# Patient Record
Sex: FEMALE | Race: WHITE | NOT HISPANIC OR LATINO | Employment: UNEMPLOYED | URBAN - NONMETROPOLITAN AREA
[De-identification: names, ages, dates, MRNs, and addresses within clinical notes are randomized per-mention and may not be internally consistent; named-entity substitution may affect disease eponyms.]

---

## 2019-02-14 ENCOUNTER — HOSPITAL ENCOUNTER (EMERGENCY)
Facility: HOSPITAL | Age: 54
Discharge: HOME | End: 2019-02-14
Attending: EMERGENCY MEDICINE | Admitting: EMERGENCY MEDICINE

## 2019-02-14 ENCOUNTER — HOSPITAL ENCOUNTER (EMERGENCY)
Facility: HOSPITAL | Age: 54
Discharge: HOME | End: 2019-02-15
Attending: EMERGENCY MEDICINE | Admitting: EMERGENCY MEDICINE

## 2019-02-14 VITALS
RESPIRATION RATE: 16 BRPM | HEART RATE: 90 BPM | OXYGEN SATURATION: 99 % | WEIGHT: 125 LBS | BODY MASS INDEX: 20.83 KG/M2 | DIASTOLIC BLOOD PRESSURE: 73 MMHG | TEMPERATURE: 97.8 F | SYSTOLIC BLOOD PRESSURE: 115 MMHG | HEIGHT: 65 IN

## 2019-02-14 DIAGNOSIS — E87.1 HYPONATREMIA: ICD-10-CM

## 2019-02-14 DIAGNOSIS — Z59.00 HOMELESSNESS: Primary | ICD-10-CM

## 2019-02-14 DIAGNOSIS — F10.10 ETOH ABUSE: Primary | ICD-10-CM

## 2019-02-14 DIAGNOSIS — Z59.00 HOMELESSNESS: ICD-10-CM

## 2019-02-14 DIAGNOSIS — R07.9 CHEST PAIN, UNSPECIFIED TYPE: ICD-10-CM

## 2019-02-14 LAB
ALBUMIN SERPL-MCNC: 4.5 G/DL (ref 3.5–5)
ALBUMIN/GLOB SERPL: 1.3 G/DL (ref 1–2)
ALP SERPL-CCNC: 87 U/L (ref 38–126)
ALT SERPL W P-5'-P-CCNC: 34 U/L (ref 13–69)
ANION GAP SERPL CALCULATED.3IONS-SCNC: 16 MMOL/L (ref 10–20)
AST SERPL-CCNC: 59 U/L (ref 15–46)
BASOPHILS # BLD AUTO: 0.03 10*3/MM3 (ref 0–0.2)
BASOPHILS # BLD AUTO: 0.06 10*3/MM3 (ref 0–0.2)
BASOPHILS NFR BLD AUTO: 0.4 % (ref 0–2.5)
BASOPHILS NFR BLD AUTO: 1 % (ref 0–2.5)
BILIRUB SERPL-MCNC: 0.3 MG/DL (ref 0.2–1.3)
BUN BLD-MCNC: 3 MG/DL (ref 7–20)
BUN/CREAT SERPL: 7.5 (ref 7.1–23.5)
CALCIUM SPEC-SCNC: 8.9 MG/DL (ref 8.4–10.2)
CHLORIDE SERPL-SCNC: 94 MMOL/L (ref 98–107)
CO2 SERPL-SCNC: 20 MMOL/L (ref 26–30)
CREAT BLD-MCNC: 0.4 MG/DL (ref 0.6–1.3)
DEPRECATED RDW RBC AUTO: 46.6 FL (ref 37–54)
DEPRECATED RDW RBC AUTO: 47.2 FL (ref 37–54)
EOSINOPHIL # BLD AUTO: 0.09 10*3/MM3 (ref 0–0.7)
EOSINOPHIL # BLD AUTO: 0.19 10*3/MM3 (ref 0–0.7)
EOSINOPHIL NFR BLD AUTO: 1.3 % (ref 0–7)
EOSINOPHIL NFR BLD AUTO: 3.2 % (ref 0–7)
ERYTHROCYTE [DISTWIDTH] IN BLOOD BY AUTOMATED COUNT: 12.5 % (ref 11.5–14.5)
ERYTHROCYTE [DISTWIDTH] IN BLOOD BY AUTOMATED COUNT: 12.5 % (ref 11.5–14.5)
ETHANOL BLD-MCNC: 236 MG/DL
ETHANOL UR QL: 0.24 %
GFR SERPL CREATININE-BSD FRML MDRD: >150 ML/MIN/1.73
GLOBULIN UR ELPH-MCNC: 3.4 GM/DL
GLUCOSE BLD-MCNC: 76 MG/DL (ref 74–98)
HCT VFR BLD AUTO: 37.1 % (ref 37–47)
HCT VFR BLD AUTO: 37.8 % (ref 37–47)
HGB BLD-MCNC: 13 G/DL (ref 12–16)
HGB BLD-MCNC: 13.1 G/DL (ref 12–16)
HOLD SPECIMEN: NORMAL
HOLD SPECIMEN: NORMAL
IMM GRANULOCYTES # BLD AUTO: 0.02 10*3/MM3 (ref 0–0.06)
IMM GRANULOCYTES # BLD AUTO: 0.03 10*3/MM3 (ref 0–0.06)
IMM GRANULOCYTES NFR BLD AUTO: 0.3 % (ref 0–0.6)
IMM GRANULOCYTES NFR BLD AUTO: 0.5 % (ref 0–0.6)
LYMPHOCYTES # BLD AUTO: 0.57 10*3/MM3 (ref 0.6–3.4)
LYMPHOCYTES # BLD AUTO: 1.33 10*3/MM3 (ref 0.6–3.4)
LYMPHOCYTES NFR BLD AUTO: 22.7 % (ref 10–50)
LYMPHOCYTES NFR BLD AUTO: 8.1 % (ref 10–50)
MCH RBC QN AUTO: 34.9 PG (ref 27–31)
MCH RBC QN AUTO: 35.6 PG (ref 27–31)
MCHC RBC AUTO-ENTMCNC: 34.4 G/DL (ref 30–37)
MCHC RBC AUTO-ENTMCNC: 35.3 G/DL (ref 30–37)
MCV RBC AUTO: 100.8 FL (ref 81–99)
MCV RBC AUTO: 101.6 FL (ref 81–99)
MONOCYTES # BLD AUTO: 0.56 10*3/MM3 (ref 0–0.9)
MONOCYTES # BLD AUTO: 0.59 10*3/MM3 (ref 0–0.9)
MONOCYTES NFR BLD AUTO: 10.1 % (ref 0–12)
MONOCYTES NFR BLD AUTO: 8 % (ref 0–12)
NEUTROPHILS # BLD AUTO: 3.65 10*3/MM3 (ref 2–6.9)
NEUTROPHILS # BLD AUTO: 5.73 10*3/MM3 (ref 2–6.9)
NEUTROPHILS NFR BLD AUTO: 62.5 % (ref 37–80)
NEUTROPHILS NFR BLD AUTO: 81.9 % (ref 37–80)
NRBC BLD AUTO-RTO: 0 /100 WBC (ref 0–0)
NRBC BLD AUTO-RTO: 0 /100 WBC (ref 0–0)
PLATELET # BLD AUTO: 107 10*3/MM3 (ref 130–400)
PLATELET # BLD AUTO: 123 10*3/MM3 (ref 130–400)
PMV BLD AUTO: 9.8 FL (ref 6–12)
PMV BLD AUTO: 9.9 FL (ref 6–12)
POTASSIUM BLD-SCNC: 4 MMOL/L (ref 3.5–5.1)
PROT SERPL-MCNC: 7.9 G/DL (ref 6.3–8.2)
RBC # BLD AUTO: 3.68 10*6/MM3 (ref 4.2–5.4)
RBC # BLD AUTO: 3.72 10*6/MM3 (ref 4.2–5.4)
SODIUM BLD-SCNC: 126 MMOL/L (ref 137–145)
TROPONIN I SERPL-MCNC: <0.012 NG/ML (ref 0–0.03)
WBC NRBC COR # BLD: 5.85 10*3/MM3 (ref 4.8–10.8)
WBC NRBC COR # BLD: 7 10*3/MM3 (ref 4.8–10.8)
WHOLE BLOOD HOLD SPECIMEN: NORMAL
WHOLE BLOOD HOLD SPECIMEN: NORMAL

## 2019-02-14 PROCEDURE — 99283 EMERGENCY DEPT VISIT LOW MDM: CPT

## 2019-02-14 PROCEDURE — 85025 COMPLETE CBC W/AUTO DIFF WBC: CPT | Performed by: EMERGENCY MEDICINE

## 2019-02-14 PROCEDURE — 85025 COMPLETE CBC W/AUTO DIFF WBC: CPT | Performed by: PHYSICIAN ASSISTANT

## 2019-02-14 PROCEDURE — 96375 TX/PRO/DX INJ NEW DRUG ADDON: CPT

## 2019-02-14 PROCEDURE — 25010000002 LORAZEPAM PER 2 MG: Performed by: EMERGENCY MEDICINE

## 2019-02-14 PROCEDURE — 25010000002 DIPHENHYDRAMINE PER 50 MG: Performed by: EMERGENCY MEDICINE

## 2019-02-14 PROCEDURE — 96361 HYDRATE IV INFUSION ADD-ON: CPT

## 2019-02-14 PROCEDURE — 96360 HYDRATION IV INFUSION INIT: CPT

## 2019-02-14 PROCEDURE — 84484 ASSAY OF TROPONIN QUANT: CPT | Performed by: PHYSICIAN ASSISTANT

## 2019-02-14 PROCEDURE — 80307 DRUG TEST PRSMV CHEM ANLYZR: CPT | Performed by: PHYSICIAN ASSISTANT

## 2019-02-14 PROCEDURE — 93005 ELECTROCARDIOGRAM TRACING: CPT | Performed by: EMERGENCY MEDICINE

## 2019-02-14 PROCEDURE — 80053 COMPREHEN METABOLIC PANEL: CPT | Performed by: PHYSICIAN ASSISTANT

## 2019-02-14 PROCEDURE — 93005 ELECTROCARDIOGRAM TRACING: CPT

## 2019-02-14 PROCEDURE — 96374 THER/PROPH/DIAG INJ IV PUSH: CPT

## 2019-02-14 RX ORDER — CALCIUM CARBONATE 200(500)MG
2 TABLET,CHEWABLE ORAL AS NEEDED
COMMUNITY

## 2019-02-14 RX ORDER — DIPHENHYDRAMINE HYDROCHLORIDE 50 MG/ML
25 INJECTION INTRAMUSCULAR; INTRAVENOUS ONCE
Status: COMPLETED | OUTPATIENT
Start: 2019-02-14 | End: 2019-02-14

## 2019-02-14 RX ORDER — ASPIRIN 325 MG
650 TABLET ORAL DAILY
Status: ON HOLD | COMMUNITY
End: 2019-04-18 | Stop reason: SDUPTHER

## 2019-02-14 RX ORDER — LORAZEPAM 2 MG/ML
1 INJECTION INTRAMUSCULAR ONCE
Status: COMPLETED | OUTPATIENT
Start: 2019-02-14 | End: 2019-02-14

## 2019-02-14 RX ORDER — SODIUM CHLORIDE 0.9 % (FLUSH) 0.9 %
10 SYRINGE (ML) INJECTION AS NEEDED
Status: DISCONTINUED | OUTPATIENT
Start: 2019-02-14 | End: 2019-02-14 | Stop reason: HOSPADM

## 2019-02-14 RX ADMIN — SODIUM CHLORIDE 1000 ML: 9 INJECTION, SOLUTION INTRAVENOUS at 22:44

## 2019-02-14 RX ADMIN — LORAZEPAM 1 MG: 2 INJECTION, SOLUTION INTRAMUSCULAR; INTRAVENOUS at 22:32

## 2019-02-14 RX ADMIN — SODIUM CHLORIDE 1000 ML: 9 INJECTION, SOLUTION INTRAVENOUS at 13:49

## 2019-02-14 RX ADMIN — DIPHENHYDRAMINE HYDROCHLORIDE 25 MG: 50 INJECTION, SOLUTION INTRAMUSCULAR; INTRAVENOUS at 22:32

## 2019-02-14 SDOH — ECONOMIC STABILITY - HOUSING INSECURITY: HOMELESSNESS UNSPECIFIED: Z59.00

## 2019-02-14 NOTE — ED NOTES
Patient was given paper scrubs, nonskid socks, sack lunch, and cola.     Bella Mccoy RN  02/14/19 8856

## 2019-02-14 NOTE — ED NOTES
Attempted to contact Collin Bundy [phone], and Faustino Mclain [phone], and [phone].  No answer.       Bella Mccoy RN  02/14/19 3339

## 2019-02-14 NOTE — ED NOTES
Erin (RECIO) attempting to find a place for patient to stay.      Bella Mccoy, RN  02/14/19 3839

## 2019-02-14 NOTE — PROGRESS NOTES
Continued Stay Note  [facility]     Patient Name: Altagracia King  MRN: [id number]  Today's Date: 2/14/2019    Admit Date: 2/14/2019    Discharge Plan     Row Name 02/14/19 7137       Plan    Plan  Spoke to pt. who states she has no where to lives. Provided homeles shelter list, bus tokens and notified of ability to ride [facility] [address] [facility] or use bus token to [address] [facility] [address] in [place] to [employer]. After 5 will need to get taxi voucher to [employer].    Plan Comments  Notified nurse.    Row Name 02/14/19 4027       Plan    Plan  Consult for homelessness        Discharge Codes    No documentation.             Zaida Campbell

## 2019-02-14 NOTE — ED PROVIDER NOTES
Subjective   53-year-old female presents with multiple complaints, she states that she's got glaucoma and bilateral eyes.  The left is blind and clouded, she has glaucoma the right she states that vision is getting worse.  She also states that she has weakness, and chest pain, she admits that she for several years, he put around she's been standing a hotel.  She recently Hotel and has nowhere to go  after being put on a hotel she called 911 to come to the emergency department.  She's had multiple complaints, she's not been eating and drinking well, and she cannot care for herself.        History provided by:  Patient   used: No        Review of Systems   Constitutional: Positive for appetite change and fatigue.   Cardiovascular: Positive for chest pain.   Musculoskeletal: Positive for myalgias.   Neurological: Positive for weakness.   All other systems reviewed and are negative.      History reviewed. No pertinent past medical history.    No Known Allergies    Past Surgical History:   Procedure Laterality Date   • CARDIAC CATHETERIZATION         History reviewed. No pertinent family history.    Social History     Socioeconomic History   • Marital status:      Spouse name: Not on file   • Number of children: Not on file   • Years of education: Not on file   • Highest education level: Not on file   Tobacco Use   • Smoking status: Current Every Day Smoker     Packs/day: 1.00   • Smokeless tobacco: Never Used   Substance and Sexual Activity   • Alcohol use: Yes     Alcohol/week: 2.4 oz     Types: 4 Cans of beer per week           Objective   Physical Exam   Constitutional: She is oriented to person, place, and time.   Disheveled appearance,   Eyes:       Left eye completely clouded no vision in any visual field.  Right eye is partially cloudy   Neck: Normal range of motion. Neck supple.   Cardiovascular: Normal rate and regular rhythm.   Pulmonary/Chest: Effort normal and breath sounds  normal.   Abdominal: Soft. Bowel sounds are normal.   Musculoskeletal:   cachectic appearing   Neurological: She is alert and oriented to person, place, and time. She has normal reflexes.   Skin:   Multiple areas of picked skin/scabs   Psychiatric: She has a normal mood and affect.   Nursing note and vitals reviewed.      Procedures           ED Course  ED Course as of Feb 18 1006   Thu Feb 14, 2019   1454 Patient has an available bed at a shelter, will discharge after liter of normal saline.  She does have low sodium and signs of dehydration, but she is a chronic alcoholic I suspect that her levels are low at baseline.  [CS]   1504 EKG reveals sinus rhythm with a rate of 92 bpm.  There are occasional premature ventricular complexes.  No acute ischemic signs or signs of arrhythmia.  Atypical appearing EKG.  [TB]   1707 With patient unable to go to [facility] due to needing assistance, she is legally blind, she has been for years but she doesn't have help  [CS]   1716 Discussed with yaneth , facility in [place] can accomodate  [CS]      ED Course User Index  [CS] Gómez Klein Jr., PA-C  [TB] Mckayla Jackson MD                  MDM  Number of Diagnoses or Management Options  ETOH abuse: new and requires workup  Homelessness: new and requires workup  Hyponatremia: new and requires workup     Amount and/or Complexity of Data Reviewed  Clinical lab tests: reviewed  Discuss the patient with other providers: yes    Risk of Complications, Morbidity, and/or Mortality  Presenting problems: low  Diagnostic procedures: moderate  Management options: moderate    Patient Progress  Patient progress: stable        Final diagnoses:   ETOH abuse   Hyponatremia   Homelessness            Gómez Klein Jr., PA-C  02/14/19 1553       Gómez Klein Jr., PA-C  02/18/19 1007

## 2019-02-15 VITALS
RESPIRATION RATE: 18 BRPM | WEIGHT: 125 LBS | OXYGEN SATURATION: 99 % | SYSTOLIC BLOOD PRESSURE: 116 MMHG | HEART RATE: 116 BPM | TEMPERATURE: 98.1 F | HEIGHT: 65 IN | DIASTOLIC BLOOD PRESSURE: 89 MMHG | BODY MASS INDEX: 20.83 KG/M2

## 2019-02-15 LAB
ALBUMIN SERPL-MCNC: 3.4 G/DL (ref 3.5–5)
ALBUMIN/GLOB SERPL: 1.3 G/DL (ref 1–2)
ALP SERPL-CCNC: 65 U/L (ref 38–126)
ALT SERPL W P-5'-P-CCNC: 28 U/L (ref 13–69)
ANION GAP SERPL CALCULATED.3IONS-SCNC: 10.3 MMOL/L (ref 10–20)
AST SERPL-CCNC: 31 U/L (ref 15–46)
BILIRUB SERPL-MCNC: 0.3 MG/DL (ref 0.2–1.3)
BUN BLD-MCNC: 8 MG/DL (ref 7–20)
BUN/CREAT SERPL: 16 (ref 7.1–23.5)
CALCIUM SPEC-SCNC: 8.2 MG/DL (ref 8.4–10.2)
CHLORIDE SERPL-SCNC: 104 MMOL/L (ref 98–107)
CO2 SERPL-SCNC: 22 MMOL/L (ref 26–30)
CREAT BLD-MCNC: 0.5 MG/DL (ref 0.6–1.3)
GFR SERPL CREATININE-BSD FRML MDRD: 128 ML/MIN/1.73
GLOBULIN UR ELPH-MCNC: 2.7 GM/DL
GLUCOSE BLD-MCNC: 72 MG/DL (ref 74–98)
GLUCOSE BLDC GLUCOMTR-MCNC: 75 MG/DL (ref 70–130)
POTASSIUM BLD-SCNC: 4.3 MMOL/L (ref 3.5–5.1)
PROT SERPL-MCNC: 6.1 G/DL (ref 6.3–8.2)
SODIUM BLD-SCNC: 132 MMOL/L (ref 137–145)

## 2019-02-15 PROCEDURE — 80053 COMPREHEN METABOLIC PANEL: CPT | Performed by: EMERGENCY MEDICINE

## 2019-02-15 PROCEDURE — 82962 GLUCOSE BLOOD TEST: CPT

## 2019-02-15 NOTE — PROGRESS NOTES
Continued Stay Note  [facility]     Patient Name: Altagracia King  MRN: [id number]  Today's Date: 2/15/2019    Admit Date: 2/14/2019    Discharge Plan     Row Name 02/15/19 1215       Plan    Plan  Have called three shelters only bed at this point [facility] [place] [facility] but it is a top bunk. Will continue to look for bed. Has no Medicare. [facility] is looking to determine if she has skilled nursing benefits    Row Name 02/15/19 1148       Plan    Plan  Patient's walker okay to bill to [facility].    Row Name 02/15/19 1047       Plan    Plan  APS report number [id number].        Discharge Codes    No documentation.             Zaida Campbell

## 2019-02-15 NOTE — ED PROVIDER NOTES
"Subjective   53-year-old female presents to the ED with chief complaint of chest pain.  The patient was seen earlier today and evaluated and discharged after a negative workup for chest pain.  She states it is not went away.  On further questioning and review it is found out that the patient was homeless without a place to stay.  It was arranged for her to go to a shelter via cab but the patient got correction to [place] and then had the  take her back to a trailer in [place]. The patient then states that she may have did some \"drugs\" with her friend who then told her to leave and called the police on her. Pt was brought back to the ED.             Review of Systems   Cardiovascular: Positive for chest pain.   All other systems reviewed and are negative.      History reviewed. No pertinent past medical history.    No Known Allergies    Past Surgical History:   Procedure Laterality Date   • CARDIAC CATHETERIZATION         History reviewed. No pertinent family history.    Social History     Socioeconomic History   • Marital status:      Spouse name: Not on file   • Number of children: Not on file   • Years of education: Not on file   • Highest education level: Not on file   Tobacco Use   • Smoking status: Current Every Day Smoker     Packs/day: 1.00   • Smokeless tobacco: Never Used   Substance and Sexual Activity   • Alcohol use: Yes     Alcohol/week: 2.4 oz     Types: 4 Cans of beer per week           Objective   Physical Exam   Constitutional: She is oriented to person, place, and time. No distress.   Chronically ill-appearing   HENT:   Head: Normocephalic and atraumatic.   Nose: Nose normal.   Eyes:   blindness   Cardiovascular: Regular rhythm.   Tachycardia   Pulmonary/Chest: Effort normal and breath sounds normal. No respiratory distress.   Abdominal: Soft. She exhibits no distension. There is no tenderness. There is no guarding.   Musculoskeletal: She exhibits no edema or deformity. "   Neurological: She is alert and oriented to person, place, and time. No cranial nerve deficit.   Skin: She is not diaphoretic.   Nursing note and vitals reviewed.      Procedures           ED Course          Workup Unremarkable.  Patient will be discharged to a facility.        [facility]      Final diagnoses:   Homelessness   Chest pain, unspecified type            Yusef Murphy, DO  02/15/19 0315

## 2019-02-15 NOTE — PROGRESS NOTES
Continued Stay Note  [facility]     Patient Name: Altagracia King  MRN: [id number]  Today's Date: 2/15/2019    Admit Date: 2/14/2019    Discharge Plan     Row Name 02/15/19 1047       Plan    Plan  [facility] report number [id number].    Row Name 02/15/19 0841       Plan    Plan  Called last evening on call. Apparently pt. told  to take her tofriends home and not to shelter as planned. Police were called per staff and cab brought her back around 9 last evening. Plan was for her to go to shelter. At around 1 am one shelter refused and unsure if other would take at 1 am. Spoke to pt. and she would return to friends. Plan was for staff to offere cab voucher to her desired location and SW would follow up in am.     Plan Comments  Called [facility] this am and am waiting a call back for acceptance. Pt legally blind perstaff but has been living in hotel caring for herself and directed  to friends house without difficulty. She needs to be able to walk up stairs at local [facility] [employer] so this is an option also. Discussed  with staff.        Discharge Codes    No documentation.             Zaida Campbell

## 2019-02-15 NOTE — ED NOTES
I spoke with Zaida Campbell and stated that the patient can be sent to [facility] in [place] to stay.  She stated that they will be able to help the patient with her needs.  Erin, (house supervisor), gave patient cab voucher to [facility] in [place].  Patient A & O x 4, resp e/u, skin p/w/d.      Bella Mccoy, RN  02/14/19 1940

## 2019-02-15 NOTE — ED NOTES
Pt loudly calling out for something to drink. Provided her with an ice water and a warm blanket. Educated her on how to use the call light for further needs.      Rose Paz RN  02/14/19 2294

## 2019-02-15 NOTE — PROGRESS NOTES
Continued Stay Note  [facility]     Patient Name: Altagracia King  MRN: [id number]  Today's Date: 2/15/2019    Admit Date: 2/14/2019    Discharge Plan     Row Name 02/15/19 1237       Plan    Plan  Left message for [facility] staff to see if local shelter has bed.    Row Name 02/15/19 1215       Plan    Plan  Have called three shelters only bed at this point [facility] [place] [facility] but it is a top bunk. Will continue to look for bed. Has no Medicare. [facility] is looking to determine if she has skilled nursing benefits    Row Name 02/15/19 1148       Plan    Plan  Patient's walker okay to bill to [facility].    Row Name 02/15/19 1047       Plan    Plan  APS report number [id number].        Discharge Codes    No documentation.             Zaida Campbell

## 2019-02-15 NOTE — ED NOTES
Lab called to report that specimen appeared contaminated.  They are coming to redraw.     Rose Paz, LIANE  02/15/19 0023

## 2019-02-15 NOTE — PROGRESS NOTES
Continued Stay Note  [facility]     Patient Name: Altagracia King  MRN: [id number]  Today's Date: 2/15/2019    Admit Date: 2/14/2019    Discharge Plan     Row Name 02/15/19 1321       Plan    Plan  Call to [facility] in [place], they have beds. Located at [address] [place] [address]. Discussed with pt. and provided a taxi voucher for her.    Plan Comments  Provided food and socks and patient left with all her belongings. Verified she has no nursing home benefits. Personal walker also left in pt's possession. Pt. in agreement with this plan.    Row Name 02/15/19 1237       Plan    Plan  Left message for [facility] staff to see if local shelter has bed.    Row Name 02/15/19 1215       Plan    Plan  Have called three shelters only bed at this point [facility] [place] [facility] but it is a top bunk. Will continue to look for bed. Has no Medicare. [facility] is looking to determine if she has skilled nursing benefits    Row Name 02/15/19 1148       Plan    Plan  Patient's walker okay to bill to [facility].    Row Name 02/15/19 1047       Plan    Plan  APS report number [id number].        Discharge Codes    No documentation.             Zaida Campbell

## 2019-02-15 NOTE — PROGRESS NOTES
Continued Stay Note  [facility]     Patient Name: Altagracia King  MRN: [id number]  Today's Date: 2/15/2019    Admit Date: 2/14/2019    Discharge Plan     Row Name 02/15/19 0841       Plan    Plan  Called last evening on call. Apparently pt. told  to take her tofriends home and not to shelter as planned. Police were called per staff and cab brought her back around 9 last evening. Plan was for her to go to shelter. At around 1 am one shelter refused and unsure if other would take at 1 am. Spoke to pt. and she would return to friends. Plan was for staff to offere cab voucher to her desired location and SW would follow up in am.     Plan Comments  Called [facility] this am and am waiting a call back for acceptance. Pt legally blind perstaff but has been living in hotel caring for herself and directed  to friends house without difficulty. She needs to be able to walk up stairs at local [employer] so this is an option also. Discussed  with staff.        Discharge Codes    No documentation.             Zaida Campbell

## 2019-02-15 NOTE — PROGRESS NOTES
Continued Stay Note  [facility]     Patient Name: Altagracia King  MRN: [id number]  Today's Date: 2/15/2019    Admit Date: 2/14/2019    Discharge Plan     Row Name 02/15/19 1148       Plan    Plan  Patient's walker okay to bill to [facility].    Row Name 02/15/19 1047       Plan    Plan  APS report number [id number].        Discharge Codes    No documentation.             Zaida Campbell

## 2019-02-22 ENCOUNTER — HOSPITAL ENCOUNTER (INPATIENT)
Facility: HOSPITAL | Age: 54
LOS: 54 days | Discharge: NURSING, REHAB OR LONG-TERM CARE | End: 2019-04-18
Attending: FAMILY MEDICINE | Admitting: INTERNAL MEDICINE

## 2019-02-22 ENCOUNTER — APPOINTMENT (OUTPATIENT)
Dept: GENERAL RADIOLOGY | Facility: HOSPITAL | Age: 54
End: 2019-02-22

## 2019-02-22 DIAGNOSIS — R07.9 CHEST PAIN, UNSPECIFIED TYPE: Primary | ICD-10-CM

## 2019-02-22 DIAGNOSIS — N30.00 ACUTE CYSTITIS WITHOUT HEMATURIA: ICD-10-CM

## 2019-02-22 LAB
6-ACETYL MORPHINE: NEGATIVE
ALBUMIN SERPL-MCNC: 4.2 G/DL (ref 3.5–5)
ALBUMIN/GLOB SERPL: 1.5 G/DL (ref 1.5–2.5)
ALP SERPL-CCNC: 69 U/L (ref 35–104)
ALT SERPL W P-5'-P-CCNC: 15 U/L (ref 10–36)
AMPHET+METHAMPHET UR QL: NEGATIVE
ANION GAP SERPL CALCULATED.3IONS-SCNC: 5.5 MMOL/L (ref 3.6–11.2)
AST SERPL-CCNC: 23 U/L (ref 10–30)
BACTERIA UR QL AUTO: ABNORMAL /HPF
BARBITURATES UR QL SCN: NEGATIVE
BASOPHILS # BLD AUTO: 0.02 10*3/MM3 (ref 0–0.3)
BASOPHILS NFR BLD AUTO: 0.4 % (ref 0–2)
BENZODIAZ UR QL SCN: NEGATIVE
BILIRUB SERPL-MCNC: 0.2 MG/DL (ref 0.2–1.8)
BILIRUB UR QL STRIP: NEGATIVE
BNP SERPL-MCNC: 162 PG/ML (ref 0–100)
BUN BLD-MCNC: 16 MG/DL (ref 7–21)
BUN/CREAT SERPL: 23.2 (ref 7–25)
BUPRENORPHINE SERPL-MCNC: NEGATIVE NG/ML
CALCIUM SPEC-SCNC: 9.1 MG/DL (ref 7.7–10)
CANNABINOIDS SERPL QL: NEGATIVE
CHLORIDE SERPL-SCNC: 102 MMOL/L (ref 99–112)
CK SERPL-CCNC: 41 U/L (ref 24–173)
CLARITY UR: CLEAR
CO2 SERPL-SCNC: 24.5 MMOL/L (ref 24.3–31.9)
COCAINE UR QL: NEGATIVE
COLOR UR: YELLOW
CREAT BLD-MCNC: 0.69 MG/DL (ref 0.43–1.29)
DEPRECATED RDW RBC AUTO: 47.4 FL (ref 37–54)
EOSINOPHIL # BLD AUTO: 0.07 10*3/MM3 (ref 0–0.7)
EOSINOPHIL NFR BLD AUTO: 1.2 % (ref 0–5)
ERYTHROCYTE [DISTWIDTH] IN BLOOD BY AUTOMATED COUNT: 12.7 % (ref 11.5–14.5)
ETHANOL BLD-MCNC: <10 MG/DL
ETHANOL UR QL: <0.01 %
GFR SERPL CREATININE-BSD FRML MDRD: 88 ML/MIN/1.73
GLOBULIN UR ELPH-MCNC: 2.8 GM/DL
GLUCOSE BLD-MCNC: 104 MG/DL (ref 70–110)
GLUCOSE UR STRIP-MCNC: NEGATIVE MG/DL
HCT VFR BLD AUTO: 34.4 % (ref 37–47)
HGB BLD-MCNC: 11.2 G/DL (ref 12–16)
HGB UR QL STRIP.AUTO: NEGATIVE
HOLD SPECIMEN: NORMAL
HOLD SPECIMEN: NORMAL
HYALINE CASTS UR QL AUTO: ABNORMAL /LPF
IMM GRANULOCYTES # BLD AUTO: 0.01 10*3/MM3 (ref 0–0.03)
IMM GRANULOCYTES NFR BLD AUTO: 0.2 % (ref 0–0.5)
KETONES UR QL STRIP: NEGATIVE
LEUKOCYTE ESTERASE UR QL STRIP.AUTO: ABNORMAL
LYMPHOCYTES # BLD AUTO: 0.85 10*3/MM3 (ref 1–3)
LYMPHOCYTES NFR BLD AUTO: 15 % (ref 21–51)
MAGNESIUM SERPL-MCNC: 2 MG/DL (ref 1.7–2.6)
MCH RBC QN AUTO: 33.9 PG (ref 27–33)
MCHC RBC AUTO-ENTMCNC: 32.6 G/DL (ref 33–37)
MCV RBC AUTO: 104.2 FL (ref 80–94)
METHADONE UR QL SCN: NEGATIVE
MONOCYTES # BLD AUTO: 1.09 10*3/MM3 (ref 0.1–0.9)
MONOCYTES NFR BLD AUTO: 19.3 % (ref 0–10)
MYOGLOBIN SERPL-MCNC: 21 NG/ML (ref 0–109)
NEUTROPHILS # BLD AUTO: 3.62 10*3/MM3 (ref 1.4–6.5)
NEUTROPHILS NFR BLD AUTO: 63.9 % (ref 30–70)
NITRITE UR QL STRIP: POSITIVE
OPIATES UR QL: NEGATIVE
OSMOLALITY SERPL CALC.SUM OF ELEC: 266 MOSM/KG (ref 273–305)
OXYCODONE UR QL SCN: NEGATIVE
PCP UR QL SCN: NEGATIVE
PH UR STRIP.AUTO: 7.5 [PH] (ref 5–8)
PLATELET # BLD AUTO: 143 10*3/MM3 (ref 130–400)
PMV BLD AUTO: 10.2 FL (ref 6–10)
POTASSIUM BLD-SCNC: 3.9 MMOL/L (ref 3.5–5.3)
PROT SERPL-MCNC: 7 G/DL (ref 6–8)
PROT UR QL STRIP: NEGATIVE
RBC # BLD AUTO: 3.3 10*6/MM3 (ref 4.2–5.4)
RBC # UR: ABNORMAL /HPF
REF LAB TEST METHOD: ABNORMAL
SODIUM BLD-SCNC: 132 MMOL/L (ref 135–153)
SP GR UR STRIP: 1.01 (ref 1–1.03)
SQUAMOUS #/AREA URNS HPF: ABNORMAL /HPF
TROPONIN I SERPL-MCNC: <0.006 NG/ML
TROPONIN I SERPL-MCNC: <0.006 NG/ML
TSH SERPL DL<=0.05 MIU/L-ACNC: 7 MIU/ML (ref 0.55–4.78)
UROBILINOGEN UR QL STRIP: ABNORMAL
WBC NRBC COR # BLD: 5.66 10*3/MM3 (ref 4.5–12.5)
WBC UR QL AUTO: ABNORMAL /HPF
WHOLE BLOOD HOLD SPECIMEN: NORMAL
WHOLE BLOOD HOLD SPECIMEN: NORMAL

## 2019-02-22 PROCEDURE — 36415 COLL VENOUS BLD VENIPUNCTURE: CPT

## 2019-02-22 PROCEDURE — 80307 DRUG TEST PRSMV CHEM ANLYZR: CPT | Performed by: FAMILY MEDICINE

## 2019-02-22 PROCEDURE — 81001 URINALYSIS AUTO W/SCOPE: CPT | Performed by: FAMILY MEDICINE

## 2019-02-22 PROCEDURE — 85025 COMPLETE CBC W/AUTO DIFF WBC: CPT | Performed by: FAMILY MEDICINE

## 2019-02-22 PROCEDURE — 71045 X-RAY EXAM CHEST 1 VIEW: CPT

## 2019-02-22 PROCEDURE — 25010000002 CEFTRIAXONE: Performed by: FAMILY MEDICINE

## 2019-02-22 PROCEDURE — G0378 HOSPITAL OBSERVATION PER HR: HCPCS

## 2019-02-22 PROCEDURE — 93010 ELECTROCARDIOGRAM REPORT: CPT | Performed by: INTERNAL MEDICINE

## 2019-02-22 PROCEDURE — 83874 ASSAY OF MYOGLOBIN: CPT | Performed by: FAMILY MEDICINE

## 2019-02-22 PROCEDURE — 71045 X-RAY EXAM CHEST 1 VIEW: CPT | Performed by: RADIOLOGY

## 2019-02-22 PROCEDURE — 93005 ELECTROCARDIOGRAM TRACING: CPT | Performed by: PHYSICIAN ASSISTANT

## 2019-02-22 PROCEDURE — 99285 EMERGENCY DEPT VISIT HI MDM: CPT

## 2019-02-22 PROCEDURE — 83735 ASSAY OF MAGNESIUM: CPT | Performed by: FAMILY MEDICINE

## 2019-02-22 PROCEDURE — 84443 ASSAY THYROID STIM HORMONE: CPT | Performed by: FAMILY MEDICINE

## 2019-02-22 PROCEDURE — 93005 ELECTROCARDIOGRAM TRACING: CPT | Performed by: EMERGENCY MEDICINE

## 2019-02-22 PROCEDURE — 83880 ASSAY OF NATRIURETIC PEPTIDE: CPT | Performed by: FAMILY MEDICINE

## 2019-02-22 PROCEDURE — 80053 COMPREHEN METABOLIC PANEL: CPT | Performed by: FAMILY MEDICINE

## 2019-02-22 PROCEDURE — 82550 ASSAY OF CK (CPK): CPT | Performed by: FAMILY MEDICINE

## 2019-02-22 PROCEDURE — 93005 ELECTROCARDIOGRAM TRACING: CPT | Performed by: FAMILY MEDICINE

## 2019-02-22 PROCEDURE — 84484 ASSAY OF TROPONIN QUANT: CPT | Performed by: FAMILY MEDICINE

## 2019-02-22 RX ORDER — SODIUM CHLORIDE 0.9 % (FLUSH) 0.9 %
3 SYRINGE (ML) INJECTION EVERY 12 HOURS SCHEDULED
Status: DISCONTINUED | OUTPATIENT
Start: 2019-02-23 | End: 2019-04-18 | Stop reason: HOSPADM

## 2019-02-22 RX ORDER — FAMOTIDINE 10 MG/ML
INJECTION, SOLUTION INTRAVENOUS
Status: COMPLETED
Start: 2019-02-22 | End: 2019-02-22

## 2019-02-22 RX ORDER — FAMOTIDINE 10 MG/ML
20 INJECTION, SOLUTION INTRAVENOUS ONCE
Status: COMPLETED | OUTPATIENT
Start: 2019-02-22 | End: 2019-02-22

## 2019-02-22 RX ORDER — ACETAMINOPHEN 325 MG/1
650 TABLET ORAL EVERY 4 HOURS PRN
Status: DISCONTINUED | OUTPATIENT
Start: 2019-02-22 | End: 2019-04-18 | Stop reason: HOSPADM

## 2019-02-22 RX ORDER — ONDANSETRON 2 MG/ML
4 INJECTION INTRAMUSCULAR; INTRAVENOUS EVERY 6 HOURS PRN
Status: DISCONTINUED | OUTPATIENT
Start: 2019-02-22 | End: 2019-04-02

## 2019-02-22 RX ORDER — SODIUM CHLORIDE 0.9 % (FLUSH) 0.9 %
3-10 SYRINGE (ML) INJECTION AS NEEDED
Status: DISCONTINUED | OUTPATIENT
Start: 2019-02-22 | End: 2019-04-18 | Stop reason: HOSPADM

## 2019-02-22 RX ORDER — CALCIUM CARBONATE 200(500)MG
2 TABLET,CHEWABLE ORAL 2 TIMES DAILY PRN
Status: DISCONTINUED | OUTPATIENT
Start: 2019-02-22 | End: 2019-04-18 | Stop reason: HOSPADM

## 2019-02-22 RX ORDER — NITROGLYCERIN 0.4 MG/1
0.4 TABLET SUBLINGUAL
Status: DISCONTINUED | OUTPATIENT
Start: 2019-02-22 | End: 2019-04-18 | Stop reason: HOSPADM

## 2019-02-22 RX ADMIN — FAMOTIDINE 20 MG: 10 INJECTION, SOLUTION INTRAVENOUS at 23:17

## 2019-02-22 RX ADMIN — CEFTRIAXONE 1 G: 1 INJECTION, POWDER, FOR SOLUTION INTRAMUSCULAR; INTRAVENOUS at 23:04

## 2019-02-23 ENCOUNTER — APPOINTMENT (OUTPATIENT)
Dept: CT IMAGING | Facility: HOSPITAL | Age: 54
End: 2019-02-23

## 2019-02-23 ENCOUNTER — APPOINTMENT (OUTPATIENT)
Dept: CARDIOLOGY | Facility: HOSPITAL | Age: 54
End: 2019-02-23

## 2019-02-23 LAB
ALBUMIN SERPL-MCNC: 3.9 G/DL (ref 3.5–5)
ALBUMIN/GLOB SERPL: 1.5 G/DL (ref 1.5–2.5)
ALP SERPL-CCNC: 61 U/L (ref 35–104)
ALT SERPL W P-5'-P-CCNC: 14 U/L (ref 10–36)
ANION GAP SERPL CALCULATED.3IONS-SCNC: 6.6 MMOL/L (ref 3.6–11.2)
AST SERPL-CCNC: 21 U/L (ref 10–30)
BASOPHILS # BLD AUTO: 0.02 10*3/MM3 (ref 0–0.3)
BASOPHILS NFR BLD AUTO: 0.4 % (ref 0–2)
BH CV ECHO MEAS - ACS: 1.3 CM
BH CV ECHO MEAS - AI DEC SLOPE: 430.7 CM/SEC^2
BH CV ECHO MEAS - AI MAX PG: 72.2 MMHG
BH CV ECHO MEAS - AI MAX VEL: 424.8 CM/SEC
BH CV ECHO MEAS - AI P1/2T: 288.9 MSEC
BH CV ECHO MEAS - AO MAX PG: 4.7 MMHG
BH CV ECHO MEAS - AO MEAN PG: 3.1 MMHG
BH CV ECHO MEAS - AO ROOT AREA (BSA CORRECTED): 1.8
BH CV ECHO MEAS - AO ROOT AREA: 6.3 CM^2
BH CV ECHO MEAS - AO ROOT DIAM: 2.8 CM
BH CV ECHO MEAS - AO V2 MAX: 108.6 CM/SEC
BH CV ECHO MEAS - AO V2 MEAN: 82.8 CM/SEC
BH CV ECHO MEAS - AO V2 VTI: 21.9 CM
BH CV ECHO MEAS - BSA(HAYCOCK): 1.5 M^2
BH CV ECHO MEAS - BSA: 1.6 M^2
BH CV ECHO MEAS - BZI_BMI: 19 KILOGRAMS/M^2
BH CV ECHO MEAS - BZI_METRIC_HEIGHT: 165.1 CM
BH CV ECHO MEAS - BZI_METRIC_WEIGHT: 51.7 KG
BH CV ECHO MEAS - EDV(CUBED): 79.5 ML
BH CV ECHO MEAS - EDV(MOD-SP4): 73 ML
BH CV ECHO MEAS - EDV(TEICH): 83 ML
BH CV ECHO MEAS - EF(CUBED): 78.5 %
BH CV ECHO MEAS - EF(MOD-BP): 68 %
BH CV ECHO MEAS - EF(MOD-SP4): 68.5 %
BH CV ECHO MEAS - EF(TEICH): 71 %
BH CV ECHO MEAS - ESV(CUBED): 17.1 ML
BH CV ECHO MEAS - ESV(MOD-SP4): 23 ML
BH CV ECHO MEAS - ESV(TEICH): 24.1 ML
BH CV ECHO MEAS - FS: 40.1 %
BH CV ECHO MEAS - IVS/LVPW: 1.6
BH CV ECHO MEAS - IVSD: 1.4 CM
BH CV ECHO MEAS - LA DIMENSION: 3.7 CM
BH CV ECHO MEAS - LA/AO: 1.3
BH CV ECHO MEAS - LV DIASTOLIC VOL/BSA (35-75): 46.9 ML/M^2
BH CV ECHO MEAS - LV MASS(C)D: 173.2 GRAMS
BH CV ECHO MEAS - LV MASS(C)DI: 111.2 GRAMS/M^2
BH CV ECHO MEAS - LV SYSTOLIC VOL/BSA (12-30): 14.8 ML/M^2
BH CV ECHO MEAS - LVIDD: 4.3 CM
BH CV ECHO MEAS - LVIDS: 2.6 CM
BH CV ECHO MEAS - LVLD AP4: 7.3 CM
BH CV ECHO MEAS - LVLS AP4: 5.8 CM
BH CV ECHO MEAS - LVOT AREA (M): 3.5 CM^2
BH CV ECHO MEAS - LVOT AREA: 3.5 CM^2
BH CV ECHO MEAS - LVOT DIAM: 2.1 CM
BH CV ECHO MEAS - LVPWD: 0.87 CM
BH CV ECHO MEAS - MV A MAX VEL: 91.8 CM/SEC
BH CV ECHO MEAS - MV E MAX VEL: 85.4 CM/SEC
BH CV ECHO MEAS - MV E/A: 0.93
BH CV ECHO MEAS - PA ACC SLOPE: 484.3 CM/SEC^2
BH CV ECHO MEAS - PA ACC TIME: 0.14 SEC
BH CV ECHO MEAS - PA PR(ACCEL): 17.2 MMHG
BH CV ECHO MEAS - RAP SYSTOLE: 10 MMHG
BH CV ECHO MEAS - RVSP: 31 MMHG
BH CV ECHO MEAS - SI(AO): 88.7 ML/M^2
BH CV ECHO MEAS - SI(CUBED): 40 ML/M^2
BH CV ECHO MEAS - SI(MOD-SP4): 32.1 ML/M^2
BH CV ECHO MEAS - SI(TEICH): 37.9 ML/M^2
BH CV ECHO MEAS - SV(AO): 138.2 ML
BH CV ECHO MEAS - SV(CUBED): 62.3 ML
BH CV ECHO MEAS - SV(MOD-SP4): 50 ML
BH CV ECHO MEAS - SV(TEICH): 59 ML
BH CV ECHO MEAS - TR MAX VEL: 228.9 CM/SEC
BILIRUB SERPL-MCNC: 0.2 MG/DL (ref 0.2–1.8)
BNP SERPL-MCNC: 155 PG/ML (ref 0–100)
BUN BLD-MCNC: 14 MG/DL (ref 7–21)
BUN/CREAT SERPL: 18.9 (ref 7–25)
CALCIUM SPEC-SCNC: 9.3 MG/DL (ref 7.7–10)
CHLORIDE SERPL-SCNC: 105 MMOL/L (ref 99–112)
CO2 SERPL-SCNC: 25.4 MMOL/L (ref 24.3–31.9)
CREAT BLD-MCNC: 0.74 MG/DL (ref 0.43–1.29)
CRP SERPL-MCNC: <0.5 MG/DL (ref 0–0.99)
D DIMER PPP FEU-MCNC: 1.16 MCGFEU/ML (ref 0–0.5)
DEPRECATED RDW RBC AUTO: 49.1 FL (ref 37–54)
EOSINOPHIL # BLD AUTO: 0.11 10*3/MM3 (ref 0–0.7)
EOSINOPHIL NFR BLD AUTO: 2.3 % (ref 0–5)
ERYTHROCYTE [DISTWIDTH] IN BLOOD BY AUTOMATED COUNT: 12.7 % (ref 11.5–14.5)
GFR SERPL CREATININE-BSD FRML MDRD: 81 ML/MIN/1.73
GLOBULIN UR ELPH-MCNC: 2.6 GM/DL
GLUCOSE BLD-MCNC: 89 MG/DL (ref 70–110)
HCT VFR BLD AUTO: 34.6 % (ref 37–47)
HGB BLD-MCNC: 11.1 G/DL (ref 12–16)
HYPOCHROMIA BLD QL: NORMAL
IMM GRANULOCYTES # BLD AUTO: 0.02 10*3/MM3 (ref 0–0.03)
IMM GRANULOCYTES NFR BLD AUTO: 0.4 % (ref 0–0.5)
LYMPHOCYTES # BLD AUTO: 0.98 10*3/MM3 (ref 1–3)
LYMPHOCYTES NFR BLD AUTO: 20.2 % (ref 21–51)
MACROCYTES BLD QL SMEAR: NORMAL
MAGNESIUM SERPL-MCNC: 2 MG/DL (ref 1.7–2.6)
MAXIMAL PREDICTED HEART RATE: 165 BPM
MCH RBC QN AUTO: 34.2 PG (ref 27–33)
MCHC RBC AUTO-ENTMCNC: 32.1 G/DL (ref 33–37)
MCV RBC AUTO: 106.5 FL (ref 80–94)
MONOCYTES # BLD AUTO: 0.99 10*3/MM3 (ref 0.1–0.9)
MONOCYTES NFR BLD AUTO: 20.5 % (ref 0–10)
NEUTROPHILS # BLD AUTO: 2.72 10*3/MM3 (ref 1.4–6.5)
NEUTROPHILS NFR BLD AUTO: 56.2 % (ref 30–70)
OSMOLALITY SERPL CALC.SUM OF ELEC: 273.8 MOSM/KG (ref 273–305)
PLAT MORPH BLD: NORMAL
PLATELET # BLD AUTO: 147 10*3/MM3 (ref 130–400)
PMV BLD AUTO: 10.4 FL (ref 6–10)
POTASSIUM BLD-SCNC: 3.6 MMOL/L (ref 3.5–5.3)
PROT SERPL-MCNC: 6.5 G/DL (ref 6–8)
RBC # BLD AUTO: 3.25 10*6/MM3 (ref 4.2–5.4)
SODIUM BLD-SCNC: 137 MMOL/L (ref 135–153)
STRESS TARGET HR: 140 BPM
TROPONIN I SERPL-MCNC: <0.006 NG/ML
WBC NRBC COR # BLD: 4.84 10*3/MM3 (ref 4.5–12.5)

## 2019-02-23 PROCEDURE — 84484 ASSAY OF TROPONIN QUANT: CPT | Performed by: INTERNAL MEDICINE

## 2019-02-23 PROCEDURE — 25010000002 CEFTRIAXONE: Performed by: INTERNAL MEDICINE

## 2019-02-23 PROCEDURE — 93306 TTE W/DOPPLER COMPLETE: CPT | Performed by: INTERNAL MEDICINE

## 2019-02-23 PROCEDURE — 85025 COMPLETE CBC W/AUTO DIFF WBC: CPT | Performed by: PHYSICIAN ASSISTANT

## 2019-02-23 PROCEDURE — 93306 TTE W/DOPPLER COMPLETE: CPT

## 2019-02-23 PROCEDURE — 83880 ASSAY OF NATRIURETIC PEPTIDE: CPT | Performed by: PHYSICIAN ASSISTANT

## 2019-02-23 PROCEDURE — 99253 IP/OBS CNSLTJ NEW/EST LOW 45: CPT | Performed by: INTERNAL MEDICINE

## 2019-02-23 PROCEDURE — 93005 ELECTROCARDIOGRAM TRACING: CPT | Performed by: INTERNAL MEDICINE

## 2019-02-23 PROCEDURE — 80053 COMPREHEN METABOLIC PANEL: CPT | Performed by: PHYSICIAN ASSISTANT

## 2019-02-23 PROCEDURE — 99232 SBSQ HOSP IP/OBS MODERATE 35: CPT | Performed by: PHYSICIAN ASSISTANT

## 2019-02-23 PROCEDURE — 94799 UNLISTED PULMONARY SVC/PX: CPT

## 2019-02-23 PROCEDURE — 93010 ELECTROCARDIOGRAM REPORT: CPT | Performed by: INTERNAL MEDICINE

## 2019-02-23 PROCEDURE — 25010000002 ENOXAPARIN PER 10 MG: Performed by: PHYSICIAN ASSISTANT

## 2019-02-23 PROCEDURE — 85007 BL SMEAR W/DIFF WBC COUNT: CPT | Performed by: PHYSICIAN ASSISTANT

## 2019-02-23 PROCEDURE — 71275 CT ANGIOGRAPHY CHEST: CPT | Performed by: RADIOLOGY

## 2019-02-23 PROCEDURE — 83735 ASSAY OF MAGNESIUM: CPT | Performed by: PHYSICIAN ASSISTANT

## 2019-02-23 PROCEDURE — 86140 C-REACTIVE PROTEIN: CPT | Performed by: PHYSICIAN ASSISTANT

## 2019-02-23 PROCEDURE — 84484 ASSAY OF TROPONIN QUANT: CPT | Performed by: PHYSICIAN ASSISTANT

## 2019-02-23 PROCEDURE — 0 IOPAMIDOL PER 1 ML: Performed by: INTERNAL MEDICINE

## 2019-02-23 PROCEDURE — 85379 FIBRIN DEGRADATION QUANT: CPT | Performed by: INTERNAL MEDICINE

## 2019-02-23 PROCEDURE — 71275 CT ANGIOGRAPHY CHEST: CPT

## 2019-02-23 RX ORDER — SODIUM CHLORIDE 0.9 % (FLUSH) 0.9 %
5 SYRINGE (ML) INJECTION AS NEEDED
Status: DISCONTINUED | OUTPATIENT
Start: 2019-02-23 | End: 2019-04-18 | Stop reason: HOSPADM

## 2019-02-23 RX ORDER — FOLIC ACID 1 MG/1
1 TABLET ORAL DAILY
Status: DISCONTINUED | OUTPATIENT
Start: 2019-02-23 | End: 2019-04-18 | Stop reason: HOSPADM

## 2019-02-23 RX ORDER — LORAZEPAM 2 MG/1
2 TABLET ORAL EVERY 4 HOURS PRN
Status: ACTIVE | OUTPATIENT
Start: 2019-02-23 | End: 2019-02-24

## 2019-02-23 RX ORDER — LORAZEPAM 0.5 MG/1
0.5 TABLET ORAL EVERY 6 HOURS PRN
Status: ACTIVE | OUTPATIENT
Start: 2019-02-26 | End: 2019-02-27

## 2019-02-23 RX ORDER — LORAZEPAM 2 MG/1
2 TABLET ORAL
Status: DISPENSED | OUTPATIENT
Start: 2019-02-23 | End: 2019-02-24

## 2019-02-23 RX ORDER — ASPIRIN 325 MG
325 TABLET ORAL DAILY
Status: DISCONTINUED | OUTPATIENT
Start: 2019-02-23 | End: 2019-03-19

## 2019-02-23 RX ORDER — LORAZEPAM 1 MG/1
1 TABLET ORAL
Status: DISPENSED | OUTPATIENT
Start: 2019-02-25 | End: 2019-02-26

## 2019-02-23 RX ORDER — CALCIUM CARBONATE 200(500)MG
2 TABLET,CHEWABLE ORAL AS NEEDED
Status: CANCELLED | OUTPATIENT
Start: 2019-02-23

## 2019-02-23 RX ORDER — LORAZEPAM 0.5 MG/1
0.5 TABLET ORAL
Status: COMPLETED | OUTPATIENT
Start: 2019-02-26 | End: 2019-02-26

## 2019-02-23 RX ORDER — LORAZEPAM 1 MG/1
1 TABLET ORAL EVERY 4 HOURS PRN
Status: ACTIVE | OUTPATIENT
Start: 2019-02-25 | End: 2019-02-26

## 2019-02-23 RX ORDER — ASPIRIN 325 MG
650 TABLET ORAL DAILY
Status: CANCELLED | OUTPATIENT
Start: 2019-02-23

## 2019-02-23 RX ORDER — LORAZEPAM 0.5 MG/1
0.5 TABLET ORAL EVERY 4 HOURS PRN
Status: DISCONTINUED | OUTPATIENT
Start: 2019-02-27 | End: 2019-02-23 | Stop reason: SDUPTHER

## 2019-02-23 RX ORDER — THIAMINE MONONITRATE (VIT B1) 100 MG
100 TABLET ORAL DAILY
Status: DISCONTINUED | OUTPATIENT
Start: 2019-02-23 | End: 2019-04-18 | Stop reason: HOSPADM

## 2019-02-23 RX ORDER — SODIUM CHLORIDE 0.9 % (FLUSH) 0.9 %
3 SYRINGE (ML) INJECTION EVERY 12 HOURS SCHEDULED
Status: DISCONTINUED | OUTPATIENT
Start: 2019-02-23 | End: 2019-04-18 | Stop reason: HOSPADM

## 2019-02-23 RX ADMIN — FOLIC ACID 1 MG: 1 TABLET ORAL at 17:25

## 2019-02-23 RX ADMIN — IOPAMIDOL 80 ML: 755 INJECTION, SOLUTION INTRAVENOUS at 15:15

## 2019-02-23 RX ADMIN — LORAZEPAM 2 MG: 2 TABLET ORAL at 07:51

## 2019-02-23 RX ADMIN — CEFTRIAXONE 1 G: 1 INJECTION, POWDER, FOR SOLUTION INTRAMUSCULAR; INTRAVENOUS at 17:31

## 2019-02-23 RX ADMIN — Medication 100 MG: at 17:25

## 2019-02-23 RX ADMIN — SODIUM CHLORIDE, PRESERVATIVE FREE 3 ML: 5 INJECTION INTRAVENOUS at 11:19

## 2019-02-23 RX ADMIN — ENOXAPARIN SODIUM 40 MG: 40 INJECTION SUBCUTANEOUS at 10:11

## 2019-02-23 RX ADMIN — ASPIRIN 325 MG: 325 TABLET ORAL at 11:18

## 2019-02-23 RX ADMIN — SODIUM CHLORIDE, PRESERVATIVE FREE 3 ML: 5 INJECTION INTRAVENOUS at 11:18

## 2019-02-23 RX ADMIN — LORAZEPAM 2 MG: 2 TABLET ORAL at 21:41

## 2019-02-24 LAB
ALBUMIN SERPL-MCNC: 3.5 G/DL (ref 3.5–5)
ALBUMIN/GLOB SERPL: 1.5 G/DL (ref 1.5–2.5)
ALP SERPL-CCNC: 59 U/L (ref 35–104)
ALT SERPL W P-5'-P-CCNC: 13 U/L (ref 10–36)
ANION GAP SERPL CALCULATED.3IONS-SCNC: 5.3 MMOL/L (ref 3.6–11.2)
AST SERPL-CCNC: 18 U/L (ref 10–30)
BASOPHILS # BLD MANUAL: 0.05 10*3/MM3 (ref 0–0.3)
BASOPHILS NFR BLD AUTO: 1 % (ref 0–1.5)
BILIRUB SERPL-MCNC: 0.1 MG/DL (ref 0.2–1.8)
BUN BLD-MCNC: 15 MG/DL (ref 7–21)
BUN/CREAT SERPL: 18.1 (ref 7–25)
CALCIUM SPEC-SCNC: 9.1 MG/DL (ref 7.7–10)
CHLORIDE SERPL-SCNC: 108 MMOL/L (ref 99–112)
CO2 SERPL-SCNC: 21.7 MMOL/L (ref 24.3–31.9)
CREAT BLD-MCNC: 0.83 MG/DL (ref 0.43–1.29)
DEPRECATED RDW RBC AUTO: 49.8 FL (ref 37–54)
EOSINOPHIL # BLD MANUAL: 0.14 10*3/MM3 (ref 0–0.7)
EOSINOPHIL NFR BLD MANUAL: 3 % (ref 0–5)
ERYTHROCYTE [DISTWIDTH] IN BLOOD BY AUTOMATED COUNT: 13.1 % (ref 11.5–14.5)
FOLATE SERPL-MCNC: 12.87 NG/ML (ref 5.4–20)
GFR SERPL CREATININE-BSD FRML MDRD: 71 ML/MIN/1.73
GLOBULIN UR ELPH-MCNC: 2.4 GM/DL
GLUCOSE BLD-MCNC: 131 MG/DL (ref 70–110)
HCT VFR BLD AUTO: 32.1 % (ref 37–47)
HGB BLD-MCNC: 10.1 G/DL (ref 12–16)
HYPOCHROMIA BLD QL: ABNORMAL
LYMPHOCYTES # BLD MANUAL: 0.86 10*3/MM3 (ref 1–3)
LYMPHOCYTES NFR BLD MANUAL: 15 % (ref 0–10)
LYMPHOCYTES NFR BLD MANUAL: 19 % (ref 21–51)
MACROCYTES BLD QL SMEAR: ABNORMAL
MCH RBC QN AUTO: 33.7 PG (ref 27–33)
MCHC RBC AUTO-ENTMCNC: 31.5 G/DL (ref 33–37)
MCV RBC AUTO: 107 FL (ref 80–94)
MONOCYTES # BLD AUTO: 0.68 10*3/MM3 (ref 0.1–0.9)
NEUTROPHILS # BLD AUTO: 2.82 10*3/MM3 (ref 1.4–6.5)
NEUTROPHILS NFR BLD MANUAL: 62 % (ref 30–70)
OSMOLALITY SERPL CALC.SUM OF ELEC: 272.7 MOSM/KG (ref 273–305)
PLATELET # BLD AUTO: 131 10*3/MM3 (ref 130–400)
PMV BLD AUTO: 11.1 FL (ref 6–10)
POTASSIUM BLD-SCNC: 3.9 MMOL/L (ref 3.5–5.3)
PROT SERPL-MCNC: 5.9 G/DL (ref 6–8)
RBC # BLD AUTO: 3 10*6/MM3 (ref 4.2–5.4)
SCAN SLIDE: NORMAL
SMALL PLATELETS BLD QL SMEAR: ABNORMAL
SODIUM BLD-SCNC: 135 MMOL/L (ref 135–153)
T3FREE SERPL-MCNC: 2.6 PG/ML (ref 2.3–4.2)
T4 FREE SERPL-MCNC: 0.69 NG/DL (ref 0.89–1.76)
VIT B12 BLD-MCNC: 122 PG/ML (ref 211–911)
WBC NRBC COR # BLD: 4.55 10*3/MM3 (ref 4.5–12.5)

## 2019-02-24 PROCEDURE — 82607 VITAMIN B-12: CPT | Performed by: PHYSICIAN ASSISTANT

## 2019-02-24 PROCEDURE — 82746 ASSAY OF FOLIC ACID SERUM: CPT | Performed by: PHYSICIAN ASSISTANT

## 2019-02-24 PROCEDURE — 25010000002 ENOXAPARIN PER 10 MG: Performed by: PHYSICIAN ASSISTANT

## 2019-02-24 PROCEDURE — 94799 UNLISTED PULMONARY SVC/PX: CPT

## 2019-02-24 PROCEDURE — 84481 FREE ASSAY (FT-3): CPT | Performed by: PHYSICIAN ASSISTANT

## 2019-02-24 PROCEDURE — 25010000002 CYANOCOBALAMIN PER 1000 MCG: Performed by: INTERNAL MEDICINE

## 2019-02-24 PROCEDURE — 99233 SBSQ HOSP IP/OBS HIGH 50: CPT | Performed by: INTERNAL MEDICINE

## 2019-02-24 PROCEDURE — 25010000002 CEFTRIAXONE: Performed by: INTERNAL MEDICINE

## 2019-02-24 PROCEDURE — 85007 BL SMEAR W/DIFF WBC COUNT: CPT | Performed by: PHYSICIAN ASSISTANT

## 2019-02-24 PROCEDURE — 85025 COMPLETE CBC W/AUTO DIFF WBC: CPT | Performed by: PHYSICIAN ASSISTANT

## 2019-02-24 PROCEDURE — 84439 ASSAY OF FREE THYROXINE: CPT | Performed by: PHYSICIAN ASSISTANT

## 2019-02-24 PROCEDURE — 80053 COMPREHEN METABOLIC PANEL: CPT | Performed by: PHYSICIAN ASSISTANT

## 2019-02-24 RX ORDER — LEVOTHYROXINE SODIUM 0.03 MG/1
25 TABLET ORAL
Status: DISCONTINUED | OUTPATIENT
Start: 2019-02-24 | End: 2019-03-11

## 2019-02-24 RX ORDER — CYANOCOBALAMIN 1000 UG/ML
1000 INJECTION, SOLUTION INTRAMUSCULAR; SUBCUTANEOUS DAILY
Status: DISCONTINUED | OUTPATIENT
Start: 2019-02-24 | End: 2019-02-25

## 2019-02-24 RX ORDER — PANTOPRAZOLE SODIUM 40 MG/1
40 TABLET, DELAYED RELEASE ORAL
Status: DISCONTINUED | OUTPATIENT
Start: 2019-02-24 | End: 2019-03-08 | Stop reason: ALTCHOICE

## 2019-02-24 RX ADMIN — ASPIRIN 325 MG: 325 TABLET ORAL at 08:24

## 2019-02-24 RX ADMIN — LORAZEPAM 1.5 MG: 2 TABLET ORAL at 15:15

## 2019-02-24 RX ADMIN — ENOXAPARIN SODIUM 40 MG: 40 INJECTION SUBCUTANEOUS at 08:24

## 2019-02-24 RX ADMIN — SODIUM CHLORIDE, PRESERVATIVE FREE 3 ML: 5 INJECTION INTRAVENOUS at 20:30

## 2019-02-24 RX ADMIN — Medication 100 MG: at 08:24

## 2019-02-24 RX ADMIN — LEVOTHYROXINE SODIUM 25 MCG: 25 TABLET ORAL at 11:12

## 2019-02-24 RX ADMIN — CYANOCOBALAMIN 1000 MCG: 1000 INJECTION, SOLUTION INTRAMUSCULAR at 11:12

## 2019-02-24 RX ADMIN — LORAZEPAM 1.5 MG: 2 TABLET ORAL at 08:25

## 2019-02-24 RX ADMIN — CEFTRIAXONE 1 G: 1 INJECTION, POWDER, FOR SOLUTION INTRAMUSCULAR; INTRAVENOUS at 17:03

## 2019-02-24 RX ADMIN — PANTOPRAZOLE SODIUM 40 MG: 40 TABLET, DELAYED RELEASE ORAL at 11:12

## 2019-02-24 RX ADMIN — FOLIC ACID 1 MG: 1 TABLET ORAL at 08:24

## 2019-02-24 RX ADMIN — LORAZEPAM 1.5 MG: 2 TABLET ORAL at 20:29

## 2019-02-25 LAB
ALBUMIN SERPL-MCNC: 3.5 G/DL (ref 3.5–5)
ALBUMIN/GLOB SERPL: 1.4 G/DL (ref 1.5–2.5)
ALP SERPL-CCNC: 52 U/L (ref 35–104)
ALT SERPL W P-5'-P-CCNC: 13 U/L (ref 10–36)
ANION GAP SERPL CALCULATED.3IONS-SCNC: 5.3 MMOL/L (ref 3.6–11.2)
AST SERPL-CCNC: 21 U/L (ref 10–30)
BASOPHILS # BLD AUTO: 0.02 10*3/MM3 (ref 0–0.3)
BASOPHILS NFR BLD AUTO: 0.4 % (ref 0–2)
BILIRUB SERPL-MCNC: 0.2 MG/DL (ref 0.2–1.8)
BUN BLD-MCNC: 12 MG/DL (ref 7–21)
BUN/CREAT SERPL: 21.4 (ref 7–25)
CALCIUM SPEC-SCNC: 9.2 MG/DL (ref 7.7–10)
CHLORIDE SERPL-SCNC: 107 MMOL/L (ref 99–112)
CO2 SERPL-SCNC: 22.7 MMOL/L (ref 24.3–31.9)
CREAT BLD-MCNC: 0.56 MG/DL (ref 0.43–1.29)
DEPRECATED RDW RBC AUTO: 51.1 FL (ref 37–54)
EOSINOPHIL # BLD AUTO: 0.13 10*3/MM3 (ref 0–0.7)
EOSINOPHIL NFR BLD AUTO: 2.3 % (ref 0–5)
ERYTHROCYTE [DISTWIDTH] IN BLOOD BY AUTOMATED COUNT: 13.4 % (ref 11.5–14.5)
GFR SERPL CREATININE-BSD FRML MDRD: 112 ML/MIN/1.73
GLOBULIN UR ELPH-MCNC: 2.5 GM/DL
GLUCOSE BLD-MCNC: 85 MG/DL (ref 70–110)
HCT VFR BLD AUTO: 31.4 % (ref 37–47)
HGB BLD-MCNC: 10.1 G/DL (ref 12–16)
IMM GRANULOCYTES # BLD AUTO: 0.02 10*3/MM3 (ref 0–0.03)
IMM GRANULOCYTES NFR BLD AUTO: 0.4 % (ref 0–0.5)
LYMPHOCYTES # BLD AUTO: 0.95 10*3/MM3 (ref 1–3)
LYMPHOCYTES NFR BLD AUTO: 17 % (ref 21–51)
MCH RBC QN AUTO: 33.9 PG (ref 27–33)
MCHC RBC AUTO-ENTMCNC: 32.2 G/DL (ref 33–37)
MCV RBC AUTO: 105.4 FL (ref 80–94)
MONOCYTES # BLD AUTO: 0.96 10*3/MM3 (ref 0.1–0.9)
MONOCYTES NFR BLD AUTO: 17.1 % (ref 0–10)
NEUTROPHILS # BLD AUTO: 3.52 10*3/MM3 (ref 1.4–6.5)
NEUTROPHILS NFR BLD AUTO: 62.8 % (ref 30–70)
OSMOLALITY SERPL CALC.SUM OF ELEC: 269.1 MOSM/KG (ref 273–305)
PLAT MORPH BLD: NORMAL
PLATELET # BLD AUTO: 161 10*3/MM3 (ref 130–400)
PMV BLD AUTO: 10.7 FL (ref 6–10)
POTASSIUM BLD-SCNC: 4.1 MMOL/L (ref 3.5–5.3)
PROT SERPL-MCNC: 6 G/DL (ref 6–8)
RBC # BLD AUTO: 2.98 10*6/MM3 (ref 4.2–5.4)
RBC MORPH BLD: NORMAL
SODIUM BLD-SCNC: 135 MMOL/L (ref 135–153)
WBC NRBC COR # BLD: 5.6 10*3/MM3 (ref 4.5–12.5)

## 2019-02-25 PROCEDURE — 85025 COMPLETE CBC W/AUTO DIFF WBC: CPT | Performed by: PHYSICIAN ASSISTANT

## 2019-02-25 PROCEDURE — 25010000002 CYANOCOBALAMIN PER 1000 MCG: Performed by: INTERNAL MEDICINE

## 2019-02-25 PROCEDURE — 25010000002 ENOXAPARIN PER 10 MG: Performed by: PHYSICIAN ASSISTANT

## 2019-02-25 PROCEDURE — 97116 GAIT TRAINING THERAPY: CPT

## 2019-02-25 PROCEDURE — 99253 IP/OBS CNSLTJ NEW/EST LOW 45: CPT | Performed by: PSYCHIATRY & NEUROLOGY

## 2019-02-25 PROCEDURE — 25010000002 CEFTRIAXONE: Performed by: INTERNAL MEDICINE

## 2019-02-25 PROCEDURE — 80053 COMPREHEN METABOLIC PANEL: CPT | Performed by: PHYSICIAN ASSISTANT

## 2019-02-25 PROCEDURE — 85007 BL SMEAR W/DIFF WBC COUNT: CPT | Performed by: PHYSICIAN ASSISTANT

## 2019-02-25 PROCEDURE — 99232 SBSQ HOSP IP/OBS MODERATE 35: CPT | Performed by: HOSPITALIST

## 2019-02-25 PROCEDURE — 94799 UNLISTED PULMONARY SVC/PX: CPT

## 2019-02-25 PROCEDURE — 97167 OT EVAL HIGH COMPLEX 60 MIN: CPT

## 2019-02-25 PROCEDURE — 97163 PT EVAL HIGH COMPLEX 45 MIN: CPT

## 2019-02-25 RX ORDER — CYANOCOBALAMIN 1000 UG/ML
100 INJECTION, SOLUTION INTRAMUSCULAR; SUBCUTANEOUS DAILY
Status: DISCONTINUED | OUTPATIENT
Start: 2019-02-25 | End: 2019-02-25

## 2019-02-25 RX ORDER — CYANOCOBALAMIN 1000 UG/ML
1000 INJECTION, SOLUTION INTRAMUSCULAR; SUBCUTANEOUS DAILY
Status: COMPLETED | OUTPATIENT
Start: 2019-02-26 | End: 2019-03-02

## 2019-02-25 RX ORDER — CYANOCOBALAMIN 1000 UG/ML
1000 INJECTION, SOLUTION INTRAMUSCULAR; SUBCUTANEOUS
Status: DISCONTINUED | OUTPATIENT
Start: 2019-04-01 | End: 2019-04-18 | Stop reason: HOSPADM

## 2019-02-25 RX ORDER — L.ACID,PARA/B.BIFIDUM/S.THERM 8B CELL
1 CAPSULE ORAL DAILY
Status: DISCONTINUED | OUTPATIENT
Start: 2019-02-25 | End: 2019-03-04

## 2019-02-25 RX ADMIN — LORAZEPAM 1 MG: 1 TABLET ORAL at 08:24

## 2019-02-25 RX ADMIN — SODIUM CHLORIDE, PRESERVATIVE FREE 3 ML: 5 INJECTION INTRAVENOUS at 21:36

## 2019-02-25 RX ADMIN — ASPIRIN 325 MG: 325 TABLET ORAL at 08:15

## 2019-02-25 RX ADMIN — SODIUM CHLORIDE, PRESERVATIVE FREE 3 ML: 5 INJECTION INTRAVENOUS at 08:23

## 2019-02-25 RX ADMIN — PANTOPRAZOLE SODIUM 40 MG: 40 TABLET, DELAYED RELEASE ORAL at 06:18

## 2019-02-25 RX ADMIN — CYANOCOBALAMIN 1000 MCG: 1000 INJECTION, SOLUTION INTRAMUSCULAR at 08:15

## 2019-02-25 RX ADMIN — LORAZEPAM 1.5 MG: 2 TABLET ORAL at 03:55

## 2019-02-25 RX ADMIN — Medication 100 MG: at 08:15

## 2019-02-25 RX ADMIN — CEFTRIAXONE 1 G: 1 INJECTION, POWDER, FOR SOLUTION INTRAMUSCULAR; INTRAVENOUS at 17:43

## 2019-02-25 RX ADMIN — LORAZEPAM 1 MG: 1 TABLET ORAL at 16:04

## 2019-02-25 RX ADMIN — FOLIC ACID 1 MG: 1 TABLET ORAL at 08:15

## 2019-02-25 RX ADMIN — LEVOTHYROXINE SODIUM 25 MCG: 25 TABLET ORAL at 06:18

## 2019-02-25 RX ADMIN — Medication 1 CAPSULE: at 13:00

## 2019-02-25 RX ADMIN — ENOXAPARIN SODIUM 40 MG: 40 INJECTION SUBCUTANEOUS at 08:16

## 2019-02-25 RX ADMIN — LORAZEPAM 0.5 MG: 0.5 TABLET ORAL at 03:49

## 2019-02-26 LAB
ALBUMIN SERPL-MCNC: 3.6 G/DL (ref 3.5–5)
ALBUMIN/GLOB SERPL: 1.4 G/DL (ref 1.5–2.5)
ALP SERPL-CCNC: 53 U/L (ref 35–104)
ALT SERPL W P-5'-P-CCNC: 16 U/L (ref 10–36)
ANION GAP SERPL CALCULATED.3IONS-SCNC: 6.5 MMOL/L (ref 3.6–11.2)
AST SERPL-CCNC: 24 U/L (ref 10–30)
BASOPHILS # BLD AUTO: 0.01 10*3/MM3 (ref 0–0.3)
BASOPHILS NFR BLD AUTO: 0.2 % (ref 0–2)
BILIRUB SERPL-MCNC: 0.2 MG/DL (ref 0.2–1.8)
BUN BLD-MCNC: 11 MG/DL (ref 7–21)
BUN/CREAT SERPL: 18.6 (ref 7–25)
CALCIUM SPEC-SCNC: 9.3 MG/DL (ref 7.7–10)
CHLORIDE SERPL-SCNC: 106 MMOL/L (ref 99–112)
CO2 SERPL-SCNC: 22.5 MMOL/L (ref 24.3–31.9)
CREAT BLD-MCNC: 0.59 MG/DL (ref 0.43–1.29)
DEPRECATED RDW RBC AUTO: 49.3 FL (ref 37–54)
EOSINOPHIL # BLD AUTO: 0.12 10*3/MM3 (ref 0–0.7)
EOSINOPHIL NFR BLD AUTO: 2.5 % (ref 0–5)
ERYTHROCYTE [DISTWIDTH] IN BLOOD BY AUTOMATED COUNT: 12.8 % (ref 11.5–14.5)
GFR SERPL CREATININE-BSD FRML MDRD: 106 ML/MIN/1.73
GLOBULIN UR ELPH-MCNC: 2.6 GM/DL
GLUCOSE BLD-MCNC: 94 MG/DL (ref 70–110)
HCT VFR BLD AUTO: 31.1 % (ref 37–47)
HGB BLD-MCNC: 9.9 G/DL (ref 12–16)
HYPOCHROMIA BLD QL: NORMAL
IMM GRANULOCYTES # BLD AUTO: 0.02 10*3/MM3 (ref 0–0.03)
IMM GRANULOCYTES NFR BLD AUTO: 0.4 % (ref 0–0.5)
LYMPHOCYTES # BLD AUTO: 0.75 10*3/MM3 (ref 1–3)
LYMPHOCYTES NFR BLD AUTO: 15.9 % (ref 21–51)
MACROCYTES BLD QL SMEAR: NORMAL
MCH RBC QN AUTO: 33.9 PG (ref 27–33)
MCHC RBC AUTO-ENTMCNC: 31.8 G/DL (ref 33–37)
MCV RBC AUTO: 106.5 FL (ref 80–94)
MONOCYTES # BLD AUTO: 0.81 10*3/MM3 (ref 0.1–0.9)
MONOCYTES NFR BLD AUTO: 17.2 % (ref 0–10)
NEUTROPHILS # BLD AUTO: 3.01 10*3/MM3 (ref 1.4–6.5)
NEUTROPHILS NFR BLD AUTO: 63.8 % (ref 30–70)
OSMOLALITY SERPL CALC.SUM OF ELEC: 269.3 MOSM/KG (ref 273–305)
PLAT MORPH BLD: NORMAL
PLATELET # BLD AUTO: 156 10*3/MM3 (ref 130–400)
PMV BLD AUTO: 10.7 FL (ref 6–10)
POTASSIUM BLD-SCNC: 4.2 MMOL/L (ref 3.5–5.3)
PROT SERPL-MCNC: 6.2 G/DL (ref 6–8)
RBC # BLD AUTO: 2.92 10*6/MM3 (ref 4.2–5.4)
SODIUM BLD-SCNC: 135 MMOL/L (ref 135–153)
WBC NRBC COR # BLD: 4.72 10*3/MM3 (ref 4.5–12.5)

## 2019-02-26 PROCEDURE — 80053 COMPREHEN METABOLIC PANEL: CPT | Performed by: PHYSICIAN ASSISTANT

## 2019-02-26 PROCEDURE — 85025 COMPLETE CBC W/AUTO DIFF WBC: CPT | Performed by: PHYSICIAN ASSISTANT

## 2019-02-26 PROCEDURE — 25010000002 ENOXAPARIN PER 10 MG: Performed by: PHYSICIAN ASSISTANT

## 2019-02-26 PROCEDURE — 97110 THERAPEUTIC EXERCISES: CPT

## 2019-02-26 PROCEDURE — 25010000002 CYANOCOBALAMIN PER 1000 MCG: Performed by: HOSPITALIST

## 2019-02-26 PROCEDURE — 85007 BL SMEAR W/DIFF WBC COUNT: CPT | Performed by: PHYSICIAN ASSISTANT

## 2019-02-26 PROCEDURE — 97530 THERAPEUTIC ACTIVITIES: CPT

## 2019-02-26 PROCEDURE — 25010000002 CEFTRIAXONE: Performed by: INTERNAL MEDICINE

## 2019-02-26 PROCEDURE — 97116 GAIT TRAINING THERAPY: CPT

## 2019-02-26 PROCEDURE — 99232 SBSQ HOSP IP/OBS MODERATE 35: CPT | Performed by: HOSPITALIST

## 2019-02-26 RX ADMIN — SODIUM CHLORIDE, PRESERVATIVE FREE 3 ML: 5 INJECTION INTRAVENOUS at 08:14

## 2019-02-26 RX ADMIN — PANTOPRAZOLE SODIUM 40 MG: 40 TABLET, DELAYED RELEASE ORAL at 05:01

## 2019-02-26 RX ADMIN — ACETAMINOPHEN 650 MG: 325 TABLET, FILM COATED ORAL at 07:07

## 2019-02-26 RX ADMIN — Medication 1 CAPSULE: at 08:13

## 2019-02-26 RX ADMIN — ENOXAPARIN SODIUM 40 MG: 40 INJECTION SUBCUTANEOUS at 08:13

## 2019-02-26 RX ADMIN — SODIUM CHLORIDE, PRESERVATIVE FREE 3 ML: 5 INJECTION INTRAVENOUS at 21:14

## 2019-02-26 RX ADMIN — ASPIRIN 325 MG: 325 TABLET ORAL at 08:13

## 2019-02-26 RX ADMIN — LORAZEPAM 0.5 MG: 0.5 TABLET ORAL at 08:13

## 2019-02-26 RX ADMIN — LORAZEPAM 0.5 MG: 0.5 TABLET ORAL at 21:17

## 2019-02-26 RX ADMIN — LORAZEPAM 0.5 MG: 0.5 TABLET ORAL at 15:28

## 2019-02-26 RX ADMIN — LEVOTHYROXINE SODIUM 25 MCG: 25 TABLET ORAL at 05:01

## 2019-02-26 RX ADMIN — Medication 100 MG: at 08:13

## 2019-02-26 RX ADMIN — FOLIC ACID 1 MG: 1 TABLET ORAL at 08:13

## 2019-02-26 RX ADMIN — SODIUM CHLORIDE, PRESERVATIVE FREE 3 ML: 5 INJECTION INTRAVENOUS at 21:15

## 2019-02-26 RX ADMIN — CYANOCOBALAMIN 1000 MCG: 1000 INJECTION, SOLUTION INTRAMUSCULAR at 08:13

## 2019-02-26 RX ADMIN — CEFTRIAXONE 1 G: 1 INJECTION, POWDER, FOR SOLUTION INTRAMUSCULAR; INTRAVENOUS at 16:52

## 2019-02-27 LAB
ALBUMIN SERPL-MCNC: 3.4 G/DL (ref 3.5–5)
ALBUMIN/GLOB SERPL: 1.3 G/DL (ref 1.5–2.5)
ALP SERPL-CCNC: 54 U/L (ref 35–104)
ALT SERPL W P-5'-P-CCNC: 21 U/L (ref 10–36)
ANION GAP SERPL CALCULATED.3IONS-SCNC: 5.9 MMOL/L (ref 3.6–11.2)
AST SERPL-CCNC: 28 U/L (ref 10–30)
BASOPHILS # BLD AUTO: 0.04 10*3/MM3 (ref 0–0.3)
BASOPHILS NFR BLD AUTO: 0.8 % (ref 0–2)
BILIRUB SERPL-MCNC: 0.2 MG/DL (ref 0.2–1.8)
BUN BLD-MCNC: 11 MG/DL (ref 7–21)
BUN/CREAT SERPL: 20.8 (ref 7–25)
CALCIUM SPEC-SCNC: 9 MG/DL (ref 7.7–10)
CHLORIDE SERPL-SCNC: 107 MMOL/L (ref 99–112)
CO2 SERPL-SCNC: 21.1 MMOL/L (ref 24.3–31.9)
CREAT BLD-MCNC: 0.53 MG/DL (ref 0.43–1.29)
DEPRECATED RDW RBC AUTO: 50.3 FL (ref 37–54)
EOSINOPHIL # BLD AUTO: 0.15 10*3/MM3 (ref 0–0.7)
EOSINOPHIL NFR BLD AUTO: 3 % (ref 0–5)
ERYTHROCYTE [DISTWIDTH] IN BLOOD BY AUTOMATED COUNT: 13.2 % (ref 11.5–14.5)
GFR SERPL CREATININE-BSD FRML MDRD: 120 ML/MIN/1.73
GLOBULIN UR ELPH-MCNC: 2.7 GM/DL
GLUCOSE BLD-MCNC: 78 MG/DL (ref 70–110)
HCT VFR BLD AUTO: 32.9 % (ref 37–47)
HGB BLD-MCNC: 10.5 G/DL (ref 12–16)
IMM GRANULOCYTES # BLD AUTO: 0.03 10*3/MM3 (ref 0–0.03)
IMM GRANULOCYTES NFR BLD AUTO: 0.6 % (ref 0–0.5)
LYMPHOCYTES # BLD AUTO: 0.89 10*3/MM3 (ref 1–3)
LYMPHOCYTES NFR BLD AUTO: 18 % (ref 21–51)
MCH RBC QN AUTO: 33.4 PG (ref 27–33)
MCHC RBC AUTO-ENTMCNC: 31.9 G/DL (ref 33–37)
MCV RBC AUTO: 104.8 FL (ref 80–94)
MONOCYTES # BLD AUTO: 0.95 10*3/MM3 (ref 0.1–0.9)
MONOCYTES NFR BLD AUTO: 19.2 % (ref 0–10)
NEUTROPHILS # BLD AUTO: 2.89 10*3/MM3 (ref 1.4–6.5)
NEUTROPHILS NFR BLD AUTO: 58.4 % (ref 30–70)
OSMOLALITY SERPL CALC.SUM OF ELEC: 266.5 MOSM/KG (ref 273–305)
PLATELET # BLD AUTO: 174 10*3/MM3 (ref 130–400)
PMV BLD AUTO: 10.6 FL (ref 6–10)
POTASSIUM BLD-SCNC: 4.1 MMOL/L (ref 3.5–5.3)
PROT SERPL-MCNC: 6.1 G/DL (ref 6–8)
RBC # BLD AUTO: 3.14 10*6/MM3 (ref 4.2–5.4)
SODIUM BLD-SCNC: 134 MMOL/L (ref 135–153)
WBC NRBC COR # BLD: 4.95 10*3/MM3 (ref 4.5–12.5)

## 2019-02-27 PROCEDURE — 99232 SBSQ HOSP IP/OBS MODERATE 35: CPT | Performed by: HOSPITALIST

## 2019-02-27 PROCEDURE — 25010000002 ENOXAPARIN PER 10 MG: Performed by: PHYSICIAN ASSISTANT

## 2019-02-27 PROCEDURE — 25010000002 CYANOCOBALAMIN PER 1000 MCG: Performed by: HOSPITALIST

## 2019-02-27 PROCEDURE — 80053 COMPREHEN METABOLIC PANEL: CPT | Performed by: PHYSICIAN ASSISTANT

## 2019-02-27 PROCEDURE — 85025 COMPLETE CBC W/AUTO DIFF WBC: CPT | Performed by: PHYSICIAN ASSISTANT

## 2019-02-27 PROCEDURE — 25010000002 CEFTRIAXONE: Performed by: HOSPITALIST

## 2019-02-27 RX ADMIN — ASPIRIN 325 MG: 325 TABLET ORAL at 10:05

## 2019-02-27 RX ADMIN — SODIUM CHLORIDE, PRESERVATIVE FREE 3 ML: 5 INJECTION INTRAVENOUS at 10:05

## 2019-02-27 RX ADMIN — CEFTRIAXONE 1 G: 1 INJECTION, POWDER, FOR SOLUTION INTRAMUSCULAR; INTRAVENOUS at 16:10

## 2019-02-27 RX ADMIN — ENOXAPARIN SODIUM 40 MG: 40 INJECTION SUBCUTANEOUS at 10:03

## 2019-02-27 RX ADMIN — PANTOPRAZOLE SODIUM 40 MG: 40 TABLET, DELAYED RELEASE ORAL at 05:18

## 2019-02-27 RX ADMIN — Medication 100 MG: at 10:05

## 2019-02-27 RX ADMIN — LEVOTHYROXINE SODIUM 25 MCG: 25 TABLET ORAL at 05:18

## 2019-02-27 RX ADMIN — FOLIC ACID 1 MG: 1 TABLET ORAL at 10:06

## 2019-02-27 RX ADMIN — SODIUM CHLORIDE, PRESERVATIVE FREE 3 ML: 5 INJECTION INTRAVENOUS at 21:00

## 2019-02-27 RX ADMIN — CYANOCOBALAMIN 1000 MCG: 1000 INJECTION, SOLUTION INTRAMUSCULAR at 10:06

## 2019-02-27 RX ADMIN — Medication 1 CAPSULE: at 10:06

## 2019-02-28 LAB
ALBUMIN SERPL-MCNC: 3.2 G/DL (ref 3.5–5)
ALBUMIN/GLOB SERPL: 1.1 G/DL (ref 1.5–2.5)
ALP SERPL-CCNC: 55 U/L (ref 35–104)
ALT SERPL W P-5'-P-CCNC: 27 U/L (ref 10–36)
ANION GAP SERPL CALCULATED.3IONS-SCNC: 7.7 MMOL/L (ref 3.6–11.2)
AST SERPL-CCNC: 23 U/L (ref 10–30)
BASOPHILS # BLD AUTO: 0.02 10*3/MM3 (ref 0–0.3)
BASOPHILS NFR BLD AUTO: 0.4 % (ref 0–2)
BILIRUB SERPL-MCNC: 0.2 MG/DL (ref 0.2–1.8)
BUN BLD-MCNC: 8 MG/DL (ref 7–21)
BUN/CREAT SERPL: 15.1 (ref 7–25)
CALCIUM SPEC-SCNC: 9.2 MG/DL (ref 7.7–10)
CHLORIDE SERPL-SCNC: 109 MMOL/L (ref 99–112)
CO2 SERPL-SCNC: 20.3 MMOL/L (ref 24.3–31.9)
CREAT BLD-MCNC: 0.53 MG/DL (ref 0.43–1.29)
CRP SERPL-MCNC: <0.5 MG/DL (ref 0–0.99)
DEPRECATED RDW RBC AUTO: 50.2 FL (ref 37–54)
EOSINOPHIL # BLD AUTO: 0.18 10*3/MM3 (ref 0–0.7)
EOSINOPHIL NFR BLD AUTO: 3.5 % (ref 0–5)
ERYTHROCYTE [DISTWIDTH] IN BLOOD BY AUTOMATED COUNT: 13.1 % (ref 11.5–14.5)
GFR SERPL CREATININE-BSD FRML MDRD: 120 ML/MIN/1.73
GLOBULIN UR ELPH-MCNC: 2.9 GM/DL
GLUCOSE BLD-MCNC: 86 MG/DL (ref 70–110)
HCT VFR BLD AUTO: 32.3 % (ref 37–47)
HGB BLD-MCNC: 10.3 G/DL (ref 12–16)
HYPOCHROMIA BLD QL: NORMAL
IMM GRANULOCYTES # BLD AUTO: 0.05 10*3/MM3 (ref 0–0.03)
IMM GRANULOCYTES NFR BLD AUTO: 1 % (ref 0–0.5)
LYMPHOCYTES # BLD AUTO: 1.04 10*3/MM3 (ref 1–3)
LYMPHOCYTES NFR BLD AUTO: 20.4 % (ref 21–51)
MACROCYTES BLD QL SMEAR: NORMAL
MCH RBC QN AUTO: 33.6 PG (ref 27–33)
MCHC RBC AUTO-ENTMCNC: 31.9 G/DL (ref 33–37)
MCV RBC AUTO: 105.2 FL (ref 80–94)
MONOCYTES # BLD AUTO: 0.79 10*3/MM3 (ref 0.1–0.9)
MONOCYTES NFR BLD AUTO: 15.5 % (ref 0–10)
NEUTROPHILS # BLD AUTO: 3.03 10*3/MM3 (ref 1.4–6.5)
NEUTROPHILS NFR BLD AUTO: 59.2 % (ref 30–70)
OSMOLALITY SERPL CALC.SUM OF ELEC: 271.5 MOSM/KG (ref 273–305)
PLAT MORPH BLD: NORMAL
PLATELET # BLD AUTO: 171 10*3/MM3 (ref 130–400)
PMV BLD AUTO: 10.4 FL (ref 6–10)
POTASSIUM BLD-SCNC: 4.3 MMOL/L (ref 3.5–5.3)
PROT SERPL-MCNC: 6.1 G/DL (ref 6–8)
RBC # BLD AUTO: 3.07 10*6/MM3 (ref 4.2–5.4)
SODIUM BLD-SCNC: 137 MMOL/L (ref 135–153)
WBC NRBC COR # BLD: 5.11 10*3/MM3 (ref 4.5–12.5)

## 2019-02-28 PROCEDURE — 85025 COMPLETE CBC W/AUTO DIFF WBC: CPT | Performed by: PHYSICIAN ASSISTANT

## 2019-02-28 PROCEDURE — 86140 C-REACTIVE PROTEIN: CPT | Performed by: HOSPITALIST

## 2019-02-28 PROCEDURE — 85007 BL SMEAR W/DIFF WBC COUNT: CPT | Performed by: PHYSICIAN ASSISTANT

## 2019-02-28 PROCEDURE — 99232 SBSQ HOSP IP/OBS MODERATE 35: CPT | Performed by: HOSPITALIST

## 2019-02-28 PROCEDURE — 80053 COMPREHEN METABOLIC PANEL: CPT | Performed by: PHYSICIAN ASSISTANT

## 2019-02-28 PROCEDURE — 97110 THERAPEUTIC EXERCISES: CPT

## 2019-02-28 PROCEDURE — 25010000002 ENOXAPARIN PER 10 MG: Performed by: PHYSICIAN ASSISTANT

## 2019-02-28 PROCEDURE — 25010000002 CYANOCOBALAMIN PER 1000 MCG: Performed by: HOSPITALIST

## 2019-02-28 RX ADMIN — CYANOCOBALAMIN 1000 MCG: 1000 INJECTION, SOLUTION INTRAMUSCULAR at 09:28

## 2019-02-28 RX ADMIN — ENOXAPARIN SODIUM 40 MG: 40 INJECTION SUBCUTANEOUS at 09:27

## 2019-02-28 RX ADMIN — PANTOPRAZOLE SODIUM 40 MG: 40 TABLET, DELAYED RELEASE ORAL at 06:32

## 2019-02-28 RX ADMIN — Medication 1 CAPSULE: at 09:27

## 2019-02-28 RX ADMIN — SODIUM CHLORIDE, PRESERVATIVE FREE 3 ML: 5 INJECTION INTRAVENOUS at 09:28

## 2019-02-28 RX ADMIN — SODIUM CHLORIDE, PRESERVATIVE FREE 3 ML: 5 INJECTION INTRAVENOUS at 21:01

## 2019-02-28 RX ADMIN — FOLIC ACID 1 MG: 1 TABLET ORAL at 09:27

## 2019-02-28 RX ADMIN — Medication 100 MG: at 09:27

## 2019-02-28 RX ADMIN — ASPIRIN 325 MG: 325 TABLET ORAL at 09:27

## 2019-02-28 RX ADMIN — LEVOTHYROXINE SODIUM 25 MCG: 25 TABLET ORAL at 06:32

## 2019-03-01 LAB
ALBUMIN SERPL-MCNC: 3.6 G/DL (ref 3.5–5)
ALBUMIN/GLOB SERPL: 1.3 G/DL (ref 1.5–2.5)
ALP SERPL-CCNC: 69 U/L (ref 35–104)
ALT SERPL W P-5'-P-CCNC: 30 U/L (ref 10–36)
ANION GAP SERPL CALCULATED.3IONS-SCNC: 7.2 MMOL/L (ref 3.6–11.2)
AST SERPL-CCNC: 34 U/L (ref 10–30)
BASOPHILS # BLD AUTO: 0.03 10*3/MM3 (ref 0–0.3)
BASOPHILS NFR BLD AUTO: 0.6 % (ref 0–2)
BILIRUB SERPL-MCNC: 0.2 MG/DL (ref 0.2–1.8)
BUN BLD-MCNC: 12 MG/DL (ref 7–21)
BUN/CREAT SERPL: 22.6 (ref 7–25)
CALCIUM SPEC-SCNC: 9.4 MG/DL (ref 7.7–10)
CHLORIDE SERPL-SCNC: 105 MMOL/L (ref 99–112)
CO2 SERPL-SCNC: 21.8 MMOL/L (ref 24.3–31.9)
CREAT BLD-MCNC: 0.53 MG/DL (ref 0.43–1.29)
DEPRECATED RDW RBC AUTO: 46.5 FL (ref 37–54)
EOSINOPHIL # BLD AUTO: 0.16 10*3/MM3 (ref 0–0.7)
EOSINOPHIL NFR BLD AUTO: 3.2 % (ref 0–5)
ERYTHROCYTE [DISTWIDTH] IN BLOOD BY AUTOMATED COUNT: 12.5 % (ref 11.5–14.5)
GFR SERPL CREATININE-BSD FRML MDRD: 120 ML/MIN/1.73
GLOBULIN UR ELPH-MCNC: 2.8 GM/DL
GLUCOSE BLD-MCNC: 88 MG/DL (ref 70–110)
HCT VFR BLD AUTO: 31.3 % (ref 37–47)
HGB BLD-MCNC: 10.1 G/DL (ref 12–16)
IMM GRANULOCYTES # BLD AUTO: 0.03 10*3/MM3 (ref 0–0.03)
IMM GRANULOCYTES NFR BLD AUTO: 0.6 % (ref 0–0.5)
LYMPHOCYTES # BLD AUTO: 1 10*3/MM3 (ref 1–3)
LYMPHOCYTES NFR BLD AUTO: 20 % (ref 21–51)
MCH RBC QN AUTO: 33.7 PG (ref 27–33)
MCHC RBC AUTO-ENTMCNC: 32.3 G/DL (ref 33–37)
MCV RBC AUTO: 104.3 FL (ref 80–94)
MONOCYTES # BLD AUTO: 0.84 10*3/MM3 (ref 0.1–0.9)
MONOCYTES NFR BLD AUTO: 16.8 % (ref 0–10)
NEUTROPHILS # BLD AUTO: 2.93 10*3/MM3 (ref 1.4–6.5)
NEUTROPHILS NFR BLD AUTO: 58.8 % (ref 30–70)
OSMOLALITY SERPL CALC.SUM OF ELEC: 267.4 MOSM/KG (ref 273–305)
PLATELET # BLD AUTO: 175 10*3/MM3 (ref 130–400)
PMV BLD AUTO: 10.3 FL (ref 6–10)
POTASSIUM BLD-SCNC: 4.4 MMOL/L (ref 3.5–5.3)
PROT SERPL-MCNC: 6.4 G/DL (ref 6–8)
RBC # BLD AUTO: 3 10*6/MM3 (ref 4.2–5.4)
SODIUM BLD-SCNC: 134 MMOL/L (ref 135–153)
WBC NRBC COR # BLD: 4.99 10*3/MM3 (ref 4.5–12.5)

## 2019-03-01 PROCEDURE — 94799 UNLISTED PULMONARY SVC/PX: CPT

## 2019-03-01 PROCEDURE — 99231 SBSQ HOSP IP/OBS SF/LOW 25: CPT | Performed by: HOSPITALIST

## 2019-03-01 PROCEDURE — 25010000002 ENOXAPARIN PER 10 MG: Performed by: PHYSICIAN ASSISTANT

## 2019-03-01 PROCEDURE — 80053 COMPREHEN METABOLIC PANEL: CPT | Performed by: PHYSICIAN ASSISTANT

## 2019-03-01 PROCEDURE — 85025 COMPLETE CBC W/AUTO DIFF WBC: CPT | Performed by: PHYSICIAN ASSISTANT

## 2019-03-01 PROCEDURE — 25010000002 CYANOCOBALAMIN PER 1000 MCG: Performed by: HOSPITALIST

## 2019-03-01 PROCEDURE — 97116 GAIT TRAINING THERAPY: CPT

## 2019-03-01 PROCEDURE — 97530 THERAPEUTIC ACTIVITIES: CPT

## 2019-03-01 RX ADMIN — ENOXAPARIN SODIUM 40 MG: 40 INJECTION SUBCUTANEOUS at 08:49

## 2019-03-01 RX ADMIN — Medication 100 MG: at 08:49

## 2019-03-01 RX ADMIN — CYANOCOBALAMIN 1000 MCG: 1000 INJECTION, SOLUTION INTRAMUSCULAR at 08:49

## 2019-03-01 RX ADMIN — ASPIRIN 325 MG: 325 TABLET ORAL at 08:49

## 2019-03-01 RX ADMIN — SODIUM CHLORIDE, PRESERVATIVE FREE 3 ML: 5 INJECTION INTRAVENOUS at 08:54

## 2019-03-01 RX ADMIN — SODIUM CHLORIDE, PRESERVATIVE FREE 3 ML: 5 INJECTION INTRAVENOUS at 20:39

## 2019-03-01 RX ADMIN — Medication 1 CAPSULE: at 08:49

## 2019-03-01 RX ADMIN — PANTOPRAZOLE SODIUM 40 MG: 40 TABLET, DELAYED RELEASE ORAL at 05:43

## 2019-03-01 RX ADMIN — FOLIC ACID 1 MG: 1 TABLET ORAL at 08:49

## 2019-03-01 RX ADMIN — LEVOTHYROXINE SODIUM 25 MCG: 25 TABLET ORAL at 05:43

## 2019-03-02 LAB
ALBUMIN SERPL-MCNC: 3.8 G/DL (ref 3.5–5)
ALBUMIN/GLOB SERPL: 1.5 G/DL (ref 1.5–2.5)
ALP SERPL-CCNC: 75 U/L (ref 35–104)
ALT SERPL W P-5'-P-CCNC: 32 U/L (ref 10–36)
ANION GAP SERPL CALCULATED.3IONS-SCNC: 9 MMOL/L (ref 3.6–11.2)
AST SERPL-CCNC: 45 U/L (ref 10–30)
BASOPHILS # BLD AUTO: 0.04 10*3/MM3 (ref 0–0.3)
BASOPHILS NFR BLD AUTO: 0.8 % (ref 0–2)
BILIRUB SERPL-MCNC: 0.2 MG/DL (ref 0.2–1.8)
BUN BLD-MCNC: 13 MG/DL (ref 7–21)
BUN/CREAT SERPL: 22.8 (ref 7–25)
CALCIUM SPEC-SCNC: 9.3 MG/DL (ref 7.7–10)
CHLORIDE SERPL-SCNC: 106 MMOL/L (ref 99–112)
CO2 SERPL-SCNC: 21 MMOL/L (ref 24.3–31.9)
CREAT BLD-MCNC: 0.57 MG/DL (ref 0.43–1.29)
DEPRECATED RDW RBC AUTO: 46.5 FL (ref 37–54)
EOSINOPHIL # BLD AUTO: 0.24 10*3/MM3 (ref 0–0.7)
EOSINOPHIL NFR BLD AUTO: 4.6 % (ref 0–5)
ERYTHROCYTE [DISTWIDTH] IN BLOOD BY AUTOMATED COUNT: 12.4 % (ref 11.5–14.5)
GFR SERPL CREATININE-BSD FRML MDRD: 110 ML/MIN/1.73
GLOBULIN UR ELPH-MCNC: 2.5 GM/DL
GLUCOSE BLD-MCNC: 84 MG/DL (ref 70–110)
HCT VFR BLD AUTO: 30.7 % (ref 37–47)
HGB BLD-MCNC: 9.8 G/DL (ref 12–16)
IMM GRANULOCYTES # BLD AUTO: 0.03 10*3/MM3 (ref 0–0.03)
IMM GRANULOCYTES NFR BLD AUTO: 0.6 % (ref 0–0.5)
LYMPHOCYTES # BLD AUTO: 1.35 10*3/MM3 (ref 1–3)
LYMPHOCYTES NFR BLD AUTO: 26 % (ref 21–51)
MCH RBC QN AUTO: 33.3 PG (ref 27–33)
MCHC RBC AUTO-ENTMCNC: 31.9 G/DL (ref 33–37)
MCV RBC AUTO: 104.4 FL (ref 80–94)
MONOCYTES # BLD AUTO: 0.8 10*3/MM3 (ref 0.1–0.9)
MONOCYTES NFR BLD AUTO: 15.4 % (ref 0–10)
NEUTROPHILS # BLD AUTO: 2.74 10*3/MM3 (ref 1.4–6.5)
NEUTROPHILS NFR BLD AUTO: 52.6 % (ref 30–70)
OSMOLALITY SERPL CALC.SUM OF ELEC: 271.3 MOSM/KG (ref 273–305)
PLATELET # BLD AUTO: 176 10*3/MM3 (ref 130–400)
PMV BLD AUTO: 10.1 FL (ref 6–10)
POTASSIUM BLD-SCNC: 4.3 MMOL/L (ref 3.5–5.3)
PROT SERPL-MCNC: 6.3 G/DL (ref 6–8)
RBC # BLD AUTO: 2.94 10*6/MM3 (ref 4.2–5.4)
SODIUM BLD-SCNC: 136 MMOL/L (ref 135–153)
WBC NRBC COR # BLD: 5.2 10*3/MM3 (ref 4.5–12.5)

## 2019-03-02 PROCEDURE — 85025 COMPLETE CBC W/AUTO DIFF WBC: CPT | Performed by: PHYSICIAN ASSISTANT

## 2019-03-02 PROCEDURE — 25010000002 CYANOCOBALAMIN PER 1000 MCG: Performed by: HOSPITALIST

## 2019-03-02 PROCEDURE — 25010000002 ENOXAPARIN PER 10 MG: Performed by: PHYSICIAN ASSISTANT

## 2019-03-02 PROCEDURE — 80053 COMPREHEN METABOLIC PANEL: CPT | Performed by: PHYSICIAN ASSISTANT

## 2019-03-02 PROCEDURE — 99231 SBSQ HOSP IP/OBS SF/LOW 25: CPT | Performed by: HOSPITALIST

## 2019-03-02 PROCEDURE — 94799 UNLISTED PULMONARY SVC/PX: CPT

## 2019-03-02 RX ADMIN — FOLIC ACID 1 MG: 1 TABLET ORAL at 09:05

## 2019-03-02 RX ADMIN — ASPIRIN 325 MG: 325 TABLET ORAL at 09:05

## 2019-03-02 RX ADMIN — SODIUM CHLORIDE, PRESERVATIVE FREE 3 ML: 5 INJECTION INTRAVENOUS at 20:25

## 2019-03-02 RX ADMIN — Medication 1 CAPSULE: at 09:05

## 2019-03-02 RX ADMIN — CYANOCOBALAMIN 1000 MCG: 1000 INJECTION, SOLUTION INTRAMUSCULAR at 09:06

## 2019-03-02 RX ADMIN — ENOXAPARIN SODIUM 40 MG: 40 INJECTION SUBCUTANEOUS at 09:05

## 2019-03-02 RX ADMIN — PANTOPRAZOLE SODIUM 40 MG: 40 TABLET, DELAYED RELEASE ORAL at 05:27

## 2019-03-02 RX ADMIN — SODIUM CHLORIDE, PRESERVATIVE FREE 3 ML: 5 INJECTION INTRAVENOUS at 09:08

## 2019-03-02 RX ADMIN — LEVOTHYROXINE SODIUM 25 MCG: 25 TABLET ORAL at 05:27

## 2019-03-02 RX ADMIN — SODIUM CHLORIDE, PRESERVATIVE FREE 3 ML: 5 INJECTION INTRAVENOUS at 09:05

## 2019-03-02 RX ADMIN — Medication 100 MG: at 09:05

## 2019-03-03 LAB
ALBUMIN SERPL-MCNC: 4.1 G/DL (ref 3.5–5)
ALBUMIN/GLOB SERPL: 1.5 G/DL (ref 1.5–2.5)
ALP SERPL-CCNC: 82 U/L (ref 35–104)
ALT SERPL W P-5'-P-CCNC: 31 U/L (ref 10–36)
ANION GAP SERPL CALCULATED.3IONS-SCNC: 9.6 MMOL/L (ref 3.6–11.2)
AST SERPL-CCNC: 31 U/L (ref 10–30)
BASOPHILS # BLD AUTO: 0.05 10*3/MM3 (ref 0–0.3)
BASOPHILS NFR BLD AUTO: 0.9 % (ref 0–2)
BILIRUB SERPL-MCNC: 0.3 MG/DL (ref 0.2–1.8)
BUN BLD-MCNC: 15 MG/DL (ref 7–21)
BUN/CREAT SERPL: 23.4 (ref 7–25)
CALCIUM SPEC-SCNC: 9.9 MG/DL (ref 7.7–10)
CHLORIDE SERPL-SCNC: 103 MMOL/L (ref 99–112)
CO2 SERPL-SCNC: 21.4 MMOL/L (ref 24.3–31.9)
CREAT BLD-MCNC: 0.64 MG/DL (ref 0.43–1.29)
DEPRECATED RDW RBC AUTO: 46.8 FL (ref 37–54)
EOSINOPHIL # BLD AUTO: 0.24 10*3/MM3 (ref 0–0.7)
EOSINOPHIL NFR BLD AUTO: 4.2 % (ref 0–5)
ERYTHROCYTE [DISTWIDTH] IN BLOOD BY AUTOMATED COUNT: 12.5 % (ref 11.5–14.5)
GFR SERPL CREATININE-BSD FRML MDRD: 96 ML/MIN/1.73
GLOBULIN UR ELPH-MCNC: 2.8 GM/DL
GLUCOSE BLD-MCNC: 86 MG/DL (ref 70–110)
HCT VFR BLD AUTO: 32.6 % (ref 37–47)
HGB BLD-MCNC: 10.6 G/DL (ref 12–16)
IMM GRANULOCYTES # BLD AUTO: 0.06 10*3/MM3 (ref 0–0.03)
IMM GRANULOCYTES NFR BLD AUTO: 1 % (ref 0–0.5)
LYMPHOCYTES # BLD AUTO: 1.47 10*3/MM3 (ref 1–3)
LYMPHOCYTES NFR BLD AUTO: 25.5 % (ref 21–51)
MCH RBC QN AUTO: 33.8 PG (ref 27–33)
MCHC RBC AUTO-ENTMCNC: 32.5 G/DL (ref 33–37)
MCV RBC AUTO: 103.8 FL (ref 80–94)
MONOCYTES # BLD AUTO: 0.78 10*3/MM3 (ref 0.1–0.9)
MONOCYTES NFR BLD AUTO: 13.5 % (ref 0–10)
NEUTROPHILS # BLD AUTO: 3.16 10*3/MM3 (ref 1.4–6.5)
NEUTROPHILS NFR BLD AUTO: 54.9 % (ref 30–70)
OSMOLALITY SERPL CALC.SUM OF ELEC: 268.4 MOSM/KG (ref 273–305)
PLATELET # BLD AUTO: 196 10*3/MM3 (ref 130–400)
PMV BLD AUTO: 10 FL (ref 6–10)
POTASSIUM BLD-SCNC: 4.3 MMOL/L (ref 3.5–5.3)
PROT SERPL-MCNC: 6.9 G/DL (ref 6–8)
RBC # BLD AUTO: 3.14 10*6/MM3 (ref 4.2–5.4)
SODIUM BLD-SCNC: 134 MMOL/L (ref 135–153)
WBC NRBC COR # BLD: 5.76 10*3/MM3 (ref 4.5–12.5)

## 2019-03-03 PROCEDURE — 99231 SBSQ HOSP IP/OBS SF/LOW 25: CPT | Performed by: HOSPITALIST

## 2019-03-03 PROCEDURE — 25010000002 ENOXAPARIN PER 10 MG: Performed by: PHYSICIAN ASSISTANT

## 2019-03-03 PROCEDURE — 85025 COMPLETE CBC W/AUTO DIFF WBC: CPT | Performed by: PHYSICIAN ASSISTANT

## 2019-03-03 PROCEDURE — 80053 COMPREHEN METABOLIC PANEL: CPT | Performed by: PHYSICIAN ASSISTANT

## 2019-03-03 RX ADMIN — Medication 100 MG: at 09:13

## 2019-03-03 RX ADMIN — LEVOTHYROXINE SODIUM 25 MCG: 25 TABLET ORAL at 05:16

## 2019-03-03 RX ADMIN — ASPIRIN 325 MG: 325 TABLET ORAL at 09:13

## 2019-03-03 RX ADMIN — ENOXAPARIN SODIUM 40 MG: 40 INJECTION SUBCUTANEOUS at 09:13

## 2019-03-03 RX ADMIN — Medication 1 CAPSULE: at 09:13

## 2019-03-03 RX ADMIN — PANTOPRAZOLE SODIUM 40 MG: 40 TABLET, DELAYED RELEASE ORAL at 05:16

## 2019-03-03 RX ADMIN — SODIUM CHLORIDE, PRESERVATIVE FREE 3 ML: 5 INJECTION INTRAVENOUS at 09:13

## 2019-03-03 RX ADMIN — FOLIC ACID 1 MG: 1 TABLET ORAL at 09:13

## 2019-03-04 LAB
ALBUMIN SERPL-MCNC: 4.1 G/DL (ref 3.5–5)
ALBUMIN/GLOB SERPL: 1.4 G/DL (ref 1.5–2.5)
ALP SERPL-CCNC: 85 U/L (ref 35–104)
ALT SERPL W P-5'-P-CCNC: 38 U/L (ref 10–36)
ANION GAP SERPL CALCULATED.3IONS-SCNC: 8 MMOL/L (ref 3.6–11.2)
AST SERPL-CCNC: 37 U/L (ref 10–30)
BASOPHILS # BLD AUTO: 0.04 10*3/MM3 (ref 0–0.3)
BASOPHILS NFR BLD AUTO: 0.6 % (ref 0–2)
BILIRUB SERPL-MCNC: 0.2 MG/DL (ref 0.2–1.8)
BUN BLD-MCNC: 18 MG/DL (ref 7–21)
BUN/CREAT SERPL: 32.7 (ref 7–25)
CALCIUM SPEC-SCNC: 10 MG/DL (ref 7.7–10)
CHLORIDE SERPL-SCNC: 104 MMOL/L (ref 99–112)
CO2 SERPL-SCNC: 22 MMOL/L (ref 24.3–31.9)
CREAT BLD-MCNC: 0.55 MG/DL (ref 0.43–1.29)
DEPRECATED RDW RBC AUTO: 48.9 FL (ref 37–54)
EOSINOPHIL # BLD AUTO: 0.28 10*3/MM3 (ref 0–0.7)
EOSINOPHIL NFR BLD AUTO: 3.9 % (ref 0–5)
ERYTHROCYTE [DISTWIDTH] IN BLOOD BY AUTOMATED COUNT: 13 % (ref 11.5–14.5)
GFR SERPL CREATININE-BSD FRML MDRD: 115 ML/MIN/1.73
GLOBULIN UR ELPH-MCNC: 2.9 GM/DL
GLUCOSE BLD-MCNC: 84 MG/DL (ref 70–110)
HCT VFR BLD AUTO: 31.3 % (ref 37–47)
HGB BLD-MCNC: 10.2 G/DL (ref 12–16)
IMM GRANULOCYTES # BLD AUTO: 0.1 10*3/MM3 (ref 0–0.03)
IMM GRANULOCYTES NFR BLD AUTO: 1.4 % (ref 0–0.5)
LYMPHOCYTES # BLD AUTO: 1.71 10*3/MM3 (ref 1–3)
LYMPHOCYTES NFR BLD AUTO: 23.7 % (ref 21–51)
MCH RBC QN AUTO: 33.7 PG (ref 27–33)
MCHC RBC AUTO-ENTMCNC: 32.6 G/DL (ref 33–37)
MCV RBC AUTO: 103.3 FL (ref 80–94)
MONOCYTES # BLD AUTO: 1.02 10*3/MM3 (ref 0.1–0.9)
MONOCYTES NFR BLD AUTO: 14.1 % (ref 0–10)
NEUTROPHILS # BLD AUTO: 4.08 10*3/MM3 (ref 1.4–6.5)
NEUTROPHILS NFR BLD AUTO: 56.3 % (ref 30–70)
OSMOLALITY SERPL CALC.SUM OF ELEC: 269.3 MOSM/KG (ref 273–305)
PLATELET # BLD AUTO: 216 10*3/MM3 (ref 130–400)
PMV BLD AUTO: 10.7 FL (ref 6–10)
POTASSIUM BLD-SCNC: 4.6 MMOL/L (ref 3.5–5.3)
PROT SERPL-MCNC: 7 G/DL (ref 6–8)
RBC # BLD AUTO: 3.03 10*6/MM3 (ref 4.2–5.4)
SODIUM BLD-SCNC: 134 MMOL/L (ref 135–153)
WBC NRBC COR # BLD: 7.23 10*3/MM3 (ref 4.5–12.5)

## 2019-03-04 PROCEDURE — 80053 COMPREHEN METABOLIC PANEL: CPT | Performed by: PHYSICIAN ASSISTANT

## 2019-03-04 PROCEDURE — 93005 ELECTROCARDIOGRAM TRACING: CPT | Performed by: HOSPITALIST

## 2019-03-04 PROCEDURE — 99233 SBSQ HOSP IP/OBS HIGH 50: CPT | Performed by: INTERNAL MEDICINE

## 2019-03-04 PROCEDURE — 93010 ELECTROCARDIOGRAM REPORT: CPT | Performed by: INTERNAL MEDICINE

## 2019-03-04 PROCEDURE — 99254 IP/OBS CNSLTJ NEW/EST MOD 60: CPT | Performed by: INTERNAL MEDICINE

## 2019-03-04 PROCEDURE — 85025 COMPLETE CBC W/AUTO DIFF WBC: CPT | Performed by: PHYSICIAN ASSISTANT

## 2019-03-04 PROCEDURE — 25010000002 ENOXAPARIN PER 10 MG: Performed by: PHYSICIAN ASSISTANT

## 2019-03-04 RX ORDER — FLECAINIDE ACETATE 50 MG/1
50 TABLET ORAL EVERY 12 HOURS SCHEDULED
Status: DISCONTINUED | OUTPATIENT
Start: 2019-03-04 | End: 2019-03-07

## 2019-03-04 RX ORDER — METOPROLOL TARTRATE 5 MG/5ML
INJECTION INTRAVENOUS
Status: COMPLETED
Start: 2019-03-04 | End: 2019-03-04

## 2019-03-04 RX ORDER — METOPROLOL TARTRATE 5 MG/5ML
5 INJECTION INTRAVENOUS ONCE
Status: COMPLETED | OUTPATIENT
Start: 2019-03-04 | End: 2019-03-04

## 2019-03-04 RX ADMIN — PANTOPRAZOLE SODIUM 40 MG: 40 TABLET, DELAYED RELEASE ORAL at 05:43

## 2019-03-04 RX ADMIN — METOPROLOL TARTRATE 5 MG: 5 INJECTION INTRAVENOUS at 01:32

## 2019-03-04 RX ADMIN — SODIUM CHLORIDE, PRESERVATIVE FREE 3 ML: 5 INJECTION INTRAVENOUS at 20:55

## 2019-03-04 RX ADMIN — ENOXAPARIN SODIUM 40 MG: 40 INJECTION SUBCUTANEOUS at 08:53

## 2019-03-04 RX ADMIN — ASPIRIN 325 MG: 325 TABLET ORAL at 08:54

## 2019-03-04 RX ADMIN — DILTIAZEM HYDROCHLORIDE 5 MG/HR: 5 INJECTION INTRAVENOUS at 02:17

## 2019-03-04 RX ADMIN — FOLIC ACID 1 MG: 1 TABLET ORAL at 08:54

## 2019-03-04 RX ADMIN — Medication 1 CAPSULE: at 08:54

## 2019-03-04 RX ADMIN — LEVOTHYROXINE SODIUM 25 MCG: 25 TABLET ORAL at 05:43

## 2019-03-04 RX ADMIN — Medication 100 MG: at 08:54

## 2019-03-05 LAB
ALBUMIN SERPL-MCNC: 3.6 G/DL (ref 3.5–5)
ALBUMIN/GLOB SERPL: 1.2 G/DL (ref 1.5–2.5)
ALP SERPL-CCNC: 75 U/L (ref 35–104)
ALT SERPL W P-5'-P-CCNC: 29 U/L (ref 10–36)
ANION GAP SERPL CALCULATED.3IONS-SCNC: 11.5 MMOL/L (ref 3.6–11.2)
AST SERPL-CCNC: 27 U/L (ref 10–30)
BASOPHILS # BLD AUTO: 0.05 10*3/MM3 (ref 0–0.3)
BASOPHILS NFR BLD AUTO: 0.6 % (ref 0–2)
BILIRUB SERPL-MCNC: 0.2 MG/DL (ref 0.2–1.8)
BUN BLD-MCNC: 14 MG/DL (ref 7–21)
BUN/CREAT SERPL: 26.4 (ref 7–25)
CALCIUM SPEC-SCNC: 9.5 MG/DL (ref 7.7–10)
CHLORIDE SERPL-SCNC: 98 MMOL/L (ref 99–112)
CO2 SERPL-SCNC: 19.5 MMOL/L (ref 24.3–31.9)
CREAT BLD-MCNC: 0.53 MG/DL (ref 0.43–1.29)
DEPRECATED RDW RBC AUTO: 47.3 FL (ref 37–54)
EOSINOPHIL # BLD AUTO: 0.32 10*3/MM3 (ref 0–0.7)
EOSINOPHIL NFR BLD AUTO: 4 % (ref 0–5)
ERYTHROCYTE [DISTWIDTH] IN BLOOD BY AUTOMATED COUNT: 12.8 % (ref 11.5–14.5)
GFR SERPL CREATININE-BSD FRML MDRD: 120 ML/MIN/1.73
GLOBULIN UR ELPH-MCNC: 2.9 GM/DL
GLUCOSE BLD-MCNC: 91 MG/DL (ref 70–110)
HCT VFR BLD AUTO: 30.3 % (ref 37–47)
HGB BLD-MCNC: 9.7 G/DL (ref 12–16)
IMM GRANULOCYTES # BLD AUTO: 0.12 10*3/MM3 (ref 0–0.03)
IMM GRANULOCYTES NFR BLD AUTO: 1.5 % (ref 0–0.5)
LYMPHOCYTES # BLD AUTO: 1.33 10*3/MM3 (ref 1–3)
LYMPHOCYTES NFR BLD AUTO: 16.6 % (ref 21–51)
MCH RBC QN AUTO: 33.1 PG (ref 27–33)
MCHC RBC AUTO-ENTMCNC: 32 G/DL (ref 33–37)
MCV RBC AUTO: 103.4 FL (ref 80–94)
MONOCYTES # BLD AUTO: 0.88 10*3/MM3 (ref 0.1–0.9)
MONOCYTES NFR BLD AUTO: 11 % (ref 0–10)
NEUTROPHILS # BLD AUTO: 5.32 10*3/MM3 (ref 1.4–6.5)
NEUTROPHILS NFR BLD AUTO: 66.3 % (ref 30–70)
OSMOLALITY SERPL CALC.SUM OF ELEC: 259 MOSM/KG (ref 273–305)
PHOSPHATE SERPL-MCNC: 5.5 MG/DL (ref 2.7–4.5)
PLATELET # BLD AUTO: 217 10*3/MM3 (ref 130–400)
PMV BLD AUTO: 10.6 FL (ref 6–10)
POTASSIUM BLD-SCNC: 4.5 MMOL/L (ref 3.5–5.3)
PROT SERPL-MCNC: 6.5 G/DL (ref 6–8)
RBC # BLD AUTO: 2.93 10*6/MM3 (ref 4.2–5.4)
SODIUM BLD-SCNC: 129 MMOL/L (ref 135–153)
SODIUM BLD-SCNC: 131 MMOL/L (ref 135–153)
SODIUM UR-SCNC: 38 MMOL/L
WBC NRBC COR # BLD: 8.02 10*3/MM3 (ref 4.5–12.5)

## 2019-03-05 PROCEDURE — 93010 ELECTROCARDIOGRAM REPORT: CPT | Performed by: INTERNAL MEDICINE

## 2019-03-05 PROCEDURE — 84100 ASSAY OF PHOSPHORUS: CPT | Performed by: INTERNAL MEDICINE

## 2019-03-05 PROCEDURE — 97530 THERAPEUTIC ACTIVITIES: CPT

## 2019-03-05 PROCEDURE — 80053 COMPREHEN METABOLIC PANEL: CPT | Performed by: PHYSICIAN ASSISTANT

## 2019-03-05 PROCEDURE — 99232 SBSQ HOSP IP/OBS MODERATE 35: CPT | Performed by: INTERNAL MEDICINE

## 2019-03-05 PROCEDURE — 97116 GAIT TRAINING THERAPY: CPT

## 2019-03-05 PROCEDURE — 25010000002 ENOXAPARIN PER 10 MG: Performed by: PHYSICIAN ASSISTANT

## 2019-03-05 PROCEDURE — 93005 ELECTROCARDIOGRAM TRACING: CPT | Performed by: INTERNAL MEDICINE

## 2019-03-05 PROCEDURE — 85025 COMPLETE CBC W/AUTO DIFF WBC: CPT | Performed by: PHYSICIAN ASSISTANT

## 2019-03-05 PROCEDURE — 84295 ASSAY OF SERUM SODIUM: CPT | Performed by: INTERNAL MEDICINE

## 2019-03-05 PROCEDURE — 84300 ASSAY OF URINE SODIUM: CPT | Performed by: INTERNAL MEDICINE

## 2019-03-05 RX ADMIN — FOLIC ACID 1 MG: 1 TABLET ORAL at 11:34

## 2019-03-05 RX ADMIN — FLECAINIDE ACETATE 50 MG: 50 TABLET ORAL at 11:34

## 2019-03-05 RX ADMIN — ENOXAPARIN SODIUM 40 MG: 40 INJECTION SUBCUTANEOUS at 11:34

## 2019-03-05 RX ADMIN — LEVOTHYROXINE SODIUM 25 MCG: 25 TABLET ORAL at 05:04

## 2019-03-05 RX ADMIN — Medication 100 MG: at 11:34

## 2019-03-05 RX ADMIN — PANTOPRAZOLE SODIUM 40 MG: 40 TABLET, DELAYED RELEASE ORAL at 05:04

## 2019-03-05 RX ADMIN — ASPIRIN 325 MG: 325 TABLET ORAL at 11:34

## 2019-03-05 RX ADMIN — FLECAINIDE ACETATE 50 MG: 50 TABLET ORAL at 21:35

## 2019-03-05 RX ADMIN — FLECAINIDE ACETATE 50 MG: 50 TABLET ORAL at 00:45

## 2019-03-06 LAB
ALBUMIN SERPL-MCNC: 3.8 G/DL (ref 3.5–5)
ALBUMIN/GLOB SERPL: 1.3 G/DL (ref 1.5–2.5)
ALP SERPL-CCNC: 80 U/L (ref 35–104)
ALT SERPL W P-5'-P-CCNC: 19 U/L (ref 10–36)
ANION GAP SERPL CALCULATED.3IONS-SCNC: 9.2 MMOL/L (ref 3.6–11.2)
AST SERPL-CCNC: 17 U/L (ref 10–30)
BASOPHILS # BLD AUTO: 0.03 10*3/MM3 (ref 0–0.3)
BASOPHILS NFR BLD AUTO: 0.4 % (ref 0–2)
BILIRUB SERPL-MCNC: 0.3 MG/DL (ref 0.2–1.8)
BUN BLD-MCNC: 16 MG/DL (ref 7–21)
BUN/CREAT SERPL: 31.4 (ref 7–25)
CALCIUM SPEC-SCNC: 10 MG/DL (ref 7.7–10)
CHLORIDE SERPL-SCNC: 99 MMOL/L (ref 99–112)
CO2 SERPL-SCNC: 21.8 MMOL/L (ref 24.3–31.9)
CREAT BLD-MCNC: 0.51 MG/DL (ref 0.43–1.29)
DEPRECATED RDW RBC AUTO: 46.8 FL (ref 37–54)
EOSINOPHIL # BLD AUTO: 0.25 10*3/MM3 (ref 0–0.7)
EOSINOPHIL NFR BLD AUTO: 3.4 % (ref 0–5)
ERYTHROCYTE [DISTWIDTH] IN BLOOD BY AUTOMATED COUNT: 12.7 % (ref 11.5–14.5)
GFR SERPL CREATININE-BSD FRML MDRD: 125 ML/MIN/1.73
GLOBULIN UR ELPH-MCNC: 2.9 GM/DL
GLUCOSE BLD-MCNC: 94 MG/DL (ref 70–110)
HCT VFR BLD AUTO: 31.1 % (ref 37–47)
HGB BLD-MCNC: 10 G/DL (ref 12–16)
IMM GRANULOCYTES # BLD AUTO: 0.06 10*3/MM3 (ref 0–0.03)
IMM GRANULOCYTES NFR BLD AUTO: 0.8 % (ref 0–0.5)
LYMPHOCYTES # BLD AUTO: 1.14 10*3/MM3 (ref 1–3)
LYMPHOCYTES NFR BLD AUTO: 15.5 % (ref 21–51)
MAGNESIUM SERPL-MCNC: 1.7 MG/DL (ref 1.7–2.6)
MCH RBC QN AUTO: 32.9 PG (ref 27–33)
MCHC RBC AUTO-ENTMCNC: 32.2 G/DL (ref 33–37)
MCV RBC AUTO: 102.3 FL (ref 80–94)
MONOCYTES # BLD AUTO: 1.03 10*3/MM3 (ref 0.1–0.9)
MONOCYTES NFR BLD AUTO: 14 % (ref 0–10)
NEUTROPHILS # BLD AUTO: 4.84 10*3/MM3 (ref 1.4–6.5)
NEUTROPHILS NFR BLD AUTO: 65.9 % (ref 30–70)
OSMOLALITY SERPL CALC.SUM OF ELEC: 261.7 MOSM/KG (ref 273–305)
PLATELET # BLD AUTO: 229 10*3/MM3 (ref 130–400)
PMV BLD AUTO: 10.5 FL (ref 6–10)
POTASSIUM BLD-SCNC: 4.6 MMOL/L (ref 3.5–5.3)
PROT SERPL-MCNC: 6.7 G/DL (ref 6–8)
RBC # BLD AUTO: 3.04 10*6/MM3 (ref 4.2–5.4)
SODIUM BLD-SCNC: 130 MMOL/L (ref 135–153)
WBC NRBC COR # BLD: 7.35 10*3/MM3 (ref 4.5–12.5)

## 2019-03-06 PROCEDURE — 99232 SBSQ HOSP IP/OBS MODERATE 35: CPT | Performed by: INTERNAL MEDICINE

## 2019-03-06 PROCEDURE — 85025 COMPLETE CBC W/AUTO DIFF WBC: CPT | Performed by: PHYSICIAN ASSISTANT

## 2019-03-06 PROCEDURE — 93010 ELECTROCARDIOGRAM REPORT: CPT | Performed by: INTERNAL MEDICINE

## 2019-03-06 PROCEDURE — 83735 ASSAY OF MAGNESIUM: CPT | Performed by: NURSE PRACTITIONER

## 2019-03-06 PROCEDURE — 80053 COMPREHEN METABOLIC PANEL: CPT | Performed by: PHYSICIAN ASSISTANT

## 2019-03-06 PROCEDURE — 25010000002 MAGNESIUM SULFATE IN D5W 1G/100ML (PREMIX) 1-5 GM/100ML-% SOLUTION: Performed by: INTERNAL MEDICINE

## 2019-03-06 PROCEDURE — 25010000002 ENOXAPARIN PER 10 MG: Performed by: PHYSICIAN ASSISTANT

## 2019-03-06 PROCEDURE — 93005 ELECTROCARDIOGRAM TRACING: CPT | Performed by: NURSE PRACTITIONER

## 2019-03-06 RX ORDER — METOPROLOL TARTRATE 50 MG/1
50 TABLET, FILM COATED ORAL EVERY 12 HOURS SCHEDULED
Status: DISCONTINUED | OUTPATIENT
Start: 2019-03-06 | End: 2019-03-07

## 2019-03-06 RX ORDER — MAGNESIUM SULFATE 1 G/100ML
1 INJECTION INTRAVENOUS ONCE
Status: COMPLETED | OUTPATIENT
Start: 2019-03-06 | End: 2019-03-06

## 2019-03-06 RX ADMIN — LEVOTHYROXINE SODIUM 25 MCG: 25 TABLET ORAL at 11:29

## 2019-03-06 RX ADMIN — MAGNESIUM SULFATE HEPTAHYDRATE 1 G: 1 INJECTION, SOLUTION INTRAVENOUS at 13:25

## 2019-03-06 RX ADMIN — ASPIRIN 325 MG: 325 TABLET ORAL at 09:56

## 2019-03-06 RX ADMIN — ENOXAPARIN SODIUM 40 MG: 40 INJECTION SUBCUTANEOUS at 09:56

## 2019-03-06 RX ADMIN — METOPROLOL TARTRATE 50 MG: 50 TABLET, FILM COATED ORAL at 11:29

## 2019-03-06 RX ADMIN — PANTOPRAZOLE SODIUM 40 MG: 40 TABLET, DELAYED RELEASE ORAL at 09:56

## 2019-03-06 RX ADMIN — FLECAINIDE ACETATE 50 MG: 50 TABLET ORAL at 09:55

## 2019-03-06 RX ADMIN — Medication 100 MG: at 09:55

## 2019-03-06 RX ADMIN — FOLIC ACID 1 MG: 1 TABLET ORAL at 09:56

## 2019-03-07 ENCOUNTER — APPOINTMENT (OUTPATIENT)
Dept: NUCLEAR MEDICINE | Facility: HOSPITAL | Age: 54
End: 2019-03-07

## 2019-03-07 ENCOUNTER — APPOINTMENT (OUTPATIENT)
Dept: GENERAL RADIOLOGY | Facility: HOSPITAL | Age: 54
End: 2019-03-07

## 2019-03-07 ENCOUNTER — APPOINTMENT (OUTPATIENT)
Dept: CARDIOLOGY | Facility: HOSPITAL | Age: 54
End: 2019-03-07

## 2019-03-07 ENCOUNTER — TELEPHONE (OUTPATIENT)
Dept: GASTROENTEROLOGY | Facility: CLINIC | Age: 54
End: 2019-03-07

## 2019-03-07 LAB
ALBUMIN SERPL-MCNC: 3.7 G/DL (ref 3.5–5)
ALBUMIN/GLOB SERPL: 1.3 G/DL (ref 1.5–2.5)
ALP SERPL-CCNC: 79 U/L (ref 35–104)
ALT SERPL W P-5'-P-CCNC: 25 U/L (ref 10–36)
ANION GAP SERPL CALCULATED.3IONS-SCNC: 7.4 MMOL/L (ref 3.6–11.2)
AST SERPL-CCNC: 21 U/L (ref 10–30)
BASOPHILS # BLD AUTO: 0.04 10*3/MM3 (ref 0–0.3)
BASOPHILS NFR BLD AUTO: 0.6 % (ref 0–2)
BH CV NUCLEAR PRIOR STUDY: 3
BH CV STRESS BP STAGE 1: NORMAL
BH CV STRESS BP STAGE 2: NORMAL
BH CV STRESS COMMENTS STAGE 1: NORMAL
BH CV STRESS COMMENTS STAGE 2: NORMAL
BH CV STRESS DOSE REGADENOSON STAGE 1: 0.4
BH CV STRESS DURATION MIN STAGE 1: 0
BH CV STRESS DURATION MIN STAGE 2: 4
BH CV STRESS DURATION SEC STAGE 1: 10
BH CV STRESS DURATION SEC STAGE 2: 0
BH CV STRESS HR STAGE 1: 118
BH CV STRESS HR STAGE 2: 162
BH CV STRESS PROTOCOL 1: NORMAL
BH CV STRESS RECOVERY BP: NORMAL MMHG
BH CV STRESS RECOVERY HR: 162 BPM
BH CV STRESS STAGE 1: 1
BH CV STRESS STAGE 2: 2
BILIRUB SERPL-MCNC: 0.2 MG/DL (ref 0.2–1.8)
BUN BLD-MCNC: 20 MG/DL (ref 7–21)
BUN/CREAT SERPL: 27.8 (ref 7–25)
CALCIUM SPEC-SCNC: 10 MG/DL (ref 7.7–10)
CHLORIDE SERPL-SCNC: 102 MMOL/L (ref 99–112)
CO2 SERPL-SCNC: 23.6 MMOL/L (ref 24.3–31.9)
CREAT BLD-MCNC: 0.72 MG/DL (ref 0.43–1.29)
D-LACTATE SERPL-SCNC: 3.1 MMOL/L (ref 0.5–2)
DEPRECATED RDW RBC AUTO: 50.1 FL (ref 37–54)
EOSINOPHIL # BLD AUTO: 0.24 10*3/MM3 (ref 0–0.7)
EOSINOPHIL NFR BLD AUTO: 3.7 % (ref 0–5)
ERYTHROCYTE [DISTWIDTH] IN BLOOD BY AUTOMATED COUNT: 13.4 % (ref 11.5–14.5)
GFR SERPL CREATININE-BSD FRML MDRD: 84 ML/MIN/1.73
GLOBULIN UR ELPH-MCNC: 2.8 GM/DL
GLUCOSE BLD-MCNC: 90 MG/DL (ref 70–110)
HCT VFR BLD AUTO: 30.7 % (ref 37–47)
HGB BLD-MCNC: 10 G/DL (ref 12–16)
IMM GRANULOCYTES # BLD AUTO: 0.09 10*3/MM3 (ref 0–0.03)
IMM GRANULOCYTES NFR BLD AUTO: 1.4 % (ref 0–0.5)
LV EF NUC BP: 64 %
LYMPHOCYTES # BLD AUTO: 1.58 10*3/MM3 (ref 1–3)
LYMPHOCYTES NFR BLD AUTO: 24.5 % (ref 21–51)
MAGNESIUM SERPL-MCNC: 1.8 MG/DL (ref 1.7–2.6)
MAXIMAL PREDICTED HEART RATE: 165 BPM
MCH RBC QN AUTO: 33.4 PG (ref 27–33)
MCHC RBC AUTO-ENTMCNC: 32.6 G/DL (ref 33–37)
MCV RBC AUTO: 102.7 FL (ref 80–94)
MONOCYTES # BLD AUTO: 0.9 10*3/MM3 (ref 0.1–0.9)
MONOCYTES NFR BLD AUTO: 13.9 % (ref 0–10)
NEUTROPHILS # BLD AUTO: 3.61 10*3/MM3 (ref 1.4–6.5)
NEUTROPHILS NFR BLD AUTO: 55.9 % (ref 30–70)
OSMOLALITY SERPL CALC.SUM OF ELEC: 268.5 MOSM/KG (ref 273–305)
PERCENT MAX PREDICTED HR: 71.52 %
PLATELET # BLD AUTO: 238 10*3/MM3 (ref 130–400)
PMV BLD AUTO: 10.5 FL (ref 6–10)
POTASSIUM BLD-SCNC: 4.6 MMOL/L (ref 3.5–5.3)
PROT SERPL-MCNC: 6.5 G/DL (ref 6–8)
RBC # BLD AUTO: 2.99 10*6/MM3 (ref 4.2–5.4)
SODIUM BLD-SCNC: 133 MMOL/L (ref 135–153)
STRESS BASELINE BP: NORMAL MMHG
STRESS BASELINE HR: 94 BPM
STRESS PERCENT HR: 84 %
STRESS POST PEAK BP: NORMAL MMHG
STRESS POST PEAK HR: 118 BPM
STRESS TARGET HR: 140 BPM
TROPONIN I SERPL-MCNC: <0.006 NG/ML
WBC NRBC COR # BLD: 6.46 10*3/MM3 (ref 4.5–12.5)

## 2019-03-07 PROCEDURE — 94799 UNLISTED PULMONARY SVC/PX: CPT

## 2019-03-07 PROCEDURE — 94002 VENT MGMT INPAT INIT DAY: CPT

## 2019-03-07 PROCEDURE — 93018 CV STRESS TEST I&R ONLY: CPT | Performed by: INTERNAL MEDICINE

## 2019-03-07 PROCEDURE — 82375 ASSAY CARBOXYHB QUANT: CPT | Performed by: HOSPITALIST

## 2019-03-07 PROCEDURE — 85025 COMPLETE CBC W/AUTO DIFF WBC: CPT | Performed by: HOSPITALIST

## 2019-03-07 PROCEDURE — 0BH17EZ INSERTION OF ENDOTRACHEAL AIRWAY INTO TRACHEA, VIA NATURAL OR ARTIFICIAL OPENING: ICD-10-PCS | Performed by: INTERNAL MEDICINE

## 2019-03-07 PROCEDURE — 0 TECHNETIUM SESTAMIBI: Performed by: INTERNAL MEDICINE

## 2019-03-07 PROCEDURE — 71045 X-RAY EXAM CHEST 1 VIEW: CPT

## 2019-03-07 PROCEDURE — 83605 ASSAY OF LACTIC ACID: CPT | Performed by: HOSPITALIST

## 2019-03-07 PROCEDURE — 93005 ELECTROCARDIOGRAM TRACING: CPT | Performed by: INTERNAL MEDICINE

## 2019-03-07 PROCEDURE — 84484 ASSAY OF TROPONIN QUANT: CPT | Performed by: HOSPITALIST

## 2019-03-07 PROCEDURE — 87040 BLOOD CULTURE FOR BACTERIA: CPT | Performed by: HOSPITALIST

## 2019-03-07 PROCEDURE — 25010000002 REGADENOSON 0.4 MG/5ML SOLUTION: Performed by: INTERNAL MEDICINE

## 2019-03-07 PROCEDURE — 85007 BL SMEAR W/DIFF WBC COUNT: CPT | Performed by: HOSPITALIST

## 2019-03-07 PROCEDURE — 93010 ELECTROCARDIOGRAM REPORT: CPT | Performed by: INTERNAL MEDICINE

## 2019-03-07 PROCEDURE — 85025 COMPLETE CBC W/AUTO DIFF WBC: CPT | Performed by: PHYSICIAN ASSISTANT

## 2019-03-07 PROCEDURE — 78452 HT MUSCLE IMAGE SPECT MULT: CPT

## 2019-03-07 PROCEDURE — 5A1955Z RESPIRATORY VENTILATION, GREATER THAN 96 CONSECUTIVE HOURS: ICD-10-PCS | Performed by: INTERNAL MEDICINE

## 2019-03-07 PROCEDURE — 93005 ELECTROCARDIOGRAM TRACING: CPT | Performed by: HOSPITALIST

## 2019-03-07 PROCEDURE — 99232 SBSQ HOSP IP/OBS MODERATE 35: CPT | Performed by: INTERNAL MEDICINE

## 2019-03-07 PROCEDURE — 71045 X-RAY EXAM CHEST 1 VIEW: CPT | Performed by: RADIOLOGY

## 2019-03-07 PROCEDURE — 82805 BLOOD GASES W/O2 SATURATION: CPT | Performed by: HOSPITALIST

## 2019-03-07 PROCEDURE — 83735 ASSAY OF MAGNESIUM: CPT | Performed by: INTERNAL MEDICINE

## 2019-03-07 PROCEDURE — 36600 WITHDRAWAL OF ARTERIAL BLOOD: CPT | Performed by: HOSPITALIST

## 2019-03-07 PROCEDURE — 80053 COMPREHEN METABOLIC PANEL: CPT | Performed by: PHYSICIAN ASSISTANT

## 2019-03-07 PROCEDURE — 83050 HGB METHEMOGLOBIN QUAN: CPT | Performed by: HOSPITALIST

## 2019-03-07 PROCEDURE — 93017 CV STRESS TEST TRACING ONLY: CPT

## 2019-03-07 PROCEDURE — 99231 SBSQ HOSP IP/OBS SF/LOW 25: CPT | Performed by: INTERNAL MEDICINE

## 2019-03-07 PROCEDURE — 86140 C-REACTIVE PROTEIN: CPT | Performed by: HOSPITALIST

## 2019-03-07 PROCEDURE — A9500 TC99M SESTAMIBI: HCPCS | Performed by: INTERNAL MEDICINE

## 2019-03-07 PROCEDURE — 25010000002 ENOXAPARIN PER 10 MG: Performed by: PHYSICIAN ASSISTANT

## 2019-03-07 PROCEDURE — 80053 COMPREHEN METABOLIC PANEL: CPT | Performed by: HOSPITALIST

## 2019-03-07 PROCEDURE — 92950 HEART/LUNG RESUSCITATION CPR: CPT

## 2019-03-07 PROCEDURE — 25010000002 LORAZEPAM PER 2 MG

## 2019-03-07 PROCEDURE — 78452 HT MUSCLE IMAGE SPECT MULT: CPT | Performed by: INTERNAL MEDICINE

## 2019-03-07 RX ORDER — LORAZEPAM 2 MG/ML
1 INJECTION INTRAMUSCULAR ONCE
Status: COMPLETED | OUTPATIENT
Start: 2019-03-07 | End: 2019-03-07

## 2019-03-07 RX ORDER — SODIUM CHLORIDE 9 MG/ML
INJECTION, SOLUTION INTRAVENOUS
Status: DISPENSED
Start: 2019-03-07 | End: 2019-03-08

## 2019-03-07 RX ORDER — LORAZEPAM 2 MG/ML
INJECTION INTRAMUSCULAR
Status: COMPLETED
Start: 2019-03-07 | End: 2019-03-07

## 2019-03-07 RX ORDER — DILTIAZEM HYDROCHLORIDE 240 MG/1
240 CAPSULE, COATED, EXTENDED RELEASE ORAL
Status: DISCONTINUED | OUTPATIENT
Start: 2019-03-07 | End: 2019-03-08

## 2019-03-07 RX ADMIN — LEVOTHYROXINE SODIUM 25 MCG: 25 TABLET ORAL at 06:00

## 2019-03-07 RX ADMIN — PROPOFOL 5 MCG/KG/MIN: 10 INJECTION, EMULSION INTRAVENOUS at 23:40

## 2019-03-07 RX ADMIN — SODIUM CHLORIDE, PRESERVATIVE FREE 3 ML: 5 INJECTION INTRAVENOUS at 19:51

## 2019-03-07 RX ADMIN — LORAZEPAM 1 MG: 2 INJECTION INTRAMUSCULAR at 22:15

## 2019-03-07 RX ADMIN — FOLIC ACID 1 MG: 1 TABLET ORAL at 15:11

## 2019-03-07 RX ADMIN — TECHNETIUM TC 99M SESTAMIBI 1 DOSE: 1 INJECTION INTRAVENOUS at 10:32

## 2019-03-07 RX ADMIN — LORAZEPAM 1 MG: 2 INJECTION INTRAMUSCULAR; INTRAVENOUS at 22:15

## 2019-03-07 RX ADMIN — NOREPINEPHRINE BITARTRATE 0.02 MCG/KG/MIN: 1 INJECTION INTRAVENOUS at 23:30

## 2019-03-07 RX ADMIN — REGADENOSON 0.4 MG: 0.08 INJECTION, SOLUTION INTRAVENOUS at 12:50

## 2019-03-07 RX ADMIN — DILTIAZEM HYDROCHLORIDE 240 MG: 240 CAPSULE, COATED, EXTENDED RELEASE ORAL at 21:18

## 2019-03-07 RX ADMIN — TECHNETIUM TC 99M SESTAMIBI 1 DOSE: 1 INJECTION INTRAVENOUS at 12:50

## 2019-03-07 RX ADMIN — METOPROLOL TARTRATE 50 MG: 50 TABLET, FILM COATED ORAL at 19:50

## 2019-03-07 RX ADMIN — SODIUM CHLORIDE, PRESERVATIVE FREE 3 ML: 5 INJECTION INTRAVENOUS at 09:21

## 2019-03-07 RX ADMIN — ASPIRIN 325 MG: 325 TABLET ORAL at 15:12

## 2019-03-07 RX ADMIN — DILTIAZEM HYDROCHLORIDE 10 MG/HR: 5 INJECTION INTRAVENOUS at 14:57

## 2019-03-07 RX ADMIN — Medication 100 MG: at 15:12

## 2019-03-07 RX ADMIN — ENOXAPARIN SODIUM 40 MG: 40 INJECTION SUBCUTANEOUS at 09:21

## 2019-03-07 NOTE — TELEPHONE ENCOUNTER
CALLED PATIENT REGARDING MISSED APPOINTMENT ON 3/5/19 AT 10:30AM. NO ANSWER AND NO VOICE MAIL SET UP.

## 2019-03-08 ENCOUNTER — APPOINTMENT (OUTPATIENT)
Dept: CARDIOLOGY | Facility: HOSPITAL | Age: 54
End: 2019-03-08

## 2019-03-08 ENCOUNTER — APPOINTMENT (OUTPATIENT)
Dept: ULTRASOUND IMAGING | Facility: HOSPITAL | Age: 54
End: 2019-03-08

## 2019-03-08 ENCOUNTER — APPOINTMENT (OUTPATIENT)
Dept: CT IMAGING | Facility: HOSPITAL | Age: 54
End: 2019-03-08

## 2019-03-08 ENCOUNTER — APPOINTMENT (OUTPATIENT)
Dept: GENERAL RADIOLOGY | Facility: HOSPITAL | Age: 54
End: 2019-03-08

## 2019-03-08 LAB
6-ACETYL MORPHINE: NEGATIVE
A-A DO2: 49.4 MMHG (ref 0–300)
A-A DO2: >300 MMHG (ref 0–300)
ALBUMIN SERPL-MCNC: 3.3 G/DL (ref 3.5–5)
ALBUMIN SERPL-MCNC: 3.8 G/DL (ref 3.5–5)
ALBUMIN/GLOB SERPL: 1.2 G/DL (ref 1.5–2.5)
ALBUMIN/GLOB SERPL: 1.2 G/DL (ref 1.5–2.5)
ALP SERPL-CCNC: 75 U/L (ref 35–104)
ALP SERPL-CCNC: 81 U/L (ref 35–104)
ALT SERPL W P-5'-P-CCNC: 24 U/L (ref 10–36)
ALT SERPL W P-5'-P-CCNC: 38 U/L (ref 10–36)
AMPHET+METHAMPHET UR QL: NEGATIVE
ANION GAP SERPL CALCULATED.3IONS-SCNC: 11.2 MMOL/L (ref 3.6–11.2)
ANION GAP SERPL CALCULATED.3IONS-SCNC: 17 MMOL/L (ref 3.6–11.2)
ARTERIAL PATENCY WRIST A: ABNORMAL
ARTERIAL PATENCY WRIST A: POSITIVE
AST SERPL-CCNC: 21 U/L (ref 10–30)
AST SERPL-CCNC: 52 U/L (ref 10–30)
ATMOSPHERIC PRESS: 735 MMHG
BACTERIA UR QL AUTO: ABNORMAL /HPF
BARBITURATES UR QL SCN: NEGATIVE
BASE EXCESS BLDA CALC-SCNC: -4 MMOL/L
BASE EXCESS BLDA CALC-SCNC: -4.8 MMOL/L
BASE EXCESS BLDA CALC-SCNC: -6 MMOL/L
BASE EXCESS BLDA CALC-SCNC: -6.6 MMOL/L
BASE EXCESS BLDA CALC-SCNC: -6.9 MMOL/L
BASOPHILS # BLD AUTO: 0.06 10*3/MM3 (ref 0–0.3)
BASOPHILS # BLD AUTO: 0.08 10*3/MM3 (ref 0–0.3)
BASOPHILS NFR BLD AUTO: 0.2 % (ref 0–2)
BASOPHILS NFR BLD AUTO: 0.3 % (ref 0–2)
BDY SITE: ABNORMAL
BENZODIAZ UR QL SCN: POSITIVE
BH CV ECHO MEAS - % IVS THICK: 60 %
BH CV ECHO MEAS - % LVPW THICK: 84.6 %
BH CV ECHO MEAS - BSA(HAYCOCK): 1.6 M^2
BH CV ECHO MEAS - BSA: 1.6 M^2
BH CV ECHO MEAS - BZI_BMI: 19.6 KILOGRAMS/M^2
BH CV ECHO MEAS - BZI_METRIC_HEIGHT: 165.1 CM
BH CV ECHO MEAS - BZI_METRIC_WEIGHT: 53.5 KG
BH CV ECHO MEAS - EDV(CUBED): 122.1 ML
BH CV ECHO MEAS - EDV(MOD-SP4): 62 ML
BH CV ECHO MEAS - EDV(TEICH): 116.1 ML
BH CV ECHO MEAS - EF(CUBED): 83.1 %
BH CV ECHO MEAS - EF(MOD-BP): 71 %
BH CV ECHO MEAS - EF(MOD-SP4): 71 %
BH CV ECHO MEAS - EF(TEICH): 75.8 %
BH CV ECHO MEAS - ESV(CUBED): 20.6 ML
BH CV ECHO MEAS - ESV(MOD-SP4): 18 ML
BH CV ECHO MEAS - ESV(TEICH): 28.1 ML
BH CV ECHO MEAS - FS: 44.7 %
BH CV ECHO MEAS - IVS/LVPW: 0.96
BH CV ECHO MEAS - IVSD: 1 CM
BH CV ECHO MEAS - IVSS: 1.6 CM
BH CV ECHO MEAS - LV DIASTOLIC VOL/BSA (35-75): 39.2 ML/M^2
BH CV ECHO MEAS - LV MASS(C)D: 186.6 GRAMS
BH CV ECHO MEAS - LV MASS(C)DI: 118 GRAMS/M^2
BH CV ECHO MEAS - LV MASS(C)S: 190.7 GRAMS
BH CV ECHO MEAS - LV MASS(C)SI: 120.6 GRAMS/M^2
BH CV ECHO MEAS - LV SYSTOLIC VOL/BSA (12-30): 11.4 ML/M^2
BH CV ECHO MEAS - LVIDD: 5 CM
BH CV ECHO MEAS - LVIDS: 2.7 CM
BH CV ECHO MEAS - LVLD AP4: 6.4 CM
BH CV ECHO MEAS - LVLS AP4: 5 CM
BH CV ECHO MEAS - LVPWD: 1 CM
BH CV ECHO MEAS - LVPWS: 1.9 CM
BH CV ECHO MEAS - SI(CUBED): 64.2 ML/M^2
BH CV ECHO MEAS - SI(MOD-SP4): 27.8 ML/M^2
BH CV ECHO MEAS - SI(TEICH): 55.7 ML/M^2
BH CV ECHO MEAS - SV(CUBED): 101.5 ML
BH CV ECHO MEAS - SV(MOD-SP4): 44 ML
BH CV ECHO MEAS - SV(TEICH): 88 ML
BILIRUB SERPL-MCNC: 0.3 MG/DL (ref 0.2–1.8)
BILIRUB SERPL-MCNC: 0.7 MG/DL (ref 0.2–1.8)
BILIRUB UR QL STRIP: NEGATIVE
BNP SERPL-MCNC: 283 PG/ML (ref 0–100)
BNP SERPL-MCNC: 679 PG/ML (ref 0–100)
BODY TEMPERATURE: 98.6 C
BUN BLD-MCNC: 18 MG/DL (ref 7–21)
BUN BLD-MCNC: 18 MG/DL (ref 7–21)
BUN/CREAT SERPL: 20.2 (ref 7–25)
BUN/CREAT SERPL: 26.5 (ref 7–25)
BUPRENORPHINE SERPL-MCNC: NEGATIVE NG/ML
CALCIUM SPEC-SCNC: 9.1 MG/DL (ref 7.7–10)
CALCIUM SPEC-SCNC: 9.8 MG/DL (ref 7.7–10)
CANNABINOIDS SERPL QL: NEGATIVE
CHLORIDE SERPL-SCNC: 100 MMOL/L (ref 99–112)
CHLORIDE SERPL-SCNC: 97 MMOL/L (ref 99–112)
CLARITY UR: CLEAR
CO2 SERPL-SCNC: 15 MMOL/L (ref 24.3–31.9)
CO2 SERPL-SCNC: 15.8 MMOL/L (ref 24.3–31.9)
COCAINE UR QL: NEGATIVE
COHGB MFR BLD: 0.5 % (ref 0–5)
COHGB MFR BLD: 0.8 % (ref 0–5)
COHGB MFR BLD: 1.2 % (ref 0–5)
COLOR UR: YELLOW
CREAT BLD-MCNC: 0.68 MG/DL (ref 0.43–1.29)
CREAT BLD-MCNC: 0.89 MG/DL (ref 0.43–1.29)
CRP SERPL-MCNC: <0.5 MG/DL (ref 0–0.99)
D DIMER PPP FEU-MCNC: 1.66 MCGFEU/ML (ref 0–0.5)
D-LACTATE SERPL-SCNC: 2.2 MMOL/L (ref 0.5–2)
D-LACTATE SERPL-SCNC: 2.5 MMOL/L (ref 0.5–2)
D-LACTATE SERPL-SCNC: 3.3 MMOL/L (ref 0.5–2)
DEPRECATED RDW RBC AUTO: 50.1 FL (ref 37–54)
DEPRECATED RDW RBC AUTO: 50.9 FL (ref 37–54)
EOSINOPHIL # BLD AUTO: 0.25 10*3/MM3 (ref 0–0.7)
EOSINOPHIL # BLD AUTO: 0.38 10*3/MM3 (ref 0–0.7)
EOSINOPHIL NFR BLD AUTO: 0.9 % (ref 0–5)
EOSINOPHIL NFR BLD AUTO: 1.6 % (ref 0–5)
ERYTHROCYTE [DISTWIDTH] IN BLOOD BY AUTOMATED COUNT: 13.3 % (ref 11.5–14.5)
ERYTHROCYTE [DISTWIDTH] IN BLOOD BY AUTOMATED COUNT: 13.3 % (ref 11.5–14.5)
GAS FLOW AIRWAY: >50 LPM
GFR SERPL CREATININE-BSD FRML MDRD: 66 ML/MIN/1.73
GFR SERPL CREATININE-BSD FRML MDRD: 90 ML/MIN/1.73
GLOBULIN UR ELPH-MCNC: 2.8 GM/DL
GLOBULIN UR ELPH-MCNC: 3.3 GM/DL
GLUCOSE BLD-MCNC: 146 MG/DL (ref 70–110)
GLUCOSE BLD-MCNC: 198 MG/DL (ref 70–110)
GLUCOSE BLDC GLUCOMTR-MCNC: 168 MG/DL (ref 70–130)
GLUCOSE BLDC GLUCOMTR-MCNC: 195 MG/DL (ref 70–130)
GLUCOSE BLDC GLUCOMTR-MCNC: 224 MG/DL (ref 70–130)
GLUCOSE UR STRIP-MCNC: NEGATIVE MG/DL
HCO3 BLDA-SCNC: 17.5 MMOL/L (ref 22–26)
HCO3 BLDA-SCNC: 18.3 MMOL/L (ref 22–26)
HCO3 BLDA-SCNC: 19.3 MMOL/L (ref 22–26)
HCO3 BLDA-SCNC: 21 MMOL/L (ref 22–26)
HCO3 BLDA-SCNC: 24.2 MMOL/L (ref 22–26)
HCT VFR BLD AUTO: 36.5 % (ref 37–47)
HCT VFR BLD AUTO: 37.2 % (ref 37–47)
HCT VFR BLD CALC: 33 % (ref 37–47)
HCT VFR BLD CALC: 34 % (ref 37–47)
HCT VFR BLD CALC: 36 % (ref 37–47)
HGB BLD-MCNC: 11.8 G/DL (ref 12–16)
HGB BLD-MCNC: 12 G/DL (ref 12–16)
HGB BLDA-MCNC: 11.2 G/DL (ref 12–16)
HGB BLDA-MCNC: 11.4 G/DL (ref 12–16)
HGB BLDA-MCNC: 11.5 G/DL (ref 12–16)
HGB BLDA-MCNC: 11.5 G/DL (ref 12–16)
HGB BLDA-MCNC: 12.2 G/DL (ref 12–16)
HGB UR QL STRIP.AUTO: ABNORMAL
HOLD SPECIMEN: NORMAL
HOROWITZ INDEX BLD+IHG-RTO: 100 %
HYALINE CASTS UR QL AUTO: ABNORMAL /LPF
HYPOCHROMIA BLD QL: NORMAL
IMM GRANULOCYTES # BLD AUTO: 0.37 10*3/MM3 (ref 0–0.03)
IMM GRANULOCYTES # BLD AUTO: 0.44 10*3/MM3 (ref 0–0.03)
IMM GRANULOCYTES NFR BLD AUTO: 1.5 % (ref 0–0.5)
IMM GRANULOCYTES NFR BLD AUTO: 1.6 % (ref 0–0.5)
KETONES UR QL STRIP: NEGATIVE
LEUKOCYTE ESTERASE UR QL STRIP.AUTO: NEGATIVE
LYMPHOCYTES # BLD AUTO: 1.74 10*3/MM3 (ref 1–3)
LYMPHOCYTES # BLD AUTO: 2.98 10*3/MM3 (ref 1–3)
LYMPHOCYTES NFR BLD AUTO: 12.5 % (ref 21–51)
LYMPHOCYTES NFR BLD AUTO: 6.3 % (ref 21–51)
MACROCYTES BLD QL SMEAR: NORMAL
MAGNESIUM SERPL-MCNC: 1.4 MG/DL (ref 1.7–2.6)
MAXIMAL PREDICTED HEART RATE: 165 BPM
MCH RBC QN AUTO: 33.5 PG (ref 27–33)
MCH RBC QN AUTO: 34.1 PG (ref 27–33)
MCHC RBC AUTO-ENTMCNC: 32.3 G/DL (ref 33–37)
MCHC RBC AUTO-ENTMCNC: 32.3 G/DL (ref 33–37)
MCV RBC AUTO: 103.7 FL (ref 80–94)
MCV RBC AUTO: 105.7 FL (ref 80–94)
METHADONE UR QL SCN: NEGATIVE
METHGB BLD QL: 0.2 % (ref 0–3)
METHGB BLD QL: 0.2 % (ref 0–3)
METHGB BLD QL: 0.3 % (ref 0–3)
MODALITY: ABNORMAL
MONOCYTES # BLD AUTO: 1.43 10*3/MM3 (ref 0.1–0.9)
MONOCYTES # BLD AUTO: 1.71 10*3/MM3 (ref 0.1–0.9)
MONOCYTES NFR BLD AUTO: 5.2 % (ref 0–10)
MONOCYTES NFR BLD AUTO: 7.1 % (ref 0–10)
NEUTROPHILS # BLD AUTO: 18.4 10*3/MM3 (ref 1.4–6.5)
NEUTROPHILS # BLD AUTO: 23.83 10*3/MM3 (ref 1.4–6.5)
NEUTROPHILS NFR BLD AUTO: 77 % (ref 30–70)
NEUTROPHILS NFR BLD AUTO: 85.8 % (ref 30–70)
NITRITE UR QL STRIP: NEGATIVE
OPIATES UR QL: NEGATIVE
OSMOLALITY SERPL CALC.SUM OF ELEC: 262.6 MOSM/KG (ref 273–305)
OSMOLALITY SERPL CALC.SUM OF ELEC: 263.5 MOSM/KG (ref 273–305)
OXYCODONE UR QL SCN: NEGATIVE
OXYHGB MFR BLDV: 77.1 % (ref 85–100)
OXYHGB MFR BLDV: 79.7 % (ref 85–100)
OXYHGB MFR BLDV: 89.5 % (ref 85–100)
OXYHGB MFR BLDV: 92.5 % (ref 85–100)
OXYHGB MFR BLDV: 93.2 % (ref 85–100)
PCO2 BLDA: 31.5 MM HG (ref 35–45)
PCO2 BLDA: 32.5 MM HG (ref 35–45)
PCO2 BLDA: 34.3 MM HG (ref 35–45)
PCO2 BLDA: 37.7 MM HG (ref 35–45)
PCO2 BLDA: 73.1 MM HG (ref 35–45)
PCP UR QL SCN: NEGATIVE
PEEP RESPIRATORY: 5 CM[H2O]
PH BLDA: 7.14 PH UNITS (ref 7.35–7.45)
PH BLDA: 7.34 PH UNITS (ref 7.35–7.45)
PH BLDA: 7.36 PH UNITS (ref 7.35–7.45)
PH BLDA: 7.36 PH UNITS (ref 7.35–7.45)
PH BLDA: 7.39 PH UNITS (ref 7.35–7.45)
PH UR STRIP.AUTO: <=5 [PH] (ref 5–8)
PLAT MORPH BLD: NORMAL
PLATELET # BLD AUTO: 340 10*3/MM3 (ref 130–400)
PLATELET # BLD AUTO: 358 10*3/MM3 (ref 130–400)
PMV BLD AUTO: 9.7 FL (ref 6–10)
PMV BLD AUTO: 9.9 FL (ref 6–10)
PO2 BLDA: 49.8 MM HG (ref 80–100)
PO2 BLDA: 49.9 MM HG (ref 80–100)
PO2 BLDA: 75.9 MM HG (ref 80–100)
PO2 BLDA: 82 MM HG (ref 80–100)
PO2 BLDA: 83.7 MM HG (ref 80–100)
POTASSIUM BLD-SCNC: 4.5 MMOL/L (ref 3.5–5.3)
POTASSIUM BLD-SCNC: 5.3 MMOL/L (ref 3.5–5.3)
PROT SERPL-MCNC: 6.1 G/DL (ref 6–8)
PROT SERPL-MCNC: 7.1 G/DL (ref 6–8)
PROT UR QL STRIP: NEGATIVE
RBC # BLD AUTO: 3.52 10*6/MM3 (ref 4.2–5.4)
RBC # BLD AUTO: 3.52 10*6/MM3 (ref 4.2–5.4)
RBC # UR: ABNORMAL /HPF
REF LAB TEST METHOD: ABNORMAL
SAO2 % BLDCOA: 78.3 % (ref 90–100)
SAO2 % BLDCOA: 80.5 % (ref 90–100)
SAO2 % BLDCOA: 90.5 % (ref 90–100)
SAO2 % BLDCOA: 93.2 % (ref 90–100)
SAO2 % BLDCOA: 94.2 % (ref 90–100)
SET MECH RESP RATE: 20
SET MECH RESP RATE: 24
SET MECH RESP RATE: 24
SODIUM BLD-SCNC: 127 MMOL/L (ref 135–153)
SODIUM BLD-SCNC: 129 MMOL/L (ref 135–153)
SP GR UR STRIP: 1.02 (ref 1–1.03)
SQUAMOUS #/AREA URNS HPF: ABNORMAL /HPF
STRESS TARGET HR: 140 BPM
TOTAL RATE: 20 BREATHS/MINUTE
TROPONIN I SERPL-MCNC: 0.02 NG/ML
UROBILINOGEN UR QL STRIP: ABNORMAL
VENTILATOR MODE: ABNORMAL
VENTILATOR MODE: AC
VT ON VENT VENT: 450 ML
WBC NRBC COR # BLD: 23.92 10*3/MM3 (ref 4.5–12.5)
WBC NRBC COR # BLD: 27.75 10*3/MM3 (ref 4.5–12.5)
WBC UR QL AUTO: ABNORMAL /HPF

## 2019-03-08 PROCEDURE — 82805 BLOOD GASES W/O2 SATURATION: CPT | Performed by: HOSPITALIST

## 2019-03-08 PROCEDURE — 25010000002 METHYLPREDNISOLONE PER 125 MG

## 2019-03-08 PROCEDURE — 36556 INSERT NON-TUNNEL CV CATH: CPT | Performed by: SURGERY

## 2019-03-08 PROCEDURE — 82805 BLOOD GASES W/O2 SATURATION: CPT | Performed by: INTERNAL MEDICINE

## 2019-03-08 PROCEDURE — 80307 DRUG TEST PRSMV CHEM ANLYZR: CPT | Performed by: HOSPITALIST

## 2019-03-08 PROCEDURE — 94799 UNLISTED PULMONARY SVC/PX: CPT

## 2019-03-08 PROCEDURE — 02HV33Z INSERTION OF INFUSION DEVICE INTO SUPERIOR VENA CAVA, PERCUTANEOUS APPROACH: ICD-10-PCS | Performed by: SURGERY

## 2019-03-08 PROCEDURE — 83880 ASSAY OF NATRIURETIC PEPTIDE: CPT | Performed by: HOSPITALIST

## 2019-03-08 PROCEDURE — 83605 ASSAY OF LACTIC ACID: CPT | Performed by: HOSPITALIST

## 2019-03-08 PROCEDURE — 99292 CRITICAL CARE ADDL 30 MIN: CPT | Performed by: INTERNAL MEDICINE

## 2019-03-08 PROCEDURE — 71045 X-RAY EXAM CHEST 1 VIEW: CPT

## 2019-03-08 PROCEDURE — 94640 AIRWAY INHALATION TREATMENT: CPT

## 2019-03-08 PROCEDURE — 71045 X-RAY EXAM CHEST 1 VIEW: CPT | Performed by: RADIOLOGY

## 2019-03-08 PROCEDURE — 82962 GLUCOSE BLOOD TEST: CPT

## 2019-03-08 PROCEDURE — 36600 WITHDRAWAL OF ARTERIAL BLOOD: CPT | Performed by: HOSPITALIST

## 2019-03-08 PROCEDURE — 63710000001 INSULIN ASPART PER 5 UNITS: Performed by: NURSE PRACTITIONER

## 2019-03-08 PROCEDURE — 99291 CRITICAL CARE FIRST HOUR: CPT | Performed by: INTERNAL MEDICINE

## 2019-03-08 PROCEDURE — 83050 HGB METHEMOGLOBIN QUAN: CPT | Performed by: HOSPITALIST

## 2019-03-08 PROCEDURE — 82375 ASSAY CARBOXYHB QUANT: CPT | Performed by: HOSPITALIST

## 2019-03-08 PROCEDURE — 93308 TTE F-UP OR LMTD: CPT

## 2019-03-08 PROCEDURE — 83735 ASSAY OF MAGNESIUM: CPT | Performed by: INTERNAL MEDICINE

## 2019-03-08 PROCEDURE — 83605 ASSAY OF LACTIC ACID: CPT | Performed by: INTERNAL MEDICINE

## 2019-03-08 PROCEDURE — 84484 ASSAY OF TROPONIN QUANT: CPT | Performed by: HOSPITALIST

## 2019-03-08 PROCEDURE — 25010000002 ENOXAPARIN PER 10 MG

## 2019-03-08 PROCEDURE — 25010000002 VANCOMYCIN 5 G RECONSTITUTED SOLUTION 5,000 MG VIAL

## 2019-03-08 PROCEDURE — 36600 WITHDRAWAL OF ARTERIAL BLOOD: CPT | Performed by: INTERNAL MEDICINE

## 2019-03-08 PROCEDURE — 83605 ASSAY OF LACTIC ACID: CPT | Performed by: NURSE PRACTITIONER

## 2019-03-08 PROCEDURE — 25010000002 MAGNESIUM SULFATE 2 GM/50ML SOLUTION: Performed by: NURSE PRACTITIONER

## 2019-03-08 PROCEDURE — 76937 US GUIDE VASCULAR ACCESS: CPT | Performed by: SURGERY

## 2019-03-08 PROCEDURE — 25010000002 PIPERACILLIN-TAZOBACTAM

## 2019-03-08 PROCEDURE — 82375 ASSAY CARBOXYHB QUANT: CPT | Performed by: INTERNAL MEDICINE

## 2019-03-08 PROCEDURE — 93970 EXTREMITY STUDY: CPT | Performed by: RADIOLOGY

## 2019-03-08 PROCEDURE — 83050 HGB METHEMOGLOBIN QUAN: CPT | Performed by: INTERNAL MEDICINE

## 2019-03-08 PROCEDURE — 99291 CRITICAL CARE FIRST HOUR: CPT | Performed by: HOSPITALIST

## 2019-03-08 PROCEDURE — 71275 CT ANGIOGRAPHY CHEST: CPT | Performed by: RADIOLOGY

## 2019-03-08 PROCEDURE — 80053 COMPREHEN METABOLIC PANEL: CPT | Performed by: PHYSICIAN ASSISTANT

## 2019-03-08 PROCEDURE — 81001 URINALYSIS AUTO W/SCOPE: CPT | Performed by: HOSPITALIST

## 2019-03-08 PROCEDURE — 25010000002 METHYLPREDNISOLONE PER 40 MG: Performed by: INTERNAL MEDICINE

## 2019-03-08 PROCEDURE — 71275 CT ANGIOGRAPHY CHEST: CPT

## 2019-03-08 PROCEDURE — 25010000002 PIPERACILLIN-TAZOBACTAM: Performed by: NURSE PRACTITIONER

## 2019-03-08 PROCEDURE — 94003 VENT MGMT INPAT SUBQ DAY: CPT

## 2019-03-08 PROCEDURE — 0 IOPAMIDOL PER 1 ML: Performed by: INTERNAL MEDICINE

## 2019-03-08 PROCEDURE — 25010000002 PROPOFOL 1000 MG/ML EMULSION: Performed by: HOSPITALIST

## 2019-03-08 PROCEDURE — 25010000002 MIDAZOLAM PER 1 MG

## 2019-03-08 PROCEDURE — 93308 TTE F-UP OR LMTD: CPT | Performed by: INTERNAL MEDICINE

## 2019-03-08 PROCEDURE — 93970 EXTREMITY STUDY: CPT

## 2019-03-08 PROCEDURE — 85379 FIBRIN DEGRADATION QUANT: CPT | Performed by: INTERNAL MEDICINE

## 2019-03-08 PROCEDURE — 85025 COMPLETE CBC W/AUTO DIFF WBC: CPT | Performed by: PHYSICIAN ASSISTANT

## 2019-03-08 RX ORDER — POTASSIUM CHLORIDE 7.45 MG/ML
10 INJECTION INTRAVENOUS
Status: DISCONTINUED | OUTPATIENT
Start: 2019-03-08 | End: 2019-03-18 | Stop reason: SDUPTHER

## 2019-03-08 RX ORDER — DEXTROSE MONOHYDRATE 25 G/50ML
25 INJECTION, SOLUTION INTRAVENOUS
Status: DISCONTINUED | OUTPATIENT
Start: 2019-03-08 | End: 2019-03-25

## 2019-03-08 RX ORDER — NICOTINE POLACRILEX 4 MG
15 LOZENGE BUCCAL
Status: DISCONTINUED | OUTPATIENT
Start: 2019-03-08 | End: 2019-03-25

## 2019-03-08 RX ORDER — SODIUM CHLORIDE 9 MG/ML
125 INJECTION, SOLUTION INTRAVENOUS CONTINUOUS
Status: DISCONTINUED | OUTPATIENT
Start: 2019-03-08 | End: 2019-03-09

## 2019-03-08 RX ORDER — OSELTAMIVIR PHOSPHATE 75 MG/1
75 CAPSULE ORAL EVERY 12 HOURS SCHEDULED
Status: COMPLETED | OUTPATIENT
Start: 2019-03-08 | End: 2019-03-12

## 2019-03-08 RX ORDER — METHYLPREDNISOLONE SODIUM SUCCINATE 125 MG/2ML
INJECTION, POWDER, LYOPHILIZED, FOR SOLUTION INTRAMUSCULAR; INTRAVENOUS
Status: COMPLETED
Start: 2019-03-08 | End: 2019-03-08

## 2019-03-08 RX ORDER — BUDESONIDE 0.5 MG/2ML
0.5 INHALANT ORAL
Status: DISCONTINUED | OUTPATIENT
Start: 2019-03-08 | End: 2019-04-05

## 2019-03-08 RX ORDER — METHYLPREDNISOLONE SODIUM SUCCINATE 40 MG/ML
40 INJECTION, POWDER, LYOPHILIZED, FOR SOLUTION INTRAMUSCULAR; INTRAVENOUS EVERY 12 HOURS
Status: DISCONTINUED | OUTPATIENT
Start: 2019-03-08 | End: 2019-03-08

## 2019-03-08 RX ORDER — MAGNESIUM SULFATE HEPTAHYDRATE 40 MG/ML
2 INJECTION, SOLUTION INTRAVENOUS AS NEEDED
Status: DISCONTINUED | OUTPATIENT
Start: 2019-03-08 | End: 2019-04-02

## 2019-03-08 RX ORDER — MAGNESIUM SULFATE HEPTAHYDRATE 40 MG/ML
2 INJECTION, SOLUTION INTRAVENOUS
Status: COMPLETED | OUTPATIENT
Start: 2019-03-08 | End: 2019-03-08

## 2019-03-08 RX ORDER — CHLORHEXIDINE GLUCONATE 0.12 MG/ML
15 RINSE ORAL EVERY 12 HOURS SCHEDULED
Status: DISCONTINUED | OUTPATIENT
Start: 2019-03-08 | End: 2019-03-20

## 2019-03-08 RX ORDER — PANTOPRAZOLE SODIUM 40 MG/10ML
40 INJECTION, POWDER, LYOPHILIZED, FOR SOLUTION INTRAVENOUS
Status: DISCONTINUED | OUTPATIENT
Start: 2019-03-08 | End: 2019-03-14

## 2019-03-08 RX ORDER — METHYLPREDNISOLONE SODIUM SUCCINATE 40 MG/ML
40 INJECTION, POWDER, LYOPHILIZED, FOR SOLUTION INTRAMUSCULAR; INTRAVENOUS EVERY 24 HOURS
Status: DISCONTINUED | OUTPATIENT
Start: 2019-03-08 | End: 2019-03-11

## 2019-03-08 RX ORDER — MAGNESIUM SULFATE HEPTAHYDRATE 40 MG/ML
4 INJECTION, SOLUTION INTRAVENOUS AS NEEDED
Status: DISCONTINUED | OUTPATIENT
Start: 2019-03-08 | End: 2019-04-02

## 2019-03-08 RX ORDER — MIDAZOLAM HYDROCHLORIDE 1 MG/ML
INJECTION INTRAMUSCULAR; INTRAVENOUS
Status: COMPLETED
Start: 2019-03-08 | End: 2019-03-08

## 2019-03-08 RX ORDER — MIDAZOLAM HYDROCHLORIDE 1 MG/ML
1 INJECTION INTRAMUSCULAR; INTRAVENOUS ONCE
Status: COMPLETED | OUTPATIENT
Start: 2019-03-08 | End: 2019-03-08

## 2019-03-08 RX ADMIN — TAZOBACTAM SODIUM AND PIPERACILLIN SODIUM 4.5 G: .5; 4 INJECTION, POWDER, LYOPHILIZED, FOR SOLUTION INTRAVENOUS at 09:20

## 2019-03-08 RX ADMIN — Medication: at 23:27

## 2019-03-08 RX ADMIN — IPRATROPIUM BROMIDE 0.5 MG: 0.5 SOLUTION RESPIRATORY (INHALATION) at 01:31

## 2019-03-08 RX ADMIN — CHLORHEXIDINE GLUCONATE 15 ML: 1.2 RINSE ORAL at 20:00

## 2019-03-08 RX ADMIN — IPRATROPIUM BROMIDE 0.5 MG: 0.5 SOLUTION RESPIRATORY (INHALATION) at 05:57

## 2019-03-08 RX ADMIN — NOREPINEPHRINE BITARTRATE 0.22 MCG/KG/MIN: 1 INJECTION INTRAVENOUS at 08:32

## 2019-03-08 RX ADMIN — LEVOTHYROXINE SODIUM 25 MCG: 25 TABLET ORAL at 06:42

## 2019-03-08 RX ADMIN — INSULIN ASPART 4 UNITS: 100 INJECTION, SOLUTION INTRAVENOUS; SUBCUTANEOUS at 06:47

## 2019-03-08 RX ADMIN — METHYLPREDNISOLONE SODIUM SUCCINATE 40 MG: 125 INJECTION, POWDER, FOR SOLUTION INTRAMUSCULAR; INTRAVENOUS at 01:45

## 2019-03-08 RX ADMIN — CHLORHEXIDINE GLUCONATE 15 ML: 1.2 RINSE ORAL at 09:25

## 2019-03-08 RX ADMIN — SODIUM CHLORIDE, PRESERVATIVE FREE 3 ML: 5 INJECTION INTRAVENOUS at 20:01

## 2019-03-08 RX ADMIN — MAGNESIUM SULFATE IN WATER 2 G: 40 INJECTION, SOLUTION INTRAVENOUS at 06:42

## 2019-03-08 RX ADMIN — SODIUM CHLORIDE 125 ML/HR: 9 INJECTION, SOLUTION INTRAVENOUS at 09:21

## 2019-03-08 RX ADMIN — PROPOFOL 20 MCG/KG/MIN: 10 INJECTION, EMULSION INTRAVENOUS at 02:05

## 2019-03-08 RX ADMIN — DEXMEDETOMIDINE HYDROCHLORIDE 0.2 MCG/KG/HR: 100 INJECTION, SOLUTION INTRAVENOUS at 04:54

## 2019-03-08 RX ADMIN — INSULIN ASPART 2 UNITS: 100 INJECTION, SOLUTION INTRAVENOUS; SUBCUTANEOUS at 14:35

## 2019-03-08 RX ADMIN — IOPAMIDOL 66 ML: 755 INJECTION, SOLUTION INTRAVENOUS at 17:45

## 2019-03-08 RX ADMIN — OSELTAMIVIR PHOSPHATE 75 MG: 75 CAPSULE ORAL at 14:41

## 2019-03-08 RX ADMIN — SODIUM CHLORIDE, PRESERVATIVE FREE 3 ML: 5 INJECTION INTRAVENOUS at 20:02

## 2019-03-08 RX ADMIN — ASPIRIN 325 MG: 325 TABLET ORAL at 09:20

## 2019-03-08 RX ADMIN — OSELTAMIVIR PHOSPHATE 75 MG: 75 CAPSULE ORAL at 20:01

## 2019-03-08 RX ADMIN — Medication: at 08:32

## 2019-03-08 RX ADMIN — VANCOMYCIN HYDROCHLORIDE 1000 MG: 5 INJECTION, POWDER, LYOPHILIZED, FOR SOLUTION INTRAVENOUS at 17:13

## 2019-03-08 RX ADMIN — METHYLPREDNISOLONE SODIUM SUCCINATE 40 MG: 40 INJECTION, POWDER, FOR SOLUTION INTRAMUSCULAR; INTRAVENOUS at 14:41

## 2019-03-08 RX ADMIN — PROPOFOL 20 MCG/KG/MIN: 10 INJECTION, EMULSION INTRAVENOUS at 09:25

## 2019-03-08 RX ADMIN — Medication 100 MG: at 09:19

## 2019-03-08 RX ADMIN — MAGNESIUM SULFATE IN WATER 2 G: 40 INJECTION, SOLUTION INTRAVENOUS at 14:34

## 2019-03-08 RX ADMIN — PROPOFOL 40 MCG/KG/MIN: 10 INJECTION, EMULSION INTRAVENOUS at 17:14

## 2019-03-08 RX ADMIN — MIDAZOLAM HYDROCHLORIDE 1 MG: 1 INJECTION INTRAMUSCULAR; INTRAVENOUS at 03:04

## 2019-03-08 RX ADMIN — VASOPRESSIN 0.03 UNITS/MIN: 20 INJECTION INTRAVENOUS at 02:58

## 2019-03-08 RX ADMIN — Medication: at 02:00

## 2019-03-08 RX ADMIN — DOXYCYCLINE 100 MG: 100 INJECTION, POWDER, LYOPHILIZED, FOR SOLUTION INTRAVENOUS at 14:41

## 2019-03-08 RX ADMIN — PANTOPRAZOLE SODIUM 40 MG: 40 INJECTION, POWDER, FOR SOLUTION INTRAVENOUS at 09:22

## 2019-03-08 RX ADMIN — SODIUM CHLORIDE 1000 ML: 9 INJECTION, SOLUTION INTRAVENOUS at 15:59

## 2019-03-08 RX ADMIN — VANCOMYCIN HYDROCHLORIDE 1000 MG: 5 INJECTION, POWDER, LYOPHILIZED, FOR SOLUTION INTRAVENOUS at 02:25

## 2019-03-08 RX ADMIN — TAZOBACTAM SODIUM AND PIPERACILLIN SODIUM 4.5 G: .5; 4 INJECTION, POWDER, LYOPHILIZED, FOR SOLUTION INTRAVENOUS at 01:00

## 2019-03-08 RX ADMIN — FOLIC ACID 1 MG: 1 TABLET ORAL at 09:20

## 2019-03-08 RX ADMIN — BUDESONIDE 0.5 MG: 0.5 INHALANT RESPIRATORY (INHALATION) at 05:56

## 2019-03-08 RX ADMIN — MAGNESIUM SULFATE IN WATER 2 G: 40 INJECTION, SOLUTION INTRAVENOUS at 09:23

## 2019-03-08 RX ADMIN — INSULIN ASPART 2 UNITS: 100 INJECTION, SOLUTION INTRAVENOUS; SUBCUTANEOUS at 19:55

## 2019-03-08 RX ADMIN — Medication: at 15:58

## 2019-03-08 RX ADMIN — IPRATROPIUM BROMIDE 0.5 MG: 0.5 SOLUTION RESPIRATORY (INHALATION) at 19:19

## 2019-03-08 RX ADMIN — SODIUM CHLORIDE 75 ML/HR: 9 INJECTION, SOLUTION INTRAVENOUS at 02:11

## 2019-03-08 RX ADMIN — ENOXAPARIN SODIUM 50 MG: 80 INJECTION SUBCUTANEOUS at 14:40

## 2019-03-08 RX ADMIN — SODIUM CHLORIDE 500 ML: 9 INJECTION, SOLUTION INTRAVENOUS at 04:53

## 2019-03-08 RX ADMIN — ENOXAPARIN SODIUM 50 MG: 80 INJECTION SUBCUTANEOUS at 01:23

## 2019-03-08 RX ADMIN — SODIUM CHLORIDE 125 ML/HR: 9 INJECTION, SOLUTION INTRAVENOUS at 17:13

## 2019-03-08 RX ADMIN — IPRATROPIUM BROMIDE 0.5 MG: 0.5 SOLUTION RESPIRATORY (INHALATION) at 12:22

## 2019-03-08 RX ADMIN — MIDAZOLAM HYDROCHLORIDE 1 MG: 1 INJECTION, SOLUTION INTRAMUSCULAR; INTRAVENOUS at 03:04

## 2019-03-08 RX ADMIN — CHLORHEXIDINE GLUCONATE 15 ML: 1.2 RINSE ORAL at 02:24

## 2019-03-08 RX ADMIN — SODIUM CHLORIDE 1000 ML: 9 INJECTION, SOLUTION INTRAVENOUS at 01:26

## 2019-03-08 RX ADMIN — TAZOBACTAM SODIUM AND PIPERACILLIN SODIUM 4.5 G: .5; 4 INJECTION, POWDER, LYOPHILIZED, FOR SOLUTION INTRAVENOUS at 17:12

## 2019-03-08 RX ADMIN — SODIUM CHLORIDE, PRESERVATIVE FREE 3 ML: 5 INJECTION INTRAVENOUS at 09:25

## 2019-03-08 RX ADMIN — BUDESONIDE 0.5 MG: 0.5 INHALANT RESPIRATORY (INHALATION) at 19:19

## 2019-03-09 ENCOUNTER — APPOINTMENT (OUTPATIENT)
Dept: GENERAL RADIOLOGY | Facility: HOSPITAL | Age: 54
End: 2019-03-09

## 2019-03-09 LAB
A-A DO2: 238.2 MMHG (ref 0–300)
ALBUMIN SERPL-MCNC: 3.3 G/DL (ref 3.5–5)
ALBUMIN/GLOB SERPL: 1.3 G/DL (ref 1.5–2.5)
ALP SERPL-CCNC: 60 U/L (ref 35–104)
ALT SERPL W P-5'-P-CCNC: 24 U/L (ref 10–36)
ANION GAP SERPL CALCULATED.3IONS-SCNC: 8.6 MMOL/L (ref 3.6–11.2)
ARTERIAL PATENCY WRIST A: ABNORMAL
ARTERIAL PATENCY WRIST A: ABNORMAL
AST SERPL-CCNC: 18 U/L (ref 10–30)
ATMOSPHERIC PRESS: 728 MMHG
ATMOSPHERIC PRESS: 731 MMHG
BASE EXCESS BLDA CALC-SCNC: -8.8 MMOL/L
BASE EXCESS BLDA CALC-SCNC: -8.8 MMOL/L
BASOPHILS # BLD AUTO: 0.01 10*3/MM3 (ref 0–0.3)
BASOPHILS NFR BLD AUTO: 0.1 % (ref 0–2)
BDY SITE: ABNORMAL
BDY SITE: ABNORMAL
BILIRUB SERPL-MCNC: 0.2 MG/DL (ref 0.2–1.8)
BODY TEMPERATURE: 98.6 C
BUN BLD-MCNC: 11 MG/DL (ref 7–21)
BUN/CREAT SERPL: 19.3 (ref 7–25)
CALCIUM SPEC-SCNC: 8.6 MG/DL (ref 7.7–10)
CHLORIDE SERPL-SCNC: 113 MMOL/L (ref 99–112)
CO2 SERPL-SCNC: 18.4 MMOL/L (ref 24.3–31.9)
COHGB MFR BLD: 0.2 % (ref 0–5)
COHGB MFR BLD: 0.2 % (ref 0–5)
CREAT BLD-MCNC: 0.57 MG/DL (ref 0.43–1.29)
D-LACTATE SERPL-SCNC: 3.6 MMOL/L (ref 0.5–2)
DEPRECATED RDW RBC AUTO: 48.4 FL (ref 37–54)
EOSINOPHIL # BLD AUTO: 0 10*3/MM3 (ref 0–0.7)
EOSINOPHIL NFR BLD AUTO: 0 % (ref 0–5)
ERYTHROCYTE [DISTWIDTH] IN BLOOD BY AUTOMATED COUNT: 13.2 % (ref 11.5–14.5)
GFR SERPL CREATININE-BSD FRML MDRD: 110 ML/MIN/1.73
GLOBULIN UR ELPH-MCNC: 2.5 GM/DL
GLUCOSE BLD-MCNC: 158 MG/DL (ref 70–110)
GLUCOSE BLDC GLUCOMTR-MCNC: 150 MG/DL (ref 70–130)
GLUCOSE BLDC GLUCOMTR-MCNC: 163 MG/DL (ref 70–130)
GLUCOSE BLDC GLUCOMTR-MCNC: 176 MG/DL (ref 70–130)
HCO3 BLDA-SCNC: 15.5 MMOL/L (ref 22–26)
HCO3 BLDA-SCNC: 15.8 MMOL/L (ref 22–26)
HCT VFR BLD AUTO: 31.2 % (ref 37–47)
HCT VFR BLD CALC: 30 % (ref 37–47)
HCT VFR BLD CALC: 32 % (ref 37–47)
HGB BLD-MCNC: 9.9 G/DL (ref 12–16)
HGB BLDA-MCNC: 10.2 G/DL (ref 12–16)
HGB BLDA-MCNC: 10.8 G/DL (ref 12–16)
HOROWITZ INDEX BLD+IHG-RTO: 60 %
HOROWITZ INDEX BLD+IHG-RTO: 60 %
IMM GRANULOCYTES # BLD AUTO: 0.08 10*3/MM3 (ref 0–0.03)
IMM GRANULOCYTES NFR BLD AUTO: 0.5 % (ref 0–0.5)
L PNEUMO1 AG UR QL IA: NEGATIVE
LYMPHOCYTES # BLD AUTO: 0.76 10*3/MM3 (ref 1–3)
LYMPHOCYTES NFR BLD AUTO: 4.8 % (ref 21–51)
M PNEUMO IGM SER QL: NEGATIVE
MAGNESIUM SERPL-MCNC: 2.2 MG/DL (ref 1.7–2.6)
MCH RBC QN AUTO: 32.9 PG (ref 27–33)
MCHC RBC AUTO-ENTMCNC: 31.7 G/DL (ref 33–37)
MCV RBC AUTO: 103.7 FL (ref 80–94)
METHGB BLD QL: 0.2 % (ref 0–3)
METHGB BLD QL: 0.3 % (ref 0–3)
MODALITY: ABNORMAL
MODALITY: ABNORMAL
MONOCYTES # BLD AUTO: 1.24 10*3/MM3 (ref 0.1–0.9)
MONOCYTES NFR BLD AUTO: 7.8 % (ref 0–10)
NEUTROPHILS # BLD AUTO: 13.84 10*3/MM3 (ref 1.4–6.5)
NEUTROPHILS NFR BLD AUTO: 86.8 % (ref 30–70)
OSMOLALITY SERPL CALC.SUM OF ELEC: 282.1 MOSM/KG (ref 273–305)
OXYHGB MFR BLDV: 96.5 % (ref 85–100)
OXYHGB MFR BLDV: 98.2 % (ref 85–100)
PCO2 BLDA: 28.6 MM HG (ref 35–45)
PCO2 BLDA: 29.6 MM HG (ref 35–45)
PEEP RESPIRATORY: 10 CM[H2O]
PEEP RESPIRATORY: 10 CM[H2O]
PH BLDA: 7.34 PH UNITS (ref 7.35–7.45)
PH BLDA: 7.35 PH UNITS (ref 7.35–7.45)
PLATELET # BLD AUTO: 315 10*3/MM3 (ref 130–400)
PMV BLD AUTO: 10 FL (ref 6–10)
PO2 BLDA: 139.6 MM HG (ref 80–100)
PO2 BLDA: 98.5 MM HG (ref 80–100)
POTASSIUM BLD-SCNC: 4.7 MMOL/L (ref 3.5–5.3)
PROT SERPL-MCNC: 5.8 G/DL (ref 6–8)
RBC # BLD AUTO: 3.01 10*6/MM3 (ref 4.2–5.4)
SAO2 % BLDCOA: 97 % (ref 90–100)
SAO2 % BLDCOA: 98.6 % (ref 90–100)
SET MECH RESP RATE: 18
SET MECH RESP RATE: 18
SODIUM BLD-SCNC: 140 MMOL/L (ref 135–153)
SODIUM BLD-SCNC: 141 MMOL/L (ref 135–153)
VENTILATOR MODE: ABNORMAL
VENTILATOR MODE: ABNORMAL
VT ON VENT VENT: 480 ML
VT ON VENT VENT: 480 ML
WBC NRBC COR # BLD: 15.93 10*3/MM3 (ref 4.5–12.5)

## 2019-03-09 PROCEDURE — 25010000002 PROPOFOL 1000 MG/ML EMULSION: Performed by: HOSPITALIST

## 2019-03-09 PROCEDURE — 25010000002 METHYLPREDNISOLONE PER 40 MG: Performed by: INTERNAL MEDICINE

## 2019-03-09 PROCEDURE — 36600 WITHDRAWAL OF ARTERIAL BLOOD: CPT | Performed by: INTERNAL MEDICINE

## 2019-03-09 PROCEDURE — 83735 ASSAY OF MAGNESIUM: CPT | Performed by: INTERNAL MEDICINE

## 2019-03-09 PROCEDURE — 87899 AGENT NOS ASSAY W/OPTIC: CPT | Performed by: INTERNAL MEDICINE

## 2019-03-09 PROCEDURE — 86738 MYCOPLASMA ANTIBODY: CPT | Performed by: INTERNAL MEDICINE

## 2019-03-09 PROCEDURE — 94799 UNLISTED PULMONARY SVC/PX: CPT

## 2019-03-09 PROCEDURE — 25010000002 PIPERACILLIN-TAZOBACTAM: Performed by: NURSE PRACTITIONER

## 2019-03-09 PROCEDURE — 82805 BLOOD GASES W/O2 SATURATION: CPT | Performed by: INTERNAL MEDICINE

## 2019-03-09 PROCEDURE — 80053 COMPREHEN METABOLIC PANEL: CPT | Performed by: PHYSICIAN ASSISTANT

## 2019-03-09 PROCEDURE — 82962 GLUCOSE BLOOD TEST: CPT

## 2019-03-09 PROCEDURE — 63710000001 INSULIN ASPART PER 5 UNITS: Performed by: NURSE PRACTITIONER

## 2019-03-09 PROCEDURE — 82375 ASSAY CARBOXYHB QUANT: CPT | Performed by: INTERNAL MEDICINE

## 2019-03-09 PROCEDURE — 94003 VENT MGMT INPAT SUBQ DAY: CPT

## 2019-03-09 PROCEDURE — 25010000002 VANCOMYCIN 5 G RECONSTITUTED SOLUTION 5,000 MG VIAL

## 2019-03-09 PROCEDURE — 83050 HGB METHEMOGLOBIN QUAN: CPT | Performed by: INTERNAL MEDICINE

## 2019-03-09 PROCEDURE — 85025 COMPLETE CBC W/AUTO DIFF WBC: CPT | Performed by: PHYSICIAN ASSISTANT

## 2019-03-09 PROCEDURE — 71045 X-RAY EXAM CHEST 1 VIEW: CPT

## 2019-03-09 PROCEDURE — 84295 ASSAY OF SERUM SODIUM: CPT | Performed by: INTERNAL MEDICINE

## 2019-03-09 PROCEDURE — 83605 ASSAY OF LACTIC ACID: CPT | Performed by: INTERNAL MEDICINE

## 2019-03-09 PROCEDURE — 71045 X-RAY EXAM CHEST 1 VIEW: CPT | Performed by: RADIOLOGY

## 2019-03-09 PROCEDURE — 99291 CRITICAL CARE FIRST HOUR: CPT | Performed by: INTERNAL MEDICINE

## 2019-03-09 PROCEDURE — 25010000002 ENOXAPARIN PER 10 MG

## 2019-03-09 RX ORDER — DEXTROSE MONOHYDRATE AND SODIUM CHLORIDE 5; .225 G/100ML; G/100ML
75 INJECTION, SOLUTION INTRAVENOUS CONTINUOUS
Status: DISCONTINUED | OUTPATIENT
Start: 2019-03-09 | End: 2019-03-11

## 2019-03-09 RX ORDER — DEXTROSE AND SODIUM CHLORIDE 5; .2 G/100ML; G/100ML
INJECTION, SOLUTION INTRAVENOUS
Status: COMPLETED
Start: 2019-03-09 | End: 2019-03-09

## 2019-03-09 RX ADMIN — DEXTROSE AND SODIUM CHLORIDE 75 ML/HR: 5; 200 INJECTION, SOLUTION INTRAVENOUS at 09:40

## 2019-03-09 RX ADMIN — SODIUM CHLORIDE, PRESERVATIVE FREE 3 ML: 5 INJECTION INTRAVENOUS at 08:44

## 2019-03-09 RX ADMIN — DOXYCYCLINE 100 MG: 100 INJECTION, POWDER, LYOPHILIZED, FOR SOLUTION INTRAVENOUS at 12:08

## 2019-03-09 RX ADMIN — CHLORHEXIDINE GLUCONATE 15 ML: 1.2 RINSE ORAL at 08:43

## 2019-03-09 RX ADMIN — TAZOBACTAM SODIUM AND PIPERACILLIN SODIUM 4.5 G: .5; 4 INJECTION, POWDER, LYOPHILIZED, FOR SOLUTION INTRAVENOUS at 01:06

## 2019-03-09 RX ADMIN — INSULIN ASPART 2 UNITS: 100 INJECTION, SOLUTION INTRAVENOUS; SUBCUTANEOUS at 18:33

## 2019-03-09 RX ADMIN — SODIUM CHLORIDE 125 ML/HR: 9 INJECTION, SOLUTION INTRAVENOUS at 01:44

## 2019-03-09 RX ADMIN — IPRATROPIUM BROMIDE 0.5 MG: 0.5 SOLUTION RESPIRATORY (INHALATION) at 18:12

## 2019-03-09 RX ADMIN — PANTOPRAZOLE SODIUM 40 MG: 40 INJECTION, POWDER, FOR SOLUTION INTRAVENOUS at 08:43

## 2019-03-09 RX ADMIN — DEXTROSE AND SODIUM CHLORIDE 75 ML/HR: 5; 200 INJECTION, SOLUTION INTRAVENOUS at 21:44

## 2019-03-09 RX ADMIN — SODIUM CHLORIDE, PRESERVATIVE FREE 3 ML: 5 INJECTION INTRAVENOUS at 21:40

## 2019-03-09 RX ADMIN — PROPOFOL 30 MCG/KG/MIN: 10 INJECTION, EMULSION INTRAVENOUS at 10:42

## 2019-03-09 RX ADMIN — BUDESONIDE 0.5 MG: 0.5 INHALANT RESPIRATORY (INHALATION) at 06:42

## 2019-03-09 RX ADMIN — NOREPINEPHRINE BITARTRATE 0.1 MCG/KG/MIN: 1 INJECTION INTRAVENOUS at 03:27

## 2019-03-09 RX ADMIN — BUDESONIDE 0.5 MG: 0.5 INHALANT RESPIRATORY (INHALATION) at 18:12

## 2019-03-09 RX ADMIN — DEXMEDETOMIDINE HYDROCHLORIDE 0.2 MCG/KG/HR: 100 INJECTION, SOLUTION INTRAVENOUS at 14:32

## 2019-03-09 RX ADMIN — OSELTAMIVIR PHOSPHATE 75 MG: 75 CAPSULE ORAL at 08:43

## 2019-03-09 RX ADMIN — Medication: at 16:41

## 2019-03-09 RX ADMIN — TAZOBACTAM SODIUM AND PIPERACILLIN SODIUM 4.5 G: .5; 4 INJECTION, POWDER, LYOPHILIZED, FOR SOLUTION INTRAVENOUS at 16:09

## 2019-03-09 RX ADMIN — Medication: at 07:12

## 2019-03-09 RX ADMIN — ASPIRIN 325 MG: 325 TABLET ORAL at 08:43

## 2019-03-09 RX ADMIN — VANCOMYCIN HYDROCHLORIDE 1000 MG: 5 INJECTION, POWDER, LYOPHILIZED, FOR SOLUTION INTRAVENOUS at 04:34

## 2019-03-09 RX ADMIN — IPRATROPIUM BROMIDE 0.5 MG: 0.5 SOLUTION RESPIRATORY (INHALATION) at 06:42

## 2019-03-09 RX ADMIN — ENOXAPARIN SODIUM 50 MG: 80 INJECTION SUBCUTANEOUS at 12:09

## 2019-03-09 RX ADMIN — PROPOFOL 40 MCG/KG/MIN: 10 INJECTION, EMULSION INTRAVENOUS at 03:24

## 2019-03-09 RX ADMIN — SODIUM CHLORIDE, PRESERVATIVE FREE 3 ML: 5 INJECTION INTRAVENOUS at 21:41

## 2019-03-09 RX ADMIN — CHLORHEXIDINE GLUCONATE 15 ML: 1.2 RINSE ORAL at 21:40

## 2019-03-09 RX ADMIN — INSULIN ASPART 2 UNITS: 100 INJECTION, SOLUTION INTRAVENOUS; SUBCUTANEOUS at 01:05

## 2019-03-09 RX ADMIN — LEVOTHYROXINE SODIUM 25 MCG: 25 TABLET ORAL at 05:00

## 2019-03-09 RX ADMIN — FOLIC ACID 1 MG: 1 TABLET ORAL at 08:43

## 2019-03-09 RX ADMIN — Medication 100 MG: at 08:43

## 2019-03-09 RX ADMIN — TAZOBACTAM SODIUM AND PIPERACILLIN SODIUM 4.5 G: .5; 4 INJECTION, POWDER, LYOPHILIZED, FOR SOLUTION INTRAVENOUS at 08:43

## 2019-03-09 RX ADMIN — METHYLPREDNISOLONE SODIUM SUCCINATE 40 MG: 40 INJECTION, POWDER, FOR SOLUTION INTRAMUSCULAR; INTRAVENOUS at 12:08

## 2019-03-09 RX ADMIN — INSULIN ASPART 2 UNITS: 100 INJECTION, SOLUTION INTRAVENOUS; SUBCUTANEOUS at 06:29

## 2019-03-09 RX ADMIN — IPRATROPIUM BROMIDE 0.5 MG: 0.5 SOLUTION RESPIRATORY (INHALATION) at 12:40

## 2019-03-09 RX ADMIN — PROPOFOL 30 MCG/KG/MIN: 10 INJECTION, EMULSION INTRAVENOUS at 18:25

## 2019-03-09 RX ADMIN — VANCOMYCIN HYDROCHLORIDE 1000 MG: 5 INJECTION, POWDER, LYOPHILIZED, FOR SOLUTION INTRAVENOUS at 16:13

## 2019-03-09 RX ADMIN — OSELTAMIVIR PHOSPHATE 75 MG: 75 CAPSULE ORAL at 21:40

## 2019-03-09 RX ADMIN — DOXYCYCLINE 100 MG: 100 INJECTION, POWDER, LYOPHILIZED, FOR SOLUTION INTRAVENOUS at 00:56

## 2019-03-09 RX ADMIN — ENOXAPARIN SODIUM 50 MG: 80 INJECTION SUBCUTANEOUS at 01:06

## 2019-03-10 LAB
A-A DO2: 257.6 MMHG (ref 0–300)
ALBUMIN SERPL-MCNC: 3 G/DL (ref 3.5–5)
ALBUMIN/GLOB SERPL: 1.3 G/DL (ref 1.5–2.5)
ALP SERPL-CCNC: 47 U/L (ref 35–104)
ALT SERPL W P-5'-P-CCNC: 29 U/L (ref 10–36)
ANION GAP SERPL CALCULATED.3IONS-SCNC: 10 MMOL/L (ref 3.6–11.2)
ARTERIAL PATENCY WRIST A: POSITIVE
AST SERPL-CCNC: 21 U/L (ref 10–30)
ATMOSPHERIC PRESS: 728 MMHG
BASE EXCESS BLDA CALC-SCNC: -6.6 MMOL/L
BASOPHILS # BLD AUTO: 0.01 10*3/MM3 (ref 0–0.3)
BASOPHILS NFR BLD AUTO: 0.1 % (ref 0–2)
BDY SITE: ABNORMAL
BILIRUB SERPL-MCNC: 0.2 MG/DL (ref 0.2–1.8)
BODY TEMPERATURE: 98.6 C
BUN BLD-MCNC: 14 MG/DL (ref 7–21)
BUN/CREAT SERPL: 26.9 (ref 7–25)
CALCIUM SPEC-SCNC: 8.9 MG/DL (ref 7.7–10)
CHLORIDE SERPL-SCNC: 115 MMOL/L (ref 99–112)
CO2 SERPL-SCNC: 18 MMOL/L (ref 24.3–31.9)
COHGB MFR BLD: 0.6 % (ref 0–5)
CREAT BLD-MCNC: 0.52 MG/DL (ref 0.43–1.29)
CRP SERPL-MCNC: <0.5 MG/DL (ref 0–0.99)
D-LACTATE SERPL-SCNC: 1.8 MMOL/L (ref 0.5–2)
DEPRECATED RDW RBC AUTO: 51.4 FL (ref 37–54)
EOSINOPHIL # BLD AUTO: 0 10*3/MM3 (ref 0–0.7)
EOSINOPHIL NFR BLD AUTO: 0 % (ref 0–5)
ERYTHROCYTE [DISTWIDTH] IN BLOOD BY AUTOMATED COUNT: 13.6 % (ref 11.5–14.5)
GFR SERPL CREATININE-BSD FRML MDRD: 122 ML/MIN/1.73
GLOBULIN UR ELPH-MCNC: 2.4 GM/DL
GLUCOSE BLD-MCNC: 135 MG/DL (ref 70–110)
GLUCOSE BLDC GLUCOMTR-MCNC: 121 MG/DL (ref 70–130)
GLUCOSE BLDC GLUCOMTR-MCNC: 122 MG/DL (ref 70–130)
GLUCOSE BLDC GLUCOMTR-MCNC: 137 MG/DL (ref 70–130)
GLUCOSE BLDC GLUCOMTR-MCNC: 146 MG/DL (ref 70–130)
HCO3 BLDA-SCNC: 17.7 MMOL/L (ref 22–26)
HCT VFR BLD AUTO: 30.6 % (ref 37–47)
HCT VFR BLD CALC: 35 % (ref 37–47)
HGB BLD-MCNC: 9.4 G/DL (ref 12–16)
HGB BLDA-MCNC: 11.9 G/DL (ref 12–16)
HOROWITZ INDEX BLD+IHG-RTO: 60 %
HYPOCHROMIA BLD QL: NORMAL
IMM GRANULOCYTES # BLD AUTO: 0.06 10*3/MM3 (ref 0–0.03)
IMM GRANULOCYTES NFR BLD AUTO: 0.4 % (ref 0–0.5)
LYMPHOCYTES # BLD AUTO: 0.98 10*3/MM3 (ref 1–3)
LYMPHOCYTES NFR BLD AUTO: 7.2 % (ref 21–51)
MACROCYTES BLD QL SMEAR: NORMAL
MAGNESIUM SERPL-MCNC: 1.5 MG/DL (ref 1.7–2.6)
MCH RBC QN AUTO: 32.6 PG (ref 27–33)
MCHC RBC AUTO-ENTMCNC: 30.7 G/DL (ref 33–37)
MCV RBC AUTO: 106.3 FL (ref 80–94)
METHGB BLD QL: 0.2 % (ref 0–3)
MODALITY: ABNORMAL
MONOCYTES # BLD AUTO: 1.12 10*3/MM3 (ref 0.1–0.9)
MONOCYTES NFR BLD AUTO: 8.3 % (ref 0–10)
NEUTROPHILS # BLD AUTO: 11.4 10*3/MM3 (ref 1.4–6.5)
NEUTROPHILS NFR BLD AUTO: 84 % (ref 30–70)
OSMOLALITY SERPL CALC.SUM OF ELEC: 287.5 MOSM/KG (ref 273–305)
OXYHGB MFR BLDV: 97.4 % (ref 85–100)
PCO2 BLDA: 31.7 MM HG (ref 35–45)
PEEP RESPIRATORY: 10 CM[H2O]
PH BLDA: 7.37 PH UNITS (ref 7.35–7.45)
PLAT MORPH BLD: NORMAL
PLATELET # BLD AUTO: 233 10*3/MM3 (ref 130–400)
PMV BLD AUTO: 10.3 FL (ref 6–10)
PO2 BLDA: 116.1 MM HG (ref 80–100)
POTASSIUM BLD-SCNC: 4.7 MMOL/L (ref 3.5–5.3)
PROT SERPL-MCNC: 5.4 G/DL (ref 6–8)
RBC # BLD AUTO: 2.88 10*6/MM3 (ref 4.2–5.4)
SAO2 % BLDCOA: 98.2 % (ref 90–100)
SET MECH RESP RATE: 18
SODIUM BLD-SCNC: 141 MMOL/L (ref 135–153)
SODIUM BLD-SCNC: 143 MMOL/L (ref 135–153)
VANCOMYCIN TROUGH SERPL-MCNC: 16.4 MCG/ML (ref 5–15)
VENTILATOR MODE: ABNORMAL
VT ON VENT VENT: 480 ML
WBC NRBC COR # BLD: 13.57 10*3/MM3 (ref 4.5–12.5)

## 2019-03-10 PROCEDURE — 94799 UNLISTED PULMONARY SVC/PX: CPT

## 2019-03-10 PROCEDURE — 83050 HGB METHEMOGLOBIN QUAN: CPT | Performed by: INTERNAL MEDICINE

## 2019-03-10 PROCEDURE — 25010000002 PROPOFOL 1000 MG/ML EMULSION: Performed by: HOSPITALIST

## 2019-03-10 PROCEDURE — 25010000002 VANCOMYCIN 5 G RECONSTITUTED SOLUTION 5,000 MG VIAL

## 2019-03-10 PROCEDURE — 83605 ASSAY OF LACTIC ACID: CPT | Performed by: INTERNAL MEDICINE

## 2019-03-10 PROCEDURE — 82375 ASSAY CARBOXYHB QUANT: CPT | Performed by: INTERNAL MEDICINE

## 2019-03-10 PROCEDURE — 25010000002 MAGNESIUM SULFATE 2 GM/50ML SOLUTION: Performed by: NURSE PRACTITIONER

## 2019-03-10 PROCEDURE — 99233 SBSQ HOSP IP/OBS HIGH 50: CPT | Performed by: INTERNAL MEDICINE

## 2019-03-10 PROCEDURE — 84295 ASSAY OF SERUM SODIUM: CPT | Performed by: INTERNAL MEDICINE

## 2019-03-10 PROCEDURE — 82962 GLUCOSE BLOOD TEST: CPT

## 2019-03-10 PROCEDURE — 85025 COMPLETE CBC W/AUTO DIFF WBC: CPT | Performed by: PHYSICIAN ASSISTANT

## 2019-03-10 PROCEDURE — 86140 C-REACTIVE PROTEIN: CPT | Performed by: INTERNAL MEDICINE

## 2019-03-10 PROCEDURE — 36600 WITHDRAWAL OF ARTERIAL BLOOD: CPT | Performed by: INTERNAL MEDICINE

## 2019-03-10 PROCEDURE — 85007 BL SMEAR W/DIFF WBC COUNT: CPT | Performed by: PHYSICIAN ASSISTANT

## 2019-03-10 PROCEDURE — 25010000002 PIPERACILLIN-TAZOBACTAM: Performed by: NURSE PRACTITIONER

## 2019-03-10 PROCEDURE — 83735 ASSAY OF MAGNESIUM: CPT | Performed by: INTERNAL MEDICINE

## 2019-03-10 PROCEDURE — 25010000002 METHYLPREDNISOLONE PER 40 MG: Performed by: INTERNAL MEDICINE

## 2019-03-10 PROCEDURE — 80053 COMPREHEN METABOLIC PANEL: CPT | Performed by: PHYSICIAN ASSISTANT

## 2019-03-10 PROCEDURE — 80202 ASSAY OF VANCOMYCIN: CPT

## 2019-03-10 PROCEDURE — 82805 BLOOD GASES W/O2 SATURATION: CPT | Performed by: INTERNAL MEDICINE

## 2019-03-10 PROCEDURE — 25010000002 ENOXAPARIN PER 10 MG

## 2019-03-10 RX ORDER — MAGNESIUM SULFATE HEPTAHYDRATE 40 MG/ML
2 INJECTION, SOLUTION INTRAVENOUS
Status: COMPLETED | OUTPATIENT
Start: 2019-03-10 | End: 2019-03-11

## 2019-03-10 RX ORDER — L.ACID,PARA/B.BIFIDUM/S.THERM 8B CELL
1 CAPSULE ORAL DAILY
Status: DISCONTINUED | OUTPATIENT
Start: 2019-03-10 | End: 2019-03-25

## 2019-03-10 RX ORDER — FLECAINIDE ACETATE 50 MG/1
50 TABLET ORAL EVERY 12 HOURS
Status: DISCONTINUED | OUTPATIENT
Start: 2019-03-10 | End: 2019-03-13

## 2019-03-10 RX ADMIN — ENOXAPARIN SODIUM 50 MG: 80 INJECTION SUBCUTANEOUS at 01:01

## 2019-03-10 RX ADMIN — PANTOPRAZOLE SODIUM 40 MG: 40 INJECTION, POWDER, FOR SOLUTION INTRAVENOUS at 08:01

## 2019-03-10 RX ADMIN — Medication: at 03:38

## 2019-03-10 RX ADMIN — Medication 100 MG: at 08:01

## 2019-03-10 RX ADMIN — PROPOFOL 50 MCG/KG/MIN: 10 INJECTION, EMULSION INTRAVENOUS at 19:31

## 2019-03-10 RX ADMIN — IPRATROPIUM BROMIDE 0.5 MG: 0.5 SOLUTION RESPIRATORY (INHALATION) at 07:18

## 2019-03-10 RX ADMIN — OSELTAMIVIR PHOSPHATE 75 MG: 75 CAPSULE ORAL at 08:01

## 2019-03-10 RX ADMIN — BUDESONIDE 0.5 MG: 0.5 INHALANT RESPIRATORY (INHALATION) at 07:18

## 2019-03-10 RX ADMIN — MAGNESIUM SULFATE IN WATER 2 G: 40 INJECTION, SOLUTION INTRAVENOUS at 21:35

## 2019-03-10 RX ADMIN — DEXTROSE AND SODIUM CHLORIDE 75 ML/HR: 5; 200 INJECTION, SOLUTION INTRAVENOUS at 12:02

## 2019-03-10 RX ADMIN — TAZOBACTAM SODIUM AND PIPERACILLIN SODIUM 4.5 G: .5; 4 INJECTION, POWDER, LYOPHILIZED, FOR SOLUTION INTRAVENOUS at 08:12

## 2019-03-10 RX ADMIN — DEXMEDETOMIDINE HYDROCHLORIDE 0.6 MCG/KG/HR: 100 INJECTION, SOLUTION INTRAVENOUS at 17:09

## 2019-03-10 RX ADMIN — NOREPINEPHRINE BITARTRATE 0.1 MCG/KG/MIN: 1 INJECTION INTRAVENOUS at 16:25

## 2019-03-10 RX ADMIN — VANCOMYCIN HYDROCHLORIDE 1000 MG: 5 INJECTION, POWDER, LYOPHILIZED, FOR SOLUTION INTRAVENOUS at 05:27

## 2019-03-10 RX ADMIN — SODIUM CHLORIDE, PRESERVATIVE FREE 3 ML: 5 INJECTION INTRAVENOUS at 21:36

## 2019-03-10 RX ADMIN — METHYLPREDNISOLONE SODIUM SUCCINATE 40 MG: 40 INJECTION, POWDER, FOR SOLUTION INTRAMUSCULAR; INTRAVENOUS at 12:02

## 2019-03-10 RX ADMIN — MAGNESIUM SULFATE IN WATER 2 G: 40 INJECTION, SOLUTION INTRAVENOUS at 23:28

## 2019-03-10 RX ADMIN — FOLIC ACID 1 MG: 1 TABLET ORAL at 08:01

## 2019-03-10 RX ADMIN — IPRATROPIUM BROMIDE 0.5 MG: 0.5 SOLUTION RESPIRATORY (INHALATION) at 18:31

## 2019-03-10 RX ADMIN — CHLORHEXIDINE GLUCONATE 15 ML: 1.2 RINSE ORAL at 08:11

## 2019-03-10 RX ADMIN — DOXYCYCLINE 100 MG: 100 INJECTION, POWDER, LYOPHILIZED, FOR SOLUTION INTRAVENOUS at 12:02

## 2019-03-10 RX ADMIN — TAZOBACTAM SODIUM AND PIPERACILLIN SODIUM 4.5 G: .5; 4 INJECTION, POWDER, LYOPHILIZED, FOR SOLUTION INTRAVENOUS at 00:57

## 2019-03-10 RX ADMIN — CHLORHEXIDINE GLUCONATE 15 ML: 1.2 RINSE ORAL at 21:36

## 2019-03-10 RX ADMIN — PROPOFOL 30 MCG/KG/MIN: 10 INJECTION, EMULSION INTRAVENOUS at 05:02

## 2019-03-10 RX ADMIN — IPRATROPIUM BROMIDE 0.5 MG: 0.5 SOLUTION RESPIRATORY (INHALATION) at 12:54

## 2019-03-10 RX ADMIN — BUDESONIDE 0.5 MG: 0.5 INHALANT RESPIRATORY (INHALATION) at 18:31

## 2019-03-10 RX ADMIN — FLECAINIDE ACETATE 50 MG: 50 TABLET ORAL at 17:08

## 2019-03-10 RX ADMIN — ENOXAPARIN SODIUM 50 MG: 80 INJECTION SUBCUTANEOUS at 12:02

## 2019-03-10 RX ADMIN — TAZOBACTAM SODIUM AND PIPERACILLIN SODIUM 4.5 G: .5; 4 INJECTION, POWDER, LYOPHILIZED, FOR SOLUTION INTRAVENOUS at 17:09

## 2019-03-10 RX ADMIN — ASPIRIN 325 MG: 325 TABLET ORAL at 08:01

## 2019-03-10 RX ADMIN — OSELTAMIVIR PHOSPHATE 75 MG: 75 CAPSULE ORAL at 21:35

## 2019-03-10 RX ADMIN — DOXYCYCLINE 100 MG: 100 INJECTION, POWDER, LYOPHILIZED, FOR SOLUTION INTRAVENOUS at 00:57

## 2019-03-10 RX ADMIN — DOXYCYCLINE 100 MG: 100 INJECTION, POWDER, LYOPHILIZED, FOR SOLUTION INTRAVENOUS at 23:35

## 2019-03-10 RX ADMIN — Medication: at 13:23

## 2019-03-10 RX ADMIN — PROPOFOL 50 MCG/KG/MIN: 10 INJECTION, EMULSION INTRAVENOUS at 12:02

## 2019-03-10 RX ADMIN — VANCOMYCIN HYDROCHLORIDE 1000 MG: 5 INJECTION, POWDER, LYOPHILIZED, FOR SOLUTION INTRAVENOUS at 17:09

## 2019-03-10 RX ADMIN — Medication: at 23:31

## 2019-03-10 RX ADMIN — IPRATROPIUM BROMIDE 0.5 MG: 0.5 SOLUTION RESPIRATORY (INHALATION) at 00:30

## 2019-03-10 RX ADMIN — LEVOTHYROXINE SODIUM 25 MCG: 25 TABLET ORAL at 05:03

## 2019-03-11 ENCOUNTER — APPOINTMENT (OUTPATIENT)
Dept: GENERAL RADIOLOGY | Facility: HOSPITAL | Age: 54
End: 2019-03-11

## 2019-03-11 PROBLEM — F10.10 ETOH ABUSE: Status: ACTIVE | Noted: 2019-03-11

## 2019-03-11 PROBLEM — J96.01 ACUTE RESPIRATORY FAILURE WITH HYPOXIA (HCC): Status: ACTIVE | Noted: 2019-03-11

## 2019-03-11 PROBLEM — I48.0 PAF (PAROXYSMAL ATRIAL FIBRILLATION) (HCC): Status: ACTIVE | Noted: 2019-03-11

## 2019-03-11 LAB
A-A DO2: 185.5 MMHG (ref 0–300)
A-A DO2: 194.1 MMHG (ref 0–300)
ALBUMIN SERPL-MCNC: 3.1 G/DL (ref 3.5–5)
ALBUMIN/GLOB SERPL: 1.2 G/DL (ref 1.5–2.5)
ALP SERPL-CCNC: 51 U/L (ref 35–104)
ALT SERPL W P-5'-P-CCNC: 34 U/L (ref 10–36)
ANION GAP SERPL CALCULATED.3IONS-SCNC: 7.4 MMOL/L (ref 3.6–11.2)
ARTERIAL PATENCY WRIST A: POSITIVE
ARTERIAL PATENCY WRIST A: POSITIVE
AST SERPL-CCNC: 21 U/L (ref 10–30)
ATMOSPHERIC PRESS: 735 MMHG
ATMOSPHERIC PRESS: 735 MMHG
BASE EXCESS BLDA CALC-SCNC: -7.2 MMOL/L
BASE EXCESS BLDA CALC-SCNC: -7.7 MMOL/L
BASOPHILS # BLD AUTO: 0 10*3/MM3 (ref 0–0.3)
BASOPHILS NFR BLD AUTO: 0 % (ref 0–2)
BDY SITE: ABNORMAL
BDY SITE: ABNORMAL
BILIRUB SERPL-MCNC: 0.2 MG/DL (ref 0.2–1.8)
BODY TEMPERATURE: 98.6 C
BODY TEMPERATURE: 98.6 C
BUN BLD-MCNC: 14 MG/DL (ref 7–21)
BUN/CREAT SERPL: 25.9 (ref 7–25)
CALCIUM SPEC-SCNC: 8.8 MG/DL (ref 7.7–10)
CHLORIDE SERPL-SCNC: 116 MMOL/L (ref 99–112)
CO2 SERPL-SCNC: 18.6 MMOL/L (ref 24.3–31.9)
COHGB MFR BLD: 0.2 % (ref 0–5)
COHGB MFR BLD: 0.3 % (ref 0–5)
CREAT BLD-MCNC: 0.54 MG/DL (ref 0.43–1.29)
DEPRECATED RDW RBC AUTO: 51.2 FL (ref 37–54)
EOSINOPHIL # BLD AUTO: 0 10*3/MM3 (ref 0–0.7)
EOSINOPHIL NFR BLD AUTO: 0 % (ref 0–5)
ERYTHROCYTE [DISTWIDTH] IN BLOOD BY AUTOMATED COUNT: 13.5 % (ref 11.5–14.5)
GFR SERPL CREATININE-BSD FRML MDRD: 117 ML/MIN/1.73
GLOBULIN UR ELPH-MCNC: 2.5 GM/DL
GLUCOSE BLD-MCNC: 165 MG/DL (ref 70–110)
GLUCOSE BLDC GLUCOMTR-MCNC: 126 MG/DL (ref 70–130)
GLUCOSE BLDC GLUCOMTR-MCNC: 134 MG/DL (ref 70–130)
GLUCOSE BLDC GLUCOMTR-MCNC: 136 MG/DL (ref 70–130)
GLUCOSE BLDC GLUCOMTR-MCNC: 157 MG/DL (ref 70–130)
HCO3 BLDA-SCNC: 18.1 MMOL/L (ref 22–26)
HCO3 BLDA-SCNC: 18.7 MMOL/L (ref 22–26)
HCT VFR BLD AUTO: 32.1 % (ref 37–47)
HCT VFR BLD CALC: 31 % (ref 37–47)
HCT VFR BLD CALC: 31 % (ref 37–47)
HGB BLD-MCNC: 10.1 G/DL (ref 12–16)
HGB BLDA-MCNC: 10.5 G/DL (ref 12–16)
HGB BLDA-MCNC: 10.7 G/DL (ref 12–16)
HOROWITZ INDEX BLD+IHG-RTO: 50 %
HOROWITZ INDEX BLD+IHG-RTO: 50 %
HYPOCHROMIA BLD QL: NORMAL
IMM GRANULOCYTES # BLD AUTO: 0.06 10*3/MM3 (ref 0–0.03)
IMM GRANULOCYTES NFR BLD AUTO: 0.5 % (ref 0–0.5)
LYMPHOCYTES # BLD AUTO: 0.75 10*3/MM3 (ref 1–3)
LYMPHOCYTES NFR BLD AUTO: 6.6 % (ref 21–51)
MACROCYTES BLD QL SMEAR: NORMAL
MCH RBC QN AUTO: 33.3 PG (ref 27–33)
MCHC RBC AUTO-ENTMCNC: 31.5 G/DL (ref 33–37)
MCV RBC AUTO: 105.9 FL (ref 80–94)
METHGB BLD QL: 0.4 % (ref 0–3)
METHGB BLD QL: 0.4 % (ref 0–3)
MODALITY: ABNORMAL
MODALITY: ABNORMAL
MONOCYTES # BLD AUTO: 0.62 10*3/MM3 (ref 0.1–0.9)
MONOCYTES NFR BLD AUTO: 5.4 % (ref 0–10)
NEUTROPHILS # BLD AUTO: 10 10*3/MM3 (ref 1.4–6.5)
NEUTROPHILS NFR BLD AUTO: 87.5 % (ref 30–70)
OSMOLALITY SERPL CALC.SUM OF ELEC: 287.3 MOSM/KG (ref 273–305)
OXYHGB MFR BLDV: 96.3 % (ref 85–100)
OXYHGB MFR BLDV: 97.1 % (ref 85–100)
PCO2 BLDA: 38.1 MM HG (ref 35–45)
PCO2 BLDA: 39.3 MM HG (ref 35–45)
PEEP RESPIRATORY: 10 CM[H2O]
PEEP RESPIRATORY: 10 CM[H2O]
PH BLDA: 7.29 PH UNITS (ref 7.35–7.45)
PH BLDA: 7.3 PH UNITS (ref 7.35–7.45)
PLAT MORPH BLD: NORMAL
PLATELET # BLD AUTO: 245 10*3/MM3 (ref 130–400)
PMV BLD AUTO: 10.5 FL (ref 6–10)
PO2 BLDA: 105.7 MM HG (ref 80–100)
PO2 BLDA: 115.6 MM HG (ref 80–100)
POTASSIUM BLD-SCNC: 4.3 MMOL/L (ref 3.5–5.3)
PROT SERPL-MCNC: 5.6 G/DL (ref 6–8)
RBC # BLD AUTO: 3.03 10*6/MM3 (ref 4.2–5.4)
SAO2 % BLDCOA: 97 % (ref 90–100)
SAO2 % BLDCOA: 97.7 % (ref 90–100)
SET MECH RESP RATE: 16
SET MECH RESP RATE: 18
SODIUM BLD-SCNC: 142 MMOL/L (ref 135–153)
SODIUM BLD-SCNC: 142 MMOL/L (ref 136–145)
SODIUM BLD-SCNC: 143 MMOL/L (ref 135–153)
SODIUM BLD-SCNC: 143 MMOL/L (ref 136–145)
VENTILATOR MODE: ABNORMAL
VENTILATOR MODE: AC
VT ON VENT VENT: 430 ML
VT ON VENT VENT: 430 ML
WBC NRBC COR # BLD: 11.43 10*3/MM3 (ref 4.5–12.5)

## 2019-03-11 PROCEDURE — 84295 ASSAY OF SERUM SODIUM: CPT | Performed by: INTERNAL MEDICINE

## 2019-03-11 PROCEDURE — 94799 UNLISTED PULMONARY SVC/PX: CPT

## 2019-03-11 PROCEDURE — 71045 X-RAY EXAM CHEST 1 VIEW: CPT

## 2019-03-11 PROCEDURE — 82805 BLOOD GASES W/O2 SATURATION: CPT | Performed by: PHYSICIAN ASSISTANT

## 2019-03-11 PROCEDURE — 63710000001 INSULIN ASPART PER 5 UNITS: Performed by: NURSE PRACTITIONER

## 2019-03-11 PROCEDURE — 25010000002 VANCOMYCIN 5 G RECONSTITUTED SOLUTION 5,000 MG VIAL

## 2019-03-11 PROCEDURE — 25010000002 ENOXAPARIN PER 10 MG

## 2019-03-11 PROCEDURE — 99232 SBSQ HOSP IP/OBS MODERATE 35: CPT | Performed by: INTERNAL MEDICINE

## 2019-03-11 PROCEDURE — 94003 VENT MGMT INPAT SUBQ DAY: CPT

## 2019-03-11 PROCEDURE — 85007 BL SMEAR W/DIFF WBC COUNT: CPT | Performed by: PHYSICIAN ASSISTANT

## 2019-03-11 PROCEDURE — 80053 COMPREHEN METABOLIC PANEL: CPT | Performed by: PHYSICIAN ASSISTANT

## 2019-03-11 PROCEDURE — 99291 CRITICAL CARE FIRST HOUR: CPT | Performed by: INTERNAL MEDICINE

## 2019-03-11 PROCEDURE — 83050 HGB METHEMOGLOBIN QUAN: CPT | Performed by: PHYSICIAN ASSISTANT

## 2019-03-11 PROCEDURE — 85025 COMPLETE CBC W/AUTO DIFF WBC: CPT | Performed by: PHYSICIAN ASSISTANT

## 2019-03-11 PROCEDURE — 36600 WITHDRAWAL OF ARTERIAL BLOOD: CPT | Performed by: PHYSICIAN ASSISTANT

## 2019-03-11 PROCEDURE — 82962 GLUCOSE BLOOD TEST: CPT

## 2019-03-11 PROCEDURE — 25010000002 METHYLPREDNISOLONE PER 40 MG: Performed by: INTERNAL MEDICINE

## 2019-03-11 PROCEDURE — 25010000002 PIPERACILLIN-TAZOBACTAM: Performed by: NURSE PRACTITIONER

## 2019-03-11 PROCEDURE — 93005 ELECTROCARDIOGRAM TRACING: CPT | Performed by: INTERNAL MEDICINE

## 2019-03-11 PROCEDURE — 71045 X-RAY EXAM CHEST 1 VIEW: CPT | Performed by: RADIOLOGY

## 2019-03-11 PROCEDURE — 99233 SBSQ HOSP IP/OBS HIGH 50: CPT | Performed by: INTERNAL MEDICINE

## 2019-03-11 PROCEDURE — 25010000002 PROPOFOL 1000 MG/ML EMULSION: Performed by: HOSPITALIST

## 2019-03-11 PROCEDURE — 25010000002 MAGNESIUM SULFATE 2 GM/50ML SOLUTION: Performed by: NURSE PRACTITIONER

## 2019-03-11 PROCEDURE — 93010 ELECTROCARDIOGRAM REPORT: CPT | Performed by: INTERNAL MEDICINE

## 2019-03-11 PROCEDURE — 25010000002 HYDROCORTISONE SODIUM SUCCINATE 100 MG RECONSTITUTED SOLUTION: Performed by: PHYSICIAN ASSISTANT

## 2019-03-11 PROCEDURE — 82375 ASSAY CARBOXYHB QUANT: CPT | Performed by: PHYSICIAN ASSISTANT

## 2019-03-11 RX ORDER — LEVOTHYROXINE SODIUM 0.05 MG/1
50 TABLET ORAL
Status: DISCONTINUED | OUTPATIENT
Start: 2019-03-12 | End: 2019-04-02

## 2019-03-11 RX ADMIN — IPRATROPIUM BROMIDE 0.5 MG: 0.5 SOLUTION RESPIRATORY (INHALATION) at 06:32

## 2019-03-11 RX ADMIN — PROPOFOL 50 MCG/KG/MIN: 10 INJECTION, EMULSION INTRAVENOUS at 02:30

## 2019-03-11 RX ADMIN — SODIUM CHLORIDE, PRESERVATIVE FREE 3 ML: 5 INJECTION INTRAVENOUS at 08:38

## 2019-03-11 RX ADMIN — ENOXAPARIN SODIUM 50 MG: 80 INJECTION SUBCUTANEOUS at 14:15

## 2019-03-11 RX ADMIN — IPRATROPIUM BROMIDE 0.5 MG: 0.5 SOLUTION RESPIRATORY (INHALATION) at 18:39

## 2019-03-11 RX ADMIN — TAZOBACTAM SODIUM AND PIPERACILLIN SODIUM 4.5 G: .5; 4 INJECTION, POWDER, LYOPHILIZED, FOR SOLUTION INTRAVENOUS at 00:43

## 2019-03-11 RX ADMIN — ENOXAPARIN SODIUM 50 MG: 80 INJECTION SUBCUTANEOUS at 00:47

## 2019-03-11 RX ADMIN — HYDROCORTISONE SODIUM SUCCINATE 50 MG: 100 INJECTION, POWDER, FOR SOLUTION INTRAMUSCULAR; INTRAVENOUS at 20:26

## 2019-03-11 RX ADMIN — DOXYCYCLINE 100 MG: 100 INJECTION, POWDER, LYOPHILIZED, FOR SOLUTION INTRAVENOUS at 11:01

## 2019-03-11 RX ADMIN — PROPOFOL 50 MCG/KG/MIN: 10 INJECTION, EMULSION INTRAVENOUS at 14:14

## 2019-03-11 RX ADMIN — LEVOTHYROXINE SODIUM 25 MCG: 25 TABLET ORAL at 06:24

## 2019-03-11 RX ADMIN — BUDESONIDE 0.5 MG: 0.5 INHALANT RESPIRATORY (INHALATION) at 06:33

## 2019-03-11 RX ADMIN — TAZOBACTAM SODIUM AND PIPERACILLIN SODIUM 4.5 G: .5; 4 INJECTION, POWDER, LYOPHILIZED, FOR SOLUTION INTRAVENOUS at 17:58

## 2019-03-11 RX ADMIN — OSELTAMIVIR PHOSPHATE 75 MG: 75 CAPSULE ORAL at 20:26

## 2019-03-11 RX ADMIN — DEXMEDETOMIDINE HYDROCHLORIDE 0.6 MCG/KG/HR: 100 INJECTION, SOLUTION INTRAVENOUS at 06:14

## 2019-03-11 RX ADMIN — METHYLPREDNISOLONE SODIUM SUCCINATE 40 MG: 40 INJECTION, POWDER, FOR SOLUTION INTRAMUSCULAR; INTRAVENOUS at 11:01

## 2019-03-11 RX ADMIN — SODIUM CHLORIDE, PRESERVATIVE FREE 3 ML: 5 INJECTION INTRAVENOUS at 20:26

## 2019-03-11 RX ADMIN — OSELTAMIVIR PHOSPHATE 75 MG: 75 CAPSULE ORAL at 08:37

## 2019-03-11 RX ADMIN — TAZOBACTAM SODIUM AND PIPERACILLIN SODIUM 4.5 G: .5; 4 INJECTION, POWDER, LYOPHILIZED, FOR SOLUTION INTRAVENOUS at 08:38

## 2019-03-11 RX ADMIN — PROPOFOL 50 MCG/KG/MIN: 10 INJECTION, EMULSION INTRAVENOUS at 07:23

## 2019-03-11 RX ADMIN — Medication 1 CAPSULE: at 08:37

## 2019-03-11 RX ADMIN — IPRATROPIUM BROMIDE 0.5 MG: 0.5 SOLUTION RESPIRATORY (INHALATION) at 12:45

## 2019-03-11 RX ADMIN — PROPOFOL 50 MCG/KG/MIN: 10 INJECTION, EMULSION INTRAVENOUS at 18:35

## 2019-03-11 RX ADMIN — BUDESONIDE 0.5 MG: 0.5 INHALANT RESPIRATORY (INHALATION) at 18:39

## 2019-03-11 RX ADMIN — MAGNESIUM SULFATE IN WATER 2 G: 40 INJECTION, SOLUTION INTRAVENOUS at 00:30

## 2019-03-11 RX ADMIN — Medication: at 19:16

## 2019-03-11 RX ADMIN — VANCOMYCIN HYDROCHLORIDE 1000 MG: 5 INJECTION, POWDER, LYOPHILIZED, FOR SOLUTION INTRAVENOUS at 17:59

## 2019-03-11 RX ADMIN — ASPIRIN 325 MG: 325 TABLET ORAL at 08:37

## 2019-03-11 RX ADMIN — SODIUM CHLORIDE, PRESERVATIVE FREE 3 ML: 5 INJECTION INTRAVENOUS at 20:25

## 2019-03-11 RX ADMIN — CHLORHEXIDINE GLUCONATE 15 ML: 1.2 RINSE ORAL at 20:26

## 2019-03-11 RX ADMIN — Medication: at 09:20

## 2019-03-11 RX ADMIN — Medication 100 MG: at 08:37

## 2019-03-11 RX ADMIN — VANCOMYCIN HYDROCHLORIDE 1000 MG: 5 INJECTION, POWDER, LYOPHILIZED, FOR SOLUTION INTRAVENOUS at 06:14

## 2019-03-11 RX ADMIN — HYDROCORTISONE SODIUM SUCCINATE 50 MG: 100 INJECTION, POWDER, FOR SOLUTION INTRAMUSCULAR; INTRAVENOUS at 14:16

## 2019-03-11 RX ADMIN — IPRATROPIUM BROMIDE 0.5 MG: 0.5 SOLUTION RESPIRATORY (INHALATION) at 00:38

## 2019-03-11 RX ADMIN — DEXMEDETOMIDINE HYDROCHLORIDE 0.6 MCG/KG/HR: 100 INJECTION, SOLUTION INTRAVENOUS at 18:41

## 2019-03-11 RX ADMIN — CHLORHEXIDINE GLUCONATE 15 ML: 1.2 RINSE ORAL at 08:38

## 2019-03-11 RX ADMIN — FOLIC ACID 1 MG: 1 TABLET ORAL at 08:37

## 2019-03-11 RX ADMIN — INSULIN ASPART 2 UNITS: 100 INJECTION, SOLUTION INTRAVENOUS; SUBCUTANEOUS at 00:42

## 2019-03-11 RX ADMIN — PANTOPRAZOLE SODIUM 40 MG: 40 INJECTION, POWDER, FOR SOLUTION INTRAVENOUS at 08:38

## 2019-03-12 ENCOUNTER — APPOINTMENT (OUTPATIENT)
Dept: GENERAL RADIOLOGY | Facility: HOSPITAL | Age: 54
End: 2019-03-12

## 2019-03-12 LAB
A-A DO2: 132 MMHG (ref 0–300)
A-A DO2: 206.8 MMHG (ref 0–300)
ALBUMIN SERPL-MCNC: 3.11 G/DL (ref 3.5–5.2)
ALBUMIN/GLOB SERPL: 1.2 G/DL
ALP SERPL-CCNC: 53 U/L (ref 39–117)
ALT SERPL W P-5'-P-CCNC: 16 U/L (ref 1–33)
ANION GAP SERPL CALCULATED.3IONS-SCNC: 10.3 MMOL/L
ARTERIAL PATENCY WRIST A: POSITIVE
ARTERIAL PATENCY WRIST A: POSITIVE
AST SERPL-CCNC: 12 U/L (ref 1–32)
ATMOSPHERIC PRESS: 735 MMHG
ATMOSPHERIC PRESS: 735 MMHG
BASE EXCESS BLDA CALC-SCNC: -5.5 MMOL/L
BASE EXCESS BLDA CALC-SCNC: -5.6 MMOL/L
BASOPHILS # BLD AUTO: 0.01 10*3/MM3 (ref 0–0.2)
BASOPHILS NFR BLD AUTO: 0.1 % (ref 0–1.5)
BDY SITE: ABNORMAL
BDY SITE: ABNORMAL
BILIRUB SERPL-MCNC: <0.2 MG/DL (ref 0.1–1.2)
BODY TEMPERATURE: 98.6 C
BODY TEMPERATURE: 98.6 C
BUN BLD-MCNC: 16 MG/DL (ref 6–20)
BUN/CREAT SERPL: 24.2 (ref 7–25)
CALCIUM SPEC-SCNC: 8.8 MG/DL (ref 8.6–10.5)
CHLORIDE SERPL-SCNC: 115 MMOL/L (ref 98–107)
CO2 SERPL-SCNC: 19.7 MMOL/L (ref 22–29)
COHGB MFR BLD: 0.7 % (ref 0–5)
COHGB MFR BLD: 0.8 % (ref 0–5)
CREAT BLD-MCNC: 0.66 MG/DL (ref 0.57–1)
D-LACTATE SERPL-SCNC: 1.5 MMOL/L (ref 0.5–2)
DEPRECATED RDW RBC AUTO: 51.4 FL (ref 37–54)
EOSINOPHIL # BLD AUTO: 0 10*3/MM3 (ref 0–0.4)
EOSINOPHIL NFR BLD AUTO: 0 % (ref 0.3–6.2)
ERYTHROCYTE [DISTWIDTH] IN BLOOD BY AUTOMATED COUNT: 13.5 % (ref 12.3–15.4)
GFR SERPL CREATININE-BSD FRML MDRD: 93 ML/MIN/1.73
GLOBULIN UR ELPH-MCNC: 2.7 GM/DL
GLUCOSE BLD-MCNC: 135 MG/DL (ref 65–99)
GLUCOSE BLDC GLUCOMTR-MCNC: 100 MG/DL (ref 70–130)
GLUCOSE BLDC GLUCOMTR-MCNC: 129 MG/DL (ref 70–130)
GLUCOSE BLDC GLUCOMTR-MCNC: 129 MG/DL (ref 70–130)
GLUCOSE BLDC GLUCOMTR-MCNC: 141 MG/DL (ref 70–130)
GLUCOSE BLDC GLUCOMTR-MCNC: 142 MG/DL (ref 70–130)
HCO3 BLDA-SCNC: 19.3 MMOL/L (ref 22–26)
HCO3 BLDA-SCNC: 19.6 MMOL/L (ref 22–26)
HCT VFR BLD AUTO: 29.7 % (ref 34–46.6)
HCT VFR BLD CALC: 28 % (ref 37–47)
HCT VFR BLD CALC: 37 % (ref 37–47)
HGB BLD-MCNC: 9.2 G/DL (ref 12–15.9)
HGB BLDA-MCNC: 12.5 G/DL (ref 12–16)
HGB BLDA-MCNC: 9.4 G/DL (ref 12–16)
HOROWITZ INDEX BLD+IHG-RTO: 40 %
HOROWITZ INDEX BLD+IHG-RTO: 50 %
HYPOCHROMIA BLD QL: NORMAL
IMM GRANULOCYTES # BLD AUTO: 0.04 10*3/MM3 (ref 0–0.05)
IMM GRANULOCYTES NFR BLD AUTO: 0.4 % (ref 0–0.5)
LYMPHOCYTES # BLD AUTO: 0.82 10*3/MM3 (ref 0.7–3.1)
LYMPHOCYTES NFR BLD AUTO: 7.6 % (ref 19.6–45.3)
MACROCYTES BLD QL SMEAR: NORMAL
MCH RBC QN AUTO: 33 PG (ref 26.6–33)
MCHC RBC AUTO-ENTMCNC: 31 G/DL (ref 31.5–35.7)
MCV RBC AUTO: 106.5 FL (ref 79–97)
METHGB BLD QL: 0.2 % (ref 0–3)
METHGB BLD QL: 0.4 % (ref 0–3)
MODALITY: ABNORMAL
MODALITY: ABNORMAL
MONOCYTES # BLD AUTO: 0.76 10*3/MM3 (ref 0.1–0.9)
MONOCYTES NFR BLD AUTO: 7 % (ref 5–12)
NEUTROPHILS # BLD AUTO: 9.22 10*3/MM3 (ref 1.4–7)
NEUTROPHILS NFR BLD AUTO: 84.9 % (ref 42.7–76)
OXYHGB MFR BLDV: 95.9 % (ref 85–100)
OXYHGB MFR BLDV: 96 % (ref 85–100)
PCO2 BLDA: 34.7 MM HG (ref 35–45)
PCO2 BLDA: 37.5 MM HG (ref 35–45)
PEEP RESPIRATORY: 10 CM[H2O]
PEEP RESPIRATORY: 10 CM[H2O]
PH BLDA: 7.34 PH UNITS (ref 7.35–7.45)
PH BLDA: 7.36 PH UNITS (ref 7.35–7.45)
PLAT MORPH BLD: NORMAL
PLATELET # BLD AUTO: 224 10*3/MM3 (ref 140–450)
PMV BLD AUTO: 10.5 FL (ref 6–12)
PO2 BLDA: 100.1 MM HG (ref 80–100)
PO2 BLDA: 98.2 MM HG (ref 80–100)
POTASSIUM BLD-SCNC: 4.3 MMOL/L (ref 3.5–5.2)
PROT SERPL-MCNC: 5.8 G/DL (ref 6–8.5)
RBC # BLD AUTO: 2.79 10*6/MM3 (ref 3.77–5.28)
SAO2 % BLDCOA: 96.9 % (ref 90–100)
SAO2 % BLDCOA: 97.1 % (ref 90–100)
SET MECH RESP RATE: 16
SET MECH RESP RATE: 18
SODIUM BLD-SCNC: 145 MMOL/L (ref 136–145)
SODIUM BLD-SCNC: 146 MMOL/L (ref 136–145)
VENTILATOR MODE: A C
VENTILATOR MODE: ABNORMAL
VT ON VENT VENT: 430 ML
VT ON VENT VENT: 430 ML
WBC NRBC COR # BLD: 10.85 10*3/MM3 (ref 3.4–10.8)

## 2019-03-12 PROCEDURE — 85025 COMPLETE CBC W/AUTO DIFF WBC: CPT | Performed by: PHYSICIAN ASSISTANT

## 2019-03-12 PROCEDURE — 36600 WITHDRAWAL OF ARTERIAL BLOOD: CPT | Performed by: PHYSICIAN ASSISTANT

## 2019-03-12 PROCEDURE — 94799 UNLISTED PULMONARY SVC/PX: CPT

## 2019-03-12 PROCEDURE — 25010000002 PIPERACILLIN-TAZOBACTAM: Performed by: NURSE PRACTITIONER

## 2019-03-12 PROCEDURE — 25010000002 PROPOFOL 1000 MG/ML EMULSION: Performed by: HOSPITALIST

## 2019-03-12 PROCEDURE — 83605 ASSAY OF LACTIC ACID: CPT | Performed by: INTERNAL MEDICINE

## 2019-03-12 PROCEDURE — 82962 GLUCOSE BLOOD TEST: CPT

## 2019-03-12 PROCEDURE — 82375 ASSAY CARBOXYHB QUANT: CPT | Performed by: PHYSICIAN ASSISTANT

## 2019-03-12 PROCEDURE — 94003 VENT MGMT INPAT SUBQ DAY: CPT

## 2019-03-12 PROCEDURE — 85007 BL SMEAR W/DIFF WBC COUNT: CPT | Performed by: PHYSICIAN ASSISTANT

## 2019-03-12 PROCEDURE — 25010000002 ENOXAPARIN PER 10 MG: Performed by: INTERNAL MEDICINE

## 2019-03-12 PROCEDURE — 25010000002 HYDROCORTISONE SODIUM SUCCINATE 100 MG RECONSTITUTED SOLUTION: Performed by: PHYSICIAN ASSISTANT

## 2019-03-12 PROCEDURE — 80053 COMPREHEN METABOLIC PANEL: CPT | Performed by: PHYSICIAN ASSISTANT

## 2019-03-12 PROCEDURE — 84295 ASSAY OF SERUM SODIUM: CPT | Performed by: INTERNAL MEDICINE

## 2019-03-12 PROCEDURE — 99291 CRITICAL CARE FIRST HOUR: CPT | Performed by: INTERNAL MEDICINE

## 2019-03-12 PROCEDURE — 99232 SBSQ HOSP IP/OBS MODERATE 35: CPT | Performed by: INTERNAL MEDICINE

## 2019-03-12 PROCEDURE — 71045 X-RAY EXAM CHEST 1 VIEW: CPT

## 2019-03-12 PROCEDURE — 71045 X-RAY EXAM CHEST 1 VIEW: CPT | Performed by: RADIOLOGY

## 2019-03-12 PROCEDURE — 99233 SBSQ HOSP IP/OBS HIGH 50: CPT | Performed by: INTERNAL MEDICINE

## 2019-03-12 PROCEDURE — 82805 BLOOD GASES W/O2 SATURATION: CPT | Performed by: PHYSICIAN ASSISTANT

## 2019-03-12 PROCEDURE — 25010000002 VANCOMYCIN 5 G RECONSTITUTED SOLUTION 5,000 MG VIAL

## 2019-03-12 PROCEDURE — 83050 HGB METHEMOGLOBIN QUAN: CPT | Performed by: PHYSICIAN ASSISTANT

## 2019-03-12 RX ORDER — DOCUSATE SODIUM 100 MG/1
100 CAPSULE, LIQUID FILLED ORAL 2 TIMES DAILY PRN
Status: DISCONTINUED | OUTPATIENT
Start: 2019-03-12 | End: 2019-04-04

## 2019-03-12 RX ORDER — BUMETANIDE 0.25 MG/ML
0.5 INJECTION INTRAMUSCULAR; INTRAVENOUS ONCE
Status: COMPLETED | OUTPATIENT
Start: 2019-03-12 | End: 2019-03-12

## 2019-03-12 RX ORDER — BISACODYL 10 MG
10 SUPPOSITORY, RECTAL RECTAL DAILY PRN
Status: DISCONTINUED | OUTPATIENT
Start: 2019-03-12 | End: 2019-04-18 | Stop reason: HOSPADM

## 2019-03-12 RX ADMIN — HYDROCORTISONE SODIUM SUCCINATE 50 MG: 100 INJECTION, POWDER, FOR SOLUTION INTRAMUSCULAR; INTRAVENOUS at 02:01

## 2019-03-12 RX ADMIN — Medication: at 04:55

## 2019-03-12 RX ADMIN — TAZOBACTAM SODIUM AND PIPERACILLIN SODIUM 4.5 G: .5; 4 INJECTION, POWDER, LYOPHILIZED, FOR SOLUTION INTRAVENOUS at 08:28

## 2019-03-12 RX ADMIN — PROPOFOL 50 MCG/KG/MIN: 10 INJECTION, EMULSION INTRAVENOUS at 07:14

## 2019-03-12 RX ADMIN — IPRATROPIUM BROMIDE 0.5 MG: 0.5 SOLUTION RESPIRATORY (INHALATION) at 13:15

## 2019-03-12 RX ADMIN — ENOXAPARIN SODIUM 40 MG: 80 INJECTION SUBCUTANEOUS at 14:13

## 2019-03-12 RX ADMIN — PROPOFOL 50 MCG/KG/MIN: 10 INJECTION, EMULSION INTRAVENOUS at 12:16

## 2019-03-12 RX ADMIN — DEXMEDETOMIDINE HYDROCHLORIDE 0.7 MCG/KG/HR: 100 INJECTION, SOLUTION INTRAVENOUS at 22:26

## 2019-03-12 RX ADMIN — CHLORHEXIDINE GLUCONATE 15 ML: 1.2 RINSE ORAL at 21:22

## 2019-03-12 RX ADMIN — BUDESONIDE 0.5 MG: 0.5 INHALANT RESPIRATORY (INHALATION) at 18:31

## 2019-03-12 RX ADMIN — Medication 100 MG: at 08:30

## 2019-03-12 RX ADMIN — HYDROCORTISONE SODIUM SUCCINATE 50 MG: 100 INJECTION, POWDER, FOR SOLUTION INTRAMUSCULAR; INTRAVENOUS at 21:21

## 2019-03-12 RX ADMIN — SODIUM CHLORIDE, PRESERVATIVE FREE 3 ML: 5 INJECTION INTRAVENOUS at 21:21

## 2019-03-12 RX ADMIN — FOLIC ACID 1 MG: 1 TABLET ORAL at 08:30

## 2019-03-12 RX ADMIN — PANTOPRAZOLE SODIUM 40 MG: 40 INJECTION, POWDER, FOR SOLUTION INTRAVENOUS at 08:29

## 2019-03-12 RX ADMIN — Medication 1 CAPSULE: at 08:30

## 2019-03-12 RX ADMIN — Medication: at 15:01

## 2019-03-12 RX ADMIN — DOXYCYCLINE 100 MG: 100 INJECTION, POWDER, LYOPHILIZED, FOR SOLUTION INTRAVENOUS at 11:51

## 2019-03-12 RX ADMIN — BUDESONIDE 0.5 MG: 0.5 INHALANT RESPIRATORY (INHALATION) at 06:31

## 2019-03-12 RX ADMIN — ASPIRIN 325 MG: 325 TABLET ORAL at 08:30

## 2019-03-12 RX ADMIN — TAZOBACTAM SODIUM AND PIPERACILLIN SODIUM 4.5 G: .5; 4 INJECTION, POWDER, LYOPHILIZED, FOR SOLUTION INTRAVENOUS at 16:20

## 2019-03-12 RX ADMIN — SODIUM CHLORIDE, PRESERVATIVE FREE 3 ML: 5 INJECTION INTRAVENOUS at 09:28

## 2019-03-12 RX ADMIN — OSELTAMIVIR PHOSPHATE 75 MG: 75 CAPSULE ORAL at 23:19

## 2019-03-12 RX ADMIN — DOXYCYCLINE 100 MG: 100 INJECTION, POWDER, LYOPHILIZED, FOR SOLUTION INTRAVENOUS at 00:08

## 2019-03-12 RX ADMIN — PROPOFOL 40 MCG/KG/MIN: 10 INJECTION, EMULSION INTRAVENOUS at 17:10

## 2019-03-12 RX ADMIN — OSELTAMIVIR PHOSPHATE 75 MG: 75 CAPSULE ORAL at 08:36

## 2019-03-12 RX ADMIN — SODIUM CHLORIDE, PRESERVATIVE FREE 3 ML: 5 INJECTION INTRAVENOUS at 08:31

## 2019-03-12 RX ADMIN — LEVOTHYROXINE SODIUM 50 MCG: 50 TABLET ORAL at 06:06

## 2019-03-12 RX ADMIN — IPRATROPIUM BROMIDE 0.5 MG: 0.5 SOLUTION RESPIRATORY (INHALATION) at 18:30

## 2019-03-12 RX ADMIN — FLECAINIDE ACETATE 50 MG: 50 TABLET ORAL at 16:20

## 2019-03-12 RX ADMIN — IPRATROPIUM BROMIDE 0.5 MG: 0.5 SOLUTION RESPIRATORY (INHALATION) at 06:30

## 2019-03-12 RX ADMIN — VANCOMYCIN HYDROCHLORIDE 1000 MG: 5 INJECTION, POWDER, LYOPHILIZED, FOR SOLUTION INTRAVENOUS at 16:20

## 2019-03-12 RX ADMIN — IPRATROPIUM BROMIDE 0.5 MG: 0.5 SOLUTION RESPIRATORY (INHALATION) at 00:20

## 2019-03-12 RX ADMIN — CHLORHEXIDINE GLUCONATE 15 ML: 1.2 RINSE ORAL at 08:30

## 2019-03-12 RX ADMIN — PROPOFOL 50 MCG/KG/MIN: 10 INJECTION, EMULSION INTRAVENOUS at 02:17

## 2019-03-12 RX ADMIN — VANCOMYCIN HYDROCHLORIDE 1000 MG: 5 INJECTION, POWDER, LYOPHILIZED, FOR SOLUTION INTRAVENOUS at 04:07

## 2019-03-12 RX ADMIN — NOREPINEPHRINE BITARTRATE 0.06 MCG/KG/MIN: 1 INJECTION INTRAVENOUS at 06:09

## 2019-03-12 RX ADMIN — BUMETANIDE 0.5 MG: 0.25 INJECTION, SOLUTION INTRAMUSCULAR; INTRAVENOUS at 11:51

## 2019-03-12 RX ADMIN — TAZOBACTAM SODIUM AND PIPERACILLIN SODIUM 4.5 G: .5; 4 INJECTION, POWDER, LYOPHILIZED, FOR SOLUTION INTRAVENOUS at 00:03

## 2019-03-12 RX ADMIN — HYDROCORTISONE SODIUM SUCCINATE 50 MG: 100 INJECTION, POWDER, FOR SOLUTION INTRAMUSCULAR; INTRAVENOUS at 14:12

## 2019-03-12 RX ADMIN — DEXMEDETOMIDINE HYDROCHLORIDE 0.6 MCG/KG/HR: 100 INJECTION, SOLUTION INTRAVENOUS at 08:28

## 2019-03-13 ENCOUNTER — APPOINTMENT (OUTPATIENT)
Dept: GENERAL RADIOLOGY | Facility: HOSPITAL | Age: 54
End: 2019-03-13

## 2019-03-13 LAB
A-A DO2: 122.1 MMHG (ref 0–300)
ALBUMIN SERPL-MCNC: 3.25 G/DL (ref 3.5–5.2)
ALBUMIN/GLOB SERPL: 1.2 G/DL
ALP SERPL-CCNC: 56 U/L (ref 39–117)
ALT SERPL W P-5'-P-CCNC: 13 U/L (ref 1–33)
ANION GAP SERPL CALCULATED.3IONS-SCNC: 9.1 MMOL/L
ARTERIAL PATENCY WRIST A: POSITIVE
AST SERPL-CCNC: 11 U/L (ref 1–32)
ATMOSPHERIC PRESS: 735 MMHG
BACTERIA SPEC AEROBE CULT: NORMAL
BACTERIA SPEC AEROBE CULT: NORMAL
BASE EXCESS BLDA CALC-SCNC: -4.3 MMOL/L
BASOPHILS # BLD AUTO: 0.02 10*3/MM3 (ref 0–0.2)
BASOPHILS NFR BLD AUTO: 0.1 % (ref 0–1.5)
BDY SITE: ABNORMAL
BILIRUB SERPL-MCNC: <0.2 MG/DL (ref 0.1–1.2)
BILIRUB UR QL STRIP: NEGATIVE
BODY TEMPERATURE: 98.6 C
BUN BLD-MCNC: 19 MG/DL (ref 6–20)
BUN/CREAT SERPL: 32.2 (ref 7–25)
CALCIUM SPEC-SCNC: 8.6 MG/DL (ref 8.6–10.5)
CHLORIDE SERPL-SCNC: 113 MMOL/L (ref 98–107)
CLARITY UR: CLEAR
CO2 SERPL-SCNC: 21.9 MMOL/L (ref 22–29)
COHGB MFR BLD: 0.7 % (ref 0–5)
COLOR UR: YELLOW
CREAT BLD-MCNC: 0.59 MG/DL (ref 0.57–1)
D-LACTATE SERPL-SCNC: 1.9 MMOL/L (ref 0.5–2)
DEPRECATED RDW RBC AUTO: 52.8 FL (ref 37–54)
EOSINOPHIL # BLD AUTO: 0.29 10*3/MM3 (ref 0–0.4)
EOSINOPHIL NFR BLD AUTO: 2.1 % (ref 0.3–6.2)
ERYTHROCYTE [DISTWIDTH] IN BLOOD BY AUTOMATED COUNT: 13.9 % (ref 12.3–15.4)
GFR SERPL CREATININE-BSD FRML MDRD: 106 ML/MIN/1.73
GLOBULIN UR ELPH-MCNC: 2.7 GM/DL
GLUCOSE BLD-MCNC: 144 MG/DL (ref 65–99)
GLUCOSE BLDC GLUCOMTR-MCNC: 133 MG/DL (ref 70–130)
GLUCOSE BLDC GLUCOMTR-MCNC: 135 MG/DL (ref 70–130)
GLUCOSE BLDC GLUCOMTR-MCNC: 154 MG/DL (ref 70–130)
GLUCOSE BLDC GLUCOMTR-MCNC: 163 MG/DL (ref 70–130)
GLUCOSE BLDC GLUCOMTR-MCNC: 168 MG/DL (ref 70–130)
GLUCOSE UR STRIP-MCNC: NEGATIVE MG/DL
HCO3 BLDA-SCNC: 20.5 MMOL/L (ref 22–26)
HCT VFR BLD AUTO: 30.6 % (ref 34–46.6)
HCT VFR BLD CALC: 30 % (ref 37–47)
HGB BLD-MCNC: 9.8 G/DL (ref 12–15.9)
HGB BLDA-MCNC: 10.2 G/DL (ref 12–16)
HGB UR QL STRIP.AUTO: NEGATIVE
HOROWITZ INDEX BLD+IHG-RTO: 40 %
IMM GRANULOCYTES # BLD AUTO: 0.08 10*3/MM3 (ref 0–0.05)
IMM GRANULOCYTES NFR BLD AUTO: 0.6 % (ref 0–0.5)
KETONES UR QL STRIP: NEGATIVE
LEUKOCYTE ESTERASE UR QL STRIP.AUTO: NEGATIVE
LYMPHOCYTES # BLD AUTO: 2.05 10*3/MM3 (ref 0.7–3.1)
LYMPHOCYTES NFR BLD AUTO: 14.9 % (ref 19.6–45.3)
MAGNESIUM SERPL-MCNC: 1.6 MG/DL (ref 1.6–2.6)
MCH RBC QN AUTO: 33.6 PG (ref 26.6–33)
MCHC RBC AUTO-ENTMCNC: 32 G/DL (ref 31.5–35.7)
MCV RBC AUTO: 104.8 FL (ref 79–97)
METHGB BLD QL: 0.2 % (ref 0–3)
MODALITY: ABNORMAL
MONOCYTES # BLD AUTO: 0.14 10*3/MM3 (ref 0.1–0.9)
MONOCYTES NFR BLD AUTO: 1 % (ref 5–12)
NEUTROPHILS # BLD AUTO: 11.21 10*3/MM3 (ref 1.4–7)
NEUTROPHILS NFR BLD AUTO: 81.3 % (ref 42.7–76)
NITRITE UR QL STRIP: NEGATIVE
OXYHGB MFR BLDV: 96.9 % (ref 85–100)
PCO2 BLDA: 36.6 MM HG (ref 35–45)
PEEP RESPIRATORY: 8 CM[H2O]
PH BLDA: 7.37 PH UNITS (ref 7.35–7.45)
PH UR STRIP.AUTO: <=5 [PH] (ref 5–8)
PLATELET # BLD AUTO: 223 10*3/MM3 (ref 140–450)
PMV BLD AUTO: 10.4 FL (ref 6–12)
PO2 BLDA: 111 MM HG (ref 80–100)
POTASSIUM BLD-SCNC: 3.9 MMOL/L (ref 3.5–5.2)
PROT SERPL-MCNC: 5.9 G/DL (ref 6–8.5)
PROT UR QL STRIP: NEGATIVE
RBC # BLD AUTO: 2.92 10*6/MM3 (ref 3.77–5.28)
SAO2 % BLDCOA: 97.8 % (ref 90–100)
SET MECH RESP RATE: 16
SODIUM BLD-SCNC: 140 MMOL/L (ref 136–145)
SODIUM BLD-SCNC: 142 MMOL/L (ref 136–145)
SODIUM BLD-SCNC: 143 MMOL/L (ref 136–145)
SODIUM BLD-SCNC: 144 MMOL/L (ref 136–145)
SP GR UR STRIP: 1.02 (ref 1–1.03)
UROBILINOGEN UR QL STRIP: NORMAL
VENTILATOR MODE: ABNORMAL
VT ON VENT VENT: 430 ML
WBC NRBC COR # BLD: 13.79 10*3/MM3 (ref 3.4–10.8)

## 2019-03-13 PROCEDURE — 94799 UNLISTED PULMONARY SVC/PX: CPT

## 2019-03-13 PROCEDURE — 84295 ASSAY OF SERUM SODIUM: CPT | Performed by: INTERNAL MEDICINE

## 2019-03-13 PROCEDURE — 71045 X-RAY EXAM CHEST 1 VIEW: CPT | Performed by: RADIOLOGY

## 2019-03-13 PROCEDURE — 99233 SBSQ HOSP IP/OBS HIGH 50: CPT | Performed by: INTERNAL MEDICINE

## 2019-03-13 PROCEDURE — 82962 GLUCOSE BLOOD TEST: CPT

## 2019-03-13 PROCEDURE — 82805 BLOOD GASES W/O2 SATURATION: CPT | Performed by: PHYSICIAN ASSISTANT

## 2019-03-13 PROCEDURE — 25010000002 HYDROCORTISONE SODIUM SUCCINATE 100 MG RECONSTITUTED SOLUTION: Performed by: PHYSICIAN ASSISTANT

## 2019-03-13 PROCEDURE — 25010000002 AMIODARONE IN DEXTROSE 5% 150-4.21 MG/100ML-% SOLUTION: Performed by: INTERNAL MEDICINE

## 2019-03-13 PROCEDURE — 81003 URINALYSIS AUTO W/O SCOPE: CPT | Performed by: PHYSICIAN ASSISTANT

## 2019-03-13 PROCEDURE — 25010000002 ENOXAPARIN PER 10 MG: Performed by: INTERNAL MEDICINE

## 2019-03-13 PROCEDURE — 83735 ASSAY OF MAGNESIUM: CPT | Performed by: INTERNAL MEDICINE

## 2019-03-13 PROCEDURE — 87040 BLOOD CULTURE FOR BACTERIA: CPT | Performed by: PHYSICIAN ASSISTANT

## 2019-03-13 PROCEDURE — 71045 X-RAY EXAM CHEST 1 VIEW: CPT

## 2019-03-13 PROCEDURE — 94003 VENT MGMT INPAT SUBQ DAY: CPT

## 2019-03-13 PROCEDURE — 25010000002 AMIODARONE IN DEXTROSE 5% 360-4.14 MG/200ML-% SOLUTION

## 2019-03-13 PROCEDURE — 93005 ELECTROCARDIOGRAM TRACING: CPT | Performed by: INTERNAL MEDICINE

## 2019-03-13 PROCEDURE — 80053 COMPREHEN METABOLIC PANEL: CPT | Performed by: PHYSICIAN ASSISTANT

## 2019-03-13 PROCEDURE — 25010000002 PIPERACILLIN-TAZOBACTAM: Performed by: NURSE PRACTITIONER

## 2019-03-13 PROCEDURE — 85025 COMPLETE CBC W/AUTO DIFF WBC: CPT | Performed by: PHYSICIAN ASSISTANT

## 2019-03-13 PROCEDURE — 83605 ASSAY OF LACTIC ACID: CPT | Performed by: PHYSICIAN ASSISTANT

## 2019-03-13 PROCEDURE — 83050 HGB METHEMOGLOBIN QUAN: CPT | Performed by: PHYSICIAN ASSISTANT

## 2019-03-13 PROCEDURE — 93010 ELECTROCARDIOGRAM REPORT: CPT | Performed by: INTERNAL MEDICINE

## 2019-03-13 PROCEDURE — 36600 WITHDRAWAL OF ARTERIAL BLOOD: CPT | Performed by: PHYSICIAN ASSISTANT

## 2019-03-13 PROCEDURE — 99291 CRITICAL CARE FIRST HOUR: CPT | Performed by: INTERNAL MEDICINE

## 2019-03-13 PROCEDURE — 63710000001 INSULIN ASPART PER 5 UNITS: Performed by: NURSE PRACTITIONER

## 2019-03-13 PROCEDURE — 82375 ASSAY CARBOXYHB QUANT: CPT | Performed by: PHYSICIAN ASSISTANT

## 2019-03-13 PROCEDURE — 25010000002 PROPOFOL 1000 MG/ML EMULSION: Performed by: HOSPITALIST

## 2019-03-13 PROCEDURE — 25010000002 AMIODARONE IN DEXTROSE 5% 360-4.14 MG/200ML-% SOLUTION: Performed by: INTERNAL MEDICINE

## 2019-03-13 RX ORDER — METOPROLOL TARTRATE 5 MG/5ML
5 INJECTION INTRAVENOUS ONCE
Status: COMPLETED | OUTPATIENT
Start: 2019-03-13 | End: 2019-03-13

## 2019-03-13 RX ORDER — BUMETANIDE 0.25 MG/ML
0.5 INJECTION INTRAMUSCULAR; INTRAVENOUS ONCE
Status: COMPLETED | OUTPATIENT
Start: 2019-03-13 | End: 2019-03-13

## 2019-03-13 RX ORDER — MAGNESIUM SULFATE HEPTAHYDRATE 40 MG/ML
4 INJECTION, SOLUTION INTRAVENOUS ONCE
Status: COMPLETED | OUTPATIENT
Start: 2019-03-13 | End: 2019-03-13

## 2019-03-13 RX ORDER — METOPROLOL TARTRATE 5 MG/5ML
INJECTION INTRAVENOUS
Status: COMPLETED
Start: 2019-03-13 | End: 2019-03-13

## 2019-03-13 RX ADMIN — Medication 1 CAPSULE: at 08:21

## 2019-03-13 RX ADMIN — INSULIN ASPART 2 UNITS: 100 INJECTION, SOLUTION INTRAVENOUS; SUBCUTANEOUS at 12:17

## 2019-03-13 RX ADMIN — PROPOFOL 50 MCG/KG/MIN: 10 INJECTION, EMULSION INTRAVENOUS at 00:46

## 2019-03-13 RX ADMIN — PROPOFOL 50 MCG/KG/MIN: 10 INJECTION, EMULSION INTRAVENOUS at 06:20

## 2019-03-13 RX ADMIN — NOREPINEPHRINE BITARTRATE 0.3 MCG/KG/MIN: 1 INJECTION INTRAVENOUS at 18:37

## 2019-03-13 RX ADMIN — NOREPINEPHRINE BITARTRATE 1.4 MCG/KG/MIN: 1 INJECTION INTRAVENOUS at 04:41

## 2019-03-13 RX ADMIN — HYDROCORTISONE SODIUM SUCCINATE 50 MG: 100 INJECTION, POWDER, FOR SOLUTION INTRAMUSCULAR; INTRAVENOUS at 13:55

## 2019-03-13 RX ADMIN — PROPOFOL 50 MCG/KG/MIN: 10 INJECTION, EMULSION INTRAVENOUS at 11:56

## 2019-03-13 RX ADMIN — Medication: at 17:07

## 2019-03-13 RX ADMIN — LEVOTHYROXINE SODIUM 50 MCG: 50 TABLET ORAL at 08:21

## 2019-03-13 RX ADMIN — BUMETANIDE 0.5 MG: 0.25 INJECTION INTRAMUSCULAR; INTRAVENOUS at 12:18

## 2019-03-13 RX ADMIN — METOPROLOL TARTRATE 5 MG: 5 INJECTION, SOLUTION INTRAVENOUS at 01:16

## 2019-03-13 RX ADMIN — BUDESONIDE 0.5 MG: 0.5 INHALANT RESPIRATORY (INHALATION) at 06:58

## 2019-03-13 RX ADMIN — IPRATROPIUM BROMIDE 0.5 MG: 0.5 SOLUTION RESPIRATORY (INHALATION) at 12:36

## 2019-03-13 RX ADMIN — INSULIN ASPART 2 UNITS: 100 INJECTION, SOLUTION INTRAVENOUS; SUBCUTANEOUS at 02:11

## 2019-03-13 RX ADMIN — SODIUM CHLORIDE, PRESERVATIVE FREE 3 ML: 5 INJECTION INTRAVENOUS at 08:22

## 2019-03-13 RX ADMIN — BUDESONIDE 0.5 MG: 0.5 INHALANT RESPIRATORY (INHALATION) at 19:08

## 2019-03-13 RX ADMIN — SODIUM CHLORIDE, PRESERVATIVE FREE 3 ML: 5 INJECTION INTRAVENOUS at 20:10

## 2019-03-13 RX ADMIN — HYDROCORTISONE SODIUM SUCCINATE 50 MG: 100 INJECTION, POWDER, FOR SOLUTION INTRAMUSCULAR; INTRAVENOUS at 01:54

## 2019-03-13 RX ADMIN — Medication 100 MG: at 08:21

## 2019-03-13 RX ADMIN — ASPIRIN 325 MG: 325 TABLET ORAL at 08:21

## 2019-03-13 RX ADMIN — DOXYCYCLINE 100 MG: 100 INJECTION, POWDER, LYOPHILIZED, FOR SOLUTION INTRAVENOUS at 00:53

## 2019-03-13 RX ADMIN — METOPROLOL TARTRATE 5 MG: 5 INJECTION INTRAVENOUS at 01:16

## 2019-03-13 RX ADMIN — MAGNESIUM HYDROXIDE 10 ML: 2400 SUSPENSION ORAL at 10:15

## 2019-03-13 RX ADMIN — Medication: at 08:19

## 2019-03-13 RX ADMIN — IPRATROPIUM BROMIDE 0.5 MG: 0.5 SOLUTION RESPIRATORY (INHALATION) at 19:02

## 2019-03-13 RX ADMIN — TAZOBACTAM SODIUM AND PIPERACILLIN SODIUM 4.5 G: .5; 4 INJECTION, POWDER, LYOPHILIZED, FOR SOLUTION INTRAVENOUS at 02:04

## 2019-03-13 RX ADMIN — DEXMEDETOMIDINE HYDROCHLORIDE 1 MCG/KG/HR: 100 INJECTION, SOLUTION INTRAVENOUS at 06:56

## 2019-03-13 RX ADMIN — DEXMEDETOMIDINE HYDROCHLORIDE 1.5 MCG/KG/HR: 100 INJECTION, SOLUTION INTRAVENOUS at 23:33

## 2019-03-13 RX ADMIN — IPRATROPIUM BROMIDE 0.5 MG: 0.5 SOLUTION RESPIRATORY (INHALATION) at 00:20

## 2019-03-13 RX ADMIN — ENOXAPARIN SODIUM 40 MG: 80 INJECTION SUBCUTANEOUS at 13:55

## 2019-03-13 RX ADMIN — AMIODARONE HYDROCHLORIDE 0.5 MG/MIN: 1.8 INJECTION, SOLUTION INTRAVENOUS at 19:50

## 2019-03-13 RX ADMIN — Medication: at 23:42

## 2019-03-13 RX ADMIN — Medication: at 00:46

## 2019-03-13 RX ADMIN — CHLORHEXIDINE GLUCONATE 15 ML: 1.2 RINSE ORAL at 20:09

## 2019-03-13 RX ADMIN — PANTOPRAZOLE SODIUM 40 MG: 40 INJECTION, POWDER, FOR SOLUTION INTRAVENOUS at 08:21

## 2019-03-13 RX ADMIN — HYDROCORTISONE SODIUM SUCCINATE 50 MG: 100 INJECTION, POWDER, FOR SOLUTION INTRAMUSCULAR; INTRAVENOUS at 08:22

## 2019-03-13 RX ADMIN — MAGNESIUM SULFATE HEPTAHYDRATE 4 G: 40 INJECTION, SOLUTION INTRAVENOUS at 12:17

## 2019-03-13 RX ADMIN — FOLIC ACID 1 MG: 1 TABLET ORAL at 08:21

## 2019-03-13 RX ADMIN — AMIODARONE HYDROCHLORIDE 150 MG: 1.5 INJECTION, SOLUTION INTRAVENOUS at 15:16

## 2019-03-13 RX ADMIN — FLECAINIDE ACETATE 50 MG: 50 TABLET ORAL at 03:27

## 2019-03-13 RX ADMIN — HYDROCORTISONE SODIUM SUCCINATE 50 MG: 100 INJECTION, POWDER, FOR SOLUTION INTRAMUSCULAR; INTRAVENOUS at 20:08

## 2019-03-13 RX ADMIN — IPRATROPIUM BROMIDE 0.5 MG: 0.5 SOLUTION RESPIRATORY (INHALATION) at 06:58

## 2019-03-13 RX ADMIN — PROPOFOL 10 MCG/KG/MIN: 10 INJECTION, EMULSION INTRAVENOUS at 18:34

## 2019-03-13 RX ADMIN — AMIODARONE HYDROCHLORIDE 1 MG/MIN: 1.8 INJECTION, SOLUTION INTRAVENOUS at 15:17

## 2019-03-13 RX ADMIN — CHLORHEXIDINE GLUCONATE 15 ML: 1.2 RINSE ORAL at 08:22

## 2019-03-13 RX ADMIN — DEXMEDETOMIDINE HYDROCHLORIDE 1 MCG/KG/HR: 100 INJECTION, SOLUTION INTRAVENOUS at 15:11

## 2019-03-14 ENCOUNTER — APPOINTMENT (OUTPATIENT)
Dept: GENERAL RADIOLOGY | Facility: HOSPITAL | Age: 54
End: 2019-03-14

## 2019-03-14 LAB
A-A DO2: 130.3 MMHG (ref 0–300)
ALBUMIN SERPL-MCNC: 2.85 G/DL (ref 3.5–5.2)
ALBUMIN/GLOB SERPL: 0.9 G/DL
ALP SERPL-CCNC: 61 U/L (ref 39–117)
ALT SERPL W P-5'-P-CCNC: 14 U/L (ref 1–33)
ANION GAP SERPL CALCULATED.3IONS-SCNC: 10.2 MMOL/L
ARTERIAL PATENCY WRIST A: POSITIVE
AST SERPL-CCNC: 13 U/L (ref 1–32)
ATMOSPHERIC PRESS: 731 MMHG
BASE EXCESS BLDA CALC-SCNC: -0.9 MMOL/L
BASOPHILS # BLD AUTO: 0.01 10*3/MM3 (ref 0–0.2)
BASOPHILS NFR BLD AUTO: 0.1 % (ref 0–1.5)
BDY SITE: ABNORMAL
BILIRUB SERPL-MCNC: <0.2 MG/DL (ref 0.1–1.2)
BODY TEMPERATURE: 98.6 C
BUN BLD-MCNC: 16 MG/DL (ref 6–20)
BUN/CREAT SERPL: 34 (ref 7–25)
CALCIUM SPEC-SCNC: 8.5 MG/DL (ref 8.6–10.5)
CHLORIDE SERPL-SCNC: 110 MMOL/L (ref 98–107)
CO2 SERPL-SCNC: 24.8 MMOL/L (ref 22–29)
COHGB MFR BLD: 0.8 % (ref 0–5)
CREAT BLD-MCNC: 0.47 MG/DL (ref 0.57–1)
DEPRECATED RDW RBC AUTO: 49.6 FL (ref 37–54)
EOSINOPHIL # BLD AUTO: 0.31 10*3/MM3 (ref 0–0.4)
EOSINOPHIL NFR BLD AUTO: 2.1 % (ref 0.3–6.2)
ERYTHROCYTE [DISTWIDTH] IN BLOOD BY AUTOMATED COUNT: 13.3 % (ref 12.3–15.4)
GFR SERPL CREATININE-BSD FRML MDRD: 138 ML/MIN/1.73
GLOBULIN UR ELPH-MCNC: 3.2 GM/DL
GLUCOSE BLD-MCNC: 143 MG/DL (ref 65–99)
GLUCOSE BLDC GLUCOMTR-MCNC: 130 MG/DL (ref 70–130)
GLUCOSE BLDC GLUCOMTR-MCNC: 140 MG/DL (ref 70–130)
GLUCOSE BLDC GLUCOMTR-MCNC: 152 MG/DL (ref 70–130)
GLUCOSE BLDC GLUCOMTR-MCNC: 181 MG/DL (ref 70–130)
HCO3 BLDA-SCNC: 23.2 MMOL/L (ref 22–26)
HCT VFR BLD AUTO: 30.4 % (ref 34–46.6)
HCT VFR BLD CALC: 30 % (ref 37–47)
HGB BLD-MCNC: 9.6 G/DL (ref 12–15.9)
HGB BLDA-MCNC: 10.1 G/DL (ref 12–16)
HOROWITZ INDEX BLD+IHG-RTO: 40 %
IMM GRANULOCYTES # BLD AUTO: 0.09 10*3/MM3 (ref 0–0.05)
IMM GRANULOCYTES NFR BLD AUTO: 0.6 % (ref 0–0.5)
LYMPHOCYTES # BLD AUTO: 0.98 10*3/MM3 (ref 0.7–3.1)
LYMPHOCYTES NFR BLD AUTO: 6.8 % (ref 19.6–45.3)
MCH RBC QN AUTO: 32.9 PG (ref 26.6–33)
MCHC RBC AUTO-ENTMCNC: 31.6 G/DL (ref 31.5–35.7)
MCV RBC AUTO: 104.1 FL (ref 79–97)
METHGB BLD QL: 0.3 % (ref 0–3)
MODALITY: ABNORMAL
MONOCYTES # BLD AUTO: 0.99 10*3/MM3 (ref 0.1–0.9)
MONOCYTES NFR BLD AUTO: 6.8 % (ref 5–12)
NEUTROPHILS # BLD AUTO: 12.12 10*3/MM3 (ref 1.4–7)
NEUTROPHILS NFR BLD AUTO: 83.6 % (ref 42.7–76)
OXYHGB MFR BLDV: 96.3 % (ref 85–100)
PCO2 BLDA: 36.2 MM HG (ref 35–45)
PEEP RESPIRATORY: 8 CM[H2O]
PH BLDA: 7.42 PH UNITS (ref 7.35–7.45)
PLATELET # BLD AUTO: 239 10*3/MM3 (ref 140–450)
PMV BLD AUTO: 10.6 FL (ref 6–12)
PO2 BLDA: 101.7 MM HG (ref 80–100)
POTASSIUM BLD-SCNC: 3.9 MMOL/L (ref 3.5–5.2)
PROT SERPL-MCNC: 6 G/DL (ref 6–8.5)
RBC # BLD AUTO: 2.92 10*6/MM3 (ref 3.77–5.28)
SAO2 % BLDCOA: 97.4 % (ref 90–100)
SET MECH RESP RATE: 16
SODIUM BLD-SCNC: 145 MMOL/L (ref 136–145)
VENTILATOR MODE: ABNORMAL
VT ON VENT VENT: 430 ML
WBC NRBC COR # BLD: 14.5 10*3/MM3 (ref 3.4–10.8)

## 2019-03-14 PROCEDURE — 25010000002 ENOXAPARIN PER 10 MG: Performed by: INTERNAL MEDICINE

## 2019-03-14 PROCEDURE — 83050 HGB METHEMOGLOBIN QUAN: CPT | Performed by: PHYSICIAN ASSISTANT

## 2019-03-14 PROCEDURE — 71045 X-RAY EXAM CHEST 1 VIEW: CPT | Performed by: RADIOLOGY

## 2019-03-14 PROCEDURE — 82962 GLUCOSE BLOOD TEST: CPT

## 2019-03-14 PROCEDURE — 87070 CULTURE OTHR SPECIMN AEROBIC: CPT | Performed by: PHYSICIAN ASSISTANT

## 2019-03-14 PROCEDURE — 82805 BLOOD GASES W/O2 SATURATION: CPT | Performed by: PHYSICIAN ASSISTANT

## 2019-03-14 PROCEDURE — 85025 COMPLETE CBC W/AUTO DIFF WBC: CPT | Performed by: PHYSICIAN ASSISTANT

## 2019-03-14 PROCEDURE — 36600 WITHDRAWAL OF ARTERIAL BLOOD: CPT | Performed by: PHYSICIAN ASSISTANT

## 2019-03-14 PROCEDURE — 99232 SBSQ HOSP IP/OBS MODERATE 35: CPT | Performed by: INTERNAL MEDICINE

## 2019-03-14 PROCEDURE — 94799 UNLISTED PULMONARY SVC/PX: CPT

## 2019-03-14 PROCEDURE — 80053 COMPREHEN METABOLIC PANEL: CPT | Performed by: PHYSICIAN ASSISTANT

## 2019-03-14 PROCEDURE — 25010000002 PROPOFOL 1000 MG/ML EMULSION: Performed by: HOSPITALIST

## 2019-03-14 PROCEDURE — 25010000002 AMIODARONE IN DEXTROSE 5% 360-4.14 MG/200ML-% SOLUTION: Performed by: INTERNAL MEDICINE

## 2019-03-14 PROCEDURE — 71045 X-RAY EXAM CHEST 1 VIEW: CPT

## 2019-03-14 PROCEDURE — 99233 SBSQ HOSP IP/OBS HIGH 50: CPT | Performed by: INTERNAL MEDICINE

## 2019-03-14 PROCEDURE — 82375 ASSAY CARBOXYHB QUANT: CPT | Performed by: PHYSICIAN ASSISTANT

## 2019-03-14 PROCEDURE — 63710000001 INSULIN ASPART PER 5 UNITS: Performed by: NURSE PRACTITIONER

## 2019-03-14 PROCEDURE — 25010000002 HYDROCORTISONE SODIUM SUCCINATE 100 MG RECONSTITUTED SOLUTION: Performed by: PHYSICIAN ASSISTANT

## 2019-03-14 PROCEDURE — 87205 SMEAR GRAM STAIN: CPT | Performed by: PHYSICIAN ASSISTANT

## 2019-03-14 PROCEDURE — 99291 CRITICAL CARE FIRST HOUR: CPT | Performed by: INTERNAL MEDICINE

## 2019-03-14 PROCEDURE — 94003 VENT MGMT INPAT SUBQ DAY: CPT

## 2019-03-14 RX ORDER — BUMETANIDE 0.25 MG/ML
0.5 INJECTION INTRAMUSCULAR; INTRAVENOUS ONCE
Status: COMPLETED | OUTPATIENT
Start: 2019-03-14 | End: 2019-03-14

## 2019-03-14 RX ORDER — LANSOPRAZOLE
30 KIT DAILY
Status: DISCONTINUED | OUTPATIENT
Start: 2019-03-15 | End: 2019-04-06 | Stop reason: ALTCHOICE

## 2019-03-14 RX ORDER — MIDODRINE HYDROCHLORIDE 2.5 MG/1
10 TABLET ORAL
Status: DISCONTINUED | OUTPATIENT
Start: 2019-03-14 | End: 2019-03-18

## 2019-03-14 RX ADMIN — Medication: at 15:09

## 2019-03-14 RX ADMIN — SODIUM CHLORIDE, PRESERVATIVE FREE 3 ML: 5 INJECTION INTRAVENOUS at 20:29

## 2019-03-14 RX ADMIN — IPRATROPIUM BROMIDE 0.5 MG: 0.5 SOLUTION RESPIRATORY (INHALATION) at 06:48

## 2019-03-14 RX ADMIN — HYDROCORTISONE SODIUM SUCCINATE 50 MG: 100 INJECTION, POWDER, FOR SOLUTION INTRAMUSCULAR; INTRAVENOUS at 13:38

## 2019-03-14 RX ADMIN — INSULIN ASPART 2 UNITS: 100 INJECTION, SOLUTION INTRAVENOUS; SUBCUTANEOUS at 01:52

## 2019-03-14 RX ADMIN — CARBIDOPA AND LEVODOPA 10 MG: 50; 200 TABLET, EXTENDED RELEASE ORAL at 12:25

## 2019-03-14 RX ADMIN — HYDROCORTISONE SODIUM SUCCINATE 50 MG: 100 INJECTION, POWDER, FOR SOLUTION INTRAMUSCULAR; INTRAVENOUS at 08:30

## 2019-03-14 RX ADMIN — PROPOFOL 50 MCG/KG/MIN: 10 INJECTION, EMULSION INTRAVENOUS at 08:05

## 2019-03-14 RX ADMIN — AMIODARONE HYDROCHLORIDE 0.5 MG/MIN: 1.8 INJECTION, SOLUTION INTRAVENOUS at 08:05

## 2019-03-14 RX ADMIN — CHLORHEXIDINE GLUCONATE 15 ML: 1.2 RINSE ORAL at 08:31

## 2019-03-14 RX ADMIN — IPRATROPIUM BROMIDE 0.5 MG: 0.5 SOLUTION RESPIRATORY (INHALATION) at 12:39

## 2019-03-14 RX ADMIN — CARBIDOPA AND LEVODOPA 10 MG: 50; 200 TABLET, EXTENDED RELEASE ORAL at 17:07

## 2019-03-14 RX ADMIN — SODIUM CHLORIDE, PRESERVATIVE FREE 3 ML: 5 INJECTION INTRAVENOUS at 08:31

## 2019-03-14 RX ADMIN — HYDROCORTISONE SODIUM SUCCINATE 50 MG: 100 INJECTION, POWDER, FOR SOLUTION INTRAMUSCULAR; INTRAVENOUS at 20:26

## 2019-03-14 RX ADMIN — SODIUM CHLORIDE, PRESERVATIVE FREE 3 ML: 5 INJECTION INTRAVENOUS at 20:28

## 2019-03-14 RX ADMIN — Medication 1 CAPSULE: at 08:30

## 2019-03-14 RX ADMIN — AMIODARONE HYDROCHLORIDE 0.5 MG/MIN: 1.8 INJECTION, SOLUTION INTRAVENOUS at 20:27

## 2019-03-14 RX ADMIN — PANTOPRAZOLE SODIUM 40 MG: 40 INJECTION, POWDER, FOR SOLUTION INTRAVENOUS at 08:30

## 2019-03-14 RX ADMIN — Medication: at 04:53

## 2019-03-14 RX ADMIN — BUDESONIDE 0.5 MG: 0.5 INHALANT RESPIRATORY (INHALATION) at 06:56

## 2019-03-14 RX ADMIN — DEXMEDETOMIDINE HYDROCHLORIDE 1.5 MCG/KG/HR: 100 INJECTION, SOLUTION INTRAVENOUS at 21:03

## 2019-03-14 RX ADMIN — LEVOTHYROXINE SODIUM 50 MCG: 50 TABLET ORAL at 06:33

## 2019-03-14 RX ADMIN — Medication 100 MG: at 08:30

## 2019-03-14 RX ADMIN — HYDROCORTISONE SODIUM SUCCINATE 50 MG: 100 INJECTION, POWDER, FOR SOLUTION INTRAMUSCULAR; INTRAVENOUS at 01:57

## 2019-03-14 RX ADMIN — BUDESONIDE 0.5 MG: 0.5 INHALANT RESPIRATORY (INHALATION) at 18:45

## 2019-03-14 RX ADMIN — CHLORHEXIDINE GLUCONATE 15 ML: 1.2 RINSE ORAL at 20:26

## 2019-03-14 RX ADMIN — IPRATROPIUM BROMIDE 0.5 MG: 0.5 SOLUTION RESPIRATORY (INHALATION) at 00:53

## 2019-03-14 RX ADMIN — Medication: at 20:26

## 2019-03-14 RX ADMIN — ENOXAPARIN SODIUM 40 MG: 80 INJECTION SUBCUTANEOUS at 13:38

## 2019-03-14 RX ADMIN — INSULIN ASPART 2 UNITS: 100 INJECTION, SOLUTION INTRAVENOUS; SUBCUTANEOUS at 12:48

## 2019-03-14 RX ADMIN — BUMETANIDE 0.5 MG: 0.25 INJECTION INTRAMUSCULAR; INTRAVENOUS at 12:08

## 2019-03-14 RX ADMIN — DEXMEDETOMIDINE HYDROCHLORIDE 1.5 MCG/KG/HR: 100 INJECTION, SOLUTION INTRAVENOUS at 04:55

## 2019-03-14 RX ADMIN — FOLIC ACID 1 MG: 1 TABLET ORAL at 08:30

## 2019-03-14 RX ADMIN — PROPOFOL 50 MCG/KG/MIN: 10 INJECTION, EMULSION INTRAVENOUS at 02:52

## 2019-03-14 RX ADMIN — DEXMEDETOMIDINE HYDROCHLORIDE 1.5 MCG/KG/HR: 100 INJECTION, SOLUTION INTRAVENOUS at 14:30

## 2019-03-14 RX ADMIN — NOREPINEPHRINE BITARTRATE 0.08 MCG/KG/MIN: 1 INJECTION INTRAVENOUS at 09:52

## 2019-03-14 RX ADMIN — PROPOFOL 50 MCG/KG/MIN: 10 INJECTION, EMULSION INTRAVENOUS at 17:47

## 2019-03-14 RX ADMIN — IPRATROPIUM BROMIDE 0.5 MG: 0.5 SOLUTION RESPIRATORY (INHALATION) at 18:45

## 2019-03-14 RX ADMIN — ASPIRIN 325 MG: 325 TABLET ORAL at 08:30

## 2019-03-14 RX ADMIN — DEXMEDETOMIDINE HYDROCHLORIDE 1.5 MCG/KG/HR: 100 INJECTION, SOLUTION INTRAVENOUS at 10:24

## 2019-03-14 RX ADMIN — Medication: at 09:52

## 2019-03-14 RX ADMIN — PROPOFOL 50 MCG/KG/MIN: 10 INJECTION, EMULSION INTRAVENOUS at 12:48

## 2019-03-15 ENCOUNTER — APPOINTMENT (OUTPATIENT)
Dept: GENERAL RADIOLOGY | Facility: HOSPITAL | Age: 54
End: 2019-03-15

## 2019-03-15 LAB
A-A DO2: 144.9 MMHG (ref 0–300)
ALBUMIN SERPL-MCNC: 2.92 G/DL (ref 3.5–5.2)
ALBUMIN/GLOB SERPL: 1 G/DL
ALP SERPL-CCNC: 56 U/L (ref 39–117)
ALT SERPL W P-5'-P-CCNC: 17 U/L (ref 1–33)
ANION GAP SERPL CALCULATED.3IONS-SCNC: 8.3 MMOL/L
ARTERIAL PATENCY WRIST A: POSITIVE
AST SERPL-CCNC: 17 U/L (ref 1–32)
ATMOSPHERIC PRESS: 727 MMHG
BASE EXCESS BLDA CALC-SCNC: -1.1 MMOL/L
BASOPHILS # BLD AUTO: 0.01 10*3/MM3 (ref 0–0.2)
BASOPHILS NFR BLD AUTO: 0.1 % (ref 0–1.5)
BDY SITE: ABNORMAL
BILIRUB SERPL-MCNC: <0.2 MG/DL (ref 0.1–1.2)
BODY TEMPERATURE: 98.6 C
BUN BLD-MCNC: 17 MG/DL (ref 6–20)
BUN/CREAT SERPL: 40.5 (ref 7–25)
CALCIUM SPEC-SCNC: 8.6 MG/DL (ref 8.6–10.5)
CHLORIDE SERPL-SCNC: 106 MMOL/L (ref 98–107)
CO2 SERPL-SCNC: 27.7 MMOL/L (ref 22–29)
COHGB MFR BLD: 0.8 % (ref 0–5)
CREAT BLD-MCNC: 0.42 MG/DL (ref 0.57–1)
DEPRECATED RDW RBC AUTO: 48.5 FL (ref 37–54)
EOSINOPHIL # BLD AUTO: 0.11 10*3/MM3 (ref 0–0.4)
EOSINOPHIL NFR BLD AUTO: 1 % (ref 0.3–6.2)
ERYTHROCYTE [DISTWIDTH] IN BLOOD BY AUTOMATED COUNT: 13.2 % (ref 12.3–15.4)
GFR SERPL CREATININE-BSD FRML MDRD: >150 ML/MIN/1.73
GLOBULIN UR ELPH-MCNC: 3 GM/DL
GLUCOSE BLD-MCNC: 152 MG/DL (ref 65–99)
GLUCOSE BLDC GLUCOMTR-MCNC: 138 MG/DL (ref 70–130)
GLUCOSE BLDC GLUCOMTR-MCNC: 147 MG/DL (ref 70–130)
GLUCOSE BLDC GLUCOMTR-MCNC: 177 MG/DL (ref 70–130)
GLUCOSE BLDC GLUCOMTR-MCNC: 178 MG/DL (ref 70–130)
HCO3 BLDA-SCNC: 22.8 MMOL/L (ref 22–26)
HCT VFR BLD AUTO: 29.3 % (ref 34–46.6)
HCT VFR BLD CALC: 29 % (ref 37–47)
HGB BLD-MCNC: 9.1 G/DL (ref 12–15.9)
HGB BLDA-MCNC: 9.7 G/DL (ref 12–16)
HOROWITZ INDEX BLD+IHG-RTO: 40 %
IMM GRANULOCYTES # BLD AUTO: 0.06 10*3/MM3 (ref 0–0.05)
IMM GRANULOCYTES NFR BLD AUTO: 0.5 % (ref 0–0.5)
LYMPHOCYTES # BLD AUTO: 0.89 10*3/MM3 (ref 0.7–3.1)
LYMPHOCYTES NFR BLD AUTO: 7.7 % (ref 19.6–45.3)
MCH RBC QN AUTO: 32.4 PG (ref 26.6–33)
MCHC RBC AUTO-ENTMCNC: 31.1 G/DL (ref 31.5–35.7)
MCV RBC AUTO: 104.3 FL (ref 79–97)
METHGB BLD QL: 0.3 % (ref 0–3)
MODALITY: ABNORMAL
MONOCYTES # BLD AUTO: 0.87 10*3/MM3 (ref 0.1–0.9)
MONOCYTES NFR BLD AUTO: 7.5 % (ref 5–12)
NEUTROPHILS # BLD AUTO: 9.62 10*3/MM3 (ref 1.4–7)
NEUTROPHILS NFR BLD AUTO: 83.2 % (ref 42.7–76)
OXYHGB MFR BLDV: 95 % (ref 85–100)
PCO2 BLDA: 34.7 MM HG (ref 35–45)
PEEP RESPIRATORY: 8 CM[H2O]
PH BLDA: 7.43 PH UNITS (ref 7.35–7.45)
PLATELET # BLD AUTO: 178 10*3/MM3 (ref 140–450)
PMV BLD AUTO: 10.6 FL (ref 6–12)
PO2 BLDA: 87.2 MM HG (ref 80–100)
POTASSIUM BLD-SCNC: 4 MMOL/L (ref 3.5–5.2)
PROT SERPL-MCNC: 5.9 G/DL (ref 6–8.5)
RBC # BLD AUTO: 2.81 10*6/MM3 (ref 3.77–5.28)
SAO2 % BLDCOA: 96.1 % (ref 90–100)
SET MECH RESP RATE: 16
SODIUM BLD-SCNC: 142 MMOL/L (ref 136–145)
VENTILATOR MODE: ABNORMAL
VT ON VENT VENT: 430 ML
WBC NRBC COR # BLD: 11.56 10*3/MM3 (ref 3.4–10.8)

## 2019-03-15 PROCEDURE — 82962 GLUCOSE BLOOD TEST: CPT

## 2019-03-15 PROCEDURE — 71045 X-RAY EXAM CHEST 1 VIEW: CPT | Performed by: RADIOLOGY

## 2019-03-15 PROCEDURE — 63710000001 INSULIN ASPART PER 5 UNITS: Performed by: NURSE PRACTITIONER

## 2019-03-15 PROCEDURE — 83050 HGB METHEMOGLOBIN QUAN: CPT | Performed by: PHYSICIAN ASSISTANT

## 2019-03-15 PROCEDURE — 93005 ELECTROCARDIOGRAM TRACING: CPT | Performed by: INTERNAL MEDICINE

## 2019-03-15 PROCEDURE — 82805 BLOOD GASES W/O2 SATURATION: CPT | Performed by: PHYSICIAN ASSISTANT

## 2019-03-15 PROCEDURE — 94799 UNLISTED PULMONARY SVC/PX: CPT

## 2019-03-15 PROCEDURE — 99233 SBSQ HOSP IP/OBS HIGH 50: CPT | Performed by: INTERNAL MEDICINE

## 2019-03-15 PROCEDURE — 25010000002 AMIODARONE IN DEXTROSE 5% 360-4.14 MG/200ML-% SOLUTION: Performed by: INTERNAL MEDICINE

## 2019-03-15 PROCEDURE — 99291 CRITICAL CARE FIRST HOUR: CPT | Performed by: INTERNAL MEDICINE

## 2019-03-15 PROCEDURE — 82375 ASSAY CARBOXYHB QUANT: CPT | Performed by: PHYSICIAN ASSISTANT

## 2019-03-15 PROCEDURE — 94003 VENT MGMT INPAT SUBQ DAY: CPT

## 2019-03-15 PROCEDURE — 25010000002 HYDROCORTISONE SODIUM SUCCINATE 100 MG RECONSTITUTED SOLUTION: Performed by: PHYSICIAN ASSISTANT

## 2019-03-15 PROCEDURE — 71045 X-RAY EXAM CHEST 1 VIEW: CPT

## 2019-03-15 PROCEDURE — 36600 WITHDRAWAL OF ARTERIAL BLOOD: CPT | Performed by: PHYSICIAN ASSISTANT

## 2019-03-15 PROCEDURE — 85025 COMPLETE CBC W/AUTO DIFF WBC: CPT | Performed by: PHYSICIAN ASSISTANT

## 2019-03-15 PROCEDURE — 80053 COMPREHEN METABOLIC PANEL: CPT | Performed by: PHYSICIAN ASSISTANT

## 2019-03-15 PROCEDURE — 99232 SBSQ HOSP IP/OBS MODERATE 35: CPT | Performed by: INTERNAL MEDICINE

## 2019-03-15 PROCEDURE — 25010000002 ENOXAPARIN PER 10 MG: Performed by: INTERNAL MEDICINE

## 2019-03-15 PROCEDURE — 25010000002 PROPOFOL 1000 MG/ML EMULSION: Performed by: HOSPITALIST

## 2019-03-15 PROCEDURE — 93010 ELECTROCARDIOGRAM REPORT: CPT | Performed by: INTERNAL MEDICINE

## 2019-03-15 RX ORDER — BUMETANIDE 0.25 MG/ML
0.5 INJECTION INTRAMUSCULAR; INTRAVENOUS ONCE
Status: DISCONTINUED | OUTPATIENT
Start: 2019-03-15 | End: 2019-03-15

## 2019-03-15 RX ORDER — BUMETANIDE 0.25 MG/ML
0.5 INJECTION INTRAMUSCULAR; INTRAVENOUS EVERY 12 HOURS
Status: DISCONTINUED | OUTPATIENT
Start: 2019-03-15 | End: 2019-03-15

## 2019-03-15 RX ADMIN — AMIODARONE HYDROCHLORIDE 0.5 MG/MIN: 1.8 INJECTION, SOLUTION INTRAVENOUS at 20:28

## 2019-03-15 RX ADMIN — LEVOTHYROXINE SODIUM 50 MCG: 50 TABLET ORAL at 06:05

## 2019-03-15 RX ADMIN — DEXMEDETOMIDINE HYDROCHLORIDE 1.5 MCG/KG/HR: 100 INJECTION, SOLUTION INTRAVENOUS at 03:00

## 2019-03-15 RX ADMIN — CARBIDOPA AND LEVODOPA 10 MG: 50; 200 TABLET, EXTENDED RELEASE ORAL at 11:31

## 2019-03-15 RX ADMIN — PROPOFOL 50 MCG/KG/MIN: 10 INJECTION, EMULSION INTRAVENOUS at 23:51

## 2019-03-15 RX ADMIN — Medication 100 MG: at 09:03

## 2019-03-15 RX ADMIN — IPRATROPIUM BROMIDE 0.5 MG: 0.5 SOLUTION RESPIRATORY (INHALATION) at 06:25

## 2019-03-15 RX ADMIN — DEXMEDETOMIDINE HYDROCHLORIDE 1.5 MCG/KG/HR: 100 INJECTION, SOLUTION INTRAVENOUS at 22:42

## 2019-03-15 RX ADMIN — INSULIN ASPART 2 UNITS: 100 INJECTION, SOLUTION INTRAVENOUS; SUBCUTANEOUS at 01:00

## 2019-03-15 RX ADMIN — ASPIRIN 325 MG: 325 TABLET ORAL at 09:03

## 2019-03-15 RX ADMIN — BUDESONIDE 0.5 MG: 0.5 INHALANT RESPIRATORY (INHALATION) at 18:30

## 2019-03-15 RX ADMIN — CHLORHEXIDINE GLUCONATE 15 ML: 1.2 RINSE ORAL at 20:28

## 2019-03-15 RX ADMIN — INSULIN ASPART 2 UNITS: 100 INJECTION, SOLUTION INTRAVENOUS; SUBCUTANEOUS at 06:14

## 2019-03-15 RX ADMIN — ENOXAPARIN SODIUM 40 MG: 80 INJECTION SUBCUTANEOUS at 16:07

## 2019-03-15 RX ADMIN — SODIUM CHLORIDE, PRESERVATIVE FREE 3 ML: 5 INJECTION INTRAVENOUS at 20:28

## 2019-03-15 RX ADMIN — Medication: at 21:28

## 2019-03-15 RX ADMIN — DEXMEDETOMIDINE HYDROCHLORIDE 1.5 MCG/KG/HR: 100 INJECTION, SOLUTION INTRAVENOUS at 17:54

## 2019-03-15 RX ADMIN — FOLIC ACID 1 MG: 1 TABLET ORAL at 09:03

## 2019-03-15 RX ADMIN — LANSOPRAZOLE 30 MG: KIT at 11:31

## 2019-03-15 RX ADMIN — CHLORHEXIDINE GLUCONATE 15 ML: 1.2 RINSE ORAL at 09:04

## 2019-03-15 RX ADMIN — HYDROCORTISONE SODIUM SUCCINATE 50 MG: 100 INJECTION, POWDER, FOR SOLUTION INTRAMUSCULAR; INTRAVENOUS at 16:06

## 2019-03-15 RX ADMIN — CARBIDOPA AND LEVODOPA 10 MG: 50; 200 TABLET, EXTENDED RELEASE ORAL at 06:30

## 2019-03-15 RX ADMIN — SODIUM CHLORIDE, PRESERVATIVE FREE 3 ML: 5 INJECTION INTRAVENOUS at 09:04

## 2019-03-15 RX ADMIN — DEXMEDETOMIDINE HYDROCHLORIDE 1.5 MCG/KG/HR: 100 INJECTION, SOLUTION INTRAVENOUS at 12:35

## 2019-03-15 RX ADMIN — HYDROCORTISONE SODIUM SUCCINATE 50 MG: 100 INJECTION, POWDER, FOR SOLUTION INTRAMUSCULAR; INTRAVENOUS at 09:03

## 2019-03-15 RX ADMIN — BUMETANIDE 3 MG: 0.25 INJECTION, SOLUTION INTRAMUSCULAR; INTRAVENOUS at 11:32

## 2019-03-15 RX ADMIN — IPRATROPIUM BROMIDE 0.5 MG: 0.5 SOLUTION RESPIRATORY (INHALATION) at 00:55

## 2019-03-15 RX ADMIN — PROPOFOL 50 MCG/KG/MIN: 10 INJECTION, EMULSION INTRAVENOUS at 17:48

## 2019-03-15 RX ADMIN — IPRATROPIUM BROMIDE 0.5 MG: 0.5 SOLUTION RESPIRATORY (INHALATION) at 18:29

## 2019-03-15 RX ADMIN — Medication: at 11:32

## 2019-03-15 RX ADMIN — Medication 1 CAPSULE: at 09:03

## 2019-03-15 RX ADMIN — PROPOFOL 50 MCG/KG/MIN: 10 INJECTION, EMULSION INTRAVENOUS at 12:58

## 2019-03-15 RX ADMIN — CARBIDOPA AND LEVODOPA 10 MG: 50; 200 TABLET, EXTENDED RELEASE ORAL at 17:48

## 2019-03-15 RX ADMIN — PROPOFOL 50 MCG/KG/MIN: 10 INJECTION, EMULSION INTRAVENOUS at 00:49

## 2019-03-15 RX ADMIN — Medication: at 06:29

## 2019-03-15 RX ADMIN — HYDROCORTISONE SODIUM SUCCINATE 50 MG: 100 INJECTION, POWDER, FOR SOLUTION INTRAMUSCULAR; INTRAVENOUS at 01:00

## 2019-03-15 RX ADMIN — LANSOPRAZOLE 30 MG: KIT at 11:14

## 2019-03-15 RX ADMIN — SODIUM CHLORIDE, PRESERVATIVE FREE 3 ML: 5 INJECTION INTRAVENOUS at 20:29

## 2019-03-15 RX ADMIN — DEXMEDETOMIDINE HYDROCHLORIDE 1.5 MCG/KG/HR: 100 INJECTION, SOLUTION INTRAVENOUS at 08:14

## 2019-03-15 RX ADMIN — IPRATROPIUM BROMIDE 0.5 MG: 0.5 SOLUTION RESPIRATORY (INHALATION) at 12:32

## 2019-03-15 RX ADMIN — Medication: at 16:28

## 2019-03-15 RX ADMIN — HYDROCORTISONE SODIUM SUCCINATE 50 MG: 100 INJECTION, POWDER, FOR SOLUTION INTRAMUSCULAR; INTRAVENOUS at 20:29

## 2019-03-15 RX ADMIN — PROPOFOL 50 MCG/KG/MIN: 10 INJECTION, EMULSION INTRAVENOUS at 06:05

## 2019-03-15 RX ADMIN — BUDESONIDE 0.5 MG: 0.5 INHALANT RESPIRATORY (INHALATION) at 06:26

## 2019-03-15 RX ADMIN — AMIODARONE HYDROCHLORIDE 0.5 MG/MIN: 1.8 INJECTION, SOLUTION INTRAVENOUS at 08:58

## 2019-03-15 RX ADMIN — Medication: at 01:33

## 2019-03-16 LAB
ALBUMIN SERPL-MCNC: 3.02 G/DL (ref 3.5–5.2)
ALBUMIN/GLOB SERPL: 1.1 G/DL
ALP SERPL-CCNC: 53 U/L (ref 39–117)
ALT SERPL W P-5'-P-CCNC: 21 U/L (ref 1–33)
ANION GAP SERPL CALCULATED.3IONS-SCNC: 11 MMOL/L
AST SERPL-CCNC: 23 U/L (ref 1–32)
BACTERIA SPEC RESP CULT: NORMAL
BASOPHILS # BLD AUTO: 0 10*3/MM3 (ref 0–0.2)
BASOPHILS NFR BLD AUTO: 0 % (ref 0–1.5)
BILIRUB SERPL-MCNC: <0.2 MG/DL (ref 0.1–1.2)
BUN BLD-MCNC: 18 MG/DL (ref 6–20)
BUN/CREAT SERPL: 47.4 (ref 7–25)
CALCIUM SPEC-SCNC: 8.7 MG/DL (ref 8.6–10.5)
CHLORIDE SERPL-SCNC: 97 MMOL/L (ref 98–107)
CO2 SERPL-SCNC: 34 MMOL/L (ref 22–29)
CREAT BLD-MCNC: 0.38 MG/DL (ref 0.57–1)
DEPRECATED RDW RBC AUTO: 49.5 FL (ref 37–54)
EOSINOPHIL # BLD AUTO: 0.03 10*3/MM3 (ref 0–0.4)
EOSINOPHIL NFR BLD AUTO: 0.4 % (ref 0.3–6.2)
ERYTHROCYTE [DISTWIDTH] IN BLOOD BY AUTOMATED COUNT: 13.3 % (ref 12.3–15.4)
GFR SERPL CREATININE-BSD FRML MDRD: >150 ML/MIN/1.73
GLOBULIN UR ELPH-MCNC: 2.7 GM/DL
GLUCOSE BLD-MCNC: 155 MG/DL (ref 65–99)
GLUCOSE BLDC GLUCOMTR-MCNC: 142 MG/DL (ref 70–130)
GLUCOSE BLDC GLUCOMTR-MCNC: 167 MG/DL (ref 70–130)
GLUCOSE BLDC GLUCOMTR-MCNC: 174 MG/DL (ref 70–130)
GRAM STN SPEC: NORMAL
HCT VFR BLD AUTO: 29.6 % (ref 34–46.6)
HGB BLD-MCNC: 9.5 G/DL (ref 12–15.9)
IMM GRANULOCYTES # BLD AUTO: 0.05 10*3/MM3 (ref 0–0.05)
IMM GRANULOCYTES NFR BLD AUTO: 0.7 % (ref 0–0.5)
LYMPHOCYTES # BLD AUTO: 0.84 10*3/MM3 (ref 0.7–3.1)
LYMPHOCYTES NFR BLD AUTO: 11.9 % (ref 19.6–45.3)
MCH RBC QN AUTO: 32.9 PG (ref 26.6–33)
MCHC RBC AUTO-ENTMCNC: 32.1 G/DL (ref 31.5–35.7)
MCV RBC AUTO: 102.4 FL (ref 79–97)
MONOCYTES # BLD AUTO: 0.81 10*3/MM3 (ref 0.1–0.9)
MONOCYTES NFR BLD AUTO: 11.5 % (ref 5–12)
NEUTROPHILS # BLD AUTO: 5.32 10*3/MM3 (ref 1.4–7)
NEUTROPHILS NFR BLD AUTO: 75.5 % (ref 42.7–76)
PLATELET # BLD AUTO: 151 10*3/MM3 (ref 140–450)
PMV BLD AUTO: 11.3 FL (ref 6–12)
POTASSIUM BLD-SCNC: 3 MMOL/L (ref 3.5–5.2)
POTASSIUM BLD-SCNC: 3.5 MMOL/L (ref 3.5–5.2)
PROT SERPL-MCNC: 5.7 G/DL (ref 6–8.5)
RBC # BLD AUTO: 2.89 10*6/MM3 (ref 3.77–5.28)
SODIUM BLD-SCNC: 142 MMOL/L (ref 136–145)
TSH SERPL DL<=0.05 MIU/L-ACNC: 2.19 MIU/ML (ref 0.27–4.2)
WBC NRBC COR # BLD: 7.05 10*3/MM3 (ref 3.4–10.8)

## 2019-03-16 PROCEDURE — 94799 UNLISTED PULMONARY SVC/PX: CPT

## 2019-03-16 PROCEDURE — 25010000003 POTASSIUM CHLORIDE 10 MEQ/100ML SOLUTION: Performed by: NURSE PRACTITIONER

## 2019-03-16 PROCEDURE — 80053 COMPREHEN METABOLIC PANEL: CPT | Performed by: PHYSICIAN ASSISTANT

## 2019-03-16 PROCEDURE — 85025 COMPLETE CBC W/AUTO DIFF WBC: CPT | Performed by: PHYSICIAN ASSISTANT

## 2019-03-16 PROCEDURE — 25010000002 ENOXAPARIN PER 10 MG: Performed by: INTERNAL MEDICINE

## 2019-03-16 PROCEDURE — 94003 VENT MGMT INPAT SUBQ DAY: CPT

## 2019-03-16 PROCEDURE — 84443 ASSAY THYROID STIM HORMONE: CPT | Performed by: INTERNAL MEDICINE

## 2019-03-16 PROCEDURE — 25010000002 AMIODARONE IN DEXTROSE 5% 360-4.14 MG/200ML-% SOLUTION: Performed by: INTERNAL MEDICINE

## 2019-03-16 PROCEDURE — 25010000002 PROPOFOL 1000 MG/ML EMULSION: Performed by: HOSPITALIST

## 2019-03-16 PROCEDURE — 99233 SBSQ HOSP IP/OBS HIGH 50: CPT | Performed by: INTERNAL MEDICINE

## 2019-03-16 PROCEDURE — 63710000001 INSULIN ASPART PER 5 UNITS: Performed by: NURSE PRACTITIONER

## 2019-03-16 PROCEDURE — 93005 ELECTROCARDIOGRAM TRACING: CPT | Performed by: INTERNAL MEDICINE

## 2019-03-16 PROCEDURE — 99232 SBSQ HOSP IP/OBS MODERATE 35: CPT | Performed by: INTERNAL MEDICINE

## 2019-03-16 PROCEDURE — 84132 ASSAY OF SERUM POTASSIUM: CPT | Performed by: INTERNAL MEDICINE

## 2019-03-16 PROCEDURE — 82962 GLUCOSE BLOOD TEST: CPT

## 2019-03-16 PROCEDURE — 25010000002 HYDROCORTISONE SODIUM SUCCINATE 100 MG RECONSTITUTED SOLUTION: Performed by: PHYSICIAN ASSISTANT

## 2019-03-16 RX ORDER — POTASSIUM CHLORIDE 7.45 MG/ML
10 INJECTION INTRAVENOUS
Status: DISPENSED | OUTPATIENT
Start: 2019-03-16 | End: 2019-03-16

## 2019-03-16 RX ADMIN — FOLIC ACID 1 MG: 1 TABLET ORAL at 09:27

## 2019-03-16 RX ADMIN — CARBIDOPA AND LEVODOPA 10 MG: 50; 200 TABLET, EXTENDED RELEASE ORAL at 17:36

## 2019-03-16 RX ADMIN — IPRATROPIUM BROMIDE 0.5 MG: 0.5 SOLUTION RESPIRATORY (INHALATION) at 12:52

## 2019-03-16 RX ADMIN — IPRATROPIUM BROMIDE 0.5 MG: 0.5 SOLUTION RESPIRATORY (INHALATION) at 00:12

## 2019-03-16 RX ADMIN — PROPOFOL 50 MCG/KG/MIN: 10 INJECTION, EMULSION INTRAVENOUS at 12:48

## 2019-03-16 RX ADMIN — DEXMEDETOMIDINE HYDROCHLORIDE 1.5 MCG/KG/HR: 100 INJECTION, SOLUTION INTRAVENOUS at 03:52

## 2019-03-16 RX ADMIN — INSULIN ASPART 2 UNITS: 100 INJECTION, SOLUTION INTRAVENOUS; SUBCUTANEOUS at 12:44

## 2019-03-16 RX ADMIN — LANSOPRAZOLE 30 MG: KIT at 09:27

## 2019-03-16 RX ADMIN — Medication: at 17:44

## 2019-03-16 RX ADMIN — Medication: at 07:49

## 2019-03-16 RX ADMIN — HYDROCORTISONE SODIUM SUCCINATE 50 MG: 100 INJECTION, POWDER, FOR SOLUTION INTRAMUSCULAR; INTRAVENOUS at 01:57

## 2019-03-16 RX ADMIN — AMIODARONE HYDROCHLORIDE 0.5 MG/MIN: 1.8 INJECTION, SOLUTION INTRAVENOUS at 18:46

## 2019-03-16 RX ADMIN — PROPOFOL 50 MCG/KG/MIN: 10 INJECTION, EMULSION INTRAVENOUS at 06:44

## 2019-03-16 RX ADMIN — Medication 100 MG: at 09:27

## 2019-03-16 RX ADMIN — HYDROCORTISONE SODIUM SUCCINATE 50 MG: 100 INJECTION, POWDER, FOR SOLUTION INTRAMUSCULAR; INTRAVENOUS at 09:27

## 2019-03-16 RX ADMIN — HYDROCORTISONE SODIUM SUCCINATE 50 MG: 100 INJECTION, POWDER, FOR SOLUTION INTRAMUSCULAR; INTRAVENOUS at 20:47

## 2019-03-16 RX ADMIN — AMIODARONE HYDROCHLORIDE 0.5 MG/MIN: 1.8 INJECTION, SOLUTION INTRAVENOUS at 07:26

## 2019-03-16 RX ADMIN — POTASSIUM CHLORIDE 10 MEQ: 10 INJECTION, SOLUTION INTRAVENOUS at 12:38

## 2019-03-16 RX ADMIN — HYDROCORTISONE SODIUM SUCCINATE 50 MG: 100 INJECTION, POWDER, FOR SOLUTION INTRAMUSCULAR; INTRAVENOUS at 14:46

## 2019-03-16 RX ADMIN — IPRATROPIUM BROMIDE 0.5 MG: 0.5 SOLUTION RESPIRATORY (INHALATION) at 20:16

## 2019-03-16 RX ADMIN — DEXMEDETOMIDINE HYDROCHLORIDE 1.5 MCG/KG/HR: 100 INJECTION, SOLUTION INTRAVENOUS at 20:47

## 2019-03-16 RX ADMIN — DEXMEDETOMIDINE HYDROCHLORIDE 1.5 MCG/KG/HR: 100 INJECTION, SOLUTION INTRAVENOUS at 15:06

## 2019-03-16 RX ADMIN — ENOXAPARIN SODIUM 40 MG: 80 INJECTION SUBCUTANEOUS at 14:46

## 2019-03-16 RX ADMIN — BUDESONIDE 0.5 MG: 0.5 INHALANT RESPIRATORY (INHALATION) at 20:18

## 2019-03-16 RX ADMIN — PROPOFOL 50 MCG/KG/MIN: 10 INJECTION, EMULSION INTRAVENOUS at 17:44

## 2019-03-16 RX ADMIN — INSULIN ASPART 2 UNITS: 100 INJECTION, SOLUTION INTRAVENOUS; SUBCUTANEOUS at 00:31

## 2019-03-16 RX ADMIN — POTASSIUM CHLORIDE 10 MEQ: 10 INJECTION, SOLUTION INTRAVENOUS at 09:25

## 2019-03-16 RX ADMIN — Medication: at 12:47

## 2019-03-16 RX ADMIN — CHLORHEXIDINE GLUCONATE 15 ML: 1.2 RINSE ORAL at 09:25

## 2019-03-16 RX ADMIN — DEXMEDETOMIDINE HYDROCHLORIDE 1.5 MCG/KG/HR: 100 INJECTION, SOLUTION INTRAVENOUS at 08:26

## 2019-03-16 RX ADMIN — Medication: at 22:54

## 2019-03-16 RX ADMIN — SODIUM CHLORIDE, PRESERVATIVE FREE 3 ML: 5 INJECTION INTRAVENOUS at 09:27

## 2019-03-16 RX ADMIN — IPRATROPIUM BROMIDE 0.5 MG: 0.5 SOLUTION RESPIRATORY (INHALATION) at 06:45

## 2019-03-16 RX ADMIN — Medication 1 CAPSULE: at 09:27

## 2019-03-16 RX ADMIN — POTASSIUM CHLORIDE 10 MEQ: 10 INJECTION, SOLUTION INTRAVENOUS at 11:26

## 2019-03-16 RX ADMIN — CARBIDOPA AND LEVODOPA 10 MG: 50; 200 TABLET, EXTENDED RELEASE ORAL at 12:40

## 2019-03-16 RX ADMIN — BUDESONIDE 0.5 MG: 0.5 INHALANT RESPIRATORY (INHALATION) at 06:46

## 2019-03-16 RX ADMIN — LEVOTHYROXINE SODIUM 50 MCG: 50 TABLET ORAL at 05:22

## 2019-03-16 RX ADMIN — CHLORHEXIDINE GLUCONATE 15 ML: 1.2 RINSE ORAL at 20:47

## 2019-03-16 RX ADMIN — SODIUM CHLORIDE, PRESERVATIVE FREE 3 ML: 5 INJECTION INTRAVENOUS at 09:26

## 2019-03-16 RX ADMIN — POTASSIUM CHLORIDE 10 MEQ: 10 INJECTION, SOLUTION INTRAVENOUS at 10:28

## 2019-03-16 RX ADMIN — ASPIRIN 325 MG: 325 TABLET ORAL at 09:27

## 2019-03-16 RX ADMIN — INSULIN ASPART 2 UNITS: 100 INJECTION, SOLUTION INTRAVENOUS; SUBCUTANEOUS at 05:23

## 2019-03-16 RX ADMIN — Medication: at 02:31

## 2019-03-16 RX ADMIN — CARBIDOPA AND LEVODOPA 10 MG: 50; 200 TABLET, EXTENDED RELEASE ORAL at 06:37

## 2019-03-16 RX ADMIN — POTASSIUM CHLORIDE 10 MEQ: 10 INJECTION, SOLUTION INTRAVENOUS at 08:26

## 2019-03-17 LAB
ALBUMIN SERPL-MCNC: 2.97 G/DL (ref 3.5–5.2)
ALBUMIN/GLOB SERPL: 1.1 G/DL
ALP SERPL-CCNC: 50 U/L (ref 39–117)
ALT SERPL W P-5'-P-CCNC: 22 U/L (ref 1–33)
ANION GAP SERPL CALCULATED.3IONS-SCNC: 11.2 MMOL/L
AST SERPL-CCNC: 17 U/L (ref 1–32)
BASOPHILS # BLD AUTO: 0.01 10*3/MM3 (ref 0–0.2)
BASOPHILS NFR BLD AUTO: 0.1 % (ref 0–1.5)
BILIRUB SERPL-MCNC: <0.2 MG/DL (ref 0.1–1.2)
BUN BLD-MCNC: 20 MG/DL (ref 6–20)
BUN/CREAT SERPL: 41.7 (ref 7–25)
CALCIUM SPEC-SCNC: 8.8 MG/DL (ref 8.6–10.5)
CHLORIDE SERPL-SCNC: 99 MMOL/L (ref 98–107)
CO2 SERPL-SCNC: 32.8 MMOL/L (ref 22–29)
CREAT BLD-MCNC: 0.48 MG/DL (ref 0.57–1)
DEPRECATED RDW RBC AUTO: 50.4 FL (ref 37–54)
EOSINOPHIL # BLD AUTO: 0.01 10*3/MM3 (ref 0–0.4)
EOSINOPHIL NFR BLD AUTO: 0.1 % (ref 0.3–6.2)
ERYTHROCYTE [DISTWIDTH] IN BLOOD BY AUTOMATED COUNT: 13.5 % (ref 12.3–15.4)
GFR SERPL CREATININE-BSD FRML MDRD: 134 ML/MIN/1.73
GLOBULIN UR ELPH-MCNC: 2.7 GM/DL
GLUCOSE BLD-MCNC: 174 MG/DL (ref 65–99)
GLUCOSE BLDC GLUCOMTR-MCNC: 146 MG/DL (ref 70–130)
GLUCOSE BLDC GLUCOMTR-MCNC: 164 MG/DL (ref 70–130)
GLUCOSE BLDC GLUCOMTR-MCNC: 175 MG/DL (ref 70–130)
GLUCOSE BLDC GLUCOMTR-MCNC: 191 MG/DL (ref 70–130)
HCT VFR BLD AUTO: 29.8 % (ref 34–46.6)
HGB BLD-MCNC: 9.5 G/DL (ref 12–15.9)
IMM GRANULOCYTES # BLD AUTO: 0.06 10*3/MM3 (ref 0–0.05)
IMM GRANULOCYTES NFR BLD AUTO: 0.7 % (ref 0–0.5)
LYMPHOCYTES # BLD AUTO: 1.02 10*3/MM3 (ref 0.7–3.1)
LYMPHOCYTES NFR BLD AUTO: 12.7 % (ref 19.6–45.3)
MCH RBC QN AUTO: 32.8 PG (ref 26.6–33)
MCHC RBC AUTO-ENTMCNC: 31.9 G/DL (ref 31.5–35.7)
MCV RBC AUTO: 102.8 FL (ref 79–97)
MONOCYTES # BLD AUTO: 0.67 10*3/MM3 (ref 0.1–0.9)
MONOCYTES NFR BLD AUTO: 8.3 % (ref 5–12)
NEUTROPHILS # BLD AUTO: 6.27 10*3/MM3 (ref 1.4–7)
NEUTROPHILS NFR BLD AUTO: 78.1 % (ref 42.7–76)
PLATELET # BLD AUTO: 162 10*3/MM3 (ref 140–450)
PMV BLD AUTO: 11.2 FL (ref 6–12)
POTASSIUM BLD-SCNC: 3.7 MMOL/L (ref 3.5–5.2)
PROT SERPL-MCNC: 5.7 G/DL (ref 6–8.5)
RBC # BLD AUTO: 2.9 10*6/MM3 (ref 3.77–5.28)
SODIUM BLD-SCNC: 143 MMOL/L (ref 136–145)
WBC NRBC COR # BLD: 8.04 10*3/MM3 (ref 3.4–10.8)

## 2019-03-17 PROCEDURE — 94799 UNLISTED PULMONARY SVC/PX: CPT

## 2019-03-17 PROCEDURE — 25010000002 AMIODARONE IN DEXTROSE 5% 360-4.14 MG/200ML-% SOLUTION: Performed by: INTERNAL MEDICINE

## 2019-03-17 PROCEDURE — 94003 VENT MGMT INPAT SUBQ DAY: CPT

## 2019-03-17 PROCEDURE — 25010000002 ENOXAPARIN PER 10 MG: Performed by: INTERNAL MEDICINE

## 2019-03-17 PROCEDURE — 85025 COMPLETE CBC W/AUTO DIFF WBC: CPT | Performed by: PHYSICIAN ASSISTANT

## 2019-03-17 PROCEDURE — 80053 COMPREHEN METABOLIC PANEL: CPT | Performed by: PHYSICIAN ASSISTANT

## 2019-03-17 PROCEDURE — 25010000002 PROPOFOL 1000 MG/ML EMULSION: Performed by: HOSPITALIST

## 2019-03-17 PROCEDURE — 99233 SBSQ HOSP IP/OBS HIGH 50: CPT | Performed by: INTERNAL MEDICINE

## 2019-03-17 PROCEDURE — 25010000002 HYDROCORTISONE SODIUM SUCCINATE 100 MG RECONSTITUTED SOLUTION: Performed by: PHYSICIAN ASSISTANT

## 2019-03-17 PROCEDURE — 82962 GLUCOSE BLOOD TEST: CPT

## 2019-03-17 PROCEDURE — 99232 SBSQ HOSP IP/OBS MODERATE 35: CPT | Performed by: INTERNAL MEDICINE

## 2019-03-17 PROCEDURE — 63710000001 INSULIN ASPART PER 5 UNITS: Performed by: NURSE PRACTITIONER

## 2019-03-17 RX ADMIN — INSULIN ASPART 2 UNITS: 100 INJECTION, SOLUTION INTRAVENOUS; SUBCUTANEOUS at 05:18

## 2019-03-17 RX ADMIN — LEVOTHYROXINE SODIUM 50 MCG: 50 TABLET ORAL at 05:13

## 2019-03-17 RX ADMIN — BUDESONIDE 0.5 MG: 0.5 INHALANT RESPIRATORY (INHALATION) at 06:19

## 2019-03-17 RX ADMIN — AMIODARONE HYDROCHLORIDE 0.5 MG/MIN: 1.8 INJECTION, SOLUTION INTRAVENOUS at 06:06

## 2019-03-17 RX ADMIN — PROPOFOL 50 MCG/KG/MIN: 10 INJECTION, EMULSION INTRAVENOUS at 00:02

## 2019-03-17 RX ADMIN — DEXMEDETOMIDINE HYDROCHLORIDE 1.5 MCG/KG/HR: 100 INJECTION, SOLUTION INTRAVENOUS at 06:23

## 2019-03-17 RX ADMIN — CARBIDOPA AND LEVODOPA 10 MG: 50; 200 TABLET, EXTENDED RELEASE ORAL at 12:26

## 2019-03-17 RX ADMIN — CARBIDOPA AND LEVODOPA 10 MG: 50; 200 TABLET, EXTENDED RELEASE ORAL at 17:51

## 2019-03-17 RX ADMIN — Medication: at 23:55

## 2019-03-17 RX ADMIN — INSULIN ASPART 2 UNITS: 100 INJECTION, SOLUTION INTRAVENOUS; SUBCUTANEOUS at 01:30

## 2019-03-17 RX ADMIN — ENOXAPARIN SODIUM 40 MG: 80 INJECTION SUBCUTANEOUS at 15:00

## 2019-03-17 RX ADMIN — PROPOFOL 50 MCG/KG/MIN: 10 INJECTION, EMULSION INTRAVENOUS at 18:57

## 2019-03-17 RX ADMIN — IPRATROPIUM BROMIDE 0.5 MG: 0.5 SOLUTION RESPIRATORY (INHALATION) at 18:58

## 2019-03-17 RX ADMIN — FOLIC ACID 1 MG: 1 TABLET ORAL at 09:02

## 2019-03-17 RX ADMIN — Medication: at 09:00

## 2019-03-17 RX ADMIN — Medication: at 13:58

## 2019-03-17 RX ADMIN — PROPOFOL 50 MCG/KG/MIN: 10 INJECTION, EMULSION INTRAVENOUS at 12:05

## 2019-03-17 RX ADMIN — Medication: at 03:26

## 2019-03-17 RX ADMIN — Medication 1 CAPSULE: at 09:02

## 2019-03-17 RX ADMIN — IPRATROPIUM BROMIDE 0.5 MG: 0.5 SOLUTION RESPIRATORY (INHALATION) at 06:19

## 2019-03-17 RX ADMIN — CARBIDOPA AND LEVODOPA 10 MG: 50; 200 TABLET, EXTENDED RELEASE ORAL at 07:17

## 2019-03-17 RX ADMIN — DEXMEDETOMIDINE HYDROCHLORIDE 1.5 MCG/KG/HR: 100 INJECTION, SOLUTION INTRAVENOUS at 00:52

## 2019-03-17 RX ADMIN — BUDESONIDE 0.5 MG: 0.5 INHALANT RESPIRATORY (INHALATION) at 18:59

## 2019-03-17 RX ADMIN — SODIUM CHLORIDE, PRESERVATIVE FREE 3 ML: 5 INJECTION INTRAVENOUS at 09:02

## 2019-03-17 RX ADMIN — ASPIRIN 325 MG: 325 TABLET ORAL at 09:02

## 2019-03-17 RX ADMIN — LANSOPRAZOLE 30 MG: KIT at 09:04

## 2019-03-17 RX ADMIN — IPRATROPIUM BROMIDE 0.5 MG: 0.5 SOLUTION RESPIRATORY (INHALATION) at 00:32

## 2019-03-17 RX ADMIN — IPRATROPIUM BROMIDE 0.5 MG: 0.5 SOLUTION RESPIRATORY (INHALATION) at 12:39

## 2019-03-17 RX ADMIN — SODIUM CHLORIDE, PRESERVATIVE FREE 3 ML: 5 INJECTION INTRAVENOUS at 20:41

## 2019-03-17 RX ADMIN — AMIODARONE HYDROCHLORIDE 0.5 MG/MIN: 1.8 INJECTION, SOLUTION INTRAVENOUS at 16:27

## 2019-03-17 RX ADMIN — CHLORHEXIDINE GLUCONATE 15 ML: 1.2 RINSE ORAL at 09:02

## 2019-03-17 RX ADMIN — HYDROCORTISONE SODIUM SUCCINATE 50 MG: 100 INJECTION, POWDER, FOR SOLUTION INTRAMUSCULAR; INTRAVENOUS at 09:02

## 2019-03-17 RX ADMIN — HYDROCORTISONE SODIUM SUCCINATE 50 MG: 100 INJECTION, POWDER, FOR SOLUTION INTRAMUSCULAR; INTRAVENOUS at 15:01

## 2019-03-17 RX ADMIN — Medication 100 MG: at 09:02

## 2019-03-17 RX ADMIN — DEXMEDETOMIDINE HYDROCHLORIDE 1.5 MCG/KG/HR: 100 INJECTION, SOLUTION INTRAVENOUS at 16:35

## 2019-03-17 RX ADMIN — INSULIN ASPART 2 UNITS: 100 INJECTION, SOLUTION INTRAVENOUS; SUBCUTANEOUS at 12:31

## 2019-03-17 RX ADMIN — HYDROCORTISONE SODIUM SUCCINATE 50 MG: 100 INJECTION, POWDER, FOR SOLUTION INTRAMUSCULAR; INTRAVENOUS at 20:40

## 2019-03-17 RX ADMIN — PROPOFOL 50 MCG/KG/MIN: 10 INJECTION, EMULSION INTRAVENOUS at 23:55

## 2019-03-17 RX ADMIN — Medication: at 18:57

## 2019-03-17 RX ADMIN — CHLORHEXIDINE GLUCONATE 15 ML: 1.2 RINSE ORAL at 20:41

## 2019-03-17 RX ADMIN — HYDROCORTISONE SODIUM SUCCINATE 50 MG: 100 INJECTION, POWDER, FOR SOLUTION INTRAMUSCULAR; INTRAVENOUS at 01:30

## 2019-03-17 RX ADMIN — DEXMEDETOMIDINE HYDROCHLORIDE 1.5 MCG/KG/HR: 100 INJECTION, SOLUTION INTRAVENOUS at 23:05

## 2019-03-17 RX ADMIN — PROPOFOL 50 MCG/KG/MIN: 10 INJECTION, EMULSION INTRAVENOUS at 05:12

## 2019-03-18 LAB
A-A DO2: 131.5 MMHG (ref 0–300)
A-A DO2: 157.5 MMHG (ref 0–300)
ALBUMIN SERPL-MCNC: 2.88 G/DL (ref 3.5–5.2)
ALBUMIN/GLOB SERPL: 1.1 G/DL
ALP SERPL-CCNC: 52 U/L (ref 39–117)
ALT SERPL W P-5'-P-CCNC: 20 U/L (ref 1–33)
ANION GAP SERPL CALCULATED.3IONS-SCNC: 9.6 MMOL/L
APTT PPP: 22.4 SECONDS (ref 23.8–36.1)
APTT PPP: 34.3 SECONDS (ref 23.8–36.1)
ARTERIAL PATENCY WRIST A: ABNORMAL
ARTERIAL PATENCY WRIST A: ABNORMAL
AST SERPL-CCNC: 16 U/L (ref 1–32)
ATMOSPHERIC PRESS: 734 MMHG
ATMOSPHERIC PRESS: 734 MMHG
BACTERIA SPEC AEROBE CULT: NORMAL
BASE EXCESS BLDA CALC-SCNC: 4.7 MMOL/L
BASE EXCESS BLDA CALC-SCNC: 5.1 MMOL/L
BASOPHILS # BLD AUTO: 0.01 10*3/MM3 (ref 0–0.2)
BASOPHILS # BLD AUTO: 0.03 10*3/MM3 (ref 0–0.2)
BASOPHILS NFR BLD AUTO: 0.1 % (ref 0–1.5)
BASOPHILS NFR BLD AUTO: 0.2 % (ref 0–1.5)
BDY SITE: ABNORMAL
BDY SITE: ABNORMAL
BILIRUB SERPL-MCNC: <0.2 MG/DL (ref 0.1–1.2)
BODY TEMPERATURE: 98.6 C
BODY TEMPERATURE: 98.6 C
BUN BLD-MCNC: 24 MG/DL (ref 6–20)
BUN/CREAT SERPL: 48 (ref 7–25)
CALCIUM SPEC-SCNC: 8.9 MG/DL (ref 8.6–10.5)
CHLORIDE SERPL-SCNC: 101 MMOL/L (ref 98–107)
CO2 SERPL-SCNC: 33.4 MMOL/L (ref 22–29)
COHGB MFR BLD: 0.4 % (ref 0–5)
COHGB MFR BLD: 0.4 % (ref 0–5)
CREAT BLD-MCNC: 0.5 MG/DL (ref 0.57–1)
DEPRECATED RDW RBC AUTO: 47.9 FL (ref 37–54)
DEPRECATED RDW RBC AUTO: 48.2 FL (ref 37–54)
EOSINOPHIL # BLD AUTO: 0.01 10*3/MM3 (ref 0–0.4)
EOSINOPHIL # BLD AUTO: 0.03 10*3/MM3 (ref 0–0.4)
EOSINOPHIL NFR BLD AUTO: 0.1 % (ref 0.3–6.2)
EOSINOPHIL NFR BLD AUTO: 0.2 % (ref 0.3–6.2)
ERYTHROCYTE [DISTWIDTH] IN BLOOD BY AUTOMATED COUNT: 13.1 % (ref 12.3–15.4)
ERYTHROCYTE [DISTWIDTH] IN BLOOD BY AUTOMATED COUNT: 13.2 % (ref 12.3–15.4)
GFR SERPL CREATININE-BSD FRML MDRD: 128 ML/MIN/1.73
GLOBULIN UR ELPH-MCNC: 2.6 GM/DL
GLUCOSE BLD-MCNC: 116 MG/DL (ref 65–99)
GLUCOSE BLDC GLUCOMTR-MCNC: 125 MG/DL (ref 70–130)
GLUCOSE BLDC GLUCOMTR-MCNC: 134 MG/DL (ref 70–130)
GLUCOSE BLDC GLUCOMTR-MCNC: 165 MG/DL (ref 70–130)
GLUCOSE BLDC GLUCOMTR-MCNC: 170 MG/DL (ref 70–130)
GLUCOSE BLDC GLUCOMTR-MCNC: 96 MG/DL (ref 70–130)
HCO3 BLDA-SCNC: 27.6 MMOL/L (ref 22–26)
HCO3 BLDA-SCNC: 28.9 MMOL/L (ref 22–26)
HCT VFR BLD AUTO: 29.5 % (ref 34–46.6)
HCT VFR BLD AUTO: 34.8 % (ref 34–46.6)
HCT VFR BLD CALC: 32 % (ref 37–47)
HCT VFR BLD CALC: 32 % (ref 37–47)
HGB BLD-MCNC: 10.9 G/DL (ref 12–15.9)
HGB BLD-MCNC: 9.2 G/DL (ref 12–15.9)
HGB BLDA-MCNC: 10.9 G/DL (ref 12–16)
HGB BLDA-MCNC: 11 G/DL (ref 12–16)
HOROWITZ INDEX BLD+IHG-RTO: 40 %
HOROWITZ INDEX BLD+IHG-RTO: 40 %
IMM GRANULOCYTES # BLD AUTO: 0.07 10*3/MM3 (ref 0–0.05)
IMM GRANULOCYTES # BLD AUTO: 0.18 10*3/MM3 (ref 0–0.05)
IMM GRANULOCYTES NFR BLD AUTO: 0.8 % (ref 0–0.5)
IMM GRANULOCYTES NFR BLD AUTO: 1.3 % (ref 0–0.5)
INR PPP: 0.88 (ref 0.9–1.1)
LYMPHOCYTES # BLD AUTO: 1.14 10*3/MM3 (ref 0.7–3.1)
LYMPHOCYTES # BLD AUTO: 1.26 10*3/MM3 (ref 0.7–3.1)
LYMPHOCYTES NFR BLD AUTO: 12.6 % (ref 19.6–45.3)
LYMPHOCYTES NFR BLD AUTO: 8.9 % (ref 19.6–45.3)
MCH RBC QN AUTO: 32.4 PG (ref 26.6–33)
MCH RBC QN AUTO: 32.5 PG (ref 26.6–33)
MCHC RBC AUTO-ENTMCNC: 31.2 G/DL (ref 31.5–35.7)
MCHC RBC AUTO-ENTMCNC: 31.3 G/DL (ref 31.5–35.7)
MCV RBC AUTO: 103.6 FL (ref 79–97)
MCV RBC AUTO: 104.2 FL (ref 79–97)
METHGB BLD QL: 0.2 % (ref 0–3)
METHGB BLD QL: 0.4 % (ref 0–3)
MODALITY: ABNORMAL
MODALITY: ABNORMAL
MONOCYTES # BLD AUTO: 0.9 10*3/MM3 (ref 0.1–0.9)
MONOCYTES # BLD AUTO: 1.24 10*3/MM3 (ref 0.1–0.9)
MONOCYTES NFR BLD AUTO: 8.8 % (ref 5–12)
MONOCYTES NFR BLD AUTO: 9.9 % (ref 5–12)
NEUTROPHILS # BLD AUTO: 11.39 10*3/MM3 (ref 1.4–7)
NEUTROPHILS # BLD AUTO: 6.94 10*3/MM3 (ref 1.4–7)
NEUTROPHILS NFR BLD AUTO: 76.5 % (ref 42.7–76)
NEUTROPHILS NFR BLD AUTO: 80.6 % (ref 42.7–76)
NT-PROBNP SERPL-MCNC: 2663 PG/ML (ref 5–900)
OXYHGB MFR BLDV: 94.2 % (ref 85–100)
OXYHGB MFR BLDV: 96.4 % (ref 85–100)
PCO2 BLDA: 34.8 MM HG (ref 35–45)
PCO2 BLDA: 39.1 MM HG (ref 35–45)
PEEP RESPIRATORY: 8 CM[H2O]
PEEP RESPIRATORY: 8 CM[H2O]
PH BLDA: 7.49 PH UNITS (ref 7.35–7.45)
PH BLDA: 7.52 PH UNITS (ref 7.35–7.45)
PLATELET # BLD AUTO: 155 10*3/MM3 (ref 140–450)
PLATELET # BLD AUTO: 170 10*3/MM3 (ref 140–450)
PMV BLD AUTO: 11.2 FL (ref 6–12)
PMV BLD AUTO: 11.8 FL (ref 6–12)
PO2 BLDA: 77.3 MM HG (ref 80–100)
PO2 BLDA: 98.3 MM HG (ref 80–100)
POTASSIUM BLD-SCNC: 2.5 MMOL/L (ref 3.5–5.2)
POTASSIUM BLD-SCNC: 3 MMOL/L (ref 3.5–5.2)
PROT SERPL-MCNC: 5.5 G/DL (ref 6–8.5)
PROTHROMBIN TIME: 12.1 SECONDS (ref 11–15.4)
PSV: 12 CMH2O
RBC # BLD AUTO: 2.83 10*6/MM3 (ref 3.77–5.28)
RBC # BLD AUTO: 3.36 10*6/MM3 (ref 3.77–5.28)
SAO2 % BLDCOA: 94.8 % (ref 90–100)
SAO2 % BLDCOA: 97.2 % (ref 90–100)
SET MECH RESP RATE: 16
SODIUM BLD-SCNC: 144 MMOL/L (ref 136–145)
VENTILATOR MODE: ABNORMAL
VENTILATOR MODE: ABNORMAL
VT ON VENT VENT: 430 ML
WBC NRBC COR # BLD: 14.13 10*3/MM3 (ref 3.4–10.8)
WBC NRBC COR # BLD: 9.07 10*3/MM3 (ref 3.4–10.8)

## 2019-03-18 PROCEDURE — 94799 UNLISTED PULMONARY SVC/PX: CPT

## 2019-03-18 PROCEDURE — 82375 ASSAY CARBOXYHB QUANT: CPT | Performed by: INTERNAL MEDICINE

## 2019-03-18 PROCEDURE — 82962 GLUCOSE BLOOD TEST: CPT

## 2019-03-18 PROCEDURE — 83050 HGB METHEMOGLOBIN QUAN: CPT | Performed by: INTERNAL MEDICINE

## 2019-03-18 PROCEDURE — 85730 THROMBOPLASTIN TIME PARTIAL: CPT | Performed by: INTERNAL MEDICINE

## 2019-03-18 PROCEDURE — 36600 WITHDRAWAL OF ARTERIAL BLOOD: CPT | Performed by: INTERNAL MEDICINE

## 2019-03-18 PROCEDURE — 85025 COMPLETE CBC W/AUTO DIFF WBC: CPT | Performed by: PHYSICIAN ASSISTANT

## 2019-03-18 PROCEDURE — 85610 PROTHROMBIN TIME: CPT | Performed by: INTERNAL MEDICINE

## 2019-03-18 PROCEDURE — 82805 BLOOD GASES W/O2 SATURATION: CPT | Performed by: INTERNAL MEDICINE

## 2019-03-18 PROCEDURE — 80053 COMPREHEN METABOLIC PANEL: CPT | Performed by: PHYSICIAN ASSISTANT

## 2019-03-18 PROCEDURE — 25010000002 HEPARIN (PORCINE) PER 1000 UNITS: Performed by: INTERNAL MEDICINE

## 2019-03-18 PROCEDURE — 99291 CRITICAL CARE FIRST HOUR: CPT | Performed by: INTERNAL MEDICINE

## 2019-03-18 PROCEDURE — 85025 COMPLETE CBC W/AUTO DIFF WBC: CPT | Performed by: INTERNAL MEDICINE

## 2019-03-18 PROCEDURE — 63710000001 INSULIN ASPART PER 5 UNITS: Performed by: NURSE PRACTITIONER

## 2019-03-18 PROCEDURE — 25010000002 AMIODARONE IN DEXTROSE 5% 360-4.14 MG/200ML-% SOLUTION: Performed by: INTERNAL MEDICINE

## 2019-03-18 PROCEDURE — 25010000003 POTASSIUM CHLORIDE 10 MEQ/100ML SOLUTION: Performed by: INTERNAL MEDICINE

## 2019-03-18 PROCEDURE — 25010000002 ACETAZOLAMIDE PER 500 MG: Performed by: INTERNAL MEDICINE

## 2019-03-18 PROCEDURE — 94003 VENT MGMT INPAT SUBQ DAY: CPT

## 2019-03-18 PROCEDURE — 25010000002 HYDROCORTISONE SODIUM SUCCINATE 100 MG RECONSTITUTED SOLUTION: Performed by: PHYSICIAN ASSISTANT

## 2019-03-18 PROCEDURE — 25010000002 HYDROCORTISONE SODIUM SUCCINATE 100 MG RECONSTITUTED SOLUTION: Performed by: INTERNAL MEDICINE

## 2019-03-18 PROCEDURE — 99232 SBSQ HOSP IP/OBS MODERATE 35: CPT | Performed by: INTERNAL MEDICINE

## 2019-03-18 PROCEDURE — 25010000002 PROPOFOL 1000 MG/ML EMULSION: Performed by: HOSPITALIST

## 2019-03-18 PROCEDURE — 84132 ASSAY OF SERUM POTASSIUM: CPT | Performed by: INTERNAL MEDICINE

## 2019-03-18 PROCEDURE — 99233 SBSQ HOSP IP/OBS HIGH 50: CPT | Performed by: INTERNAL MEDICINE

## 2019-03-18 PROCEDURE — 83880 ASSAY OF NATRIURETIC PEPTIDE: CPT | Performed by: INTERNAL MEDICINE

## 2019-03-18 RX ORDER — POTASSIUM CHLORIDE 7.45 MG/ML
10 INJECTION INTRAVENOUS
Status: COMPLETED | OUTPATIENT
Start: 2019-03-18 | End: 2019-03-19

## 2019-03-18 RX ORDER — POTASSIUM CHLORIDE 20 MEQ/1
40 TABLET, EXTENDED RELEASE ORAL AS NEEDED
Status: DISCONTINUED | OUTPATIENT
Start: 2019-03-18 | End: 2019-04-02

## 2019-03-18 RX ORDER — POTASSIUM CHLORIDE 1.5 G/1.77G
40 POWDER, FOR SOLUTION ORAL AS NEEDED
Status: DISCONTINUED | OUTPATIENT
Start: 2019-03-18 | End: 2019-04-02

## 2019-03-18 RX ORDER — METOPROLOL TARTRATE 5 MG/5ML
5 INJECTION INTRAVENOUS ONCE
Status: COMPLETED | OUTPATIENT
Start: 2019-03-18 | End: 2019-03-18

## 2019-03-18 RX ORDER — HEPARIN SODIUM 10000 [USP'U]/100ML
12 INJECTION, SOLUTION INTRAVENOUS
Status: DISCONTINUED | OUTPATIENT
Start: 2019-03-18 | End: 2019-03-19

## 2019-03-18 RX ORDER — POTASSIUM CHLORIDE 1.5 G/1.77G
40 POWDER, FOR SOLUTION ORAL EVERY 4 HOURS
Status: DISCONTINUED | OUTPATIENT
Start: 2019-03-18 | End: 2019-03-18 | Stop reason: CLARIF

## 2019-03-18 RX ORDER — POTASSIUM CHLORIDE 7.45 MG/ML
10 INJECTION INTRAVENOUS
Status: COMPLETED | OUTPATIENT
Start: 2019-03-18 | End: 2019-03-18

## 2019-03-18 RX ORDER — POTASSIUM CHLORIDE 7.45 MG/ML
10 INJECTION INTRAVENOUS
Status: DISCONTINUED | OUTPATIENT
Start: 2019-03-18 | End: 2019-03-18

## 2019-03-18 RX ORDER — POTASSIUM CHLORIDE 7.45 MG/ML
10 INJECTION INTRAVENOUS
Status: DISCONTINUED | OUTPATIENT
Start: 2019-03-18 | End: 2019-04-02

## 2019-03-18 RX ORDER — HEPARIN SODIUM 5000 [USP'U]/ML
30 INJECTION, SOLUTION INTRAVENOUS; SUBCUTANEOUS AS NEEDED
Status: DISCONTINUED | OUTPATIENT
Start: 2019-03-18 | End: 2019-03-19

## 2019-03-18 RX ORDER — METOPROLOL TARTRATE 5 MG/5ML
INJECTION INTRAVENOUS
Status: COMPLETED
Start: 2019-03-18 | End: 2019-03-18

## 2019-03-18 RX ORDER — METOPROLOL TARTRATE 5 MG/5ML
10 INJECTION INTRAVENOUS ONCE
Status: DISCONTINUED | OUTPATIENT
Start: 2019-03-18 | End: 2019-03-18

## 2019-03-18 RX ORDER — HEPARIN SODIUM 5000 [USP'U]/ML
60 INJECTION, SOLUTION INTRAVENOUS; SUBCUTANEOUS AS NEEDED
Status: DISCONTINUED | OUTPATIENT
Start: 2019-03-18 | End: 2019-03-19

## 2019-03-18 RX ORDER — HEPARIN SODIUM 5000 [USP'U]/ML
60 INJECTION, SOLUTION INTRAVENOUS; SUBCUTANEOUS ONCE
Status: COMPLETED | OUTPATIENT
Start: 2019-03-18 | End: 2019-03-18

## 2019-03-18 RX ORDER — METOPROLOL TARTRATE 5 MG/5ML
5 INJECTION INTRAVENOUS ONCE
Status: DISCONTINUED | OUTPATIENT
Start: 2019-03-18 | End: 2019-03-21

## 2019-03-18 RX ORDER — METOPROLOL TARTRATE 5 MG/5ML
INJECTION INTRAVENOUS
Status: DISPENSED
Start: 2019-03-18 | End: 2019-03-18

## 2019-03-18 RX ORDER — METOPROLOL TARTRATE 5 MG/5ML
10 INJECTION INTRAVENOUS ONCE
Status: COMPLETED | OUTPATIENT
Start: 2019-03-18 | End: 2019-03-18

## 2019-03-18 RX ADMIN — Medication: at 10:07

## 2019-03-18 RX ADMIN — POTASSIUM CHLORIDE 10 MEQ: 10 INJECTION, SOLUTION INTRAVENOUS at 15:03

## 2019-03-18 RX ADMIN — Medication 100 MG: at 09:14

## 2019-03-18 RX ADMIN — DEXMEDETOMIDINE HYDROCHLORIDE 1.5 MCG/KG/HR: 100 INJECTION, SOLUTION INTRAVENOUS at 05:02

## 2019-03-18 RX ADMIN — FOLIC ACID 1 MG: 1 TABLET ORAL at 09:14

## 2019-03-18 RX ADMIN — SODIUM CHLORIDE, PRESERVATIVE FREE 3 ML: 5 INJECTION INTRAVENOUS at 09:14

## 2019-03-18 RX ADMIN — IPRATROPIUM BROMIDE 0.5 MG: 0.5 SOLUTION RESPIRATORY (INHALATION) at 00:23

## 2019-03-18 RX ADMIN — SODIUM CHLORIDE, PRESERVATIVE FREE 3 ML: 5 INJECTION INTRAVENOUS at 20:49

## 2019-03-18 RX ADMIN — IPRATROPIUM BROMIDE 0.5 MG: 0.5 SOLUTION RESPIRATORY (INHALATION) at 14:07

## 2019-03-18 RX ADMIN — AMIODARONE HYDROCHLORIDE 0.5 MG/MIN: 1.8 INJECTION, SOLUTION INTRAVENOUS at 15:03

## 2019-03-18 RX ADMIN — CARBIDOPA AND LEVODOPA 10 MG: 50; 200 TABLET, EXTENDED RELEASE ORAL at 06:33

## 2019-03-18 RX ADMIN — LANSOPRAZOLE 30 MG: KIT at 09:13

## 2019-03-18 RX ADMIN — POTASSIUM CHLORIDE 10 MEQ: 10 INJECTION, SOLUTION INTRAVENOUS at 17:43

## 2019-03-18 RX ADMIN — Medication 1 CAPSULE: at 09:13

## 2019-03-18 RX ADMIN — HYDROCORTISONE SODIUM SUCCINATE 50 MG: 100 INJECTION, POWDER, FOR SOLUTION INTRAMUSCULAR; INTRAVENOUS at 20:49

## 2019-03-18 RX ADMIN — POTASSIUM CHLORIDE 10 MEQ: 10 INJECTION, SOLUTION INTRAVENOUS at 14:40

## 2019-03-18 RX ADMIN — METOPROLOL TARTRATE 10 MG: 5 INJECTION INTRAVENOUS at 02:44

## 2019-03-18 RX ADMIN — POTASSIUM CHLORIDE 10 MEQ: 10 INJECTION, SOLUTION INTRAVENOUS at 23:36

## 2019-03-18 RX ADMIN — METOPROLOL TARTRATE 5 MG: 5 INJECTION, SOLUTION INTRAVENOUS at 13:59

## 2019-03-18 RX ADMIN — CHLORHEXIDINE GLUCONATE 15 ML: 1.2 RINSE ORAL at 20:49

## 2019-03-18 RX ADMIN — DEXMEDETOMIDINE HYDROCHLORIDE 1.5 MCG/KG/HR: 100 INJECTION, SOLUTION INTRAVENOUS at 21:07

## 2019-03-18 RX ADMIN — BUDESONIDE 0.5 MG: 0.5 INHALANT RESPIRATORY (INHALATION) at 18:31

## 2019-03-18 RX ADMIN — ASPIRIN 325 MG: 325 TABLET ORAL at 09:13

## 2019-03-18 RX ADMIN — HEPARIN SODIUM 3700 UNITS: 5000 INJECTION INTRAVENOUS; SUBCUTANEOUS at 12:05

## 2019-03-18 RX ADMIN — Medication: at 05:04

## 2019-03-18 RX ADMIN — PROPOFOL 50 MCG/KG/MIN: 10 INJECTION, EMULSION INTRAVENOUS at 10:08

## 2019-03-18 RX ADMIN — HYDROCORTISONE SODIUM SUCCINATE 50 MG: 100 INJECTION, POWDER, FOR SOLUTION INTRAMUSCULAR; INTRAVENOUS at 09:41

## 2019-03-18 RX ADMIN — PROPOFOL 50 MCG/KG/MIN: 10 INJECTION, EMULSION INTRAVENOUS at 05:17

## 2019-03-18 RX ADMIN — POTASSIUM CHLORIDE 10 MEQ: 10 INJECTION, SOLUTION INTRAVENOUS at 16:47

## 2019-03-18 RX ADMIN — BUDESONIDE 0.5 MG: 0.5 INHALANT RESPIRATORY (INHALATION) at 06:28

## 2019-03-18 RX ADMIN — DEXMEDETOMIDINE HYDROCHLORIDE 1.5 MCG/KG/HR: 100 INJECTION, SOLUTION INTRAVENOUS at 14:40

## 2019-03-18 RX ADMIN — POTASSIUM CHLORIDE 40 MEQ: 1.5 POWDER, FOR SOLUTION ORAL at 10:07

## 2019-03-18 RX ADMIN — CHLORHEXIDINE GLUCONATE 15 ML: 1.2 RINSE ORAL at 09:14

## 2019-03-18 RX ADMIN — HYDROCORTISONE SODIUM SUCCINATE 50 MG: 100 INJECTION, POWDER, FOR SOLUTION INTRAMUSCULAR; INTRAVENOUS at 02:21

## 2019-03-18 RX ADMIN — HEPARIN SODIUM 12 UNITS/KG/HR: 10000 INJECTION, SOLUTION INTRAVENOUS at 12:05

## 2019-03-18 RX ADMIN — LEVOTHYROXINE SODIUM 50 MCG: 50 TABLET ORAL at 06:33

## 2019-03-18 RX ADMIN — IPRATROPIUM BROMIDE 0.5 MG: 0.5 SOLUTION RESPIRATORY (INHALATION) at 06:27

## 2019-03-18 RX ADMIN — AMIODARONE HYDROCHLORIDE 0.5 MG/MIN: 1.8 INJECTION, SOLUTION INTRAVENOUS at 04:34

## 2019-03-18 RX ADMIN — METOPROLOL TARTRATE 10 MG: 5 INJECTION, SOLUTION INTRAVENOUS at 02:44

## 2019-03-18 RX ADMIN — ACETAZOLAMIDE 375 MG: 500 INJECTION, POWDER, LYOPHILIZED, FOR SOLUTION INTRAVENOUS at 12:05

## 2019-03-18 RX ADMIN — IPRATROPIUM BROMIDE 0.5 MG: 0.5 SOLUTION RESPIRATORY (INHALATION) at 18:31

## 2019-03-18 RX ADMIN — METOPROLOL TARTRATE 5 MG: 5 INJECTION, SOLUTION INTRAVENOUS at 11:48

## 2019-03-18 RX ADMIN — INSULIN ASPART 2 UNITS: 100 INJECTION, SOLUTION INTRAVENOUS; SUBCUTANEOUS at 06:43

## 2019-03-18 RX ADMIN — INSULIN ASPART 2 UNITS: 100 INJECTION, SOLUTION INTRAVENOUS; SUBCUTANEOUS at 00:15

## 2019-03-18 RX ADMIN — DEXMEDETOMIDINE HYDROCHLORIDE 1.5 MCG/KG/HR: 100 INJECTION, SOLUTION INTRAVENOUS at 10:11

## 2019-03-19 ENCOUNTER — APPOINTMENT (OUTPATIENT)
Dept: GENERAL RADIOLOGY | Facility: HOSPITAL | Age: 54
End: 2019-03-19

## 2019-03-19 ENCOUNTER — ANCILLARY PROCEDURE (OUTPATIENT)
Dept: SPEECH THERAPY | Facility: HOSPITAL | Age: 54
End: 2019-03-19

## 2019-03-19 LAB
A-A DO2: 224.7 MMHG (ref 0–300)
ALBUMIN SERPL-MCNC: 2.97 G/DL (ref 3.5–5.2)
ALBUMIN/GLOB SERPL: 1.1 G/DL
ALP SERPL-CCNC: 64 U/L (ref 39–117)
ALT SERPL W P-5'-P-CCNC: 17 U/L (ref 1–33)
ANION GAP SERPL CALCULATED.3IONS-SCNC: 12.2 MMOL/L
APTT PPP: 36.2 SECONDS (ref 23.8–36.1)
APTT PPP: >100 SECONDS (ref 23.8–36.1)
ARTERIAL PATENCY WRIST A: POSITIVE
AST SERPL-CCNC: 18 U/L (ref 1–32)
ATMOSPHERIC PRESS: 734 MMHG
BASE EXCESS BLDA CALC-SCNC: 2.1 MMOL/L
BASOPHILS # BLD AUTO: 0.01 10*3/MM3 (ref 0–0.2)
BASOPHILS NFR BLD AUTO: 0.1 % (ref 0–1.5)
BDY SITE: ABNORMAL
BILIRUB SERPL-MCNC: 0.3 MG/DL (ref 0.2–1.2)
BODY TEMPERATURE: 98.6 C
BUN BLD-MCNC: 18 MG/DL (ref 6–20)
BUN/CREAT SERPL: 35.3 (ref 7–25)
CALCIUM SPEC-SCNC: 8.4 MG/DL (ref 8.6–10.5)
CHLORIDE SERPL-SCNC: 98 MMOL/L (ref 98–107)
CO2 SERPL-SCNC: 26.8 MMOL/L (ref 22–29)
COHGB MFR BLD: 1.3 % (ref 0–5)
CREAT BLD-MCNC: 0.51 MG/DL (ref 0.57–1)
DEPRECATED RDW RBC AUTO: 47.2 FL (ref 37–54)
EOSINOPHIL # BLD AUTO: 0.09 10*3/MM3 (ref 0–0.4)
EOSINOPHIL NFR BLD AUTO: 0.6 % (ref 0.3–6.2)
ERYTHROCYTE [DISTWIDTH] IN BLOOD BY AUTOMATED COUNT: 13.2 % (ref 12.3–15.4)
GAS FLOW AIRWAY: 4 LPM
GFR SERPL CREATININE-BSD FRML MDRD: 125 ML/MIN/1.73
GLOBULIN UR ELPH-MCNC: 2.7 GM/DL
GLUCOSE BLD-MCNC: 126 MG/DL (ref 65–99)
GLUCOSE BLDC GLUCOMTR-MCNC: 116 MG/DL (ref 70–130)
GLUCOSE BLDC GLUCOMTR-MCNC: 83 MG/DL (ref 70–130)
GLUCOSE BLDC GLUCOMTR-MCNC: 93 MG/DL (ref 70–130)
HCO3 BLDA-SCNC: 24.6 MMOL/L (ref 22–26)
HCT VFR BLD AUTO: 29.9 % (ref 34–46.6)
HCT VFR BLD CALC: 38 % (ref 37–47)
HGB BLD-MCNC: 9.4 G/DL (ref 12–15.9)
HGB BLDA-MCNC: 12.9 G/DL (ref 12–16)
HOROWITZ INDEX BLD+IHG-RTO: 36 %
IMM GRANULOCYTES # BLD AUTO: 0.14 10*3/MM3 (ref 0–0.05)
IMM GRANULOCYTES NFR BLD AUTO: 1 % (ref 0–0.5)
LYMPHOCYTES # BLD AUTO: 1.03 10*3/MM3 (ref 0.7–3.1)
LYMPHOCYTES NFR BLD AUTO: 7.1 % (ref 19.6–45.3)
MAGNESIUM SERPL-MCNC: 2 MG/DL (ref 1.6–2.6)
MCH RBC QN AUTO: 32.3 PG (ref 26.6–33)
MCHC RBC AUTO-ENTMCNC: 31.4 G/DL (ref 31.5–35.7)
MCV RBC AUTO: 102.7 FL (ref 79–97)
METHGB BLD QL: 0.2 % (ref 0–3)
MODALITY: ABNORMAL
MONOCYTES # BLD AUTO: 0.91 10*3/MM3 (ref 0.1–0.9)
MONOCYTES NFR BLD AUTO: 6.3 % (ref 5–12)
NEUTROPHILS # BLD AUTO: 12.24 10*3/MM3 (ref 1.4–7)
NEUTROPHILS NFR BLD AUTO: 84.9 % (ref 42.7–76)
NT-PROBNP SERPL-MCNC: 5262 PG/ML (ref 5–900)
OXYHGB MFR BLDV: 87.4 % (ref 85–100)
PCO2 BLDA: 31.7 MM HG (ref 35–45)
PH BLDA: 7.51 PH UNITS (ref 7.35–7.45)
PLATELET # BLD AUTO: 176 10*3/MM3 (ref 140–450)
PMV BLD AUTO: 11.6 FL (ref 6–12)
PO2 BLDA: 55.3 MM HG (ref 80–100)
POTASSIUM BLD-SCNC: 2.6 MMOL/L (ref 3.5–5.2)
POTASSIUM BLD-SCNC: 3 MMOL/L (ref 3.5–5.2)
PROT SERPL-MCNC: 5.7 G/DL (ref 6–8.5)
RBC # BLD AUTO: 2.91 10*6/MM3 (ref 3.77–5.28)
SAO2 % BLDCOA: 88.7 % (ref 90–100)
SODIUM BLD-SCNC: 137 MMOL/L (ref 136–145)
WBC NRBC COR # BLD: 14.42 10*3/MM3 (ref 3.4–10.8)

## 2019-03-19 PROCEDURE — 25010000002 HEPARIN (PORCINE) PER 1000 UNITS: Performed by: INTERNAL MEDICINE

## 2019-03-19 PROCEDURE — 82805 BLOOD GASES W/O2 SATURATION: CPT | Performed by: NURSE PRACTITIONER

## 2019-03-19 PROCEDURE — 25010000003 POTASSIUM CHLORIDE 10 MEQ/100ML SOLUTION

## 2019-03-19 PROCEDURE — 25010000002 DIGOXIN PER 500 MCG: Performed by: INTERNAL MEDICINE

## 2019-03-19 PROCEDURE — 87150 DNA/RNA AMPLIFIED PROBE: CPT | Performed by: INTERNAL MEDICINE

## 2019-03-19 PROCEDURE — 99233 SBSQ HOSP IP/OBS HIGH 50: CPT | Performed by: INTERNAL MEDICINE

## 2019-03-19 PROCEDURE — 71045 X-RAY EXAM CHEST 1 VIEW: CPT | Performed by: RADIOLOGY

## 2019-03-19 PROCEDURE — 84132 ASSAY OF SERUM POTASSIUM: CPT | Performed by: INTERNAL MEDICINE

## 2019-03-19 PROCEDURE — 85730 THROMBOPLASTIN TIME PARTIAL: CPT | Performed by: INTERNAL MEDICINE

## 2019-03-19 PROCEDURE — 94799 UNLISTED PULMONARY SVC/PX: CPT

## 2019-03-19 PROCEDURE — 71045 X-RAY EXAM CHEST 1 VIEW: CPT

## 2019-03-19 PROCEDURE — 82375 ASSAY CARBOXYHB QUANT: CPT | Performed by: NURSE PRACTITIONER

## 2019-03-19 PROCEDURE — 83735 ASSAY OF MAGNESIUM: CPT | Performed by: INTERNAL MEDICINE

## 2019-03-19 PROCEDURE — 25010000003 POTASSIUM CHLORIDE 10 MEQ/100ML SOLUTION: Performed by: INTERNAL MEDICINE

## 2019-03-19 PROCEDURE — 80053 COMPREHEN METABOLIC PANEL: CPT | Performed by: PHYSICIAN ASSISTANT

## 2019-03-19 PROCEDURE — 25010000002 VANCOMYCIN 5 G RECONSTITUTED SOLUTION 5,000 MG VIAL: Performed by: INTERNAL MEDICINE

## 2019-03-19 PROCEDURE — 85025 COMPLETE CBC W/AUTO DIFF WBC: CPT | Performed by: PHYSICIAN ASSISTANT

## 2019-03-19 PROCEDURE — 36600 WITHDRAWAL OF ARTERIAL BLOOD: CPT | Performed by: NURSE PRACTITIONER

## 2019-03-19 PROCEDURE — 87147 CULTURE TYPE IMMUNOLOGIC: CPT | Performed by: INTERNAL MEDICINE

## 2019-03-19 PROCEDURE — 87040 BLOOD CULTURE FOR BACTERIA: CPT | Performed by: INTERNAL MEDICINE

## 2019-03-19 PROCEDURE — 83880 ASSAY OF NATRIURETIC PEPTIDE: CPT | Performed by: NURSE PRACTITIONER

## 2019-03-19 PROCEDURE — 25010000003 POTASSIUM CHLORIDE 10 MEQ/100ML SOLUTION: Performed by: NURSE PRACTITIONER

## 2019-03-19 PROCEDURE — 25010000002 AMIODARONE IN DEXTROSE 5% 360-4.14 MG/200ML-% SOLUTION: Performed by: INTERNAL MEDICINE

## 2019-03-19 PROCEDURE — 82962 GLUCOSE BLOOD TEST: CPT

## 2019-03-19 PROCEDURE — 83050 HGB METHEMOGLOBIN QUAN: CPT | Performed by: NURSE PRACTITIONER

## 2019-03-19 RX ORDER — DIGOXIN 0.25 MG/ML
250 INJECTION INTRAMUSCULAR; INTRAVENOUS ONCE
Status: COMPLETED | OUTPATIENT
Start: 2019-03-19 | End: 2019-03-19

## 2019-03-19 RX ORDER — METOPROLOL TARTRATE 5 MG/5ML
5 INJECTION INTRAVENOUS
Status: COMPLETED | OUTPATIENT
Start: 2019-03-19 | End: 2019-03-19

## 2019-03-19 RX ORDER — ASPIRIN 81 MG/1
81 TABLET ORAL DAILY
Status: DISCONTINUED | OUTPATIENT
Start: 2019-03-19 | End: 2019-04-04

## 2019-03-19 RX ORDER — BUMETANIDE 0.25 MG/ML
1 INJECTION INTRAMUSCULAR; INTRAVENOUS ONCE
Status: COMPLETED | OUTPATIENT
Start: 2019-03-19 | End: 2019-03-19

## 2019-03-19 RX ORDER — QUETIAPINE FUMARATE 25 MG/1
25 TABLET, FILM COATED ORAL NIGHTLY
Status: DISCONTINUED | OUTPATIENT
Start: 2019-03-19 | End: 2019-03-22

## 2019-03-19 RX ORDER — POTASSIUM CHLORIDE 7.45 MG/ML
10 INJECTION INTRAVENOUS
Status: COMPLETED | OUTPATIENT
Start: 2019-03-19 | End: 2019-03-19

## 2019-03-19 RX ORDER — METOPROLOL TARTRATE 5 MG/5ML
2.5 INJECTION INTRAVENOUS EVERY 6 HOURS PRN
Status: DISCONTINUED | OUTPATIENT
Start: 2019-03-19 | End: 2019-04-02

## 2019-03-19 RX ORDER — HEPARIN SODIUM 5000 [USP'U]/ML
5000 INJECTION, SOLUTION INTRAVENOUS; SUBCUTANEOUS EVERY 8 HOURS SCHEDULED
Status: DISCONTINUED | OUTPATIENT
Start: 2019-03-19 | End: 2019-03-21

## 2019-03-19 RX ADMIN — POTASSIUM CHLORIDE 10 MEQ: 10 INJECTION, SOLUTION INTRAVENOUS at 04:59

## 2019-03-19 RX ADMIN — POTASSIUM CHLORIDE 10 MEQ: 10 INJECTION, SOLUTION INTRAVENOUS at 15:48

## 2019-03-19 RX ADMIN — DEXMEDETOMIDINE HYDROCHLORIDE 1 MCG/KG/HR: 100 INJECTION, SOLUTION INTRAVENOUS at 07:52

## 2019-03-19 RX ADMIN — POTASSIUM CHLORIDE 10 MEQ: 10 INJECTION, SOLUTION INTRAVENOUS at 17:21

## 2019-03-19 RX ADMIN — POTASSIUM CHLORIDE 10 MEQ: 10 INJECTION, SOLUTION INTRAVENOUS at 01:47

## 2019-03-19 RX ADMIN — DEXMEDETOMIDINE HYDROCHLORIDE 1.5 MCG/KG/HR: 100 INJECTION, SOLUTION INTRAVENOUS at 01:48

## 2019-03-19 RX ADMIN — METOPROLOL TARTRATE 2.5 MG: 5 INJECTION, SOLUTION INTRAVENOUS at 20:23

## 2019-03-19 RX ADMIN — METOPROLOL TARTRATE 5 MG: 5 INJECTION INTRAVENOUS at 09:39

## 2019-03-19 RX ADMIN — DEXMEDETOMIDINE HYDROCHLORIDE 1 MCG/KG/HR: 100 INJECTION, SOLUTION INTRAVENOUS at 13:06

## 2019-03-19 RX ADMIN — CEFEPIME HYDROCHLORIDE 1 G: 1 INJECTION, POWDER, FOR SOLUTION INTRAMUSCULAR; INTRAVENOUS at 18:10

## 2019-03-19 RX ADMIN — AMIODARONE HYDROCHLORIDE 0.5 MG/MIN: 1.8 INJECTION, SOLUTION INTRAVENOUS at 13:06

## 2019-03-19 RX ADMIN — VANCOMYCIN HYDROCHLORIDE 1000 MG: 5 INJECTION, POWDER, LYOPHILIZED, FOR SOLUTION INTRAVENOUS at 23:58

## 2019-03-19 RX ADMIN — POTASSIUM CHLORIDE 10 MEQ: 10 INJECTION, SOLUTION INTRAVENOUS at 03:58

## 2019-03-19 RX ADMIN — METOPROLOL TARTRATE 5 MG: 5 INJECTION INTRAVENOUS at 07:51

## 2019-03-19 RX ADMIN — POTASSIUM CHLORIDE 10 MEQ: 10 INJECTION, SOLUTION INTRAVENOUS at 14:16

## 2019-03-19 RX ADMIN — SODIUM CHLORIDE, PRESERVATIVE FREE 3 ML: 5 INJECTION INTRAVENOUS at 09:39

## 2019-03-19 RX ADMIN — SODIUM CHLORIDE, PRESERVATIVE FREE 3 ML: 5 INJECTION INTRAVENOUS at 20:27

## 2019-03-19 RX ADMIN — VANCOMYCIN HYDROCHLORIDE 1250 MG: 5 INJECTION, POWDER, LYOPHILIZED, FOR SOLUTION INTRAVENOUS at 13:06

## 2019-03-19 RX ADMIN — SODIUM CHLORIDE, PRESERVATIVE FREE 3 ML: 5 INJECTION INTRAVENOUS at 09:38

## 2019-03-19 RX ADMIN — CEFEPIME HYDROCHLORIDE 1 G: 1 INJECTION, POWDER, FOR SOLUTION INTRAMUSCULAR; INTRAVENOUS at 13:04

## 2019-03-19 RX ADMIN — HEPARIN SODIUM 5000 UNITS: 5000 INJECTION INTRAVENOUS; SUBCUTANEOUS at 22:57

## 2019-03-19 RX ADMIN — DIGOXIN 250 MCG: 0.25 INJECTION INTRAMUSCULAR; INTRAVENOUS at 13:06

## 2019-03-19 RX ADMIN — BUMETANIDE 1 MG: 0.25 INJECTION INTRAMUSCULAR; INTRAVENOUS at 13:04

## 2019-03-19 RX ADMIN — CHLORHEXIDINE GLUCONATE 15 ML: 1.2 RINSE ORAL at 20:27

## 2019-03-19 RX ADMIN — HEPARIN SODIUM 3700 UNITS: 5000 INJECTION INTRAVENOUS; SUBCUTANEOUS at 01:49

## 2019-03-19 RX ADMIN — HEPARIN SODIUM 5000 UNITS: 5000 INJECTION INTRAVENOUS; SUBCUTANEOUS at 14:16

## 2019-03-19 RX ADMIN — POTASSIUM CHLORIDE 10 MEQ: 10 INJECTION, SOLUTION INTRAVENOUS at 19:28

## 2019-03-19 RX ADMIN — POTASSIUM CHLORIDE 10 MEQ: 10 INJECTION, SOLUTION INTRAVENOUS at 18:08

## 2019-03-19 RX ADMIN — IPRATROPIUM BROMIDE 0.5 MG: 0.5 SOLUTION RESPIRATORY (INHALATION) at 00:47

## 2019-03-19 RX ADMIN — SODIUM CHLORIDE, PRESERVATIVE FREE 3 ML: 5 INJECTION INTRAVENOUS at 20:26

## 2019-03-19 RX ADMIN — POTASSIUM CHLORIDE 10 MEQ: 10 INJECTION, SOLUTION INTRAVENOUS at 15:22

## 2019-03-19 RX ADMIN — POTASSIUM CHLORIDE 10 MEQ: 10 INJECTION, SOLUTION INTRAVENOUS at 02:56

## 2019-03-19 RX ADMIN — AMIODARONE HYDROCHLORIDE 0.5 MG/MIN: 1.8 INJECTION, SOLUTION INTRAVENOUS at 02:32

## 2019-03-19 RX ADMIN — POTASSIUM CHLORIDE 10 MEQ: 10 INJECTION, SOLUTION INTRAVENOUS at 00:45

## 2019-03-20 ENCOUNTER — APPOINTMENT (OUTPATIENT)
Dept: GENERAL RADIOLOGY | Facility: HOSPITAL | Age: 54
End: 2019-03-20

## 2019-03-20 ENCOUNTER — ANCILLARY PROCEDURE (OUTPATIENT)
Dept: SPEECH THERAPY | Facility: HOSPITAL | Age: 54
End: 2019-03-20

## 2019-03-20 LAB
A-A DO2: 50.7 MMHG (ref 0–300)
ALBUMIN SERPL-MCNC: 2.99 G/DL (ref 3.5–5.2)
ALBUMIN/GLOB SERPL: 1 G/DL
ALP SERPL-CCNC: 61 U/L (ref 39–117)
ALT SERPL W P-5'-P-CCNC: 15 U/L (ref 1–33)
ANION GAP SERPL CALCULATED.3IONS-SCNC: 13.2 MMOL/L
ARTERIAL PATENCY WRIST A: POSITIVE
AST SERPL-CCNC: 18 U/L (ref 1–32)
ATMOSPHERIC PRESS: 733 MMHG
BACTERIA BLD CULT: ABNORMAL
BASE EXCESS BLDA CALC-SCNC: -0.3 MMOL/L
BASOPHILS # BLD AUTO: 0.03 10*3/MM3 (ref 0–0.2)
BASOPHILS NFR BLD AUTO: 0.2 % (ref 0–1.5)
BDY SITE: ABNORMAL
BILIRUB SERPL-MCNC: 0.5 MG/DL (ref 0.2–1.2)
BODY TEMPERATURE: 98.6 C
BUN BLD-MCNC: 11 MG/DL (ref 6–20)
BUN/CREAT SERPL: 23.9 (ref 7–25)
CALCIUM SPEC-SCNC: 8.5 MG/DL (ref 8.6–10.5)
CHLORIDE SERPL-SCNC: 98 MMOL/L (ref 98–107)
CO2 SERPL-SCNC: 25.8 MMOL/L (ref 22–29)
COHGB MFR BLD: 1.1 % (ref 0–5)
CREAT BLD-MCNC: 0.46 MG/DL (ref 0.57–1)
DEPRECATED RDW RBC AUTO: 46.7 FL (ref 37–54)
EOSINOPHIL # BLD AUTO: 0.16 10*3/MM3 (ref 0–0.4)
EOSINOPHIL NFR BLD AUTO: 1.2 % (ref 0.3–6.2)
ERYTHROCYTE [DISTWIDTH] IN BLOOD BY AUTOMATED COUNT: 13.4 % (ref 12.3–15.4)
GFR SERPL CREATININE-BSD FRML MDRD: 141 ML/MIN/1.73
GLOBULIN UR ELPH-MCNC: 3 GM/DL
GLUCOSE BLD-MCNC: 103 MG/DL (ref 65–99)
GLUCOSE BLDC GLUCOMTR-MCNC: 101 MG/DL (ref 70–130)
GLUCOSE BLDC GLUCOMTR-MCNC: 103 MG/DL (ref 70–130)
GLUCOSE BLDC GLUCOMTR-MCNC: 105 MG/DL (ref 70–130)
GLUCOSE BLDC GLUCOMTR-MCNC: 120 MG/DL (ref 70–130)
HCO3 BLDA-SCNC: 21.2 MMOL/L (ref 22–26)
HCT VFR BLD AUTO: 29.3 % (ref 34–46.6)
HCT VFR BLD CALC: 31 % (ref 37–47)
HGB BLD-MCNC: 9.4 G/DL (ref 12–15.9)
HGB BLDA-MCNC: 10.7 G/DL (ref 12–16)
HOROWITZ INDEX BLD+IHG-RTO: 21 %
IMM GRANULOCYTES # BLD AUTO: 0.12 10*3/MM3 (ref 0–0.05)
IMM GRANULOCYTES NFR BLD AUTO: 0.9 % (ref 0–0.5)
LYMPHOCYTES # BLD AUTO: 1.59 10*3/MM3 (ref 0.7–3.1)
LYMPHOCYTES NFR BLD AUTO: 11.8 % (ref 19.6–45.3)
MCH RBC QN AUTO: 32.4 PG (ref 26.6–33)
MCHC RBC AUTO-ENTMCNC: 32.1 G/DL (ref 31.5–35.7)
MCV RBC AUTO: 101 FL (ref 79–97)
METHGB BLD QL: 0.3 % (ref 0–3)
MODALITY: ABNORMAL
MONOCYTES # BLD AUTO: 0.92 10*3/MM3 (ref 0.1–0.9)
MONOCYTES NFR BLD AUTO: 6.8 % (ref 5–12)
NEUTROPHILS # BLD AUTO: 10.63 10*3/MM3 (ref 1.4–7)
NEUTROPHILS NFR BLD AUTO: 79.1 % (ref 42.7–76)
OXYHGB MFR BLDV: 90.7 % (ref 85–100)
PCO2 BLDA: 25.1 MM HG (ref 35–45)
PH BLDA: 7.54 PH UNITS (ref 7.35–7.45)
PLATELET # BLD AUTO: 195 10*3/MM3 (ref 140–450)
PMV BLD AUTO: 10.7 FL (ref 6–12)
PO2 BLDA: 63.3 MM HG (ref 80–100)
POTASSIUM BLD-SCNC: 2.8 MMOL/L (ref 3.5–5.2)
POTASSIUM BLD-SCNC: 3 MMOL/L (ref 3.5–5.2)
POTASSIUM BLD-SCNC: 3 MMOL/L (ref 3.5–5.2)
PROT SERPL-MCNC: 6 G/DL (ref 6–8.5)
RBC # BLD AUTO: 2.9 10*6/MM3 (ref 3.77–5.28)
SAO2 % BLDCOA: 92 % (ref 90–100)
SODIUM BLD-SCNC: 137 MMOL/L (ref 136–145)
WBC NRBC COR # BLD: 13.45 10*3/MM3 (ref 3.4–10.8)

## 2019-03-20 PROCEDURE — 94799 UNLISTED PULMONARY SVC/PX: CPT

## 2019-03-20 PROCEDURE — 82962 GLUCOSE BLOOD TEST: CPT

## 2019-03-20 PROCEDURE — 84132 ASSAY OF SERUM POTASSIUM: CPT | Performed by: HOSPITALIST

## 2019-03-20 PROCEDURE — 36600 WITHDRAWAL OF ARTERIAL BLOOD: CPT | Performed by: INTERNAL MEDICINE

## 2019-03-20 PROCEDURE — 82805 BLOOD GASES W/O2 SATURATION: CPT | Performed by: INTERNAL MEDICINE

## 2019-03-20 PROCEDURE — 71045 X-RAY EXAM CHEST 1 VIEW: CPT

## 2019-03-20 PROCEDURE — 25010000002 HYDROCORTISONE SODIUM SUCCINATE 100 MG RECONSTITUTED SOLUTION: Performed by: INTERNAL MEDICINE

## 2019-03-20 PROCEDURE — 93005 ELECTROCARDIOGRAM TRACING: CPT | Performed by: HOSPITALIST

## 2019-03-20 PROCEDURE — 71045 X-RAY EXAM CHEST 1 VIEW: CPT | Performed by: RADIOLOGY

## 2019-03-20 PROCEDURE — 25010000003 POTASSIUM CHLORIDE 10 MEQ/100ML SOLUTION: Performed by: HOSPITALIST

## 2019-03-20 PROCEDURE — 99233 SBSQ HOSP IP/OBS HIGH 50: CPT | Performed by: INTERNAL MEDICINE

## 2019-03-20 PROCEDURE — 80053 COMPREHEN METABOLIC PANEL: CPT | Performed by: PHYSICIAN ASSISTANT

## 2019-03-20 PROCEDURE — 99233 SBSQ HOSP IP/OBS HIGH 50: CPT | Performed by: HOSPITALIST

## 2019-03-20 PROCEDURE — 85025 COMPLETE CBC W/AUTO DIFF WBC: CPT | Performed by: PHYSICIAN ASSISTANT

## 2019-03-20 PROCEDURE — 99231 SBSQ HOSP IP/OBS SF/LOW 25: CPT | Performed by: INTERNAL MEDICINE

## 2019-03-20 PROCEDURE — 83050 HGB METHEMOGLOBIN QUAN: CPT | Performed by: INTERNAL MEDICINE

## 2019-03-20 PROCEDURE — 25010000002 VANCOMYCIN 5 G RECONSTITUTED SOLUTION 5,000 MG VIAL

## 2019-03-20 PROCEDURE — 82375 ASSAY CARBOXYHB QUANT: CPT | Performed by: INTERNAL MEDICINE

## 2019-03-20 PROCEDURE — 25010000002 HEPARIN (PORCINE) PER 1000 UNITS: Performed by: INTERNAL MEDICINE

## 2019-03-20 PROCEDURE — 25010000002 AMIODARONE IN DEXTROSE 5% 360-4.14 MG/200ML-% SOLUTION: Performed by: INTERNAL MEDICINE

## 2019-03-20 PROCEDURE — 92612 ENDOSCOPY SWALLOW (FEES) VID: CPT

## 2019-03-20 PROCEDURE — 93010 ELECTROCARDIOGRAM REPORT: CPT | Performed by: INTERNAL MEDICINE

## 2019-03-20 RX ORDER — BUMETANIDE 0.25 MG/ML
1 INJECTION INTRAMUSCULAR; INTRAVENOUS ONCE
Status: COMPLETED | OUTPATIENT
Start: 2019-03-20 | End: 2019-03-20

## 2019-03-20 RX ORDER — POTASSIUM CHLORIDE 7.45 MG/ML
10 INJECTION INTRAVENOUS
Status: COMPLETED | OUTPATIENT
Start: 2019-03-20 | End: 2019-03-20

## 2019-03-20 RX ORDER — SPIRONOLACTONE 25 MG/1
25 TABLET ORAL DAILY
Status: DISCONTINUED | OUTPATIENT
Start: 2019-03-20 | End: 2019-03-25

## 2019-03-20 RX ADMIN — SODIUM CHLORIDE, PRESERVATIVE FREE 3 ML: 5 INJECTION INTRAVENOUS at 20:42

## 2019-03-20 RX ADMIN — HEPARIN SODIUM 5000 UNITS: 5000 INJECTION INTRAVENOUS; SUBCUTANEOUS at 14:20

## 2019-03-20 RX ADMIN — HYDROCORTISONE SODIUM SUCCINATE 50 MG: 100 INJECTION, POWDER, FOR SOLUTION INTRAMUSCULAR; INTRAVENOUS at 08:55

## 2019-03-20 RX ADMIN — POTASSIUM CHLORIDE 10 MEQ: 10 INJECTION, SOLUTION INTRAVENOUS at 11:30

## 2019-03-20 RX ADMIN — IPRATROPIUM BROMIDE 0.5 MG: 0.5 SOLUTION RESPIRATORY (INHALATION) at 18:42

## 2019-03-20 RX ADMIN — SPIRONOLACTONE 25 MG: 25 TABLET ORAL at 11:41

## 2019-03-20 RX ADMIN — BUDESONIDE 0.5 MG: 0.5 INHALANT RESPIRATORY (INHALATION) at 18:42

## 2019-03-20 RX ADMIN — POTASSIUM CHLORIDE 10 MEQ: 10 INJECTION, SOLUTION INTRAVENOUS at 09:25

## 2019-03-20 RX ADMIN — VANCOMYCIN HYDROCHLORIDE 1000 MG: 5 INJECTION, POWDER, LYOPHILIZED, FOR SOLUTION INTRAVENOUS at 11:31

## 2019-03-20 RX ADMIN — POTASSIUM CHLORIDE 10 MEQ: 10 INJECTION, SOLUTION INTRAVENOUS at 13:49

## 2019-03-20 RX ADMIN — POTASSIUM CHLORIDE 10 MEQ: 10 INJECTION, SOLUTION INTRAVENOUS at 14:23

## 2019-03-20 RX ADMIN — SODIUM CHLORIDE, PRESERVATIVE FREE 3 ML: 5 INJECTION INTRAVENOUS at 20:41

## 2019-03-20 RX ADMIN — VANCOMYCIN HYDROCHLORIDE 1000 MG: 5 INJECTION, POWDER, LYOPHILIZED, FOR SOLUTION INTRAVENOUS at 23:48

## 2019-03-20 RX ADMIN — AMIODARONE HYDROCHLORIDE 0.5 MG/MIN: 1.8 INJECTION, SOLUTION INTRAVENOUS at 13:49

## 2019-03-20 RX ADMIN — SODIUM CHLORIDE, PRESERVATIVE FREE 3 ML: 5 INJECTION INTRAVENOUS at 08:55

## 2019-03-20 RX ADMIN — POTASSIUM CHLORIDE 10 MEQ: 10 INJECTION, SOLUTION INTRAVENOUS at 10:23

## 2019-03-20 RX ADMIN — POTASSIUM CHLORIDE 10 MEQ: 10 INJECTION, SOLUTION INTRAVENOUS at 15:00

## 2019-03-20 RX ADMIN — CEFEPIME HYDROCHLORIDE 1 G: 1 INJECTION, POWDER, FOR SOLUTION INTRAMUSCULAR; INTRAVENOUS at 10:31

## 2019-03-20 RX ADMIN — CEFEPIME HYDROCHLORIDE 1 G: 1 INJECTION, POWDER, FOR SOLUTION INTRAMUSCULAR; INTRAVENOUS at 03:15

## 2019-03-20 RX ADMIN — DEXMEDETOMIDINE HYDROCHLORIDE 1 MCG/KG/HR: 100 INJECTION, SOLUTION INTRAVENOUS at 09:25

## 2019-03-20 RX ADMIN — BUMETANIDE 1 MG: 0.25 INJECTION INTRAMUSCULAR; INTRAVENOUS at 10:31

## 2019-03-20 RX ADMIN — CHLORHEXIDINE GLUCONATE 15 ML: 1.2 RINSE ORAL at 08:54

## 2019-03-20 RX ADMIN — METOPROLOL TARTRATE 2.5 MG: 5 INJECTION, SOLUTION INTRAVENOUS at 03:30

## 2019-03-20 RX ADMIN — CEFEPIME HYDROCHLORIDE 1 G: 1 INJECTION, POWDER, FOR SOLUTION INTRAMUSCULAR; INTRAVENOUS at 18:07

## 2019-03-20 RX ADMIN — METOPROLOL TARTRATE 2.5 MG: 5 INJECTION, SOLUTION INTRAVENOUS at 10:29

## 2019-03-20 RX ADMIN — HEPARIN SODIUM 5000 UNITS: 5000 INJECTION INTRAVENOUS; SUBCUTANEOUS at 06:02

## 2019-03-21 ENCOUNTER — APPOINTMENT (OUTPATIENT)
Dept: GENERAL RADIOLOGY | Facility: HOSPITAL | Age: 54
End: 2019-03-21

## 2019-03-21 ENCOUNTER — APPOINTMENT (OUTPATIENT)
Dept: CARDIOLOGY | Facility: HOSPITAL | Age: 54
End: 2019-03-21

## 2019-03-21 LAB
A-A DO2: 31.4 MMHG (ref 0–300)
ANION GAP SERPL CALCULATED.3IONS-SCNC: 16.5 MMOL/L
APTT PPP: 22.1 SECONDS (ref 23.8–36.1)
APTT PPP: 32.4 SECONDS (ref 23.8–36.1)
ARTERIAL PATENCY WRIST A: POSITIVE
ATMOSPHERIC PRESS: 726 MMHG
BACTERIA SPEC AEROBE CULT: ABNORMAL
BASE EXCESS BLDA CALC-SCNC: 4.4 MMOL/L
BASOPHILS # BLD AUTO: 0.02 10*3/MM3 (ref 0–0.2)
BASOPHILS # BLD AUTO: 0.02 10*3/MM3 (ref 0–0.2)
BASOPHILS NFR BLD AUTO: 0.2 % (ref 0–1.5)
BASOPHILS NFR BLD AUTO: 0.2 % (ref 0–1.5)
BDY SITE: ABNORMAL
BH CV ECHO MEAS - % IVS THICK: 21.8 %
BH CV ECHO MEAS - % LVPW THICK: 62.2 %
BH CV ECHO MEAS - ACS: 1.8 CM
BH CV ECHO MEAS - AI DEC SLOPE: 270.3 CM/SEC^2
BH CV ECHO MEAS - AI MAX PG: 74.3 MMHG
BH CV ECHO MEAS - AI MAX VEL: 431 CM/SEC
BH CV ECHO MEAS - AI P1/2T: 467.1 MSEC
BH CV ECHO MEAS - AO MAX PG: 7 MMHG
BH CV ECHO MEAS - AO MEAN PG: 3.6 MMHG
BH CV ECHO MEAS - AO ROOT AREA (BSA CORRECTED): 1.9
BH CV ECHO MEAS - AO ROOT AREA: 6 CM^2
BH CV ECHO MEAS - AO ROOT DIAM: 2.8 CM
BH CV ECHO MEAS - AO V2 MAX: 132 CM/SEC
BH CV ECHO MEAS - AO V2 MEAN: 89.7 CM/SEC
BH CV ECHO MEAS - AO V2 VTI: 23.1 CM
BH CV ECHO MEAS - BSA(HAYCOCK): 1.5 M^2
BH CV ECHO MEAS - BSA: 1.5 M^2
BH CV ECHO MEAS - BZI_BMI: 30.2 KILOGRAMS/M^2
BH CV ECHO MEAS - BZI_METRIC_HEIGHT: 139.7 CM
BH CV ECHO MEAS - BZI_METRIC_WEIGHT: 59 KG
BH CV ECHO MEAS - EDV(CUBED): 98.8 ML
BH CV ECHO MEAS - EDV(MOD-SP4): 51 ML
BH CV ECHO MEAS - EDV(TEICH): 98.5 ML
BH CV ECHO MEAS - EF(CUBED): 66.4 %
BH CV ECHO MEAS - EF(MOD-SP4): 80.4 %
BH CV ECHO MEAS - EF(TEICH): 57.9 %
BH CV ECHO MEAS - ESV(CUBED): 33.2 ML
BH CV ECHO MEAS - ESV(MOD-SP4): 10 ML
BH CV ECHO MEAS - ESV(TEICH): 41.4 ML
BH CV ECHO MEAS - FS: 30.5 %
BH CV ECHO MEAS - IVS/LVPW: 1.2
BH CV ECHO MEAS - IVSD: 1.1 CM
BH CV ECHO MEAS - IVSS: 1.3 CM
BH CV ECHO MEAS - LA DIMENSION: 3.7 CM
BH CV ECHO MEAS - LA/AO: 1.4
BH CV ECHO MEAS - LV DIASTOLIC VOL/BSA (35-75): 34.9 ML/M^2
BH CV ECHO MEAS - LV MASS(C)D: 155.4 GRAMS
BH CV ECHO MEAS - LV MASS(C)DI: 106.5 GRAMS/M^2
BH CV ECHO MEAS - LV MASS(C)S: 149.5 GRAMS
BH CV ECHO MEAS - LV MASS(C)SI: 102.5 GRAMS/M^2
BH CV ECHO MEAS - LV SYSTOLIC VOL/BSA (12-30): 6.9 ML/M^2
BH CV ECHO MEAS - LVIDD: 4.6 CM
BH CV ECHO MEAS - LVIDS: 3.2 CM
BH CV ECHO MEAS - LVLD AP4: 5.8 CM
BH CV ECHO MEAS - LVLS AP4: 4.8 CM
BH CV ECHO MEAS - LVOT AREA (M): 2.8 CM^2
BH CV ECHO MEAS - LVOT AREA: 2.8 CM^2
BH CV ECHO MEAS - LVOT DIAM: 1.9 CM
BH CV ECHO MEAS - LVPWD: 0.9 CM
BH CV ECHO MEAS - LVPWS: 1.5 CM
BH CV ECHO MEAS - MV A MAX VEL: 105.6 CM/SEC
BH CV ECHO MEAS - MV E MAX VEL: 163.2 CM/SEC
BH CV ECHO MEAS - MV E/A: 1.5
BH CV ECHO MEAS - PA ACC SLOPE: 874 CM/SEC^2
BH CV ECHO MEAS - PA ACC TIME: 0.08 SEC
BH CV ECHO MEAS - PA PR(ACCEL): 42.6 MMHG
BH CV ECHO MEAS - SI(AO): 94.2 ML/M^2
BH CV ECHO MEAS - SI(CUBED): 44.9 ML/M^2
BH CV ECHO MEAS - SI(MOD-SP4): 28.1 ML/M^2
BH CV ECHO MEAS - SI(TEICH): 39.1 ML/M^2
BH CV ECHO MEAS - SV(AO): 137.5 ML
BH CV ECHO MEAS - SV(CUBED): 65.6 ML
BH CV ECHO MEAS - SV(MOD-SP4): 41 ML
BH CV ECHO MEAS - SV(TEICH): 57 ML
BODY TEMPERATURE: 98.6 C
BUN BLD-MCNC: 11 MG/DL (ref 6–20)
BUN/CREAT SERPL: 22 (ref 7–25)
CALCIUM SPEC-SCNC: 9 MG/DL (ref 8.6–10.5)
CHLORIDE SERPL-SCNC: 95 MMOL/L (ref 98–107)
CO2 SERPL-SCNC: 23.5 MMOL/L (ref 22–29)
COHGB MFR BLD: 0.8 % (ref 0–5)
CREAT BLD-MCNC: 0.5 MG/DL (ref 0.57–1)
DEPRECATED RDW RBC AUTO: 46.1 FL (ref 37–54)
DEPRECATED RDW RBC AUTO: 47.2 FL (ref 37–54)
EOSINOPHIL # BLD AUTO: 0.11 10*3/MM3 (ref 0–0.4)
EOSINOPHIL # BLD AUTO: 0.16 10*3/MM3 (ref 0–0.4)
EOSINOPHIL NFR BLD AUTO: 1 % (ref 0.3–6.2)
EOSINOPHIL NFR BLD AUTO: 1.7 % (ref 0.3–6.2)
ERYTHROCYTE [DISTWIDTH] IN BLOOD BY AUTOMATED COUNT: 13.3 % (ref 12.3–15.4)
ERYTHROCYTE [DISTWIDTH] IN BLOOD BY AUTOMATED COUNT: 13.4 % (ref 12.3–15.4)
GFR SERPL CREATININE-BSD FRML MDRD: 128 ML/MIN/1.73
GLUCOSE BLD-MCNC: 104 MG/DL (ref 65–99)
GLUCOSE BLDC GLUCOMTR-MCNC: 105 MG/DL (ref 70–130)
GLUCOSE BLDC GLUCOMTR-MCNC: 88 MG/DL (ref 70–130)
GLUCOSE BLDC GLUCOMTR-MCNC: 95 MG/DL (ref 70–130)
GLUCOSE BLDC GLUCOMTR-MCNC: 99 MG/DL (ref 70–130)
GRAM STN SPEC: ABNORMAL
GRAM STN SPEC: ABNORMAL
HCO3 BLDA-SCNC: 25.9 MMOL/L (ref 22–26)
HCT VFR BLD AUTO: 33.9 % (ref 34–46.6)
HCT VFR BLD AUTO: 34.8 % (ref 34–46.6)
HCT VFR BLD CALC: 33 % (ref 37–47)
HGB BLD-MCNC: 11 G/DL (ref 12–15.9)
HGB BLD-MCNC: 11.3 G/DL (ref 12–15.9)
HGB BLDA-MCNC: 11.3 G/DL (ref 12–16)
HOROWITZ INDEX BLD+IHG-RTO: 21 %
IMM GRANULOCYTES # BLD AUTO: 0.07 10*3/MM3 (ref 0–0.05)
IMM GRANULOCYTES # BLD AUTO: 0.09 10*3/MM3 (ref 0–0.05)
IMM GRANULOCYTES NFR BLD AUTO: 0.7 % (ref 0–0.5)
IMM GRANULOCYTES NFR BLD AUTO: 0.8 % (ref 0–0.5)
INR PPP: 0.95 (ref 0.9–1.1)
ISOLATED FROM: ABNORMAL
LV EF 2D ECHO EST: 60 %
LYMPHOCYTES # BLD AUTO: 1.61 10*3/MM3 (ref 0.7–3.1)
LYMPHOCYTES # BLD AUTO: 1.7 10*3/MM3 (ref 0.7–3.1)
LYMPHOCYTES NFR BLD AUTO: 15.3 % (ref 19.6–45.3)
LYMPHOCYTES NFR BLD AUTO: 16.9 % (ref 19.6–45.3)
MAXIMAL PREDICTED HEART RATE: 165 BPM
MCH RBC QN AUTO: 32.5 PG (ref 26.6–33)
MCH RBC QN AUTO: 32.9 PG (ref 26.6–33)
MCHC RBC AUTO-ENTMCNC: 32.4 G/DL (ref 31.5–35.7)
MCHC RBC AUTO-ENTMCNC: 32.5 G/DL (ref 31.5–35.7)
MCV RBC AUTO: 100.3 FL (ref 79–97)
MCV RBC AUTO: 101.5 FL (ref 79–97)
METHGB BLD QL: 0.1 % (ref 0–3)
MODALITY: ABNORMAL
MONOCYTES # BLD AUTO: 0.99 10*3/MM3 (ref 0.1–0.9)
MONOCYTES # BLD AUTO: 1.18 10*3/MM3 (ref 0.1–0.9)
MONOCYTES NFR BLD AUTO: 10.4 % (ref 5–12)
MONOCYTES NFR BLD AUTO: 10.6 % (ref 5–12)
NEUTROPHILS # BLD AUTO: 6.69 10*3/MM3 (ref 1.4–7)
NEUTROPHILS # BLD AUTO: 7.98 10*3/MM3 (ref 1.4–7)
NEUTROPHILS NFR BLD AUTO: 70.1 % (ref 42.7–76)
NEUTROPHILS NFR BLD AUTO: 72.1 % (ref 42.7–76)
OXYHGB MFR BLDV: 94.5 % (ref 85–100)
PCO2 BLDA: 28.7 MM HG (ref 35–45)
PH BLDA: 7.57 PH UNITS (ref 7.35–7.45)
PLATELET # BLD AUTO: 212 10*3/MM3 (ref 140–450)
PLATELET # BLD AUTO: 219 10*3/MM3 (ref 140–450)
PMV BLD AUTO: 10.3 FL (ref 6–12)
PMV BLD AUTO: 10.6 FL (ref 6–12)
PO2 BLDA: 76.8 MM HG (ref 80–100)
POTASSIUM BLD-SCNC: 2.9 MMOL/L (ref 3.5–5.2)
POTASSIUM BLD-SCNC: 3.1 MMOL/L (ref 3.5–5.2)
POTASSIUM BLD-SCNC: 3.8 MMOL/L (ref 3.5–5.2)
POTASSIUM BLD-SCNC: 4 MMOL/L (ref 3.5–5.2)
PROTHROMBIN TIME: 12.9 SECONDS (ref 11–15.4)
RBC # BLD AUTO: 3.38 10*6/MM3 (ref 3.77–5.28)
RBC # BLD AUTO: 3.43 10*6/MM3 (ref 3.77–5.28)
SAO2 % BLDCOA: 95.4 % (ref 90–100)
SODIUM BLD-SCNC: 135 MMOL/L (ref 136–145)
STRESS TARGET HR: 140 BPM
VANCOMYCIN TROUGH SERPL-MCNC: 12.4 MCG/ML (ref 5–20)
WBC NRBC COR # BLD: 11.08 10*3/MM3 (ref 3.4–10.8)
WBC NRBC COR # BLD: 9.54 10*3/MM3 (ref 3.4–10.8)

## 2019-03-21 PROCEDURE — 71045 X-RAY EXAM CHEST 1 VIEW: CPT

## 2019-03-21 PROCEDURE — 85025 COMPLETE CBC W/AUTO DIFF WBC: CPT | Performed by: PHYSICIAN ASSISTANT

## 2019-03-21 PROCEDURE — 99233 SBSQ HOSP IP/OBS HIGH 50: CPT | Performed by: INTERNAL MEDICINE

## 2019-03-21 PROCEDURE — 25010000003 POTASSIUM CHLORIDE 10 MEQ/100ML SOLUTION: Performed by: NURSE PRACTITIONER

## 2019-03-21 PROCEDURE — 82375 ASSAY CARBOXYHB QUANT: CPT | Performed by: INTERNAL MEDICINE

## 2019-03-21 PROCEDURE — 85025 COMPLETE CBC W/AUTO DIFF WBC: CPT | Performed by: INTERNAL MEDICINE

## 2019-03-21 PROCEDURE — 85610 PROTHROMBIN TIME: CPT | Performed by: INTERNAL MEDICINE

## 2019-03-21 PROCEDURE — 25010000002 AMIODARONE IN DEXTROSE 5% 360-4.14 MG/200ML-% SOLUTION: Performed by: INTERNAL MEDICINE

## 2019-03-21 PROCEDURE — 82805 BLOOD GASES W/O2 SATURATION: CPT | Performed by: INTERNAL MEDICINE

## 2019-03-21 PROCEDURE — 94799 UNLISTED PULMONARY SVC/PX: CPT

## 2019-03-21 PROCEDURE — 82962 GLUCOSE BLOOD TEST: CPT

## 2019-03-21 PROCEDURE — 80048 BASIC METABOLIC PNL TOTAL CA: CPT | Performed by: HOSPITALIST

## 2019-03-21 PROCEDURE — 84132 ASSAY OF SERUM POTASSIUM: CPT | Performed by: HOSPITALIST

## 2019-03-21 PROCEDURE — 99232 SBSQ HOSP IP/OBS MODERATE 35: CPT | Performed by: HOSPITALIST

## 2019-03-21 PROCEDURE — 25010000002 VANCOMYCIN 5 G RECONSTITUTED SOLUTION 5,000 MG VIAL

## 2019-03-21 PROCEDURE — 93306 TTE W/DOPPLER COMPLETE: CPT | Performed by: INTERNAL MEDICINE

## 2019-03-21 PROCEDURE — 36600 WITHDRAWAL OF ARTERIAL BLOOD: CPT | Performed by: INTERNAL MEDICINE

## 2019-03-21 PROCEDURE — 25010000002 HEPARIN (PORCINE) PER 1000 UNITS: Performed by: INTERNAL MEDICINE

## 2019-03-21 PROCEDURE — 80202 ASSAY OF VANCOMYCIN: CPT

## 2019-03-21 PROCEDURE — 71045 X-RAY EXAM CHEST 1 VIEW: CPT | Performed by: RADIOLOGY

## 2019-03-21 PROCEDURE — 92526 ORAL FUNCTION THERAPY: CPT

## 2019-03-21 PROCEDURE — 83050 HGB METHEMOGLOBIN QUAN: CPT | Performed by: INTERNAL MEDICINE

## 2019-03-21 PROCEDURE — 93306 TTE W/DOPPLER COMPLETE: CPT

## 2019-03-21 PROCEDURE — 87040 BLOOD CULTURE FOR BACTERIA: CPT | Performed by: INTERNAL MEDICINE

## 2019-03-21 PROCEDURE — 85730 THROMBOPLASTIN TIME PARTIAL: CPT | Performed by: INTERNAL MEDICINE

## 2019-03-21 RX ORDER — POTASSIUM CHLORIDE 7.45 MG/ML
10 INJECTION INTRAVENOUS
Status: COMPLETED | OUTPATIENT
Start: 2019-03-21 | End: 2019-03-21

## 2019-03-21 RX ORDER — MEXILETINE HYDROCHLORIDE 150 MG/1
150 CAPSULE ORAL EVERY 8 HOURS SCHEDULED
Status: DISCONTINUED | OUTPATIENT
Start: 2019-03-21 | End: 2019-04-18 | Stop reason: HOSPADM

## 2019-03-21 RX ORDER — HEPARIN SODIUM 5000 [USP'U]/ML
30 INJECTION, SOLUTION INTRAVENOUS; SUBCUTANEOUS AS NEEDED
Status: DISCONTINUED | OUTPATIENT
Start: 2019-03-21 | End: 2019-03-22

## 2019-03-21 RX ORDER — AMIODARONE HYDROCHLORIDE 200 MG/1
400 TABLET ORAL
Status: DISPENSED | OUTPATIENT
Start: 2019-03-21 | End: 2019-03-28

## 2019-03-21 RX ORDER — POTASSIUM CHLORIDE 1.5 G/1.77G
40 POWDER, FOR SOLUTION ORAL EVERY 4 HOURS
Status: DISCONTINUED | OUTPATIENT
Start: 2019-03-21 | End: 2019-03-21

## 2019-03-21 RX ORDER — AMIODARONE HYDROCHLORIDE 200 MG/1
200 TABLET ORAL DAILY
Status: DISCONTINUED | OUTPATIENT
Start: 2019-03-28 | End: 2019-04-18 | Stop reason: HOSPADM

## 2019-03-21 RX ORDER — POTASSIUM CHLORIDE 20MEQ/15ML
40 LIQUID (ML) ORAL ONCE
Status: COMPLETED | OUTPATIENT
Start: 2019-03-21 | End: 2019-03-21

## 2019-03-21 RX ORDER — HEPARIN SODIUM 5000 [USP'U]/ML
60 INJECTION, SOLUTION INTRAVENOUS; SUBCUTANEOUS AS NEEDED
Status: DISCONTINUED | OUTPATIENT
Start: 2019-03-21 | End: 2019-03-22

## 2019-03-21 RX ORDER — HEPARIN SODIUM 10000 [USP'U]/100ML
12 INJECTION, SOLUTION INTRAVENOUS
Status: DISCONTINUED | OUTPATIENT
Start: 2019-03-21 | End: 2019-03-22

## 2019-03-21 RX ADMIN — MEXILETINE HYDROCHLORIDE 150 MG: 150 CAPSULE ORAL at 21:00

## 2019-03-21 RX ADMIN — LANSOPRAZOLE 30 MG: KIT at 08:14

## 2019-03-21 RX ADMIN — POTASSIUM CHLORIDE 10 MEQ: 10 INJECTION, SOLUTION INTRAVENOUS at 07:41

## 2019-03-21 RX ADMIN — SODIUM CHLORIDE, PRESERVATIVE FREE 3 ML: 5 INJECTION INTRAVENOUS at 08:20

## 2019-03-21 RX ADMIN — CEFEPIME HYDROCHLORIDE 1 G: 1 INJECTION, POWDER, FOR SOLUTION INTRAMUSCULAR; INTRAVENOUS at 03:36

## 2019-03-21 RX ADMIN — CEFEPIME HYDROCHLORIDE 1 G: 1 INJECTION, POWDER, FOR SOLUTION INTRAMUSCULAR; INTRAVENOUS at 18:01

## 2019-03-21 RX ADMIN — METOPROLOL TARTRATE 25 MG: 25 TABLET, FILM COATED ORAL at 11:56

## 2019-03-21 RX ADMIN — CEFEPIME HYDROCHLORIDE 1 G: 1 INJECTION, POWDER, FOR SOLUTION INTRAMUSCULAR; INTRAVENOUS at 11:48

## 2019-03-21 RX ADMIN — AMIODARONE HYDROCHLORIDE 0.5 MG/MIN: 1.8 INJECTION, SOLUTION INTRAVENOUS at 01:14

## 2019-03-21 RX ADMIN — AMIODARONE HYDROCHLORIDE 400 MG: 200 TABLET ORAL at 11:54

## 2019-03-21 RX ADMIN — SODIUM CHLORIDE, PRESERVATIVE FREE 3 ML: 5 INJECTION INTRAVENOUS at 21:01

## 2019-03-21 RX ADMIN — QUETIAPINE FUMARATE 25 MG: 25 TABLET, FILM COATED ORAL at 21:00

## 2019-03-21 RX ADMIN — POTASSIUM CHLORIDE 10 MEQ: 10 INJECTION, SOLUTION INTRAVENOUS at 06:04

## 2019-03-21 RX ADMIN — POTASSIUM CHLORIDE 10 MEQ: 10 INJECTION, SOLUTION INTRAVENOUS at 04:33

## 2019-03-21 RX ADMIN — POTASSIUM CHLORIDE 10 MEQ: 10 INJECTION, SOLUTION INTRAVENOUS at 09:15

## 2019-03-21 RX ADMIN — HEPARIN SODIUM 12 UNITS/KG/HR: 10000 INJECTION, SOLUTION INTRAVENOUS at 12:13

## 2019-03-21 RX ADMIN — HEPARIN SODIUM 3600 UNITS: 5000 INJECTION INTRAVENOUS; SUBCUTANEOUS at 18:33

## 2019-03-21 RX ADMIN — ASPIRIN 81 MG: 81 TABLET ORAL at 08:14

## 2019-03-21 RX ADMIN — SPIRONOLACTONE 25 MG: 25 TABLET ORAL at 08:14

## 2019-03-21 RX ADMIN — MEXILETINE HYDROCHLORIDE 150 MG: 150 CAPSULE ORAL at 12:45

## 2019-03-21 RX ADMIN — POTASSIUM CHLORIDE 40 MEQ: 20 SOLUTION ORAL at 11:51

## 2019-03-21 RX ADMIN — FOLIC ACID 1 MG: 1 TABLET ORAL at 08:14

## 2019-03-21 RX ADMIN — POTASSIUM CHLORIDE 10 MEQ: 10 INJECTION, SOLUTION INTRAVENOUS at 08:15

## 2019-03-21 RX ADMIN — POTASSIUM CHLORIDE 40 MEQ: 1.5 POWDER, FOR SOLUTION ORAL at 14:21

## 2019-03-21 RX ADMIN — Medication 100 MG: at 08:14

## 2019-03-21 RX ADMIN — POTASSIUM CHLORIDE 10 MEQ: 10 INJECTION, SOLUTION INTRAVENOUS at 10:39

## 2019-03-21 RX ADMIN — METOPROLOL TARTRATE 25 MG: 25 TABLET, FILM COATED ORAL at 21:00

## 2019-03-21 RX ADMIN — Medication 1 CAPSULE: at 08:14

## 2019-03-21 RX ADMIN — VANCOMYCIN HYDROCHLORIDE 1000 MG: 5 INJECTION, POWDER, LYOPHILIZED, FOR SOLUTION INTRAVENOUS at 11:54

## 2019-03-22 LAB
A-A DO2: 33.7 MMHG (ref 0–300)
ALBUMIN SERPL-MCNC: 3.47 G/DL (ref 3.5–5.2)
ALBUMIN/GLOB SERPL: 1.1 G/DL
ALP SERPL-CCNC: 60 U/L (ref 39–117)
ALT SERPL W P-5'-P-CCNC: 13 U/L (ref 1–33)
ANION GAP SERPL CALCULATED.3IONS-SCNC: 16.7 MMOL/L
APTT PPP: 89.8 SECONDS (ref 23.8–36.1)
ARTERIAL PATENCY WRIST A: POSITIVE
AST SERPL-CCNC: 16 U/L (ref 1–32)
ATMOSPHERIC PRESS: 728 MMHG
BASE EXCESS BLDA CALC-SCNC: 0.6 MMOL/L
BASOPHILS # BLD AUTO: 0.06 10*3/MM3 (ref 0–0.2)
BASOPHILS NFR BLD AUTO: 0.6 % (ref 0–1.5)
BDY SITE: ABNORMAL
BILIRUB SERPL-MCNC: 0.6 MG/DL (ref 0.2–1.2)
BODY TEMPERATURE: 98.6 C
BUN BLD-MCNC: 13 MG/DL (ref 6–20)
BUN/CREAT SERPL: 24.1 (ref 7–25)
CALCIUM SPEC-SCNC: 8.9 MG/DL (ref 8.6–10.5)
CHLORIDE SERPL-SCNC: 101 MMOL/L (ref 98–107)
CO2 SERPL-SCNC: 22.3 MMOL/L (ref 22–29)
COHGB MFR BLD: 1.2 % (ref 0–5)
CREAT BLD-MCNC: 0.54 MG/DL (ref 0.57–1)
DEPRECATED RDW RBC AUTO: 48 FL (ref 37–54)
EOSINOPHIL # BLD AUTO: 0.27 10*3/MM3 (ref 0–0.4)
EOSINOPHIL NFR BLD AUTO: 2.6 % (ref 0.3–6.2)
ERYTHROCYTE [DISTWIDTH] IN BLOOD BY AUTOMATED COUNT: 13.6 % (ref 12.3–15.4)
GFR SERPL CREATININE-BSD FRML MDRD: 117 ML/MIN/1.73
GLOBULIN UR ELPH-MCNC: 3 GM/DL
GLUCOSE BLD-MCNC: 91 MG/DL (ref 65–99)
GLUCOSE BLDC GLUCOMTR-MCNC: 71 MG/DL (ref 70–130)
GLUCOSE BLDC GLUCOMTR-MCNC: 76 MG/DL (ref 70–130)
GLUCOSE BLDC GLUCOMTR-MCNC: 82 MG/DL (ref 70–130)
GLUCOSE BLDC GLUCOMTR-MCNC: 88 MG/DL (ref 70–130)
HCO3 BLDA-SCNC: 21.8 MMOL/L (ref 22–26)
HCT VFR BLD AUTO: 33.7 % (ref 34–46.6)
HCT VFR BLD CALC: 34 % (ref 37–47)
HGB BLD-MCNC: 10.8 G/DL (ref 12–15.9)
HGB BLDA-MCNC: 11.4 G/DL (ref 12–16)
HOROWITZ INDEX BLD+IHG-RTO: 21 %
IMM GRANULOCYTES # BLD AUTO: 0.06 10*3/MM3 (ref 0–0.05)
IMM GRANULOCYTES NFR BLD AUTO: 0.6 % (ref 0–0.5)
LYMPHOCYTES # BLD AUTO: 2.13 10*3/MM3 (ref 0.7–3.1)
LYMPHOCYTES NFR BLD AUTO: 20.3 % (ref 19.6–45.3)
MAGNESIUM SERPL-MCNC: 1.8 MG/DL (ref 1.6–2.6)
MCH RBC QN AUTO: 32.9 PG (ref 26.6–33)
MCHC RBC AUTO-ENTMCNC: 32 G/DL (ref 31.5–35.7)
MCV RBC AUTO: 102.7 FL (ref 79–97)
METHGB BLD QL: 0.3 % (ref 0–3)
MODALITY: ABNORMAL
MONOCYTES # BLD AUTO: 1.29 10*3/MM3 (ref 0.1–0.9)
MONOCYTES NFR BLD AUTO: 12.3 % (ref 5–12)
NEUTROPHILS # BLD AUTO: 6.67 10*3/MM3 (ref 1.4–7)
NEUTROPHILS NFR BLD AUTO: 63.6 % (ref 42.7–76)
NT-PROBNP SERPL-MCNC: 4263 PG/ML (ref 5–900)
OXYHGB MFR BLDV: 94.1 % (ref 85–100)
PCO2 BLDA: 25.1 MM HG (ref 35–45)
PH BLDA: 7.56 PH UNITS (ref 7.35–7.45)
PLATELET # BLD AUTO: 207 10*3/MM3 (ref 140–450)
PMV BLD AUTO: 10.1 FL (ref 6–12)
PO2 BLDA: 79.3 MM HG (ref 80–100)
POTASSIUM BLD-SCNC: 3.8 MMOL/L (ref 3.5–5.2)
POTASSIUM BLD-SCNC: 3.9 MMOL/L (ref 3.5–5.2)
PROT SERPL-MCNC: 6.5 G/DL (ref 6–8.5)
RBC # BLD AUTO: 3.28 10*6/MM3 (ref 3.77–5.28)
SAO2 % BLDCOA: 95.5 % (ref 90–100)
SODIUM BLD-SCNC: 140 MMOL/L (ref 136–145)
WBC NRBC COR # BLD: 10.48 10*3/MM3 (ref 3.4–10.8)

## 2019-03-22 PROCEDURE — 94799 UNLISTED PULMONARY SVC/PX: CPT

## 2019-03-22 PROCEDURE — 99233 SBSQ HOSP IP/OBS HIGH 50: CPT | Performed by: INTERNAL MEDICINE

## 2019-03-22 PROCEDURE — 82375 ASSAY CARBOXYHB QUANT: CPT | Performed by: INTERNAL MEDICINE

## 2019-03-22 PROCEDURE — 84132 ASSAY OF SERUM POTASSIUM: CPT | Performed by: HOSPITALIST

## 2019-03-22 PROCEDURE — 99233 SBSQ HOSP IP/OBS HIGH 50: CPT | Performed by: HOSPITALIST

## 2019-03-22 PROCEDURE — 85730 THROMBOPLASTIN TIME PARTIAL: CPT | Performed by: INTERNAL MEDICINE

## 2019-03-22 PROCEDURE — 83735 ASSAY OF MAGNESIUM: CPT | Performed by: HOSPITALIST

## 2019-03-22 PROCEDURE — 25010000003 POTASSIUM CHLORIDE 10 MEQ/100ML SOLUTION: Performed by: HOSPITALIST

## 2019-03-22 PROCEDURE — 82805 BLOOD GASES W/O2 SATURATION: CPT | Performed by: INTERNAL MEDICINE

## 2019-03-22 PROCEDURE — 85025 COMPLETE CBC W/AUTO DIFF WBC: CPT | Performed by: PHYSICIAN ASSISTANT

## 2019-03-22 PROCEDURE — 25010000002 MAGNESIUM SULFATE 2 GM/50ML SOLUTION: Performed by: HOSPITALIST

## 2019-03-22 PROCEDURE — 93010 ELECTROCARDIOGRAM REPORT: CPT | Performed by: INTERNAL MEDICINE

## 2019-03-22 PROCEDURE — 83050 HGB METHEMOGLOBIN QUAN: CPT | Performed by: INTERNAL MEDICINE

## 2019-03-22 PROCEDURE — 80053 COMPREHEN METABOLIC PANEL: CPT | Performed by: INTERNAL MEDICINE

## 2019-03-22 PROCEDURE — 36600 WITHDRAWAL OF ARTERIAL BLOOD: CPT | Performed by: INTERNAL MEDICINE

## 2019-03-22 PROCEDURE — 82962 GLUCOSE BLOOD TEST: CPT

## 2019-03-22 PROCEDURE — 25010000002 ZIPRASIDONE MESYLATE PER 10 MG: Performed by: HOSPITALIST

## 2019-03-22 PROCEDURE — 83880 ASSAY OF NATRIURETIC PEPTIDE: CPT | Performed by: HOSPITALIST

## 2019-03-22 PROCEDURE — 25010000002 ENOXAPARIN PER 10 MG: Performed by: INTERNAL MEDICINE

## 2019-03-22 PROCEDURE — 93005 ELECTROCARDIOGRAM TRACING: CPT | Performed by: INTERNAL MEDICINE

## 2019-03-22 RX ORDER — HEPARIN SODIUM 5000 [USP'U]/ML
5000 INJECTION, SOLUTION INTRAVENOUS; SUBCUTANEOUS EVERY 8 HOURS SCHEDULED
Status: DISCONTINUED | OUTPATIENT
Start: 2019-03-22 | End: 2019-03-22

## 2019-03-22 RX ORDER — HALOPERIDOL 5 MG/ML
5 INJECTION INTRAMUSCULAR ONCE
Status: DISCONTINUED | OUTPATIENT
Start: 2019-03-22 | End: 2019-03-22

## 2019-03-22 RX ORDER — METOPROLOL TARTRATE 50 MG/1
50 TABLET, FILM COATED ORAL EVERY 12 HOURS SCHEDULED
Status: DISCONTINUED | OUTPATIENT
Start: 2019-03-22 | End: 2019-03-28

## 2019-03-22 RX ORDER — OLANZAPINE 5 MG/1
5 TABLET, ORALLY DISINTEGRATING ORAL 2 TIMES DAILY PRN
Status: DISCONTINUED | OUTPATIENT
Start: 2019-03-22 | End: 2019-04-02

## 2019-03-22 RX ORDER — POTASSIUM CHLORIDE 7.45 MG/ML
10 INJECTION INTRAVENOUS
Status: COMPLETED | OUTPATIENT
Start: 2019-03-22 | End: 2019-03-22

## 2019-03-22 RX ORDER — ZIPRASIDONE MESYLATE 20 MG/ML
INJECTION, POWDER, LYOPHILIZED, FOR SOLUTION INTRAMUSCULAR
Status: DISPENSED
Start: 2019-03-22 | End: 2019-03-22

## 2019-03-22 RX ORDER — POTASSIUM CHLORIDE 20 MEQ/1
40 TABLET, EXTENDED RELEASE ORAL ONCE
Status: DISCONTINUED | OUTPATIENT
Start: 2019-03-22 | End: 2019-03-22

## 2019-03-22 RX ORDER — OLANZAPINE 5 MG/1
5 TABLET ORAL NIGHTLY
Status: DISCONTINUED | OUTPATIENT
Start: 2019-03-22 | End: 2019-04-01

## 2019-03-22 RX ORDER — BUMETANIDE 0.25 MG/ML
0.5 INJECTION INTRAMUSCULAR; INTRAVENOUS ONCE
Status: COMPLETED | OUTPATIENT
Start: 2019-03-22 | End: 2019-03-22

## 2019-03-22 RX ORDER — MAGNESIUM SULFATE HEPTAHYDRATE 40 MG/ML
2 INJECTION, SOLUTION INTRAVENOUS ONCE
Status: COMPLETED | OUTPATIENT
Start: 2019-03-22 | End: 2019-03-22

## 2019-03-22 RX ORDER — WATER FOR INJ.,BACTERIOSTATIC
VIAL (ML) INJECTION
Status: DISPENSED
Start: 2019-03-22 | End: 2019-03-22

## 2019-03-22 RX ADMIN — DILTIAZEM HYDROCHLORIDE 5 MG/HR: 5 INJECTION INTRAVENOUS at 09:29

## 2019-03-22 RX ADMIN — BUMETANIDE 0.5 MG: 0.25 INJECTION INTRAMUSCULAR; INTRAVENOUS at 11:33

## 2019-03-22 RX ADMIN — SODIUM CHLORIDE, PRESERVATIVE FREE 3 ML: 5 INJECTION INTRAVENOUS at 20:35

## 2019-03-22 RX ADMIN — CEFEPIME HYDROCHLORIDE 1 G: 1 INJECTION, POWDER, FOR SOLUTION INTRAMUSCULAR; INTRAVENOUS at 18:30

## 2019-03-22 RX ADMIN — MEXILETINE HYDROCHLORIDE 150 MG: 150 CAPSULE ORAL at 20:34

## 2019-03-22 RX ADMIN — ENOXAPARIN SODIUM 60 MG: 60 INJECTION SUBCUTANEOUS at 10:42

## 2019-03-22 RX ADMIN — POTASSIUM CHLORIDE 10 MEQ: 10 INJECTION, SOLUTION INTRAVENOUS at 14:27

## 2019-03-22 RX ADMIN — POTASSIUM CHLORIDE 10 MEQ: 10 INJECTION, SOLUTION INTRAVENOUS at 17:08

## 2019-03-22 RX ADMIN — MAGNESIUM SULFATE IN WATER 2 G: 40 INJECTION, SOLUTION INTRAVENOUS at 08:45

## 2019-03-22 RX ADMIN — SODIUM CHLORIDE, PRESERVATIVE FREE 3 ML: 5 INJECTION INTRAVENOUS at 08:45

## 2019-03-22 RX ADMIN — POTASSIUM CHLORIDE 10 MEQ: 10 INJECTION, SOLUTION INTRAVENOUS at 16:24

## 2019-03-22 RX ADMIN — WATER 10 MG: 1 INJECTION INTRAMUSCULAR; INTRAVENOUS; SUBCUTANEOUS at 07:54

## 2019-03-22 RX ADMIN — CEFEPIME HYDROCHLORIDE 1 G: 1 INJECTION, POWDER, FOR SOLUTION INTRAMUSCULAR; INTRAVENOUS at 03:16

## 2019-03-22 RX ADMIN — DILTIAZEM HYDROCHLORIDE 15 MG/HR: 5 INJECTION INTRAVENOUS at 17:19

## 2019-03-22 RX ADMIN — OLANZAPINE 5 MG: 5 TABLET, FILM COATED ORAL at 20:34

## 2019-03-22 RX ADMIN — POTASSIUM CHLORIDE 10 MEQ: 10 INJECTION, SOLUTION INTRAVENOUS at 14:59

## 2019-03-22 RX ADMIN — ENOXAPARIN SODIUM 60 MG: 60 INJECTION SUBCUTANEOUS at 22:00

## 2019-03-22 RX ADMIN — CEFEPIME HYDROCHLORIDE 1 G: 1 INJECTION, POWDER, FOR SOLUTION INTRAMUSCULAR; INTRAVENOUS at 11:33

## 2019-03-22 RX ADMIN — METOPROLOL TARTRATE 50 MG: 50 TABLET, FILM COATED ORAL at 20:34

## 2019-03-23 LAB
ANION GAP SERPL CALCULATED.3IONS-SCNC: 18 MMOL/L
BUN BLD-MCNC: 12 MG/DL (ref 6–20)
BUN/CREAT SERPL: 23.1 (ref 7–25)
CALCIUM SPEC-SCNC: 9.1 MG/DL (ref 8.6–10.5)
CHLORIDE SERPL-SCNC: 99 MMOL/L (ref 98–107)
CO2 SERPL-SCNC: 22 MMOL/L (ref 22–29)
CREAT BLD-MCNC: 0.52 MG/DL (ref 0.57–1)
DEPRECATED RDW RBC AUTO: 48.7 FL (ref 37–54)
ERYTHROCYTE [DISTWIDTH] IN BLOOD BY AUTOMATED COUNT: 13.7 % (ref 12.3–15.4)
GFR SERPL CREATININE-BSD FRML MDRD: 122 ML/MIN/1.73
GLUCOSE BLD-MCNC: 83 MG/DL (ref 65–99)
GLUCOSE BLDC GLUCOMTR-MCNC: 105 MG/DL (ref 70–130)
GLUCOSE BLDC GLUCOMTR-MCNC: 88 MG/DL (ref 70–130)
GLUCOSE BLDC GLUCOMTR-MCNC: 95 MG/DL (ref 70–130)
HCT VFR BLD AUTO: 32.1 % (ref 34–46.6)
HGB BLD-MCNC: 10.3 G/DL (ref 12–15.9)
MAGNESIUM SERPL-MCNC: 1.8 MG/DL (ref 1.6–2.6)
MCH RBC QN AUTO: 32.8 PG (ref 26.6–33)
MCHC RBC AUTO-ENTMCNC: 32.1 G/DL (ref 31.5–35.7)
MCV RBC AUTO: 102.2 FL (ref 79–97)
NT-PROBNP SERPL-MCNC: 1203 PG/ML (ref 5–900)
PLATELET # BLD AUTO: 242 10*3/MM3 (ref 140–450)
PMV BLD AUTO: 10.1 FL (ref 6–12)
POTASSIUM BLD-SCNC: 3.5 MMOL/L (ref 3.5–5.2)
RBC # BLD AUTO: 3.14 10*6/MM3 (ref 3.77–5.28)
SODIUM BLD-SCNC: 139 MMOL/L (ref 136–145)
WBC NRBC COR # BLD: 11.38 10*3/MM3 (ref 3.4–10.8)

## 2019-03-23 PROCEDURE — 25010000002 ENOXAPARIN PER 10 MG: Performed by: INTERNAL MEDICINE

## 2019-03-23 PROCEDURE — 99251 PR INITL INPATIENT CONSULT NEW/ESTAB PT 20 MIN: CPT | Performed by: PSYCHIATRY & NEUROLOGY

## 2019-03-23 PROCEDURE — 25010000002 MAGNESIUM SULFATE 2 GM/50ML SOLUTION: Performed by: HOSPITALIST

## 2019-03-23 PROCEDURE — 80048 BASIC METABOLIC PNL TOTAL CA: CPT | Performed by: HOSPITALIST

## 2019-03-23 PROCEDURE — 83735 ASSAY OF MAGNESIUM: CPT | Performed by: HOSPITALIST

## 2019-03-23 PROCEDURE — 99232 SBSQ HOSP IP/OBS MODERATE 35: CPT | Performed by: HOSPITALIST

## 2019-03-23 PROCEDURE — 94799 UNLISTED PULMONARY SVC/PX: CPT

## 2019-03-23 PROCEDURE — 82962 GLUCOSE BLOOD TEST: CPT

## 2019-03-23 PROCEDURE — 99231 SBSQ HOSP IP/OBS SF/LOW 25: CPT | Performed by: INTERNAL MEDICINE

## 2019-03-23 PROCEDURE — 83880 ASSAY OF NATRIURETIC PEPTIDE: CPT | Performed by: HOSPITALIST

## 2019-03-23 PROCEDURE — 85027 COMPLETE CBC AUTOMATED: CPT | Performed by: INTERNAL MEDICINE

## 2019-03-23 RX ORDER — MAGNESIUM SULFATE HEPTAHYDRATE 40 MG/ML
2 INJECTION, SOLUTION INTRAVENOUS ONCE
Status: COMPLETED | OUTPATIENT
Start: 2019-03-23 | End: 2019-03-23

## 2019-03-23 RX ORDER — POTASSIUM CHLORIDE 20 MEQ/1
40 TABLET, EXTENDED RELEASE ORAL DAILY
Status: COMPLETED | OUTPATIENT
Start: 2019-03-24 | End: 2019-03-26

## 2019-03-23 RX ORDER — POTASSIUM CHLORIDE 7.45 MG/ML
10 INJECTION INTRAVENOUS
Status: DISCONTINUED | OUTPATIENT
Start: 2019-03-23 | End: 2019-03-23 | Stop reason: CLARIF

## 2019-03-23 RX ORDER — BUMETANIDE 0.25 MG/ML
0.5 INJECTION INTRAMUSCULAR; INTRAVENOUS ONCE
Status: DISCONTINUED | OUTPATIENT
Start: 2019-03-23 | End: 2019-03-23

## 2019-03-23 RX ORDER — POTASSIUM CHLORIDE 1.5 G/1.77G
40 POWDER, FOR SOLUTION ORAL EVERY 4 HOURS
Status: COMPLETED | OUTPATIENT
Start: 2019-03-23 | End: 2019-03-23

## 2019-03-23 RX ORDER — TORSEMIDE 20 MG/1
20 TABLET ORAL DAILY
Status: DISCONTINUED | OUTPATIENT
Start: 2019-03-23 | End: 2019-03-25

## 2019-03-23 RX ADMIN — DILTIAZEM HYDROCHLORIDE 15 MG/HR: 5 INJECTION INTRAVENOUS at 03:24

## 2019-03-23 RX ADMIN — MAGNESIUM SULFATE IN WATER 2 G: 40 INJECTION, SOLUTION INTRAVENOUS at 11:56

## 2019-03-23 RX ADMIN — MEXILETINE HYDROCHLORIDE 150 MG: 150 CAPSULE ORAL at 22:14

## 2019-03-23 RX ADMIN — SPIRONOLACTONE 25 MG: 25 TABLET ORAL at 10:17

## 2019-03-23 RX ADMIN — POTASSIUM CHLORIDE 40 MEQ: 1.5 POWDER, FOR SOLUTION ORAL at 10:13

## 2019-03-23 RX ADMIN — DILTIAZEM HYDROCHLORIDE 15 MG/HR: 5 INJECTION INTRAVENOUS at 13:08

## 2019-03-23 RX ADMIN — POTASSIUM CHLORIDE 40 MEQ: 1.5 POWDER, FOR SOLUTION ORAL at 17:08

## 2019-03-23 RX ADMIN — ENOXAPARIN SODIUM 60 MG: 60 INJECTION SUBCUTANEOUS at 10:14

## 2019-03-23 RX ADMIN — SODIUM CHLORIDE, PRESERVATIVE FREE 3 ML: 5 INJECTION INTRAVENOUS at 22:13

## 2019-03-23 RX ADMIN — OLANZAPINE 5 MG: 5 TABLET, FILM COATED ORAL at 22:12

## 2019-03-23 RX ADMIN — CEFEPIME HYDROCHLORIDE 1 G: 1 INJECTION, POWDER, FOR SOLUTION INTRAMUSCULAR; INTRAVENOUS at 11:56

## 2019-03-23 RX ADMIN — MEXILETINE HYDROCHLORIDE 150 MG: 150 CAPSULE ORAL at 06:13

## 2019-03-23 RX ADMIN — METOPROLOL TARTRATE 50 MG: 50 TABLET, FILM COATED ORAL at 10:14

## 2019-03-23 RX ADMIN — Medication 1 CAPSULE: at 10:17

## 2019-03-23 RX ADMIN — SODIUM CHLORIDE, PRESERVATIVE FREE 3 ML: 5 INJECTION INTRAVENOUS at 10:13

## 2019-03-23 RX ADMIN — Medication 100 MG: at 10:17

## 2019-03-23 RX ADMIN — FOLIC ACID 1 MG: 1 TABLET ORAL at 10:17

## 2019-03-23 RX ADMIN — AMIODARONE HYDROCHLORIDE 400 MG: 200 TABLET ORAL at 10:17

## 2019-03-23 RX ADMIN — METOPROLOL TARTRATE 50 MG: 50 TABLET, FILM COATED ORAL at 22:11

## 2019-03-23 RX ADMIN — ASPIRIN 81 MG: 81 TABLET ORAL at 10:17

## 2019-03-23 RX ADMIN — CEFEPIME HYDROCHLORIDE 1 G: 1 INJECTION, POWDER, FOR SOLUTION INTRAMUSCULAR; INTRAVENOUS at 22:10

## 2019-03-23 RX ADMIN — POTASSIUM CHLORIDE 40 MEQ: 1.5 POWDER, FOR SOLUTION ORAL at 11:56

## 2019-03-23 RX ADMIN — CEFEPIME HYDROCHLORIDE 1 G: 1 INJECTION, POWDER, FOR SOLUTION INTRAMUSCULAR; INTRAVENOUS at 03:25

## 2019-03-23 RX ADMIN — LEVOTHYROXINE SODIUM 50 MCG: 50 TABLET ORAL at 06:13

## 2019-03-23 RX ADMIN — LANSOPRAZOLE 30 MG: KIT at 10:14

## 2019-03-24 ENCOUNTER — APPOINTMENT (OUTPATIENT)
Dept: GENERAL RADIOLOGY | Facility: HOSPITAL | Age: 54
End: 2019-03-24

## 2019-03-24 LAB
ANION GAP SERPL CALCULATED.3IONS-SCNC: 18.3 MMOL/L
BACTERIA SPEC AEROBE CULT: NORMAL
BACTERIA UR QL AUTO: ABNORMAL /HPF
BASOPHILS # BLD AUTO: 0.03 10*3/MM3 (ref 0–0.2)
BASOPHILS NFR BLD AUTO: 0.2 % (ref 0–1.5)
BILIRUB UR QL STRIP: NEGATIVE
BUN BLD-MCNC: 12 MG/DL (ref 6–20)
BUN/CREAT SERPL: 25 (ref 7–25)
CALCIUM SPEC-SCNC: 9.3 MG/DL (ref 8.6–10.5)
CHLORIDE SERPL-SCNC: 101 MMOL/L (ref 98–107)
CLARITY UR: ABNORMAL
CO2 SERPL-SCNC: 19.7 MMOL/L (ref 22–29)
COLOR UR: YELLOW
CREAT BLD-MCNC: 0.48 MG/DL (ref 0.57–1)
DEPRECATED RDW RBC AUTO: 48.7 FL (ref 37–54)
DEPRECATED RDW RBC AUTO: 49 FL (ref 37–54)
EOSINOPHIL # BLD AUTO: 0.35 10*3/MM3 (ref 0–0.4)
EOSINOPHIL NFR BLD AUTO: 2.4 % (ref 0.3–6.2)
ERYTHROCYTE [DISTWIDTH] IN BLOOD BY AUTOMATED COUNT: 13.8 % (ref 12.3–15.4)
ERYTHROCYTE [DISTWIDTH] IN BLOOD BY AUTOMATED COUNT: 14 % (ref 12.3–15.4)
GFR SERPL CREATININE-BSD FRML MDRD: 134 ML/MIN/1.73
GLUCOSE BLD-MCNC: 82 MG/DL (ref 65–99)
GLUCOSE BLDC GLUCOMTR-MCNC: 114 MG/DL (ref 70–130)
GLUCOSE BLDC GLUCOMTR-MCNC: 130 MG/DL (ref 70–130)
GLUCOSE BLDC GLUCOMTR-MCNC: 172 MG/DL (ref 70–130)
GLUCOSE BLDC GLUCOMTR-MCNC: 83 MG/DL (ref 70–130)
GLUCOSE BLDC GLUCOMTR-MCNC: 86 MG/DL (ref 70–130)
GLUCOSE UR STRIP-MCNC: NEGATIVE MG/DL
HCT VFR BLD AUTO: 31 % (ref 34–46.6)
HCT VFR BLD AUTO: 34.7 % (ref 34–46.6)
HGB BLD-MCNC: 11.2 G/DL (ref 12–15.9)
HGB BLD-MCNC: 9.8 G/DL (ref 12–15.9)
HGB UR QL STRIP.AUTO: ABNORMAL
HYALINE CASTS UR QL AUTO: ABNORMAL /LPF
IMM GRANULOCYTES # BLD AUTO: 0.07 10*3/MM3 (ref 0–0.05)
IMM GRANULOCYTES NFR BLD AUTO: 0.5 % (ref 0–0.5)
KETONES UR QL STRIP: ABNORMAL
LEUKOCYTE ESTERASE UR QL STRIP.AUTO: NEGATIVE
LYMPHOCYTES # BLD AUTO: 1.09 10*3/MM3 (ref 0.7–3.1)
LYMPHOCYTES NFR BLD AUTO: 7.6 % (ref 19.6–45.3)
MAGNESIUM SERPL-MCNC: 1.8 MG/DL (ref 1.6–2.6)
MCH RBC QN AUTO: 32.6 PG (ref 26.6–33)
MCH RBC QN AUTO: 32.7 PG (ref 26.6–33)
MCHC RBC AUTO-ENTMCNC: 31.6 G/DL (ref 31.5–35.7)
MCHC RBC AUTO-ENTMCNC: 32.3 G/DL (ref 31.5–35.7)
MCV RBC AUTO: 101.2 FL (ref 79–97)
MCV RBC AUTO: 103 FL (ref 79–97)
MONOCYTES # BLD AUTO: 1.02 10*3/MM3 (ref 0.1–0.9)
MONOCYTES NFR BLD AUTO: 7.1 % (ref 5–12)
NEUTROPHILS # BLD AUTO: 11.87 10*3/MM3 (ref 1.4–7)
NEUTROPHILS NFR BLD AUTO: 82.2 % (ref 42.7–76)
NITRITE UR QL STRIP: NEGATIVE
NT-PROBNP SERPL-MCNC: 856.7 PG/ML (ref 5–900)
PH UR STRIP.AUTO: <=5 [PH] (ref 5–8)
PLATELET # BLD AUTO: 219 10*3/MM3 (ref 140–450)
PLATELET # BLD AUTO: 255 10*3/MM3 (ref 140–450)
PMV BLD AUTO: 10 FL (ref 6–12)
PMV BLD AUTO: 10.3 FL (ref 6–12)
POTASSIUM BLD-SCNC: 4.1 MMOL/L (ref 3.5–5.2)
PROT UR QL STRIP: NEGATIVE
RBC # BLD AUTO: 3.01 10*6/MM3 (ref 3.77–5.28)
RBC # BLD AUTO: 3.43 10*6/MM3 (ref 3.77–5.28)
RBC # UR: ABNORMAL /HPF
REF LAB TEST METHOD: ABNORMAL
SODIUM BLD-SCNC: 139 MMOL/L (ref 136–145)
SP GR UR STRIP: 1.01 (ref 1–1.03)
SQUAMOUS #/AREA URNS HPF: ABNORMAL /HPF
URATE CRY URNS QL MICRO: ABNORMAL /HPF
UROBILINOGEN UR QL STRIP: ABNORMAL
WBC NRBC COR # BLD: 11.59 10*3/MM3 (ref 3.4–10.8)
WBC NRBC COR # BLD: 14.43 10*3/MM3 (ref 3.4–10.8)
WBC UR QL AUTO: ABNORMAL /HPF
YEAST URNS QL MICRO: ABNORMAL /HPF

## 2019-03-24 PROCEDURE — 83880 ASSAY OF NATRIURETIC PEPTIDE: CPT | Performed by: HOSPITALIST

## 2019-03-24 PROCEDURE — 83735 ASSAY OF MAGNESIUM: CPT | Performed by: HOSPITALIST

## 2019-03-24 PROCEDURE — 93010 ELECTROCARDIOGRAM REPORT: CPT | Performed by: INTERNAL MEDICINE

## 2019-03-24 PROCEDURE — 93005 ELECTROCARDIOGRAM TRACING: CPT | Performed by: HOSPITALIST

## 2019-03-24 PROCEDURE — 99232 SBSQ HOSP IP/OBS MODERATE 35: CPT | Performed by: HOSPITALIST

## 2019-03-24 PROCEDURE — 85025 COMPLETE CBC W/AUTO DIFF WBC: CPT | Performed by: INTERNAL MEDICINE

## 2019-03-24 PROCEDURE — 71045 X-RAY EXAM CHEST 1 VIEW: CPT

## 2019-03-24 PROCEDURE — 82962 GLUCOSE BLOOD TEST: CPT

## 2019-03-24 PROCEDURE — 85027 COMPLETE CBC AUTOMATED: CPT | Performed by: INTERNAL MEDICINE

## 2019-03-24 PROCEDURE — 94799 UNLISTED PULMONARY SVC/PX: CPT

## 2019-03-24 PROCEDURE — 99232 SBSQ HOSP IP/OBS MODERATE 35: CPT | Performed by: INTERNAL MEDICINE

## 2019-03-24 PROCEDURE — 71045 X-RAY EXAM CHEST 1 VIEW: CPT | Performed by: RADIOLOGY

## 2019-03-24 PROCEDURE — 25010000002 MAGNESIUM SULFATE 2 GM/50ML SOLUTION: Performed by: HOSPITALIST

## 2019-03-24 PROCEDURE — 81001 URINALYSIS AUTO W/SCOPE: CPT | Performed by: HOSPITALIST

## 2019-03-24 PROCEDURE — 80048 BASIC METABOLIC PNL TOTAL CA: CPT | Performed by: HOSPITALIST

## 2019-03-24 PROCEDURE — 87086 URINE CULTURE/COLONY COUNT: CPT | Performed by: HOSPITALIST

## 2019-03-24 PROCEDURE — 25010000002 ENOXAPARIN PER 10 MG: Performed by: INTERNAL MEDICINE

## 2019-03-24 RX ORDER — MAGNESIUM SULFATE HEPTAHYDRATE 40 MG/ML
2 INJECTION, SOLUTION INTRAVENOUS ONCE
Status: COMPLETED | OUTPATIENT
Start: 2019-03-24 | End: 2019-03-24

## 2019-03-24 RX ORDER — AMLODIPINE BESYLATE 5 MG/1
5 TABLET ORAL
Status: DISCONTINUED | OUTPATIENT
Start: 2019-03-24 | End: 2019-03-24

## 2019-03-24 RX ADMIN — MEXILETINE HYDROCHLORIDE 150 MG: 150 CAPSULE ORAL at 17:09

## 2019-03-24 RX ADMIN — SODIUM CHLORIDE, PRESERVATIVE FREE 3 ML: 5 INJECTION INTRAVENOUS at 09:09

## 2019-03-24 RX ADMIN — IPRATROPIUM BROMIDE 0.5 MG: 0.5 SOLUTION RESPIRATORY (INHALATION) at 06:46

## 2019-03-24 RX ADMIN — Medication 1 CAPSULE: at 09:02

## 2019-03-24 RX ADMIN — AMIODARONE HYDROCHLORIDE 400 MG: 200 TABLET ORAL at 09:05

## 2019-03-24 RX ADMIN — CEFEPIME HYDROCHLORIDE 1 G: 1 INJECTION, POWDER, FOR SOLUTION INTRAMUSCULAR; INTRAVENOUS at 11:19

## 2019-03-24 RX ADMIN — ENOXAPARIN SODIUM 60 MG: 60 INJECTION SUBCUTANEOUS at 20:38

## 2019-03-24 RX ADMIN — METOPROLOL TARTRATE 50 MG: 50 TABLET, FILM COATED ORAL at 20:37

## 2019-03-24 RX ADMIN — MEXILETINE HYDROCHLORIDE 150 MG: 150 CAPSULE ORAL at 20:37

## 2019-03-24 RX ADMIN — IPRATROPIUM BROMIDE 0.5 MG: 0.5 SOLUTION RESPIRATORY (INHALATION) at 13:34

## 2019-03-24 RX ADMIN — MEXILETINE HYDROCHLORIDE 150 MG: 150 CAPSULE ORAL at 05:43

## 2019-03-24 RX ADMIN — ENOXAPARIN SODIUM 60 MG: 60 INJECTION SUBCUTANEOUS at 10:41

## 2019-03-24 RX ADMIN — LANSOPRAZOLE 30 MG: KIT at 10:38

## 2019-03-24 RX ADMIN — SPIRONOLACTONE 25 MG: 25 TABLET ORAL at 08:56

## 2019-03-24 RX ADMIN — METOPROLOL TARTRATE 50 MG: 50 TABLET, FILM COATED ORAL at 09:03

## 2019-03-24 RX ADMIN — ASPIRIN 81 MG: 81 TABLET ORAL at 09:02

## 2019-03-24 RX ADMIN — POTASSIUM CHLORIDE 40 MEQ: 1500 TABLET, EXTENDED RELEASE ORAL at 09:04

## 2019-03-24 RX ADMIN — OLANZAPINE 5 MG: 5 TABLET, FILM COATED ORAL at 20:37

## 2019-03-24 RX ADMIN — TORSEMIDE 20 MG: 20 TABLET ORAL at 09:07

## 2019-03-24 RX ADMIN — Medication 100 MG: at 09:02

## 2019-03-24 RX ADMIN — CEFEPIME HYDROCHLORIDE 1 G: 1 INJECTION, POWDER, FOR SOLUTION INTRAMUSCULAR; INTRAVENOUS at 02:52

## 2019-03-24 RX ADMIN — MAGNESIUM SULFATE IN WATER 2 G: 40 INJECTION, SOLUTION INTRAVENOUS at 10:59

## 2019-03-24 RX ADMIN — ENOXAPARIN SODIUM 60 MG: 60 INJECTION SUBCUTANEOUS at 00:02

## 2019-03-24 RX ADMIN — LEVOTHYROXINE SODIUM 50 MCG: 50 TABLET ORAL at 05:44

## 2019-03-24 RX ADMIN — FOLIC ACID 1 MG: 1 TABLET ORAL at 09:03

## 2019-03-25 LAB
ANION GAP SERPL CALCULATED.3IONS-SCNC: 15.3 MMOL/L
BACTERIA SPEC AEROBE CULT: NORMAL
BASOPHILS # BLD AUTO: 0.03 10*3/MM3 (ref 0–0.2)
BASOPHILS NFR BLD AUTO: 0.2 % (ref 0–1.5)
BUN BLD-MCNC: 16 MG/DL (ref 6–20)
BUN/CREAT SERPL: 23.2 (ref 7–25)
CALCIUM SPEC-SCNC: 9.5 MG/DL (ref 8.6–10.5)
CHLORIDE SERPL-SCNC: 102 MMOL/L (ref 98–107)
CO2 SERPL-SCNC: 23.7 MMOL/L (ref 22–29)
CREAT BLD-MCNC: 0.69 MG/DL (ref 0.57–1)
DEPRECATED RDW RBC AUTO: 49.7 FL (ref 37–54)
EOSINOPHIL # BLD AUTO: 0.14 10*3/MM3 (ref 0–0.4)
EOSINOPHIL NFR BLD AUTO: 1.1 % (ref 0.3–6.2)
ERYTHROCYTE [DISTWIDTH] IN BLOOD BY AUTOMATED COUNT: 14.1 % (ref 12.3–15.4)
GFR SERPL CREATININE-BSD FRML MDRD: 88 ML/MIN/1.73
GLUCOSE BLD-MCNC: 98 MG/DL (ref 65–99)
GLUCOSE BLDC GLUCOMTR-MCNC: 130 MG/DL (ref 70–130)
GLUCOSE BLDC GLUCOMTR-MCNC: 132 MG/DL (ref 70–130)
HCT VFR BLD AUTO: 32.3 % (ref 34–46.6)
HGB BLD-MCNC: 10.6 G/DL (ref 12–15.9)
IMM GRANULOCYTES # BLD AUTO: 0.05 10*3/MM3 (ref 0–0.05)
IMM GRANULOCYTES NFR BLD AUTO: 0.4 % (ref 0–0.5)
LYMPHOCYTES # BLD AUTO: 1.4 10*3/MM3 (ref 0.7–3.1)
LYMPHOCYTES NFR BLD AUTO: 10.9 % (ref 19.6–45.3)
MAGNESIUM SERPL-MCNC: 1.9 MG/DL (ref 1.6–2.6)
MCH RBC QN AUTO: 33.3 PG (ref 26.6–33)
MCHC RBC AUTO-ENTMCNC: 32.8 G/DL (ref 31.5–35.7)
MCV RBC AUTO: 101.6 FL (ref 79–97)
MONOCYTES # BLD AUTO: 1.18 10*3/MM3 (ref 0.1–0.9)
MONOCYTES NFR BLD AUTO: 9.2 % (ref 5–12)
NEUTROPHILS # BLD AUTO: 10.03 10*3/MM3 (ref 1.4–7)
NEUTROPHILS NFR BLD AUTO: 78.2 % (ref 42.7–76)
PLATELET # BLD AUTO: 246 10*3/MM3 (ref 140–450)
PMV BLD AUTO: 9.7 FL (ref 6–12)
POTASSIUM BLD-SCNC: 3.7 MMOL/L (ref 3.5–5.2)
RBC # BLD AUTO: 3.18 10*6/MM3 (ref 3.77–5.28)
SODIUM BLD-SCNC: 141 MMOL/L (ref 136–145)
WBC NRBC COR # BLD: 12.83 10*3/MM3 (ref 3.4–10.8)

## 2019-03-25 PROCEDURE — 82962 GLUCOSE BLOOD TEST: CPT

## 2019-03-25 PROCEDURE — 99232 SBSQ HOSP IP/OBS MODERATE 35: CPT | Performed by: INTERNAL MEDICINE

## 2019-03-25 PROCEDURE — 93005 ELECTROCARDIOGRAM TRACING: CPT | Performed by: INTERNAL MEDICINE

## 2019-03-25 PROCEDURE — 97163 PT EVAL HIGH COMPLEX 45 MIN: CPT

## 2019-03-25 PROCEDURE — 94799 UNLISTED PULMONARY SVC/PX: CPT

## 2019-03-25 PROCEDURE — 25010000002 ENOXAPARIN PER 10 MG

## 2019-03-25 PROCEDURE — 93010 ELECTROCARDIOGRAM REPORT: CPT | Performed by: INTERNAL MEDICINE

## 2019-03-25 PROCEDURE — 97530 THERAPEUTIC ACTIVITIES: CPT

## 2019-03-25 PROCEDURE — 25010000002 ENOXAPARIN PER 10 MG: Performed by: INTERNAL MEDICINE

## 2019-03-25 PROCEDURE — 80048 BASIC METABOLIC PNL TOTAL CA: CPT | Performed by: HOSPITALIST

## 2019-03-25 PROCEDURE — 85025 COMPLETE CBC W/AUTO DIFF WBC: CPT | Performed by: HOSPITALIST

## 2019-03-25 PROCEDURE — 92526 ORAL FUNCTION THERAPY: CPT

## 2019-03-25 PROCEDURE — 99232 SBSQ HOSP IP/OBS MODERATE 35: CPT | Performed by: HOSPITALIST

## 2019-03-25 PROCEDURE — 83735 ASSAY OF MAGNESIUM: CPT | Performed by: HOSPITALIST

## 2019-03-25 RX ORDER — TORSEMIDE 20 MG/1
10 TABLET ORAL DAILY
Status: DISCONTINUED | OUTPATIENT
Start: 2019-03-26 | End: 2019-04-02

## 2019-03-25 RX ORDER — SPIRONOLACTONE 25 MG/1
25 TABLET ORAL
Status: DISCONTINUED | OUTPATIENT
Start: 2019-03-25 | End: 2019-04-18 | Stop reason: HOSPADM

## 2019-03-25 RX ORDER — MAGNESIUM SULFATE HEPTAHYDRATE 40 MG/ML
4 INJECTION, SOLUTION INTRAVENOUS ONCE
Status: COMPLETED | OUTPATIENT
Start: 2019-03-25 | End: 2019-03-25

## 2019-03-25 RX ADMIN — IPRATROPIUM BROMIDE 0.5 MG: 0.5 SOLUTION RESPIRATORY (INHALATION) at 07:15

## 2019-03-25 RX ADMIN — METOPROLOL TARTRATE 2.5 MG: 5 INJECTION, SOLUTION INTRAVENOUS at 04:39

## 2019-03-25 RX ADMIN — ENOXAPARIN SODIUM 50 MG: 80 INJECTION SUBCUTANEOUS at 19:47

## 2019-03-25 RX ADMIN — METOPROLOL TARTRATE 50 MG: 50 TABLET, FILM COATED ORAL at 19:47

## 2019-03-25 RX ADMIN — OLANZAPINE 5 MG: 5 TABLET, ORALLY DISINTEGRATING ORAL at 19:51

## 2019-03-25 RX ADMIN — Medication 1 CAPSULE: at 08:43

## 2019-03-25 RX ADMIN — MAGNESIUM GLUCONATE 500 MG ORAL TABLET 400 MG: 500 TABLET ORAL at 08:43

## 2019-03-25 RX ADMIN — IPRATROPIUM BROMIDE 0.5 MG: 0.5 SOLUTION RESPIRATORY (INHALATION) at 12:37

## 2019-03-25 RX ADMIN — SODIUM CHLORIDE, PRESERVATIVE FREE 3 ML: 5 INJECTION INTRAVENOUS at 09:02

## 2019-03-25 RX ADMIN — TORSEMIDE 20 MG: 20 TABLET ORAL at 08:42

## 2019-03-25 RX ADMIN — ENOXAPARIN SODIUM 60 MG: 60 INJECTION SUBCUTANEOUS at 08:43

## 2019-03-25 RX ADMIN — MEXILETINE HYDROCHLORIDE 150 MG: 150 CAPSULE ORAL at 13:07

## 2019-03-25 RX ADMIN — MEXILETINE HYDROCHLORIDE 150 MG: 150 CAPSULE ORAL at 19:47

## 2019-03-25 RX ADMIN — LANSOPRAZOLE 30 MG: KIT at 08:43

## 2019-03-25 RX ADMIN — BUDESONIDE 0.5 MG: 0.5 INHALANT RESPIRATORY (INHALATION) at 07:16

## 2019-03-25 RX ADMIN — FOLIC ACID 1 MG: 1 TABLET ORAL at 08:43

## 2019-03-25 RX ADMIN — OLANZAPINE 5 MG: 5 TABLET, ORALLY DISINTEGRATING ORAL at 17:22

## 2019-03-25 RX ADMIN — MAGNESIUM SULFATE HEPTAHYDRATE 4 G: 40 INJECTION, SOLUTION INTRAVENOUS at 05:25

## 2019-03-25 RX ADMIN — LEVOTHYROXINE SODIUM 50 MCG: 50 TABLET ORAL at 05:25

## 2019-03-25 RX ADMIN — POTASSIUM CHLORIDE 40 MEQ: 1500 TABLET, EXTENDED RELEASE ORAL at 08:42

## 2019-03-25 RX ADMIN — MEXILETINE HYDROCHLORIDE 150 MG: 150 CAPSULE ORAL at 05:25

## 2019-03-25 RX ADMIN — SPIRONOLACTONE 25 MG: 25 TABLET ORAL at 11:37

## 2019-03-25 RX ADMIN — Medication 100 MG: at 08:43

## 2019-03-25 RX ADMIN — AMIODARONE HYDROCHLORIDE 400 MG: 200 TABLET ORAL at 08:42

## 2019-03-25 RX ADMIN — ASPIRIN 81 MG: 81 TABLET ORAL at 08:43

## 2019-03-26 ENCOUNTER — APPOINTMENT (OUTPATIENT)
Dept: CARDIOLOGY | Facility: HOSPITAL | Age: 54
End: 2019-03-26

## 2019-03-26 ENCOUNTER — ANESTHESIA EVENT (OUTPATIENT)
Dept: CARDIOLOGY | Facility: HOSPITAL | Age: 54
End: 2019-03-26

## 2019-03-26 ENCOUNTER — ANESTHESIA (OUTPATIENT)
Dept: CARDIOLOGY | Facility: HOSPITAL | Age: 54
End: 2019-03-26

## 2019-03-26 LAB
ANION GAP SERPL CALCULATED.3IONS-SCNC: 13.5 MMOL/L
BACTERIA SPEC AEROBE CULT: NORMAL
BACTERIA SPEC AEROBE CULT: NORMAL
BUN BLD-MCNC: 20 MG/DL (ref 6–20)
BUN/CREAT SERPL: 24.4 (ref 7–25)
CALCIUM SPEC-SCNC: 10 MG/DL (ref 8.6–10.5)
CHLORIDE SERPL-SCNC: 103 MMOL/L (ref 98–107)
CO2 SERPL-SCNC: 26.5 MMOL/L (ref 22–29)
CREAT BLD-MCNC: 0.82 MG/DL (ref 0.57–1)
GFR SERPL CREATININE-BSD FRML MDRD: 72 ML/MIN/1.73
GLUCOSE BLD-MCNC: 102 MG/DL (ref 65–99)
MAGNESIUM SERPL-MCNC: 2.2 MG/DL (ref 1.6–2.6)
POTASSIUM BLD-SCNC: 3.9 MMOL/L (ref 3.5–5.2)
SODIUM BLD-SCNC: 143 MMOL/L (ref 136–145)

## 2019-03-26 PROCEDURE — 94799 UNLISTED PULMONARY SVC/PX: CPT

## 2019-03-26 PROCEDURE — 99232 SBSQ HOSP IP/OBS MODERATE 35: CPT | Performed by: INTERNAL MEDICINE

## 2019-03-26 PROCEDURE — 97167 OT EVAL HIGH COMPLEX 60 MIN: CPT

## 2019-03-26 PROCEDURE — 25010000002 DIGOXIN PER 500 MCG: Performed by: INTERNAL MEDICINE

## 2019-03-26 PROCEDURE — 80048 BASIC METABOLIC PNL TOTAL CA: CPT | Performed by: HOSPITALIST

## 2019-03-26 PROCEDURE — 93010 ELECTROCARDIOGRAM REPORT: CPT | Performed by: INTERNAL MEDICINE

## 2019-03-26 PROCEDURE — 93005 ELECTROCARDIOGRAM TRACING: CPT | Performed by: INTERNAL MEDICINE

## 2019-03-26 PROCEDURE — 83735 ASSAY OF MAGNESIUM: CPT | Performed by: HOSPITALIST

## 2019-03-26 PROCEDURE — 25010000002 ENOXAPARIN PER 10 MG

## 2019-03-26 PROCEDURE — 99232 SBSQ HOSP IP/OBS MODERATE 35: CPT | Performed by: HOSPITALIST

## 2019-03-26 RX ORDER — DIGOXIN 0.25 MG/ML
250 INJECTION INTRAMUSCULAR; INTRAVENOUS ONCE
Status: COMPLETED | OUTPATIENT
Start: 2019-03-26 | End: 2019-03-26

## 2019-03-26 RX ORDER — SODIUM CHLORIDE 9 MG/ML
125 INJECTION, SOLUTION INTRAVENOUS ONCE
Status: COMPLETED | OUTPATIENT
Start: 2019-03-26 | End: 2019-03-26

## 2019-03-26 RX ADMIN — MAGNESIUM GLUCONATE 500 MG ORAL TABLET 400 MG: 500 TABLET ORAL at 13:33

## 2019-03-26 RX ADMIN — SODIUM CHLORIDE, PRESERVATIVE FREE 3 ML: 5 INJECTION INTRAVENOUS at 09:51

## 2019-03-26 RX ADMIN — METOPROLOL TARTRATE 2.5 MG: 5 INJECTION, SOLUTION INTRAVENOUS at 08:58

## 2019-03-26 RX ADMIN — METOPROLOL TARTRATE 50 MG: 50 TABLET, FILM COATED ORAL at 21:03

## 2019-03-26 RX ADMIN — AMIODARONE HYDROCHLORIDE 400 MG: 200 TABLET ORAL at 13:34

## 2019-03-26 RX ADMIN — OLANZAPINE 5 MG: 5 TABLET, FILM COATED ORAL at 21:04

## 2019-03-26 RX ADMIN — ENOXAPARIN SODIUM 50 MG: 80 INJECTION SUBCUTANEOUS at 13:33

## 2019-03-26 RX ADMIN — ASPIRIN 81 MG: 81 TABLET ORAL at 13:34

## 2019-03-26 RX ADMIN — MEXILETINE HYDROCHLORIDE 150 MG: 150 CAPSULE ORAL at 21:03

## 2019-03-26 RX ADMIN — SPIRONOLACTONE 25 MG: 25 TABLET ORAL at 13:42

## 2019-03-26 RX ADMIN — DILTIAZEM HYDROCHLORIDE 10 MG/HR: 5 INJECTION INTRAVENOUS at 09:44

## 2019-03-26 RX ADMIN — ENOXAPARIN SODIUM 50 MG: 80 INJECTION SUBCUTANEOUS at 21:04

## 2019-03-26 RX ADMIN — Medication 100 MG: at 13:35

## 2019-03-26 RX ADMIN — SODIUM CHLORIDE 125 ML/HR: 9 INJECTION, SOLUTION INTRAVENOUS at 17:30

## 2019-03-26 RX ADMIN — TORSEMIDE 10 MG: 20 TABLET ORAL at 13:42

## 2019-03-26 RX ADMIN — BUDESONIDE 0.5 MG: 0.5 INHALANT RESPIRATORY (INHALATION) at 19:10

## 2019-03-26 RX ADMIN — IPRATROPIUM BROMIDE 0.5 MG: 0.5 SOLUTION RESPIRATORY (INHALATION) at 19:10

## 2019-03-26 RX ADMIN — FOLIC ACID 1 MG: 1 TABLET ORAL at 13:34

## 2019-03-26 RX ADMIN — POTASSIUM CHLORIDE 40 MEQ: 1500 TABLET, EXTENDED RELEASE ORAL at 13:35

## 2019-03-26 RX ADMIN — DIGOXIN 250 MCG: 0.25 INJECTION INTRAMUSCULAR; INTRAVENOUS at 11:04

## 2019-03-26 RX ADMIN — LANSOPRAZOLE 30 MG: KIT at 13:36

## 2019-03-26 RX ADMIN — SODIUM CHLORIDE, PRESERVATIVE FREE 3 ML: 5 INJECTION INTRAVENOUS at 21:05

## 2019-03-26 RX ADMIN — DIGOXIN 250 MCG: 0.25 INJECTION INTRAMUSCULAR; INTRAVENOUS at 17:30

## 2019-03-27 LAB
ANION GAP SERPL CALCULATED.3IONS-SCNC: 13.4 MMOL/L
BASOPHILS # BLD AUTO: 0.02 10*3/MM3 (ref 0–0.2)
BASOPHILS # BLD AUTO: 0.04 10*3/MM3 (ref 0–0.2)
BASOPHILS NFR BLD AUTO: 0.3 % (ref 0–1.5)
BASOPHILS NFR BLD AUTO: 0.5 % (ref 0–1.5)
BUN BLD-MCNC: 23 MG/DL (ref 6–20)
BUN/CREAT SERPL: 26.4 (ref 7–25)
CALCIUM SPEC-SCNC: 10 MG/DL (ref 8.6–10.5)
CHLORIDE SERPL-SCNC: 105 MMOL/L (ref 98–107)
CO2 SERPL-SCNC: 26.6 MMOL/L (ref 22–29)
CREAT BLD-MCNC: 0.87 MG/DL (ref 0.57–1)
DEPRECATED RDW RBC AUTO: 53 FL (ref 37–54)
DEPRECATED RDW RBC AUTO: 54.7 FL (ref 37–54)
EOSINOPHIL # BLD AUTO: 0.35 10*3/MM3 (ref 0–0.4)
EOSINOPHIL # BLD AUTO: 0.36 10*3/MM3 (ref 0–0.4)
EOSINOPHIL NFR BLD AUTO: 4.8 % (ref 0.3–6.2)
EOSINOPHIL NFR BLD AUTO: 5.8 % (ref 0.3–6.2)
ERYTHROCYTE [DISTWIDTH] IN BLOOD BY AUTOMATED COUNT: 14.2 % (ref 12.3–15.4)
ERYTHROCYTE [DISTWIDTH] IN BLOOD BY AUTOMATED COUNT: 14.4 % (ref 12.3–15.4)
GFR SERPL CREATININE-BSD FRML MDRD: 68 ML/MIN/1.73
GLUCOSE BLD-MCNC: 109 MG/DL (ref 65–99)
HCT VFR BLD AUTO: 34 % (ref 34–46.6)
HCT VFR BLD AUTO: 34.5 % (ref 34–46.6)
HGB BLD-MCNC: 10.8 G/DL (ref 12–15.9)
HGB BLD-MCNC: 11 G/DL (ref 12–15.9)
IMM GRANULOCYTES # BLD AUTO: 0.02 10*3/MM3 (ref 0–0.05)
IMM GRANULOCYTES # BLD AUTO: 0.03 10*3/MM3 (ref 0–0.05)
IMM GRANULOCYTES NFR BLD AUTO: 0.3 % (ref 0–0.5)
IMM GRANULOCYTES NFR BLD AUTO: 0.4 % (ref 0–0.5)
LYMPHOCYTES # BLD AUTO: 1.16 10*3/MM3 (ref 0.7–3.1)
LYMPHOCYTES # BLD AUTO: 1.45 10*3/MM3 (ref 0.7–3.1)
LYMPHOCYTES NFR BLD AUTO: 19.3 % (ref 19.6–45.3)
LYMPHOCYTES NFR BLD AUTO: 19.5 % (ref 19.6–45.3)
MAGNESIUM SERPL-MCNC: 2 MG/DL (ref 1.6–2.6)
MCH RBC QN AUTO: 32.9 PG (ref 26.6–33)
MCH RBC QN AUTO: 33.3 PG (ref 26.6–33)
MCHC RBC AUTO-ENTMCNC: 31.8 G/DL (ref 31.5–35.7)
MCHC RBC AUTO-ENTMCNC: 31.9 G/DL (ref 31.5–35.7)
MCV RBC AUTO: 103.7 FL (ref 79–97)
MCV RBC AUTO: 104.5 FL (ref 79–97)
MONOCYTES # BLD AUTO: 0.88 10*3/MM3 (ref 0.1–0.9)
MONOCYTES # BLD AUTO: 0.97 10*3/MM3 (ref 0.1–0.9)
MONOCYTES NFR BLD AUTO: 11.8 % (ref 5–12)
MONOCYTES NFR BLD AUTO: 16.1 % (ref 5–12)
NEUTROPHILS # BLD AUTO: 3.49 10*3/MM3 (ref 1.4–7)
NEUTROPHILS # BLD AUTO: 4.69 10*3/MM3 (ref 1.4–7)
NEUTROPHILS NFR BLD AUTO: 58.2 % (ref 42.7–76)
NEUTROPHILS NFR BLD AUTO: 63 % (ref 42.7–76)
PLATELET # BLD AUTO: 202 10*3/MM3 (ref 140–450)
PLATELET # BLD AUTO: 225 10*3/MM3 (ref 140–450)
PMV BLD AUTO: 10.2 FL (ref 6–12)
PMV BLD AUTO: 9.9 FL (ref 6–12)
POTASSIUM BLD-SCNC: 3.5 MMOL/L (ref 3.5–5.2)
POTASSIUM BLD-SCNC: 3.8 MMOL/L (ref 3.5–5.2)
RBC # BLD AUTO: 3.28 10*6/MM3 (ref 3.77–5.28)
RBC # BLD AUTO: 3.3 10*6/MM3 (ref 3.77–5.28)
SODIUM BLD-SCNC: 145 MMOL/L (ref 136–145)
TSH SERPL DL<=0.05 MIU/L-ACNC: 8.07 MIU/ML (ref 0.27–4.2)
WBC NRBC COR # BLD: 6.01 10*3/MM3 (ref 3.4–10.8)
WBC NRBC COR # BLD: 7.45 10*3/MM3 (ref 3.4–10.8)

## 2019-03-27 PROCEDURE — 25010000003 POTASSIUM CHLORIDE 10 MEQ/100ML SOLUTION: Performed by: NURSE PRACTITIONER

## 2019-03-27 PROCEDURE — 83735 ASSAY OF MAGNESIUM: CPT | Performed by: HOSPITALIST

## 2019-03-27 PROCEDURE — 85025 COMPLETE CBC W/AUTO DIFF WBC: CPT | Performed by: INTERNAL MEDICINE

## 2019-03-27 PROCEDURE — 84132 ASSAY OF SERUM POTASSIUM: CPT | Performed by: HOSPITALIST

## 2019-03-27 PROCEDURE — 80048 BASIC METABOLIC PNL TOTAL CA: CPT | Performed by: HOSPITALIST

## 2019-03-27 PROCEDURE — 99232 SBSQ HOSP IP/OBS MODERATE 35: CPT | Performed by: INTERNAL MEDICINE

## 2019-03-27 PROCEDURE — 25010000002 ALTEPLASE 2 MG RECONSTITUTED SOLUTION 1 EACH VIAL: Performed by: HOSPITALIST

## 2019-03-27 PROCEDURE — 25010000002 ENOXAPARIN PER 10 MG

## 2019-03-27 PROCEDURE — 94799 UNLISTED PULMONARY SVC/PX: CPT

## 2019-03-27 PROCEDURE — 84443 ASSAY THYROID STIM HORMONE: CPT | Performed by: INTERNAL MEDICINE

## 2019-03-27 PROCEDURE — 99232 SBSQ HOSP IP/OBS MODERATE 35: CPT | Performed by: HOSPITALIST

## 2019-03-27 PROCEDURE — 85025 COMPLETE CBC W/AUTO DIFF WBC: CPT | Performed by: HOSPITALIST

## 2019-03-27 PROCEDURE — 25010000002 DIGOXIN PER 500 MCG: Performed by: INTERNAL MEDICINE

## 2019-03-27 RX ORDER — DIGOXIN 0.25 MG/ML
250 INJECTION INTRAMUSCULAR; INTRAVENOUS ONCE
Status: COMPLETED | OUTPATIENT
Start: 2019-03-27 | End: 2019-03-27

## 2019-03-27 RX ORDER — DIGOXIN 125 MCG
125 TABLET ORAL DAILY
Status: DISCONTINUED | OUTPATIENT
Start: 2019-03-27 | End: 2019-04-03

## 2019-03-27 RX ORDER — POTASSIUM CHLORIDE 7.45 MG/ML
10 INJECTION INTRAVENOUS
Status: DISPENSED | OUTPATIENT
Start: 2019-03-27 | End: 2019-03-27

## 2019-03-27 RX ADMIN — Medication 100 MG: at 12:23

## 2019-03-27 RX ADMIN — OLANZAPINE 5 MG: 5 TABLET, ORALLY DISINTEGRATING ORAL at 20:30

## 2019-03-27 RX ADMIN — DIGOXIN 250 MCG: 0.25 INJECTION INTRAMUSCULAR; INTRAVENOUS at 12:24

## 2019-03-27 RX ADMIN — ALTEPLASE: 2.2 INJECTION, POWDER, LYOPHILIZED, FOR SOLUTION INTRAVENOUS at 16:53

## 2019-03-27 RX ADMIN — LANSOPRAZOLE 30 MG: KIT at 12:22

## 2019-03-27 RX ADMIN — METOPROLOL TARTRATE 50 MG: 50 TABLET, FILM COATED ORAL at 20:31

## 2019-03-27 RX ADMIN — POTASSIUM CHLORIDE 10 MEQ: 10 INJECTION, SOLUTION INTRAVENOUS at 08:23

## 2019-03-27 RX ADMIN — ASPIRIN 81 MG: 81 TABLET ORAL at 12:23

## 2019-03-27 RX ADMIN — POTASSIUM CHLORIDE 10 MEQ: 10 INJECTION, SOLUTION INTRAVENOUS at 14:43

## 2019-03-27 RX ADMIN — DIGOXIN 125 MCG: 125 TABLET ORAL at 18:38

## 2019-03-27 RX ADMIN — METOPROLOL TARTRATE 50 MG: 50 TABLET, FILM COATED ORAL at 12:22

## 2019-03-27 RX ADMIN — DILTIAZEM HYDROCHLORIDE 5 MG/HR: 5 INJECTION INTRAVENOUS at 09:42

## 2019-03-27 RX ADMIN — MAGNESIUM GLUCONATE 500 MG ORAL TABLET 400 MG: 500 TABLET ORAL at 12:22

## 2019-03-27 RX ADMIN — MEXILETINE HYDROCHLORIDE 150 MG: 150 CAPSULE ORAL at 20:30

## 2019-03-27 RX ADMIN — SPIRONOLACTONE 25 MG: 25 TABLET ORAL at 12:23

## 2019-03-27 RX ADMIN — ENOXAPARIN SODIUM 50 MG: 80 INJECTION SUBCUTANEOUS at 12:22

## 2019-03-27 RX ADMIN — FOLIC ACID 1 MG: 1 TABLET ORAL at 12:23

## 2019-03-27 RX ADMIN — POTASSIUM CHLORIDE 10 MEQ: 10 INJECTION, SOLUTION INTRAVENOUS at 12:23

## 2019-03-27 RX ADMIN — POTASSIUM CHLORIDE 10 MEQ: 10 INJECTION, SOLUTION INTRAVENOUS at 09:38

## 2019-03-27 RX ADMIN — TORSEMIDE 10 MG: 20 TABLET ORAL at 12:23

## 2019-03-27 RX ADMIN — MEXILETINE HYDROCHLORIDE 150 MG: 150 CAPSULE ORAL at 16:53

## 2019-03-27 RX ADMIN — AMIODARONE HYDROCHLORIDE 400 MG: 200 TABLET ORAL at 12:23

## 2019-03-28 LAB
ANION GAP SERPL CALCULATED.3IONS-SCNC: 14.6 MMOL/L
BASOPHILS # BLD AUTO: 0.04 10*3/MM3 (ref 0–0.2)
BASOPHILS NFR BLD AUTO: 0.7 % (ref 0–1.5)
BUN BLD-MCNC: 22 MG/DL (ref 6–20)
BUN/CREAT SERPL: 23.9 (ref 7–25)
CALCIUM SPEC-SCNC: 10.3 MG/DL (ref 8.6–10.5)
CHLORIDE SERPL-SCNC: 102 MMOL/L (ref 98–107)
CO2 SERPL-SCNC: 26.4 MMOL/L (ref 22–29)
CREAT BLD-MCNC: 0.92 MG/DL (ref 0.57–1)
DEPRECATED RDW RBC AUTO: 53.4 FL (ref 37–54)
EOSINOPHIL # BLD AUTO: 0.4 10*3/MM3 (ref 0–0.4)
EOSINOPHIL NFR BLD AUTO: 7.5 % (ref 0.3–6.2)
ERYTHROCYTE [DISTWIDTH] IN BLOOD BY AUTOMATED COUNT: 13.9 % (ref 12.3–15.4)
GFR SERPL CREATININE-BSD FRML MDRD: 63 ML/MIN/1.73
GLUCOSE BLD-MCNC: 114 MG/DL (ref 65–99)
HCT VFR BLD AUTO: 36.8 % (ref 34–46.6)
HGB BLD-MCNC: 11.3 G/DL (ref 12–15.9)
HYPOCHROMIA BLD QL: NORMAL
IMM GRANULOCYTES # BLD AUTO: 0.02 10*3/MM3 (ref 0–0.05)
IMM GRANULOCYTES NFR BLD AUTO: 0.4 % (ref 0–0.5)
LYMPHOCYTES # BLD AUTO: 0.94 10*3/MM3 (ref 0.7–3.1)
LYMPHOCYTES NFR BLD AUTO: 17.6 % (ref 19.6–45.3)
MACROCYTES BLD QL SMEAR: NORMAL
MCH RBC QN AUTO: 32.6 PG (ref 26.6–33)
MCHC RBC AUTO-ENTMCNC: 30.7 G/DL (ref 31.5–35.7)
MCV RBC AUTO: 106.1 FL (ref 79–97)
MONOCYTES # BLD AUTO: 0.88 10*3/MM3 (ref 0.1–0.9)
MONOCYTES NFR BLD AUTO: 16.4 % (ref 5–12)
NEUTROPHILS # BLD AUTO: 3.07 10*3/MM3 (ref 1.4–7)
NEUTROPHILS NFR BLD AUTO: 57.4 % (ref 42.7–76)
PLAT MORPH BLD: NORMAL
PLATELET # BLD AUTO: 227 10*3/MM3 (ref 140–450)
PMV BLD AUTO: 10.3 FL (ref 6–12)
POTASSIUM BLD-SCNC: 3.6 MMOL/L (ref 3.5–5.2)
RBC # BLD AUTO: 3.47 10*6/MM3 (ref 3.77–5.28)
SODIUM BLD-SCNC: 143 MMOL/L (ref 136–145)
WBC NRBC COR # BLD: 5.35 10*3/MM3 (ref 3.4–10.8)

## 2019-03-28 PROCEDURE — 94799 UNLISTED PULMONARY SVC/PX: CPT

## 2019-03-28 PROCEDURE — 80048 BASIC METABOLIC PNL TOTAL CA: CPT | Performed by: HOSPITALIST

## 2019-03-28 PROCEDURE — 25010000002 ENOXAPARIN PER 10 MG

## 2019-03-28 PROCEDURE — 85007 BL SMEAR W/DIFF WBC COUNT: CPT | Performed by: HOSPITALIST

## 2019-03-28 PROCEDURE — 99232 SBSQ HOSP IP/OBS MODERATE 35: CPT | Performed by: HOSPITALIST

## 2019-03-28 PROCEDURE — 85025 COMPLETE CBC W/AUTO DIFF WBC: CPT | Performed by: HOSPITALIST

## 2019-03-28 PROCEDURE — 97530 THERAPEUTIC ACTIVITIES: CPT

## 2019-03-28 PROCEDURE — 99232 SBSQ HOSP IP/OBS MODERATE 35: CPT | Performed by: INTERNAL MEDICINE

## 2019-03-28 RX ADMIN — FOLIC ACID 1 MG: 1 TABLET ORAL at 08:13

## 2019-03-28 RX ADMIN — MEXILETINE HYDROCHLORIDE 150 MG: 150 CAPSULE ORAL at 20:45

## 2019-03-28 RX ADMIN — MAGNESIUM GLUCONATE 500 MG ORAL TABLET 400 MG: 500 TABLET ORAL at 12:16

## 2019-03-28 RX ADMIN — ENOXAPARIN SODIUM 50 MG: 80 INJECTION SUBCUTANEOUS at 20:45

## 2019-03-28 RX ADMIN — LEVOTHYROXINE SODIUM 50 MCG: 50 TABLET ORAL at 05:55

## 2019-03-28 RX ADMIN — SODIUM CHLORIDE, PRESERVATIVE FREE 3 ML: 5 INJECTION INTRAVENOUS at 08:14

## 2019-03-28 RX ADMIN — ENOXAPARIN SODIUM 50 MG: 80 INJECTION SUBCUTANEOUS at 08:13

## 2019-03-28 RX ADMIN — SODIUM CHLORIDE, PRESERVATIVE FREE 3 ML: 5 INJECTION INTRAVENOUS at 20:46

## 2019-03-28 RX ADMIN — MEXILETINE HYDROCHLORIDE 150 MG: 150 CAPSULE ORAL at 14:10

## 2019-03-28 RX ADMIN — SPIRONOLACTONE 25 MG: 25 TABLET ORAL at 12:16

## 2019-03-28 RX ADMIN — OLANZAPINE 5 MG: 5 TABLET, FILM COATED ORAL at 20:45

## 2019-03-28 RX ADMIN — Medication 100 MG: at 08:13

## 2019-03-28 RX ADMIN — ASPIRIN 81 MG: 81 TABLET ORAL at 08:13

## 2019-03-28 RX ADMIN — METOPROLOL TARTRATE 50 MG: 50 TABLET, FILM COATED ORAL at 08:13

## 2019-03-28 RX ADMIN — LANSOPRAZOLE 30 MG: KIT at 08:18

## 2019-03-28 RX ADMIN — DILTIAZEM HYDROCHLORIDE 5 MG/HR: 5 INJECTION INTRAVENOUS at 05:55

## 2019-03-28 RX ADMIN — DIGOXIN 125 MCG: 125 TABLET ORAL at 08:13

## 2019-03-28 RX ADMIN — TORSEMIDE 10 MG: 20 TABLET ORAL at 08:13

## 2019-03-28 RX ADMIN — SODIUM CHLORIDE, PRESERVATIVE FREE 3 ML: 5 INJECTION INTRAVENOUS at 08:15

## 2019-03-28 RX ADMIN — METOPROLOL TARTRATE 75 MG: 50 TABLET, FILM COATED ORAL at 20:45

## 2019-03-28 RX ADMIN — AMIODARONE HYDROCHLORIDE 200 MG: 200 TABLET ORAL at 08:13

## 2019-03-28 RX ADMIN — MEXILETINE HYDROCHLORIDE 150 MG: 150 CAPSULE ORAL at 05:55

## 2019-03-29 LAB
ANION GAP SERPL CALCULATED.3IONS-SCNC: 13.8 MMOL/L
BUN BLD-MCNC: 28 MG/DL (ref 6–20)
BUN/CREAT SERPL: 22.6 (ref 7–25)
CALCIUM SPEC-SCNC: 10.2 MG/DL (ref 8.6–10.5)
CHLORIDE SERPL-SCNC: 103 MMOL/L (ref 98–107)
CO2 SERPL-SCNC: 27.2 MMOL/L (ref 22–29)
CREAT BLD-MCNC: 1.24 MG/DL (ref 0.57–1)
GFR SERPL CREATININE-BSD FRML MDRD: 45 ML/MIN/1.73
GLUCOSE BLD-MCNC: 102 MG/DL (ref 65–99)
POTASSIUM BLD-SCNC: 4.1 MMOL/L (ref 3.5–5.2)
SODIUM BLD-SCNC: 144 MMOL/L (ref 136–145)

## 2019-03-29 PROCEDURE — 94799 UNLISTED PULMONARY SVC/PX: CPT

## 2019-03-29 PROCEDURE — 97530 THERAPEUTIC ACTIVITIES: CPT

## 2019-03-29 PROCEDURE — 99231 SBSQ HOSP IP/OBS SF/LOW 25: CPT | Performed by: PHYSICIAN ASSISTANT

## 2019-03-29 PROCEDURE — 97116 GAIT TRAINING THERAPY: CPT

## 2019-03-29 PROCEDURE — 80048 BASIC METABOLIC PNL TOTAL CA: CPT | Performed by: HOSPITALIST

## 2019-03-29 PROCEDURE — 99232 SBSQ HOSP IP/OBS MODERATE 35: CPT | Performed by: HOSPITALIST

## 2019-03-29 PROCEDURE — 25010000002 ENOXAPARIN PER 10 MG

## 2019-03-29 RX ORDER — BISACODYL 10 MG
10 SUPPOSITORY, RECTAL RECTAL DAILY
Status: DISCONTINUED | OUTPATIENT
Start: 2019-03-29 | End: 2019-04-18 | Stop reason: HOSPADM

## 2019-03-29 RX ADMIN — OLANZAPINE 5 MG: 5 TABLET, FILM COATED ORAL at 21:10

## 2019-03-29 RX ADMIN — MEXILETINE HYDROCHLORIDE 150 MG: 150 CAPSULE ORAL at 17:15

## 2019-03-29 RX ADMIN — DIGOXIN 125 MCG: 125 TABLET ORAL at 09:03

## 2019-03-29 RX ADMIN — BUDESONIDE 0.5 MG: 0.5 INHALANT RESPIRATORY (INHALATION) at 06:55

## 2019-03-29 RX ADMIN — SPIRONOLACTONE 25 MG: 25 TABLET ORAL at 11:58

## 2019-03-29 RX ADMIN — MEXILETINE HYDROCHLORIDE 150 MG: 150 CAPSULE ORAL at 05:23

## 2019-03-29 RX ADMIN — Medication 100 MG: at 09:04

## 2019-03-29 RX ADMIN — ENOXAPARIN SODIUM 50 MG: 80 INJECTION SUBCUTANEOUS at 09:00

## 2019-03-29 RX ADMIN — LEVOTHYROXINE SODIUM 50 MCG: 50 TABLET ORAL at 05:23

## 2019-03-29 RX ADMIN — METOPROLOL TARTRATE 75 MG: 50 TABLET, FILM COATED ORAL at 09:04

## 2019-03-29 RX ADMIN — ENOXAPARIN SODIUM 50 MG: 80 INJECTION SUBCUTANEOUS at 21:13

## 2019-03-29 RX ADMIN — TORSEMIDE 10 MG: 20 TABLET ORAL at 09:11

## 2019-03-29 RX ADMIN — ASPIRIN 81 MG: 81 TABLET ORAL at 09:04

## 2019-03-29 RX ADMIN — SODIUM CHLORIDE, PRESERVATIVE FREE 3 ML: 5 INJECTION INTRAVENOUS at 21:12

## 2019-03-29 RX ADMIN — AMIODARONE HYDROCHLORIDE 200 MG: 200 TABLET ORAL at 09:03

## 2019-03-29 RX ADMIN — FOLIC ACID 1 MG: 1 TABLET ORAL at 09:03

## 2019-03-29 RX ADMIN — SODIUM CHLORIDE, PRESERVATIVE FREE 3 ML: 5 INJECTION INTRAVENOUS at 09:17

## 2019-03-29 RX ADMIN — MAGNESIUM GLUCONATE 500 MG ORAL TABLET 400 MG: 500 TABLET ORAL at 09:03

## 2019-03-29 RX ADMIN — ACETAMINOPHEN 650 MG: 325 TABLET, FILM COATED ORAL at 11:59

## 2019-03-29 RX ADMIN — LANSOPRAZOLE 30 MG: KIT at 13:51

## 2019-03-29 RX ADMIN — METOPROLOL TARTRATE 75 MG: 50 TABLET, FILM COATED ORAL at 21:18

## 2019-03-30 LAB
ANION GAP SERPL CALCULATED.3IONS-SCNC: 13.9 MMOL/L
BASOPHILS # BLD AUTO: 0.04 10*3/MM3 (ref 0–0.2)
BASOPHILS NFR BLD AUTO: 0.8 % (ref 0–1.5)
BUN BLD-MCNC: 27 MG/DL (ref 6–20)
BUN/CREAT SERPL: 27 (ref 7–25)
CALCIUM SPEC-SCNC: 9.5 MG/DL (ref 8.6–10.5)
CHLORIDE SERPL-SCNC: 101 MMOL/L (ref 98–107)
CO2 SERPL-SCNC: 26.1 MMOL/L (ref 22–29)
CREAT BLD-MCNC: 1 MG/DL (ref 0.57–1)
DEPRECATED RDW RBC AUTO: 51.9 FL (ref 37–54)
EOSINOPHIL # BLD AUTO: 0.44 10*3/MM3 (ref 0–0.4)
EOSINOPHIL NFR BLD AUTO: 8.5 % (ref 0.3–6.2)
ERYTHROCYTE [DISTWIDTH] IN BLOOD BY AUTOMATED COUNT: 13.6 % (ref 12.3–15.4)
GFR SERPL CREATININE-BSD FRML MDRD: 58 ML/MIN/1.73
GLUCOSE BLD-MCNC: 90 MG/DL (ref 65–99)
HCT VFR BLD AUTO: 32.3 % (ref 34–46.6)
HGB BLD-MCNC: 10.1 G/DL (ref 12–15.9)
HYPOCHROMIA BLD QL: NORMAL
IMM GRANULOCYTES # BLD AUTO: 0.02 10*3/MM3 (ref 0–0.05)
IMM GRANULOCYTES NFR BLD AUTO: 0.4 % (ref 0–0.5)
LYMPHOCYTES # BLD AUTO: 1.34 10*3/MM3 (ref 0.7–3.1)
LYMPHOCYTES NFR BLD AUTO: 26 % (ref 19.6–45.3)
MACROCYTES BLD QL SMEAR: NORMAL
MCH RBC QN AUTO: 32.9 PG (ref 26.6–33)
MCHC RBC AUTO-ENTMCNC: 31.3 G/DL (ref 31.5–35.7)
MCV RBC AUTO: 105.2 FL (ref 79–97)
MONOCYTES # BLD AUTO: 0.7 10*3/MM3 (ref 0.1–0.9)
MONOCYTES NFR BLD AUTO: 13.6 % (ref 5–12)
NEUTROPHILS # BLD AUTO: 2.62 10*3/MM3 (ref 1.4–7)
NEUTROPHILS NFR BLD AUTO: 50.7 % (ref 42.7–76)
PLATELET # BLD AUTO: 207 10*3/MM3 (ref 140–450)
PMV BLD AUTO: 11.1 FL (ref 6–12)
POTASSIUM BLD-SCNC: 3.7 MMOL/L (ref 3.5–5.2)
RBC # BLD AUTO: 3.07 10*6/MM3 (ref 3.77–5.28)
SMALL PLATELETS BLD QL SMEAR: ADEQUATE
SODIUM BLD-SCNC: 141 MMOL/L (ref 136–145)
WBC NRBC COR # BLD: 5.16 10*3/MM3 (ref 3.4–10.8)

## 2019-03-30 PROCEDURE — 94799 UNLISTED PULMONARY SVC/PX: CPT

## 2019-03-30 PROCEDURE — 85025 COMPLETE CBC W/AUTO DIFF WBC: CPT | Performed by: INTERNAL MEDICINE

## 2019-03-30 PROCEDURE — 80048 BASIC METABOLIC PNL TOTAL CA: CPT | Performed by: HOSPITALIST

## 2019-03-30 PROCEDURE — 85007 BL SMEAR W/DIFF WBC COUNT: CPT | Performed by: INTERNAL MEDICINE

## 2019-03-30 PROCEDURE — 25010000002 ENOXAPARIN PER 10 MG

## 2019-03-30 PROCEDURE — 99232 SBSQ HOSP IP/OBS MODERATE 35: CPT | Performed by: HOSPITALIST

## 2019-03-30 RX ADMIN — SODIUM CHLORIDE, PRESERVATIVE FREE 3 ML: 5 INJECTION INTRAVENOUS at 20:38

## 2019-03-30 RX ADMIN — ASPIRIN 81 MG: 81 TABLET ORAL at 08:06

## 2019-03-30 RX ADMIN — FOLIC ACID 1 MG: 1 TABLET ORAL at 08:06

## 2019-03-30 RX ADMIN — SPIRONOLACTONE 25 MG: 25 TABLET ORAL at 12:42

## 2019-03-30 RX ADMIN — ENOXAPARIN SODIUM 50 MG: 80 INJECTION SUBCUTANEOUS at 08:08

## 2019-03-30 RX ADMIN — METOPROLOL TARTRATE 75 MG: 50 TABLET, FILM COATED ORAL at 20:36

## 2019-03-30 RX ADMIN — SODIUM CHLORIDE, PRESERVATIVE FREE 3 ML: 5 INJECTION INTRAVENOUS at 08:10

## 2019-03-30 RX ADMIN — SODIUM CHLORIDE, PRESERVATIVE FREE 3 ML: 5 INJECTION INTRAVENOUS at 08:07

## 2019-03-30 RX ADMIN — ENOXAPARIN SODIUM 50 MG: 80 INJECTION SUBCUTANEOUS at 20:36

## 2019-03-30 RX ADMIN — MAGNESIUM GLUCONATE 500 MG ORAL TABLET 400 MG: 500 TABLET ORAL at 08:06

## 2019-03-30 RX ADMIN — OLANZAPINE 5 MG: 5 TABLET, FILM COATED ORAL at 20:36

## 2019-03-30 RX ADMIN — MEXILETINE HYDROCHLORIDE 150 MG: 150 CAPSULE ORAL at 05:53

## 2019-03-30 RX ADMIN — METOPROLOL TARTRATE 75 MG: 50 TABLET, FILM COATED ORAL at 08:06

## 2019-03-30 RX ADMIN — LEVOTHYROXINE SODIUM 50 MCG: 50 TABLET ORAL at 05:53

## 2019-03-30 RX ADMIN — Medication 100 MG: at 08:07

## 2019-03-30 RX ADMIN — LANSOPRAZOLE 30 MG: KIT at 08:09

## 2019-03-30 RX ADMIN — MEXILETINE HYDROCHLORIDE 150 MG: 150 CAPSULE ORAL at 18:08

## 2019-03-30 RX ADMIN — AMIODARONE HYDROCHLORIDE 200 MG: 200 TABLET ORAL at 08:07

## 2019-03-30 RX ADMIN — DIGOXIN 125 MCG: 125 TABLET ORAL at 08:07

## 2019-03-30 RX ADMIN — TORSEMIDE 10 MG: 20 TABLET ORAL at 08:10

## 2019-03-31 LAB
ANION GAP SERPL CALCULATED.3IONS-SCNC: 13.3 MMOL/L
BUN BLD-MCNC: 24 MG/DL (ref 6–20)
BUN/CREAT SERPL: 24.2 (ref 7–25)
CALCIUM SPEC-SCNC: 9.4 MG/DL (ref 8.6–10.5)
CHLORIDE SERPL-SCNC: 100 MMOL/L (ref 98–107)
CO2 SERPL-SCNC: 27.7 MMOL/L (ref 22–29)
CREAT BLD-MCNC: 0.99 MG/DL (ref 0.57–1)
GFR SERPL CREATININE-BSD FRML MDRD: 58 ML/MIN/1.73
GLUCOSE BLD-MCNC: 79 MG/DL (ref 65–99)
POTASSIUM BLD-SCNC: 3.9 MMOL/L (ref 3.5–5.2)
SODIUM BLD-SCNC: 141 MMOL/L (ref 136–145)

## 2019-03-31 PROCEDURE — 94799 UNLISTED PULMONARY SVC/PX: CPT

## 2019-03-31 PROCEDURE — 25010000002 ENOXAPARIN PER 10 MG

## 2019-03-31 PROCEDURE — 99232 SBSQ HOSP IP/OBS MODERATE 35: CPT | Performed by: HOSPITALIST

## 2019-03-31 PROCEDURE — 80048 BASIC METABOLIC PNL TOTAL CA: CPT | Performed by: HOSPITALIST

## 2019-03-31 RX ADMIN — MEXILETINE HYDROCHLORIDE 150 MG: 150 CAPSULE ORAL at 05:38

## 2019-03-31 RX ADMIN — ENOXAPARIN SODIUM 50 MG: 80 INJECTION SUBCUTANEOUS at 10:09

## 2019-03-31 RX ADMIN — AMIODARONE HYDROCHLORIDE 200 MG: 200 TABLET ORAL at 10:08

## 2019-03-31 RX ADMIN — METOPROLOL TARTRATE 75 MG: 50 TABLET, FILM COATED ORAL at 20:10

## 2019-03-31 RX ADMIN — LANSOPRAZOLE 30 MG: KIT at 10:12

## 2019-03-31 RX ADMIN — SODIUM CHLORIDE, PRESERVATIVE FREE 3 ML: 5 INJECTION INTRAVENOUS at 10:10

## 2019-03-31 RX ADMIN — DIGOXIN 125 MCG: 125 TABLET ORAL at 10:08

## 2019-03-31 RX ADMIN — OLANZAPINE 5 MG: 5 TABLET, FILM COATED ORAL at 20:10

## 2019-03-31 RX ADMIN — TORSEMIDE 10 MG: 20 TABLET ORAL at 10:08

## 2019-03-31 RX ADMIN — METOPROLOL TARTRATE 75 MG: 50 TABLET, FILM COATED ORAL at 10:07

## 2019-03-31 RX ADMIN — ASPIRIN 81 MG: 81 TABLET ORAL at 10:08

## 2019-03-31 RX ADMIN — ACETAMINOPHEN 650 MG: 325 TABLET, FILM COATED ORAL at 07:12

## 2019-03-31 RX ADMIN — FOLIC ACID 1 MG: 1 TABLET ORAL at 10:08

## 2019-03-31 RX ADMIN — ENOXAPARIN SODIUM 50 MG: 80 INJECTION SUBCUTANEOUS at 20:10

## 2019-03-31 RX ADMIN — MEXILETINE HYDROCHLORIDE 150 MG: 150 CAPSULE ORAL at 15:47

## 2019-03-31 RX ADMIN — MEXILETINE HYDROCHLORIDE 150 MG: 150 CAPSULE ORAL at 20:10

## 2019-03-31 RX ADMIN — SPIRONOLACTONE 25 MG: 25 TABLET ORAL at 15:47

## 2019-03-31 RX ADMIN — BUDESONIDE 0.5 MG: 0.5 INHALANT RESPIRATORY (INHALATION) at 07:29

## 2019-03-31 RX ADMIN — Medication 100 MG: at 10:07

## 2019-03-31 RX ADMIN — LEVOTHYROXINE SODIUM 50 MCG: 50 TABLET ORAL at 05:38

## 2019-03-31 RX ADMIN — MAGNESIUM GLUCONATE 500 MG ORAL TABLET 400 MG: 500 TABLET ORAL at 10:08

## 2019-04-01 LAB
ANION GAP SERPL CALCULATED.3IONS-SCNC: 13.6 MMOL/L
BUN BLD-MCNC: 24 MG/DL (ref 6–20)
BUN/CREAT SERPL: 24.7 (ref 7–25)
CALCIUM SPEC-SCNC: 9.5 MG/DL (ref 8.6–10.5)
CHLORIDE SERPL-SCNC: 97 MMOL/L (ref 98–107)
CO2 SERPL-SCNC: 28.4 MMOL/L (ref 22–29)
CREAT BLD-MCNC: 0.97 MG/DL (ref 0.57–1)
GFR SERPL CREATININE-BSD FRML MDRD: 60 ML/MIN/1.73
GLUCOSE BLD-MCNC: 101 MG/DL (ref 65–99)
MAGNESIUM SERPL-MCNC: 1.9 MG/DL (ref 1.6–2.6)
NT-PROBNP SERPL-MCNC: 260.8 PG/ML (ref 5–900)
POTASSIUM BLD-SCNC: 3.6 MMOL/L (ref 3.5–5.2)
SODIUM BLD-SCNC: 139 MMOL/L (ref 136–145)

## 2019-04-01 PROCEDURE — 80048 BASIC METABOLIC PNL TOTAL CA: CPT | Performed by: HOSPITALIST

## 2019-04-01 PROCEDURE — 83735 ASSAY OF MAGNESIUM: CPT | Performed by: HOSPITALIST

## 2019-04-01 PROCEDURE — 25010000002 CYANOCOBALAMIN PER 1000 MCG: Performed by: HOSPITALIST

## 2019-04-01 PROCEDURE — 83880 ASSAY OF NATRIURETIC PEPTIDE: CPT | Performed by: HOSPITALIST

## 2019-04-01 PROCEDURE — 93010 ELECTROCARDIOGRAM REPORT: CPT | Performed by: INTERNAL MEDICINE

## 2019-04-01 PROCEDURE — 93005 ELECTROCARDIOGRAM TRACING: CPT | Performed by: INTERNAL MEDICINE

## 2019-04-01 PROCEDURE — 99232 SBSQ HOSP IP/OBS MODERATE 35: CPT | Performed by: HOSPITALIST

## 2019-04-01 PROCEDURE — 25010000002 ENOXAPARIN PER 10 MG

## 2019-04-01 PROCEDURE — 94799 UNLISTED PULMONARY SVC/PX: CPT

## 2019-04-01 RX ORDER — POTASSIUM CHLORIDE 20 MEQ/1
40 TABLET, EXTENDED RELEASE ORAL EVERY 4 HOURS
Status: COMPLETED | OUTPATIENT
Start: 2019-04-01 | End: 2019-04-01

## 2019-04-01 RX ORDER — OLANZAPINE 2.5 MG/1
2.5 TABLET ORAL NIGHTLY
Status: DISCONTINUED | OUTPATIENT
Start: 2019-04-01 | End: 2019-04-02

## 2019-04-01 RX ADMIN — POTASSIUM CHLORIDE 40 MEQ: 1500 TABLET, EXTENDED RELEASE ORAL at 08:36

## 2019-04-01 RX ADMIN — MEXILETINE HYDROCHLORIDE 150 MG: 150 CAPSULE ORAL at 04:22

## 2019-04-01 RX ADMIN — MAGNESIUM GLUCONATE 500 MG ORAL TABLET 400 MG: 500 TABLET ORAL at 08:38

## 2019-04-01 RX ADMIN — ENOXAPARIN SODIUM 50 MG: 80 INJECTION SUBCUTANEOUS at 08:36

## 2019-04-01 RX ADMIN — POTASSIUM CHLORIDE 40 MEQ: 1500 TABLET, EXTENDED RELEASE ORAL at 12:17

## 2019-04-01 RX ADMIN — ASPIRIN 81 MG: 81 TABLET ORAL at 08:38

## 2019-04-01 RX ADMIN — Medication 100 MG: at 08:38

## 2019-04-01 RX ADMIN — FOLIC ACID 1 MG: 1 TABLET ORAL at 08:38

## 2019-04-01 RX ADMIN — LEVOTHYROXINE SODIUM 50 MCG: 50 TABLET ORAL at 04:22

## 2019-04-01 RX ADMIN — LANSOPRAZOLE 30 MG: KIT at 08:36

## 2019-04-01 RX ADMIN — ENOXAPARIN SODIUM 50 MG: 80 INJECTION SUBCUTANEOUS at 20:46

## 2019-04-01 RX ADMIN — SPIRONOLACTONE 25 MG: 25 TABLET ORAL at 12:17

## 2019-04-01 RX ADMIN — MEXILETINE HYDROCHLORIDE 150 MG: 150 CAPSULE ORAL at 20:45

## 2019-04-01 RX ADMIN — OLANZAPINE 2.5 MG: 5 TABLET, FILM COATED ORAL at 20:43

## 2019-04-01 RX ADMIN — MEXILETINE HYDROCHLORIDE 150 MG: 150 CAPSULE ORAL at 15:37

## 2019-04-01 RX ADMIN — CYANOCOBALAMIN 1000 MCG: 1000 INJECTION, SOLUTION INTRAMUSCULAR at 08:36

## 2019-04-01 RX ADMIN — METOPROLOL TARTRATE 75 MG: 50 TABLET, FILM COATED ORAL at 20:45

## 2019-04-01 RX ADMIN — SODIUM CHLORIDE, PRESERVATIVE FREE 3 ML: 5 INJECTION INTRAVENOUS at 08:42

## 2019-04-01 RX ADMIN — Medication 10 MG: at 08:36

## 2019-04-01 RX ADMIN — SODIUM CHLORIDE, PRESERVATIVE FREE 3 ML: 5 INJECTION INTRAVENOUS at 08:38

## 2019-04-01 NOTE — PROGRESS NOTES
Pt seen briefly. Denies complaints or concerns. Confused (trying to answer her call light for instance). Otherwise, no concerns.     Staff in to do a procedure during my visit.     We will continue to follow along. Please do not hesitate to contact us regarding further sx mgmt or GOC needs, including after hours or on weekends via our on call provider at [phone].        Silvia Tillman MD    4/1/2019

## 2019-04-01 NOTE — SIGNIFICANT NOTE
"   04/01/19 1425   Rehab Treatment   Discipline physical therapist   Reason Treatment Not Performed patient/family declined treatment  (Pt declined PT treatment today, stated \"I will do it tomorrow, I'm too sore today\". )     "

## 2019-04-01 NOTE — PLAN OF CARE
Problem: Fall Risk (Adult)  Goal: Absence of Fall  Outcome: Ongoing (interventions implemented as appropriate)   04/01/19 1118   Fall Risk (Adult)   Absence of Fall making progress toward outcome       Problem: Skin Injury Risk (Adult)  Goal: Skin Health and Integrity  Outcome: Ongoing (interventions implemented as appropriate)   04/01/19 1118   Skin Injury Risk (Adult)   Skin Health and Integrity making progress toward outcome       Problem: Patient Care Overview  Goal: Plan of Care Review  Outcome: Ongoing (interventions implemented as appropriate)      Problem: Pain, Chronic (Adult)  Goal: Acceptable Pain/Comfort Level and Functional Ability  Outcome: Ongoing (interventions implemented as appropriate)   04/01/19 1118   Pain, Chronic (Adult)   Acceptable Pain/Comfort Level and Functional Ability making progress toward outcome       Problem: Mobility, Physical Impaired (Adult)  Goal: Enhanced Functional Ability  Outcome: Ongoing (interventions implemented as appropriate)   04/01/19 1118   Mobility, Physical Impaired (Adult)   Enhanced Functional Ability making progress toward outcome

## 2019-04-01 NOTE — NURSING NOTE
Bedside skin assessment completed with outgoing nurse. No changes noted. Will continue to monitor.

## 2019-04-01 NOTE — PROGRESS NOTES
Discharge Planning Assessment  [facility]     Patient Name: Altagracia King  MRN: [id number]  Today's Date: 4/1/2019    Admit Date: 2/22/2019    Discharge Plan     Row Name 04/01/19 1532       Plan    Plan  SS spoke with Sushma who states that they are unable to accept pt at [facility], but that Sushma would send it out to their sister facilities. SS spoke with [facility] bed per Tamy who states no beds available at this time. However she is agreeable to look at the referral. SS faxed pt's information on this date.    Patient/Family in Agreement with Plan  yes        Destination      Service Provider Request Status Selected Services Address Phone Number Fax Number    [facility] - Request Sent N/A [address] [facility] [address] [phone]    [facility] Pending - Request Sent N/A [address] [facility] [address] [phone]    [facility] - Request Sent N/A [address] [facility] [address] [phone]    [facility] - Request Sent N/A [address] [facility] [address] [phone]    [facility] Declined N/A [address] [facility] [address] [phone]    [facility] Declined N/A [address] [address] [phone]        Expected Discharge Date and Time     Expected Discharge Date Expected Discharge Time    Apr 3, 2019             Jazmyn Mendoza

## 2019-04-01 NOTE — NURSING NOTE
Full skin assessment performed with the assist of night shift nurse, LIANE Carrillo. Small scratches and scabs noted to bilateral upper extremities. Generalized bruising noted. No other skin issues noted at this time. Educated patient on the importance of turning with a wedge. Patient tolerated well. No distress noted.

## 2019-04-01 NOTE — DISCHARGE PLACEMENT REQUEST
"Shagufta Loza (53 y.o. Female)     Date of Birth Social Security Number Address Home Phone MRN    1965  [address] [place] [address] [phone] [id number]    Taoism Marital Status          Unknown        Admission Date Admission Type Admitting Provider Attending Provider Department, Room/Bed    2/22/19 Emergency Carito Leone MD Srinivas, Priyanka, MD [address], 3315/[address]S    Discharge Date Discharge Disposition Discharge Destination                       Attending Provider:  Sayra Milton MD    Allergies:  No Known Allergies    Isolation:  None   Infection:  None   Code Status:  CPR    Ht:  165.1 cm (65\")   Wt:  45.9 kg (101 lb 3.2 oz)    Admission Cmt:  None   Principal Problem:  None                Active Insurance as of 2/22/2019     Primary Coverage     Payor Plan Insurance Group Employer/Plan Group    HU[employer]NA MEDICAID HU[employer] [id number]     Payor Plan Address Payor Plan Phone Number Payor Plan Fax Number Effective Dates    PO  [phone]  2/14/2019 - None Entered    [facility] [id number]       Subscriber Name Subscriber Birth Date Member ID       SHAGUFTA LOZA 1965 [id number]                 Emergency Contacts      (Rel.) Home Phone Work Phone Mobile Phone    Madeline Loza (Daughter) -- -- [phone]    Faustino Mclain (Friend) [phone] -- --    Mumtaz Bundy (Friend) [phone] -- --    Unknown, Sylwia (Sister) [phone] -- --            Emergency Contact Information     Name Relation Home Work Mobile    Madeline Loza Daughter   [phone]    Faustino Mclain Friend [phone]      Mumtaz Bundy Friend [phone]      Unknown, Sylwia Sister [phone]            Insurance Information                HU[employer]NA MEDICAID/[employer]NA [employer] Phone: [phone]    Subscriber: Shagufta Loza Subscriber#: [id number]    Group#: [id number] Precert#:              History & Physical      Jhon Messer PA-C at 2/23/2019 " "12:23 PM                   HISTORY AND PHYSICAL        Patient Identification:  Name:  Altagracia King  Age:  53 y.o.  Sex:  female  :  1965  MRN:  [id number]   Visit Number:  [id number]  Primary Care Physician:  Provider, No Known       Subjective     Subjective     Chief complaint:     Chief Complaint   Patient presents with   • Chest Pain       History of presenting illness:     The patient is a 53 year old female for presented to the ED with complaints of substernal chest pain that began yesterday morning. She reports the pain radiates down her left arm and her arm feels somewhat numb. She also reports issues at home and needing  as she has been living in a homeless shelter where she was recently \"kicked out.\" Vital signs stable. Troponin levels negative.EKG shows sinus rhythm with premature ventricular complexes or fusion complexes and septal infarct , age undetermined.   History of alcohol abuse but normal ethanol level and she reports she has not had any alcohol for around a week. TSH elevated at 6.995. White count and CRP normal. Chest xray is unremarkable. EKG shows sinus rhythm with premature ventricular complexes or fusion complexes and septal infarct , age undetermined. Admitted to the observation unit for further evaluation and monitoring.       ---------------------------------------------------------------------------------------------------------------------     Review Of Systems:    Constitutional: no fever, chills and night sweats. No appetite change or unexpected weight change. No fatigue.  Eyes: no eye drainage, itching or redness.  HEENT: no mouth sores, dysphagia or nose bleed.  Respiratory: no for shortness of breath, cough or production of sputum.  Cardiovascular: See HPI  Gastrointestinal: no nausea, vomiting or diarrhea. No abdominal pain, hematemesis or rectal bleeding.  Genitourinary: no dysuria or polyuria.  Hematologic/lymphatic: no lymph node abnormalities, no " easy bruising or easy bleeding.  Musculoskeletal: no muscle or joint pain.  Skin: No rash and no itching.  Neurological: no loss of consciousness, no seizure, no headache.  Behavioral/Psych: no depression or suicidal ideation.  Endocrine: no hot flashes.  Immunologic: negative.    ---------------------------------------------------------------------------------------------------------------------     Past Medical History    Past Medical History:   Diagnosis Date   • Alcoholism (CMS/[facility])    • Depression    • GERD (gastroesophageal reflux disease)        Past Surgical History    Past Surgical History:   Procedure Laterality Date   • CARDIAC CATHETERIZATION     • COLONOSCOPY     • ENDOSCOPY     • TUBAL ABDOMINAL LIGATION         Family History    Family History   Problem Relation Age of Onset   • Heart disease Mother    • Depression Mother    • Heart disease Father    • Depression Sister    • Heart disease Brother    • Depression Maternal Grandmother    • Alcohol abuse Maternal Grandfather        Social History    Social History     Tobacco Use   • Smoking status: Current Every Day Smoker     Packs/day: 1.00   • Smokeless tobacco: Never Used   Substance Use Topics   • Alcohol use: Yes     Alcohol/week: 2.4 oz     Types: 4 Cans of beer per week   • Drug use: No       Allergies    Patient has no known allergies.  ---------------------------------------------------------------------------------------------------------------------     Home Medications:    Prior to Admission Medications     Prescriptions Last Dose Informant Patient Reported? Taking?    aspirin 325 MG tablet 2/22/2019 Self Yes Yes    Take 650 mg by mouth Daily.    calcium carbonate (TUMS) 500 MG chewable tablet 2/23/2019 Self Yes Yes    Chew 2 tablets As Needed for Indigestion or Heartburn.        ---------------------------------------------------------------------------------------------------------------------    Objective     Objective     Hospital  Scheduled Meds:    aspirin 325 mg Oral Daily   enoxaparin 40 mg Subcutaneous Q24H   LORazepam 2 mg Oral 3 times per day   Followed by      [START ON 2/24/2019] LORazepam 1.5 mg Oral 3 times per day   Followed by      [START ON 2/25/2019] LORazepam 1 mg Oral 3 times per day   Followed by      [START ON 2/26/2019] LORazepam 0.5 mg Oral 3 times per day   sodium chloride 3 mL Intravenous Q12H   sodium chloride 3 mL Intravenous Q12H        ---------------------------------------------------------------------------------------------------------------------   Vital Signs:  Temp:  [98.2 °F (36.8 °C)-99.2 °F (37.3 °C)] 98.6 °F (37 °C)  Heart Rate:  [] 85  Resp:  [18-24] 18  BP: (104-164)/(52-98) 104/61  Mean Arterial Pressure (Non-Invasive) for the past 24 hrs (Last 3 readings):   Noninvasive MAP (mmHg)   02/23/19 1126 74   02/23/19 0716 81   02/23/19 0440 88     SpO2 Percentage    02/23/19 0440 02/23/19 0716 02/23/19 1126   SpO2: 99% 100% 100%     SpO2:  [96 %-100 %] 100 %  on   ;   Device (Oxygen Therapy): room air    Body mass index is 18.99 kg/m².  Wt Readings from Last 3 Encounters:   02/22/19 51.8 kg (114 lb 2 oz)   02/14/19 56.7 kg (125 lb)   02/14/19 56.7 kg (125 lb)     ---------------------------------------------------------------------------------------------------------------------     Physical Exam:    Constitutional:  Well-developed and well-nourished.  No respiratory distress.      HENT:  Head: Normocephalic and atraumatic.  Mouth:  Moist mucous membranes.    Eyes: Left cataract.  Conjunctivae and EOM are normal.  No scleral icterus.  Neck:  Neck supple.  No JVD present.    Cardiovascular:  Normal rate, regular rhythm and normal heart sounds with no murmur. No edema.  Pulmonary/Chest: Mild bilateral wheezes. No respiratory distress, no crackles, with normal breath sounds and good air movement.  Abdominal:  Soft.  Bowel sounds are normal.  No distension and no tenderness.   Musculoskeletal:  No edema, no  tenderness, and no deformity.  No swelling or redness of joints.  Neurological:  Alert and oriented to person, place, and time.  No facial droop.  No slurred speech.   Skin:  Skin is warm and dry.  No rash noted.  No pallor.   Psychiatric:  Normal mood and affect.  Behavior is normal.    ---------------------------------------------------------------------------------------------------------------------  I have personally reviewed the EKG/Telemetry strip  ---------------------------------------------------------------------------------------------------------------------   Results from last 7 days   Lab Units 02/23/19  1134 02/23/19  0849 02/23/19  0132  02/22/19  1804   CK TOTAL U/L  --   --   --   --  41   TROPONIN I ng/mL <0.006 <0.006 <0.006   < > <0.006   MYOGLOBIN ng/mL  --   --   --   --  21.0    < > = values in this interval not displayed.           Results from last 7 days   Lab Units 02/23/19  0132 02/22/19  1944   CRP mg/dL <0.50  --    WBC 10*3/mm3 4.84 5.66   HEMOGLOBIN g/dL 11.1* 11.2*   HEMATOCRIT % 34.6* 34.4*   MCV fL 106.5* 104.2*   MCHC g/dL 32.1* 32.6*   PLATELETS 10*3/mm3 147 143     Results from last 7 days   Lab Units 02/23/19  0132 02/22/19  1804   SODIUM mmol/L 137 132*   POTASSIUM mmol/L 3.6 3.9   MAGNESIUM mg/dL 2.0 2.0   CHLORIDE mmol/L 105 102   CO2 mmol/L 25.4 24.5   BUN mg/dL 14 16   CREATININE mg/dL 0.74 0.69   EGFR IF NONAFRICN AM mL/min/1.73 81 88   CALCIUM mg/dL 9.3 9.1   GLUCOSE mg/dL 89 104   ALBUMIN g/dL 3.90 4.20   BILIRUBIN mg/dL 0.2 0.2   ALK PHOS U/L 61 69   AST (SGOT) U/L 21 23   ALT (SGPT) U/L 14 15   Estimated Creatinine Clearance: 70.2 mL/min (by C-G formula based on SCr of 0.74 mg/dL).  No results found for: AMMONIA    No results found for: HGBA1C, POCGLU  No results found for: HGBA1C  Lab Results   Component Value Date    TSH 6.995 (H) 02/22/2019                      Pain Management Panel     Pain Management Panel Latest Ref Rng & Units 2/22/2019    AMPHETAMINES  "SCREEN, URINE Negative Negative    BARBITURATES SCREEN Negative Negative    BENZODIAZEPINE SCREEN, URINE Negative Negative    BUPRENORPHINE Negative Negative    COCAINE SCREEN, URINE Negative Negative    METHADONE SCREEN, URINE Negative Negative        I have personally reviewed the above laboratory results.   ---------------------------------------------------------------------------------------------------------------------  Imaging Results (last 7 days)     Procedure Component Value Units Date/Time    XR Chest 1 View [[id number]] Collected:  02/23/19 0924     Updated:  02/23/19 0927    Narrative:       EXAMINATION: XR CHEST 1 VW-      CLINICAL INDICATION:     cp     TECHNIQUE:  XR CHEST 1 VW-      COMPARISON: NONE      FINDINGS:   Coarse interstitial markings suggestive of chronic interstitial lung  disease.  Heart and mediastinum contours are unremarkable.  No pleural effusion.  No pneumothorax.   Bony and soft tissue structures are unremarkable.       Impression:       No radiographic evidence of acute cardiac or pulmonary  disease.     This report was finalized on 2/23/2019 9:24 AM by Dr. Keven Lux MD.           I have personally reviewed the above radiology results.   ---------------------------------------------------------------------------------------------------------------------      Assessment & Plan        Assessment/Plan       ASSESSMENT:    1. Chest pain    PLAN:    The patient is a 53 year old female for presented to the ED with complaints of substernal chest pain that began yesterday morning. She reports the pain radiates down her left arm and her arm feels somewhat numb. She also reports issues at home and needing  as she has been living in a homeless shelter where she was recently \"kicked out.\" Vital signs stable. Troponin levels negative.EKG shows sinus rhythm with premature ventricular complexes or fusion complexes and septal infarct , age undetermined.   History of alcohol " abuse but normal ethanol level and she reports she has not had any alcohol for around a week. TSH elevated at 6.995. White count and CRP normal. Chest xray is unremarkable. EKG shows sinus rhythm with premature ventricular complexes or fusion complexes and septal infarct , age undetermined. Admitted to the observation unit for further evaluation and monitoring.    Cardiology consulted for abnormality with new infarct on EKG with no further workup recommended. Would recommend to follow up with her cardiologist as outpatient.     Lovenox 40 mg subcutaneous q 24 hours for DVT prophylaxis.    Echo ordered. Cardiology was consulted for acute EKG changes.     D-dimer found to be elevated and CT per PE protocol ordered.     Patient's findings and recommendations were discussed with patient, nursing staff and consulting provider      Code Status:   Code Status and Medical Interventions:   Ordered at: 02/22/19 2236     Code Status:    CPR     Medical Interventions (Level of Support Prior to Arrest):    Full     Patient seen with LIANE Goddard.     Jhon Messer PA-C  02/23/19  12:23 PM      Electronically signed by Carito Leone MD at 2/23/2019  2:27 PM       Hospital Medications (active)       Dose Frequency Start End    acetaminophen (TYLENOL) tablet 650 mg 650 mg Every 4 Hours PRN 2/22/2019     Sig - Route: Take 2 tablets by mouth Every 4 (Four) Hours As Needed for Mild Pain . - Oral    Cosign for Ordering: Accepted by Carito Leone MD on 2/23/2019  9:06 AM    amiodarone (PACERONE) tablet 200 mg 200 mg Daily 3/28/2019     Sig - Route: Take 1 tablet by mouth Daily. - Oral    aspirin EC tablet 81 mg 81 mg Daily 3/19/2019     Sig - Route: Take 1 tablet by mouth Daily. - Oral    bisacodyl (DULCOLAX) suppository 10 mg 10 mg Daily PRN 3/12/2019     Sig - Route: Insert 1 suppository into the rectum Daily As Needed for Constipation (if oral meds do not help). - Rectal    bisacodyl (DULCOLAX) suppository 10 mg 10 mg  "Daily 3/29/2019     Sig - Route: Insert 1 suppository into the rectum Daily. - Rectal    budesonide (PULMICORT) nebulizer solution 0.5 mg 0.5 mg 2 Times Daily - RT 3/8/2019     Sig - Route: Take 2 mL by nebulization 2 (Two) Times a Day. - Nebulization    calcium carbonate (TUMS) chewable tablet 500 mg (200 mg elemental) 2 tablet 2 Times Daily PRN 2/22/2019     Sig - Route: Chew 1,000 mg 2 (Two) Times a Day As Needed for Heartburn. - Oral    Cosign for Ordering: Accepted by Carito Leone MD on 2/23/2019  9:06 AM    cyanocobalamin injection 1,000 mcg 1,000 mcg Every 30 Days 4/1/2019     Sig - Route: Inject 1 mL into the appropriate muscle as directed by prescriber Every 30 (Thirty) Days. - Intramuscular    Linked Group 1:  \"Followed by\" Linked Group Details        digoxin (LANOXIN) tablet 125 mcg 125 mcg Daily 3/27/2019     Sig - Route: Take 1 tablet by mouth Daily. - Oral    Notes to Pharmacy: Please schedule a dose for this evening    docusate sodium (COLACE) capsule 100 mg 100 mg 2 Times Daily PRN 3/12/2019     Sig - Route: Take 1 capsule by mouth 2 (Two) Times a Day As Needed for Constipation. - Oral    enoxaparin (LOVENOX) syringe 50 mg 1 mg/kg × 47.4 kg Every 12 Hours 3/25/2019     Sig - Route: Inject 0.5 mL under the skin into the appropriate area as directed Every 12 (Twelve) Hours. - Subcutaneous    folic acid (FOLVITE) tablet 1 mg 1 mg Daily 2/23/2019     Sig - Route: Take 1 tablet by mouth Daily. - Oral    Cosign for Ordering: Accepted by Alberto Connolly MD on 2/23/2019  3:57 PM    ipratropium (ATROVENT) nebulizer solution 0.5 mg 0.5 mg Every 6 Hours PRN 3/27/2019     Sig - Route: Take 2.5 mL by nebulization Every 6 (Six) Hours As Needed for Shortness of Air. - Nebulization    Cosign for Ordering: Accepted by Christy Irizarry MD on 3/27/2019 11:04 AM    lansoprazole (FIRST) oral suspension 30 mg 30 mg Daily 3/15/2019     Sig - Route: 10 mL by Per G Tube route Daily. - Per G Tube    " "levothyroxine (SYNTHROID, LEVOTHROID) tablet 50 mcg 50 mcg Every Early Morning 3/12/2019     Sig - Route: Take 1 tablet by mouth Every Morning. - Oral    Cosign for Ordering: Accepted by Romario Martinez MD on 3/11/2019  2:12 PM    magnesium hydroxide (MILK OF MAGNESIA) suspension 2400 mg/10mL 10 mL 10 mL Daily PRN 3/12/2019     Sig - Route: Take 10 mL by mouth Daily As Needed for Constipation. - Oral    magnesium oxide (MAGOX) tablet 400 mg 400 mg Daily 3/24/2019     Sig - Route: Take 1 tablet by mouth Daily. - Oral    Magnesium Sulfate 2 gram / 50mL Infusion (GIVE X 3 BAGS TO EQUAL 6GM TOTAL DOSE) - Mg 1.1 - 1.5 mg/dl 2 g As Needed 3/8/2019     Sig - Route: Infuse 50 mL into a venous catheter As Needed (See Administration Instructions). - Intravenous    Linked Group 2:  \"Or\" Linked Group Details        Magnesium Sulfate 2 gram Bolus, followed by 8 gram infusion (total Mg dose 10 grams)- Mg less than or equal to 1mg/dL 2 g As Needed 3/8/2019     Sig - Route: Infuse 50 mL into a venous catheter As Needed (See Administration Instructions). - Intravenous    Linked Group 2:  \"Or\" Linked Group Details        Magnesium Sulfate 4 gram infusion- Mg 1.6-1.9 mg/dL 4 g As Needed 3/8/2019     Sig - Route: Infuse 100 mL into a venous catheter As Needed (See Administration Instructions). - Intravenous    Linked Group 2:  \"Or\" Linked Group Details        metoprolol tartrate (LOPRESSOR) injection 2.5 mg 2.5 mg Every 6 Hours PRN 3/19/2019     Sig - Route: Infuse 2.5 mL into a venous catheter Every 6 (Six) Hours As Needed for Heart Rate (HR >120). - Intravenous    metoprolol tartrate (LOPRESSOR) tablet 75 mg 75 mg Every 12 Hours Scheduled 3/28/2019     Sig - Route: Take 75 mg by mouth Every 12 (Twelve) Hours. - Oral    mexiletine (MEXITIL) capsule 150 mg 150 mg Every 8 Hours Scheduled 3/21/2019     Sig - Route: Take 1 capsule by mouth Every 8 (Eight) Hours. - Oral    nitroglycerin (NITROSTAT) SL tablet 0.4 mg 0.4 mg Every 5 Minutes " "PRN 2/22/2019     Sig - Route: Place 1 tablet under the tongue Every 5 (Five) Minutes As Needed for Chest Pain (Chest Pain With Systolic Blood Pressure Greater Than 100). - Sublingual    Cosign for Ordering: Accepted by Carito Leone MD on 2/23/2019  9:06 AM    OLANZapine (zyPREXA) tablet 5 mg 5 mg Nightly 3/22/2019     Sig - Route: Take 1 tablet by mouth Every Night. - Oral    OLANZapine zydis (zyPREXA) disintegrating tablet 5 mg 5 mg 2 Times Daily PRN 3/22/2019     Sig - Route: Take 1 tablet by mouth 2 (Two) Times a Day As Needed (agitation). - Oral    ondansetron (ZOFRAN) injection 4 mg 4 mg Every 6 Hours PRN 2/22/2019     Sig - Route: Infuse 2 mL into a venous catheter Every 6 (Six) Hours As Needed for Nausea or Vomiting. - Intravenous    Cosign for Ordering: Accepted by Carito Leone MD on 2/23/2019  9:06 AM    potassium chloride (K-DUR,KLOR-CON) CR tablet 40 mEq 40 mEq As Needed 3/18/2019     Sig - Route: Take 2 tablets by mouth As Needed (potassium replacement.  see admin instructions). - Oral    Linked Group 3:  \"Or\" Linked Group Details        potassium chloride (K-DUR,KLOR-CON) CR tablet 40 mEq 40 mEq Every 4 Hours 4/1/2019 4/1/2019    Sig - Route: Take 2 tablets by mouth Every 4 (Four) Hours. - Oral    potassium chloride (KLOR-CON) packet 40 mEq 40 mEq As Needed 3/18/2019     Sig - Route: Take 40 mEq by mouth As Needed (potassium replacement, see admin instructions). - Oral    Linked Group 3:  \"Or\" Linked Group Details        potassium chloride 10 mEq in 100 mL IVPB 10 mEq Every 1 Hour PRN 3/18/2019     Sig - Route: Infuse 100 mL into a venous catheter Every 1 (One) Hour As Needed (potassium protocol PERIPHERAL - see admin instructions). - Intravenous    Linked Group 3:  \"Or\" Linked Group Details        potassium phosphate 15 mmol in sodium chloride 0.9 % 100 mL infusion 15 mmol As Needed 3/8/2019     Sig - Route: Infuse 15 mmol into a venous catheter As Needed (Peripheral IV - Phosphorus " "1.8 - 2.5 mg/dL). - Intravenous    Linked Group 4:  \"Or\" Linked Group Details        potassium phosphate 30 mmol in sodium chloride 0.9 % 250 mL infusion 30 mmol As Needed 3/8/2019     Sig - Route: Infuse 30 mmol into a venous catheter As Needed (Peripheral IV - Phosphorus 1.3 - 1.7 mg/dL). - Intravenous    Linked Group 4:  \"Or\" Linked Group Details        potassium phosphate 45 mmol in sodium chloride 0.9 % 500 mL infusion 45 mmol As Needed 3/8/2019     Sig - Route: Infuse 45 mmol into a venous catheter As Needed (Peripheral IV - Phosphorus Less Than 1.3 mg/dL). - Intravenous    Linked Group 4:  \"Or\" Linked Group Details        sodium chloride 0.9 % flush 3 mL 3 mL Every 12 Hours Scheduled 2/23/2019     Sig - Route: Infuse 3 mL into a venous catheter Every 12 (Twelve) Hours. - Intravenous    Cosign for Ordering: Accepted by Carito Leone MD on 2/23/2019  9:06 AM    sodium chloride 0.9 % flush 3 mL 3 mL Every 12 Hours Scheduled 2/23/2019     Sig - Route: Infuse 3 mL into a venous catheter Every 12 (Twelve) Hours. - Intravenous    sodium chloride 0.9 % flush 3-10 mL 3-10 mL As Needed 2/22/2019     Sig - Route: Infuse 3-10 mL into a venous catheter As Needed for Line Care. - Intravenous    Cosign for Ordering: Accepted by Carito Leone MD on 2/23/2019  9:06 AM    sodium chloride 0.9 % flush 5 mL 5 mL As Needed 2/23/2019     Sig - Route: Infuse 5 mL into a venous catheter As Needed for Line Care (After Medication Administration or Blood Draw). - Intravenous    sodium phosphates 15 mmol in sodium chloride 0.9 % 250 mL IVPB 15 mmol As Needed 3/8/2019     Sig - Route: Infuse 15 mmol into a venous catheter As Needed (Peripheral IV - Phosphorus 1.8 - 2.5 mg/dL & Potassium Greater Than 4). - Intravenous    Linked Group 4:  \"Or\" Linked Group Details        sodium phosphates 30 mmol in sodium chloride 0.9 % 250 mL IVPB 30 mmol As Needed 3/8/2019     Sig - Route: Infuse 30 mmol into a venous catheter As Needed " "(Peripheral IV - Phosphorus 1.3-1.7 mg/dL & Potassium Greater Than 4). - Intravenous    Linked Group 4:  \"Or\" Linked Group Details        sodium phosphates 45 mmol in sodium chloride 0.9 % 500 mL IVPB 45 mmol As Needed 3/8/2019     Sig - Route: Infuse 45 mmol into a venous catheter As Needed (Peripheral IV - Phosphorus Less Than 1.3 mg/dL & Potassium Greater Than 4). - Intravenous    Linked Group 4:  \"Or\" Linked Group Details        spironolactone (ALDACTONE) tablet 25 mg 25 mg Daily With Lunch 3/25/2019     Sig - Route: Take 1 tablet by mouth Daily With Lunch. - Oral    thiamine (VITAMIN B-1) tablet 100 mg 100 mg Daily 2/23/2019     Sig - Route: Take 1 tablet by mouth Daily. - Oral    Cosign for Ordering: Accepted by Alberto Connolly MD on 2/23/2019  3:57 PM    torsemide (DEMADEX) tablet 10 mg 10 mg Daily 3/26/2019     Sig - Route: Take 0.5 tablets by mouth Daily. - Oral            Lab Results (last 24 hours)     Procedure Component Value Units Date/Time    Basic Metabolic Panel [[id number]]  (Abnormal) Collected:  04/01/19 0434    Specimen:  Blood Updated:  04/01/19 0514     Glucose 101 mg/dL      BUN 24 mg/dL      Creatinine 0.97 mg/dL      Sodium 139 mmol/L      Potassium 3.6 mmol/L      Chloride 97 mmol/L      CO2 28.4 mmol/L      Calcium 9.5 mg/dL      eGFR Non African Amer 60 mL/min/1.73      BUN/Creatinine Ratio 24.7     Anion Gap 13.6 mmol/L     Narrative:       GFR Normal >60  Chronic Kidney Disease <60  Kidney Failure <15        Imaging Results (last 24 hours)     ** No results found for the last 24 hours. **        ECG/EMG Results (last 24 hours)     Procedure Component Value Units Date/Time    ECG 12 Lead [[id number]] Collected:  04/01/19 1136     Updated:  04/01/19 1138    Narrative:       Test Reason : RHYTHM CHECK  Blood Pressure : **/** mmHG  Vent. Rate : 074 BPM     Atrial Rate : 074 BPM     P-R Int : 174 ms          QRS Dur : 090 ms      QT Int : 428 ms       P-R-T Axes : 072 096 074 " "degrees     QTc Int : 475 ms    Normal sinus rhythm  Rightward axis  Borderline ECG  When compared with ECG of 26-MAR-2019 08:54,  Sinus rhythm has replaced Atrial fibrillation  Vent. rate has decreased BY  59 BPM  ST no longer depressed in Lateral leads  T wave inversion now evident in Anterior leads    Referred By:  GUERDA           Confirmed By:              Physician Progress Notes (last 24 hours) (Notes from 3/31/2019  3:36 PM through 4/1/2019  3:36 PM)      Silvia Tillman MD at 4/1/2019  1:36 PM        Pt seen briefly. Denies complaints or concerns. Confused (trying to answer her call light for instance). Otherwise, no concerns.     Staff in to do a procedure during my visit.     We will continue to follow along. Please do not hesitate to contact us regarding further sx mgmt or GOC needs, including after hours or on weekends via our on call provider at [phone].        Silvia Tillman MD    4/1/2019      Electronically signed by Silvia Tillman MD at 4/1/2019  1:37 PM       Medical Student Notes (last 24 hours) (Notes from 3/31/2019  3:36 PM through 4/1/2019  3:36 PM)     No notes of this type exist for this encounter.        Consult Notes (last 24 hours) (Notes from 3/31/2019  3:36 PM through 4/1/2019  3:36 PM)     No notes of this type exist for this encounter.        Nutrition Notes (last 24 hours) (Notes from 3/31/2019  3:36 PM through 4/1/2019  3:36 PM)     No notes of this type exist for this encounter.           Physical Therapy Notes (last 24 hours) (Notes from 3/31/2019  3:36 PM through 4/1/2019  3:36 PM)      Kyung Lr PT at 4/1/2019  2:26 PM  Version 1 of 1 04/01/19 1425   Rehab Treatment   Discipline physical therapist   Reason Treatment Not Performed patient/family declined treatment  (Pt declined PT treatment today, stated \"I will do it tomorrow, I'm too sore today\". )       Electronically signed by Kyung Lr PT at 4/1/2019  2:26 PM       Occupational " Therapy Notes (last 24 hours) (Notes from 3/31/2019  3:36 PM through 4/1/2019  3:36 PM)     No notes of this type exist for this encounter.        Speech Language Pathology Notes (last 24 hours) (Notes from 3/31/2019  3:36 PM through 4/1/2019  3:36 PM)     No notes of this type exist for this encounter.        Respiratory Therapy Notes (last 24 hours) (Notes from 3/31/2019  3:36 PM through 4/1/2019  3:36 PM)     No notes of this type exist for this encounter.

## 2019-04-02 LAB
ANION GAP SERPL CALCULATED.3IONS-SCNC: 10.9 MMOL/L
BASOPHILS # BLD AUTO: 0.03 10*3/MM3 (ref 0–0.2)
BASOPHILS NFR BLD AUTO: 0.6 % (ref 0–1.5)
BUN BLD-MCNC: 16 MG/DL (ref 6–20)
BUN/CREAT SERPL: 21.6 (ref 7–25)
CALCIUM SPEC-SCNC: 9.5 MG/DL (ref 8.6–10.5)
CHLORIDE SERPL-SCNC: 101 MMOL/L (ref 98–107)
CO2 SERPL-SCNC: 26.1 MMOL/L (ref 22–29)
CREAT BLD-MCNC: 0.74 MG/DL (ref 0.57–1)
DEPRECATED RDW RBC AUTO: 50.5 FL (ref 37–54)
EOSINOPHIL # BLD AUTO: 0.35 10*3/MM3 (ref 0–0.4)
EOSINOPHIL NFR BLD AUTO: 6.7 % (ref 0.3–6.2)
ERYTHROCYTE [DISTWIDTH] IN BLOOD BY AUTOMATED COUNT: 13.7 % (ref 12.3–15.4)
GFR SERPL CREATININE-BSD FRML MDRD: 81 ML/MIN/1.73
GLUCOSE BLD-MCNC: 101 MG/DL (ref 65–99)
HCT VFR BLD AUTO: 33.2 % (ref 34–46.6)
HGB BLD-MCNC: 10.3 G/DL (ref 12–15.9)
IMM GRANULOCYTES # BLD AUTO: 0.02 10*3/MM3 (ref 0–0.05)
IMM GRANULOCYTES NFR BLD AUTO: 0.4 % (ref 0–0.5)
LYMPHOCYTES # BLD AUTO: 0.89 10*3/MM3 (ref 0.7–3.1)
LYMPHOCYTES NFR BLD AUTO: 17 % (ref 19.6–45.3)
MCH RBC QN AUTO: 31.9 PG (ref 26.6–33)
MCHC RBC AUTO-ENTMCNC: 31 G/DL (ref 31.5–35.7)
MCV RBC AUTO: 102.8 FL (ref 79–97)
MONOCYTES # BLD AUTO: 0.82 10*3/MM3 (ref 0.1–0.9)
MONOCYTES NFR BLD AUTO: 15.7 % (ref 5–12)
NEUTROPHILS # BLD AUTO: 3.11 10*3/MM3 (ref 1.4–7)
NEUTROPHILS NFR BLD AUTO: 59.6 % (ref 42.7–76)
PHOSPHATE SERPL-MCNC: 3.4 MG/DL (ref 2.5–4.5)
PLATELET # BLD AUTO: 198 10*3/MM3 (ref 140–450)
PMV BLD AUTO: 11.1 FL (ref 6–12)
POTASSIUM BLD-SCNC: 4.6 MMOL/L (ref 3.5–5.2)
RBC # BLD AUTO: 3.23 10*6/MM3 (ref 3.77–5.28)
SODIUM BLD-SCNC: 138 MMOL/L (ref 136–145)
WBC NRBC COR # BLD: 5.22 10*3/MM3 (ref 3.4–10.8)

## 2019-04-02 PROCEDURE — 84100 ASSAY OF PHOSPHORUS: CPT | Performed by: HOSPITALIST

## 2019-04-02 PROCEDURE — 94799 UNLISTED PULMONARY SVC/PX: CPT

## 2019-04-02 PROCEDURE — 85025 COMPLETE CBC W/AUTO DIFF WBC: CPT | Performed by: INTERNAL MEDICINE

## 2019-04-02 PROCEDURE — 99232 SBSQ HOSP IP/OBS MODERATE 35: CPT | Performed by: HOSPITALIST

## 2019-04-02 PROCEDURE — 25010000002 ENOXAPARIN PER 10 MG

## 2019-04-02 PROCEDURE — 80048 BASIC METABOLIC PNL TOTAL CA: CPT | Performed by: HOSPITALIST

## 2019-04-02 RX ORDER — FUROSEMIDE 20 MG/1
20 TABLET ORAL DAILY
Status: DISCONTINUED | OUTPATIENT
Start: 2019-04-03 | End: 2019-04-07

## 2019-04-02 RX ORDER — LEVOTHYROXINE SODIUM 0.07 MG/1
75 TABLET ORAL
Status: DISCONTINUED | OUTPATIENT
Start: 2019-04-03 | End: 2019-04-18 | Stop reason: HOSPADM

## 2019-04-02 RX ORDER — MAGNESIUM SULFATE HEPTAHYDRATE 40 MG/ML
4 INJECTION, SOLUTION INTRAVENOUS ONCE
Status: COMPLETED | OUTPATIENT
Start: 2019-04-02 | End: 2019-04-02

## 2019-04-02 RX ORDER — OLANZAPINE 5 MG/1
5 TABLET ORAL NIGHTLY
Status: DISCONTINUED | OUTPATIENT
Start: 2019-04-02 | End: 2019-04-18 | Stop reason: HOSPADM

## 2019-04-02 RX ADMIN — DIGOXIN 125 MCG: 125 TABLET ORAL at 08:40

## 2019-04-02 RX ADMIN — MAGNESIUM GLUCONATE 500 MG ORAL TABLET 400 MG: 500 TABLET ORAL at 08:40

## 2019-04-02 RX ADMIN — ASPIRIN 81 MG: 81 TABLET ORAL at 08:42

## 2019-04-02 RX ADMIN — ENOXAPARIN SODIUM 50 MG: 80 INJECTION SUBCUTANEOUS at 08:43

## 2019-04-02 RX ADMIN — METOPROLOL TARTRATE 75 MG: 50 TABLET, FILM COATED ORAL at 08:41

## 2019-04-02 RX ADMIN — TORSEMIDE 10 MG: 20 TABLET ORAL at 08:42

## 2019-04-02 RX ADMIN — AMIODARONE HYDROCHLORIDE 200 MG: 200 TABLET ORAL at 08:39

## 2019-04-02 RX ADMIN — LEVOTHYROXINE SODIUM 50 MCG: 50 TABLET ORAL at 05:00

## 2019-04-02 RX ADMIN — Medication 100 MG: at 08:42

## 2019-04-02 RX ADMIN — MAGNESIUM SULFATE HEPTAHYDRATE 4 G: 40 INJECTION, SOLUTION INTRAVENOUS at 00:38

## 2019-04-02 RX ADMIN — SPIRONOLACTONE 25 MG: 25 TABLET ORAL at 13:08

## 2019-04-02 RX ADMIN — MEXILETINE HYDROCHLORIDE 150 MG: 150 CAPSULE ORAL at 13:10

## 2019-04-02 RX ADMIN — OLANZAPINE 5 MG: 5 TABLET, FILM COATED ORAL at 19:43

## 2019-04-02 RX ADMIN — FOLIC ACID 1 MG: 1 TABLET ORAL at 08:41

## 2019-04-02 RX ADMIN — Medication 10 MG: at 08:43

## 2019-04-02 RX ADMIN — LANSOPRAZOLE 30 MG: KIT at 08:44

## 2019-04-02 RX ADMIN — ENOXAPARIN SODIUM 50 MG: 80 INJECTION SUBCUTANEOUS at 19:43

## 2019-04-02 RX ADMIN — SODIUM CHLORIDE, PRESERVATIVE FREE 3 ML: 5 INJECTION INTRAVENOUS at 08:45

## 2019-04-02 RX ADMIN — MEXILETINE HYDROCHLORIDE 150 MG: 150 CAPSULE ORAL at 05:00

## 2019-04-02 RX ADMIN — MEXILETINE HYDROCHLORIDE 150 MG: 150 CAPSULE ORAL at 19:43

## 2019-04-02 NOTE — PROGRESS NOTES
[facility] HOSPITALIST PROGRESS NOTE     Patient Identification:  Name:  Altagracia King  Age:  53 y.o.  Sex:  female  :  1965  MRN:  [id number]  Visit Number:  [id number]  ROOM: [address] [place] [address]     Primary Care Provider:  Provider, No Known    Length of stay:  38    Subjective        Chief Compliant follow-up for AMS    Patient seen and examined this morning with no family at the bedside.  Patient is alert, awake and oriented to self, time, limited to person and place. Does have episodes of delusions where she states that she has to work and pay the bills. Did mention about having a brother and going to home with him. Overall doing much better. Appetite improving. Denies cough.  Denies any chest pain, shortness of breath. on room air.  Afebrile and no events overnight.        Objective     Current Hospital Meds:    amiodarone 200 mg Oral Daily   aspirin 81 mg Oral Daily   bisacodyl 10 mg Rectal Daily   budesonide 0.5 mg Nebulization BID - RT   cyanocobalamin 1,000 mcg Intramuscular Q30 Days   digoxin 125 mcg Oral Daily   enoxaparin 1 mg/kg Subcutaneous Q12H   folic acid 1 mg Oral Daily   [START ON 4/3/2019] furosemide 20 mg Oral Daily   lansoprazole 30 mg Per G Tube Daily   levothyroxine 50 mcg Oral Q AM   magnesium oxide 400 mg Oral Daily   metoprolol tartrate 75 mg Oral Q12H   mexiletine 150 mg Oral Q8H   OLANZapine 5 mg Oral Nightly   sodium chloride 3 mL Intravenous Q12H   sodium chloride 3 mL Intravenous Q12H   spironolactone 25 mg Oral Daily With Lunch   thiamine 100 mg Oral Daily        ----------------------------------------------------------------------------------------------------------------------  Vital Signs:  Temp:  [98 °F (36.7 °C)-98.4 °F (36.9 °C)] 98.2 °F (36.8 °C)  Heart Rate:  [65-83] 68  Resp:  [18] 18  BP: (108-127)/(56-72) 127/72  SpO2:  [95 %-97 %] 97 %  on   ;   Device (Oxygen Therapy): room air  Body mass index is 16.89 kg/m².    Wt Readings from Last 3 Encounters:    04/02/19 46 kg (101 lb 8 oz)   02/14/19 56.7 kg (125 lb)   02/14/19 56.7 kg (125 lb)       Intake/Output Summary (Last 24 hours) at 4/2/2019 1626  Last data filed at 4/2/2019 0300  Gross per 24 hour   Intake 420 ml   Output --   Net 420 ml     Diet Soft Texture; Chopped; Thin; Daily Fluid Restriction; Other; 1,800  ----------------------------------------------------------------------------------------------------------------------  Physical exam:  General: Comfortable, Not in distress.  Well-developed and well-nourished.   HENT:  Head:  Normocephalic and atraumatic.  Mouth:  Moist mucous membranes.    Eyes:  Conjunctivae and EOM are normal.  Right eye with cataract and left eye opacified.   No scleral icterus.    Neck:  Neck supple.  No JVD present.  trachea midline.   Cardiovascular:  Normal rate, regular rhythm with systolic murmur + in the mitral area.   Pulmonary/Chest:  No respiratory distress, no wheezes, no rales, with normal breath sounds and good air movement.  Abdomen:  Soft.  Bowel sounds are normal.  No distension and no tenderness. No organomegaly.   Musculoskeletal:  No edema, no tenderness, and no deformity.  No red or swollen joints anywhere.    Neurological: oriented to self, limited to person, but not to time and place. NFND.   Skin:  Skin is warm and dry. No rash noted. No pallor.   Peripheral vascular:  pulses in all 4 extremities with no clubbing, no cyanosis, no edema.  Genitourinary: no antunez  ----------------------------------------------------------------------------------------------------------------------  ----------------------------------------------------------------------------------------------------------------------  Results from last 7 days   Lab Units 04/02/19  1030 03/30/19  0538 03/28/19  0300   WBC 10*3/mm3 5.22 5.16 5.35   HEMOGLOBIN g/dL 10.3* 10.1* 11.3*   HEMATOCRIT % 33.2* 32.3* 36.8   MCV fL 102.8* 105.2* 106.1*   MCHC g/dL 31.0* 31.3* 30.7*   PLATELETS 10*3/mm3 198  207 227     Results from last 7 days   Lab Units 04/02/19  0456 04/01/19  2113 04/01/19  0434 03/31/19  0136  03/27/19  0423   SODIUM mmol/L 138  --  139 141   < > 145   POTASSIUM mmol/L 4.6  --  3.6 3.9   < > 3.5   MAGNESIUM mg/dL  --  1.9  --   --   --  2.0   CHLORIDE mmol/L 101  --  97* 100   < > 105   CO2 mmol/L 26.1  --  28.4 27.7   < > 26.6   BUN mg/dL 16  --  24* 24*   < > 23*   CREATININE mg/dL 0.74  --  0.97 0.99   < > 0.87   PHOSPHORUS mg/dL 3.4  --   --   --   --   --    EGFR IF NONAFRICN AM mL/min/1.73 81  --  60* 58*   < > 68   CALCIUM mg/dL 9.5  --  9.5 9.4   < > 10.0   GLUCOSE mg/dL 101*  --  101* 79   < > 109*    < > = values in this interval not displayed.   Estimated Creatinine Clearance: 62.4 mL/min (by C-G formula based on SCr of 0.74 mg/dL).      No results found for: HGBA1C, POCGLU  No results found for: AMMONIA        Blood Culture   Date Value Ref Range Status   03/19/2019 No growth at 24 hours  Preliminary   03/19/2019 Abnormal Stain (A)  Preliminary     Respiratory Culture   Date Value Ref Range Status   03/14/2019 Rare Normal Respiratory Obdulia  Final         I have personally looked at the labs and they are summarized above.  ----------------------------------------------------------------------------------------------------------------------  Imaging Results (last 24 hours)     ** No results found for the last 24 hours. **        I have personally reviewed the radiology images and read the final radiology report.    Assessment & Plan      Assessment and Plan:  - Acute diastolic CHF secondary to severe MR: resolved. dc. Torsemide and start on low dose lasix since BP borderline. continue to monitor closely. monitor renal function and electrolytes. on fluid restriction 1800 cc/day.      -RLL pneumonia. Resolved.     -Atrial fibrillation with RVR([id number] score is 2) with H/O HTN and female sex.  In SR.  On amiodarone, digoxin and metoprolol and lovenox full dose.   Patient was on  flecainide but that had been discontinued. Cardiology on board and recommend no need for anticoagulation due to low embolic risk and significant alcohol use history.  Stress test showed normal myocardial perfusion study with no evidence of ischemia.  QTC prolonged.       -Acute hypoxemic respiratory failure. Resolved.    Continue Pulmicort nebs and  Atrovent nebs.  Venous Doppler negative for DVT.  2D echo showed normal ejection fraction, grade I diastolic dysfunction, moderate AR and severe MR.     -Delirium:  Resolved. Continue thiamine and folic acid. Continue with 5 mg of Zyprexa since the patient has episodes of delusions.     -Septic shock due to bilateral pneumonia  Resolved. Treated with vancomycin, Zosyn and doxycycline.  No growth on blood culture so far    -NSVT, resolved. on mexitil by Dr. Abbasi.     -Hypokalemia.  Resolved.      -Hyponatremia, resolved  Sodium level was 126 on admission is normal now. Continue water flushes with tube feeds.     -Esophageal thickening  May be due to reflux esophagitis.  She will need EGD as an outpatient.     -Hypothyroidism  TSH was elevated on admission.  Continue levothyroxine.  repeat TSH elevated so increase dose of levothyroxin.     -Nutrition. SLP evaluated and started on the dysphagia diet.  -Debility: PT/OT on board.     - Prophylaxis  DVT: Lovenox subq bid  GI: PPI.   - on board for discharge planning.    The patient is high risk due to: respiratory failure, bilateral pneumonia, septic shock, atrial fibrillation, hyponatremia.     I discussed the patients findings and my recommendations with the patient and the nursing staff.       Sayra Milton MD  04/02/19  4:26 PM

## 2019-04-02 NOTE — SIGNIFICANT NOTE
04/02/19 1425   Rehab Treatment   Discipline physical therapist   Reason Treatment Not Performed patient/family declined treatment  (Pt declines treatment today. I continue to explain the benefit of participation with pt and she refuses. Will continue to follow. )

## 2019-04-02 NOTE — PLAN OF CARE
Problem: Fall Risk (Adult)  Goal: Absence of Fall  Outcome: Ongoing (interventions implemented as appropriate)      Problem: Skin Injury Risk (Adult)  Goal: Skin Health and Integrity  Outcome: Ongoing (interventions implemented as appropriate)      Problem: Patient Care Overview  Goal: Plan of Care Review  Outcome: Ongoing (interventions implemented as appropriate)    Goal: Individualization and Mutuality  Outcome: Ongoing (interventions implemented as appropriate)    Goal: Discharge Needs Assessment  Outcome: Ongoing (interventions implemented as appropriate)    Goal: Interprofessional Rounds/Family Conf  Outcome: Ongoing (interventions implemented as appropriate)    Goal: Plan of Care Review  Outcome: Ongoing (interventions implemented as appropriate)    Goal: Individualization and Mutuality  Outcome: Ongoing (interventions implemented as appropriate)    Goal: Discharge Needs Assessment  Outcome: Ongoing (interventions implemented as appropriate)    Goal: Interprofessional Rounds/Family Conf  Outcome: Ongoing (interventions implemented as appropriate)      Problem: Pain, Chronic (Adult)  Goal: Acceptable Pain/Comfort Level and Functional Ability  Outcome: Ongoing (interventions implemented as appropriate)      Problem: Mobility, Physical Impaired (Adult)  Goal: Enhanced Mobility Skills  Outcome: Ongoing (interventions implemented as appropriate)    Goal: Enhanced Functional Ability  Outcome: Ongoing (interventions implemented as appropriate)    Goal: Identify Related Risk Factors and Signs and Symptoms  Outcome: Ongoing (interventions implemented as appropriate)    Goal: Enhanced Mobility Skills  Outcome: Ongoing (interventions implemented as appropriate)    Goal: Enhanced Functional Ability  Outcome: Ongoing (interventions implemented as appropriate)      Problem: Arrhythmia/Dysrhythmia (Symptomatic) (Adult)  Goal: Signs and Symptoms of Listed Potential Problems Will be Absent, Minimized or Managed  (Arrhythmia/Dysrhythmia)  Outcome: Ongoing (interventions implemented as appropriate)

## 2019-04-02 NOTE — PLAN OF CARE
Problem: Skin Injury Risk (Adult)  Goal: Skin Health and Integrity  Outcome: Ongoing (interventions implemented as appropriate)      Problem: Patient Care Overview  Goal: Plan of Care Review  Outcome: Ongoing (interventions implemented as appropriate)    Goal: Individualization and Mutuality  Outcome: Ongoing (interventions implemented as appropriate)

## 2019-04-02 NOTE — PROGRESS NOTES
[facility] HOSPITALIST PROGRESS NOTE     Patient Identification:  Name:  Altagracia King  Age:  53 y.o.  Sex:  female  :  1965  MRN:  [id number]  Visit Number:  [id number]  ROOM: [address] [place] [address]     Primary Care Provider:  Provider, No Known    Length of stay:  37    Subjective        Chief Compliant follow-up for AMS    Patient seen and examined this morning with no family at the bedside.  Patient is doing much better. Appetite improving. Denies cough.  Denies any chest pain, shortness of breath. on room air.  Afebrile and no events overnight.        Objective     Current Hospital Meds:    amiodarone 200 mg Oral Daily   aspirin 81 mg Oral Daily   bisacodyl 10 mg Rectal Daily   budesonide 0.5 mg Nebulization BID - RT   cyanocobalamin 1,000 mcg Intramuscular Q30 Days   digoxin 125 mcg Oral Daily   enoxaparin 1 mg/kg Subcutaneous Q12H   folic acid 1 mg Oral Daily   lansoprazole 30 mg Per G Tube Daily   levothyroxine 50 mcg Oral Q AM   magnesium oxide 400 mg Oral Daily   metoprolol tartrate 75 mg Oral Q12H   mexiletine 150 mg Oral Q8H   OLANZapine 2.5 mg Oral Nightly   sodium chloride 3 mL Intravenous Q12H   sodium chloride 3 mL Intravenous Q12H   spironolactone 25 mg Oral Daily With Lunch   thiamine 100 mg Oral Daily   torsemide 10 mg Oral Daily        ----------------------------------------------------------------------------------------------------------------------  Vital Signs:  Temp:  [97.5 °F (36.4 °C)-98.3 °F (36.8 °C)] 98.3 °F (36.8 °C)  Heart Rate:  [59-83] 83  Resp:  [18] 18  BP: ()/(55-71) 124/71  SpO2:  [96 %-99 %] 96 %  on  Flow (L/min):  [4] 4;   Device (Oxygen Therapy): room air  Body mass index is 16.84 kg/m².    Wt Readings from Last 3 Encounters:   19 45.9 kg (101 lb 3.2 oz)   19 56.7 kg (125 lb)   19 56.7 kg (125 lb)       Intake/Output Summary (Last 24 hours) at 2019  Last data filed at 2019 1030  Gross per 24 hour   Intake --   Output 525 ml    Net -525 ml     Diet Soft Texture; Chopped; Thin; Daily Fluid Restriction; Other; 1,800  ----------------------------------------------------------------------------------------------------------------------  Physical exam:  General: Comfortable, Not in distress.  Well-developed and well-nourished.   HENT:  Head:  Normocephalic and atraumatic.  Mouth:  Moist mucous membranes.    Eyes:  Conjunctivae and EOM are normal.  Right eye with cataract and left eye opacified.   No scleral icterus.    Neck:  Neck supple.  No JVD present.  trachea midline.   Cardiovascular:  Tachycardia, regular rhythm with systolic murmur + in the mitral area.   Pulmonary/Chest:  No respiratory distress, no wheezes, no rales, with normal breath sounds and good air movement.  Abdomen:  Soft.  Bowel sounds are normal.  No distension and no tenderness. No organomegaly.   Musculoskeletal:  No edema, no tenderness, and no deformity.  No red or swollen joints anywhere.    Neurological: oriented to self, limited to person, but not to time and place. NFND.   Skin:  Skin is warm and dry. No rash noted. No pallor.   Peripheral vascular:  pulses in all 4 extremities with no clubbing, no cyanosis, no edema.  Genitourinary: no antunez  ----------------------------------------------------------------------------------------------------------------------  ----------------------------------------------------------------------------------------------------------------------  Results from last 7 days   Lab Units 03/30/19  0538 03/28/19  0300 03/27/19  1033   WBC 10*3/mm3 5.16 5.35 6.01   HEMOGLOBIN g/dL 10.1* 11.3* 10.8*   HEMATOCRIT % 32.3* 36.8 34.0   MCV fL 105.2* 106.1* 103.7*   MCHC g/dL 31.3* 30.7* 31.8   PLATELETS 10*3/mm3 207 227 202     Results from last 7 days   Lab Units 04/01/19  0434 03/31/19  0136 03/30/19  0538  03/27/19  0423 03/26/19  0409   SODIUM mmol/L 139 141 141   < > 145 143   POTASSIUM mmol/L 3.6 3.9 3.7   < > 3.5 3.9   MAGNESIUM mg/dL   --   --   --   --  2.0 2.2   CHLORIDE mmol/L 97* 100 101   < > 105 103   CO2 mmol/L 28.4 27.7 26.1   < > 26.6 26.5   BUN mg/dL 24* 24* 27*   < > 23* 20   CREATININE mg/dL 0.97 0.99 1.00   < > 0.87 0.82   EGFR IF NONAFRICN AM mL/min/1.73 60* 58* 58*   < > 68 72   CALCIUM mg/dL 9.5 9.4 9.5   < > 10.0 10.0   GLUCOSE mg/dL 101* 79 90   < > 109* 102*    < > = values in this interval not displayed.   Estimated Creatinine Clearance: 47.5 mL/min (by C-G formula based on SCr of 0.97 mg/dL).      No results found for: HGBA1C, POCGLU  No results found for: AMMONIA        Blood Culture   Date Value Ref Range Status   03/19/2019 No growth at 24 hours  Preliminary   03/19/2019 Abnormal Stain (A)  Preliminary     Respiratory Culture   Date Value Ref Range Status   03/14/2019 Rare Normal Respiratory Obdulia  Final         I have personally looked at the labs and they are summarized above.  ----------------------------------------------------------------------------------------------------------------------  Imaging Results (last 24 hours)     ** No results found for the last 24 hours. **        I have personally reviewed the radiology images and read the final radiology report.    Assessment & Plan      Assessment and Plan:  - Acute diastolic CHF secondary to severe MR: resolved. on p.o. Torsemide. Cr improving. continue to monitor closely. monitor renal function and electrolytes. on fluid restriction 1800 cc/day.  Replace Magnesium and potassium.     -RLL pneumonia. Resolved.     -Atrial fibrillation with RVR  In SR.  On amiodarone, digoxin and metoprolol.   Patient was on flecainide but that had been discontinued. Cardiology on board and recommend no need for anticoagulation due to low embolic risk and significant alcohol use history.  Stress test showed normal myocardial perfusion study with no evidence of ischemia.  QTC prolonged.       -Acute hypoxemic respiratory failure. Resolved.    Continue Pulmicort nebs and  Atrovent nebs.   Venous Doppler negative for DVT.  2D echo showed normal ejection fraction, grade I diastolic dysfunction, moderate AR and severe MR.     -Delirium:  Resolved. Continue thiamine and folic acid. Decrease dose of zyprexa.     -Septic shock due to bilateral pneumonia  Resolved. Treated with vancomycin, Zosyn and doxycycline.  No growth on blood culture so far    -NSVT, resolved. on mexitil by Dr. Abbasi.     -Hypokalemia.  Resolved.      -Hyponatremia, resolved  Sodium level was 126 on admission is normal now. Continue water flushes with tube feeds.     -Esophageal thickening  May be due to reflux esophagitis.  She will need EGD as an outpatient.     -Hypothyroidism  TSH was elevated on admission.  Continue levothyroxine.  Patient will need repeat check in 3-4 weeks as an outpatient.     -Nutrition. SLP evaluated and started on the dysphagia diet.  -Debility: PT/OT on board.     - Prophylaxis  DVT: Lovenox  GI: PPI.   - on board for discharge planning.    The patient is high risk due to: respiratory failure, bilateral pneumonia, septic shock, atrial fibrillation, hyponatremia.     I discussed the patients findings and my recommendations with the patient and the nursing staff.       Sayra Milton MD  04/01/19  8:23 PM

## 2019-04-02 NOTE — PROGRESS NOTES
Discharge Planning Assessment  [facility]     Patient Name: Altagracia King  MRN: [id number]  Today's Date: 4/2/2019    Admit Date: 2/22/2019      Discharge Plan     Row Name 04/02/19 1532       Plan    Plan  SS spoke with the pt on this date. Pt states that she is going to go home with her brother. Pt did not provide the name of her brother. Pt states that her brother is in [place] at this time, but will be traveling home. SS has attempted to contact pt's daughter and pt's sister on this date. SS will follow.       Patient/Family in Agreement with Plan  yes        Destination      Service Provider Request Status Selected Services Address Phone Number Fax Number    [facility] Pending - Request Sent N/A [address] [place] [id number] [phone]    [facility] Pending - Request Sent N/A [address] [facility] [address] [phone]    [facility] - Request Sent N/A [address] [place] [id number] [phone]    [facility] - Request Sent N/A [address] [facility] [address] [phone]    [facility] Declined N/A [address] [facility] [address] [phone]    [facility] Declined N/A [address] [id number] [phone]        Expected Discharge Date and Time     Expected Discharge Date Expected Discharge Time    Apr 3, 2019             Jazmyn Mendoza

## 2019-04-03 LAB
DIGOXIN SERPL-MCNC: 1.3 NG/ML (ref 0.6–1.2)
MAGNESIUM SERPL-MCNC: 2.2 MG/DL (ref 1.6–2.6)
POTASSIUM BLD-SCNC: 4.7 MMOL/L (ref 3.5–5.2)

## 2019-04-03 PROCEDURE — 99232 SBSQ HOSP IP/OBS MODERATE 35: CPT | Performed by: HOSPITALIST

## 2019-04-03 PROCEDURE — 84132 ASSAY OF SERUM POTASSIUM: CPT | Performed by: HOSPITALIST

## 2019-04-03 PROCEDURE — 94799 UNLISTED PULMONARY SVC/PX: CPT

## 2019-04-03 PROCEDURE — 83735 ASSAY OF MAGNESIUM: CPT | Performed by: PSYCHIATRY & NEUROLOGY

## 2019-04-03 PROCEDURE — 25010000002 ENOXAPARIN PER 10 MG

## 2019-04-03 PROCEDURE — 99232 SBSQ HOSP IP/OBS MODERATE 35: CPT | Performed by: PSYCHIATRY & NEUROLOGY

## 2019-04-03 PROCEDURE — 80162 ASSAY OF DIGOXIN TOTAL: CPT | Performed by: HOSPITALIST

## 2019-04-03 RX ORDER — DIGOXIN 125 MCG
125 TABLET ORAL
Status: DISCONTINUED | OUTPATIENT
Start: 2019-04-05 | End: 2019-04-18 | Stop reason: HOSPADM

## 2019-04-03 RX ADMIN — AMIODARONE HYDROCHLORIDE 200 MG: 200 TABLET ORAL at 09:36

## 2019-04-03 RX ADMIN — ENOXAPARIN SODIUM 50 MG: 80 INJECTION SUBCUTANEOUS at 20:47

## 2019-04-03 RX ADMIN — FOLIC ACID 1 MG: 1 TABLET ORAL at 09:36

## 2019-04-03 RX ADMIN — METOPROLOL TARTRATE 75 MG: 50 TABLET, FILM COATED ORAL at 20:48

## 2019-04-03 RX ADMIN — SODIUM CHLORIDE, PRESERVATIVE FREE 3 ML: 5 INJECTION INTRAVENOUS at 20:47

## 2019-04-03 RX ADMIN — ASPIRIN 81 MG: 81 TABLET ORAL at 09:36

## 2019-04-03 RX ADMIN — Medication 100 MG: at 09:37

## 2019-04-03 RX ADMIN — LEVOTHYROXINE SODIUM 75 MCG: 75 TABLET ORAL at 05:44

## 2019-04-03 RX ADMIN — MAGNESIUM GLUCONATE 500 MG ORAL TABLET 400 MG: 500 TABLET ORAL at 09:36

## 2019-04-03 RX ADMIN — SPIRONOLACTONE 25 MG: 25 TABLET ORAL at 11:46

## 2019-04-03 RX ADMIN — MEXILETINE HYDROCHLORIDE 150 MG: 150 CAPSULE ORAL at 20:47

## 2019-04-03 RX ADMIN — MEXILETINE HYDROCHLORIDE 150 MG: 150 CAPSULE ORAL at 05:44

## 2019-04-03 RX ADMIN — ENOXAPARIN SODIUM 50 MG: 80 INJECTION SUBCUTANEOUS at 09:38

## 2019-04-03 RX ADMIN — SODIUM CHLORIDE, PRESERVATIVE FREE 3 ML: 5 INJECTION INTRAVENOUS at 20:48

## 2019-04-03 RX ADMIN — FUROSEMIDE 20 MG: 20 TABLET ORAL at 09:37

## 2019-04-03 RX ADMIN — METOPROLOL TARTRATE 75 MG: 50 TABLET, FILM COATED ORAL at 09:36

## 2019-04-03 RX ADMIN — DIGOXIN 125 MCG: 125 TABLET ORAL at 09:37

## 2019-04-03 RX ADMIN — OLANZAPINE 5 MG: 5 TABLET, FILM COATED ORAL at 20:47

## 2019-04-03 RX ADMIN — LANSOPRAZOLE 30 MG: KIT at 09:39

## 2019-04-03 RX ADMIN — MEXILETINE HYDROCHLORIDE 150 MG: 150 CAPSULE ORAL at 13:29

## 2019-04-03 RX ADMIN — BUDESONIDE 0.5 MG: 0.5 INHALANT RESPIRATORY (INHALATION) at 06:38

## 2019-04-03 NOTE — PROGRESS NOTES
Discharge Planning Assessment   [facility]     Patient Name: Altagracia King  MRN: [id number]  Today's Date: 4/3/2019    Admit Date: 2019      Discharge Plan     Row Name 19 1553       Plan    Plan SS spoke with pt's sister, Sylwia and pt's daughter, Madeline on this date who state pt's brother is  and there are no family members that will be willing to let pt live with them. SS contacted [facility] and left a voicemail for admissions. SS contacted [facility] who states they do not have any available beds at this time. SS contacted [facility] and left a voicemail for admissions. SS contacted [facility] and left a voicemail for admissions. SS contacted [facility] who states they do not have any available beds, but will call if one becomes available. SS contacted [facility] who states they have available beds. SS faxed referral to be reviewed. SS contacted Esthela at [facility] who states they do not have any available beds. SS contacted [facility] and left a voicemail for admissions. SS contacted [facility] who states they are not sure if they have any available beds, but will check and contact SS back. SS contacted [facility] who states they do not have any available beds at this time. SS will continue to follow.       Mona Post

## 2019-04-03 NOTE — PROGRESS NOTES
[facility] HOSPITALIST PROGRESS NOTE     Patient Identification:  Name:  Altagracia King  Age:  53 y.o.  Sex:  female  :  1965  MRN:  [id number]  Visit Number:  [id number]  ROOM: [address] [place] [address]     Primary Care Provider:  Provider, No Known    Length of stay:  39    Subjective        Chief Compliant follow-up for AMS    Patient seen and examined this morning with no family at the bedside.  Patient is alert, awake and oriented to self, time, limited to person and place. Does have episodes of delusions.  Advised to ambulate with walker.  Overall doing much better. Appetite improving. Denies cough.  Denies any chest pain, shortness of breath. on room air.  Afebrile and no events overnight.        Objective     Current Hospital Meds:    amiodarone 200 mg Oral Daily   aspirin 81 mg Oral Daily   bisacodyl 10 mg Rectal Daily   budesonide 0.5 mg Nebulization BID - RT   cyanocobalamin 1,000 mcg Intramuscular Q30 Days   [START ON 2019] digoxin 125 mcg Oral Q48H   enoxaparin 1 mg/kg Subcutaneous Q12H   folic acid 1 mg Oral Daily   furosemide 20 mg Oral Daily   lansoprazole 30 mg Per G Tube Daily   levothyroxine 75 mcg Oral Q AM   magnesium oxide 400 mg Oral Daily   metoprolol tartrate 75 mg Oral Q12H   mexiletine 150 mg Oral Q8H   OLANZapine 5 mg Oral Nightly   sodium chloride 3 mL Intravenous Q12H   sodium chloride 3 mL Intravenous Q12H   spironolactone 25 mg Oral Daily With Lunch   thiamine 100 mg Oral Daily        ----------------------------------------------------------------------------------------------------------------------  Vital Signs:  Temp:  [97.4 °F (36.3 °C)-98.2 °F (36.8 °C)] 97.7 °F (36.5 °C)  Heart Rate:  [68-75] 68  Resp:  [18] 18  BP: (103-142)/(45-72) 142/60  SpO2:  [94 %-99 %] 98 %  on   ;   Device (Oxygen Therapy): room air  Body mass index is 17.04 kg/m².    Wt Readings from Last 3 Encounters:   19 46.4 kg (102 lb 6.4 oz)   19 56.7 kg (125 lb)   19 56.7 kg (125  lb)       Intake/Output Summary (Last 24 hours) at 4/3/2019 1431  Last data filed at 4/3/2019 1300  Gross per 24 hour   Intake 580 ml   Output --   Net 580 ml     Diet Soft Texture; Chopped; Thin; Daily Fluid Restriction; Other; 1,800  ----------------------------------------------------------------------------------------------------------------------  Physical exam:  General: Comfortable, Not in distress.  Well-developed and well-nourished.   HENT:  Head:  Normocephalic and atraumatic.  Mouth:  Moist mucous membranes.    Eyes:  Conjunctivae and EOM are normal.  Right eye with cataract and left eye opacified.   No scleral icterus.    Neck:  Neck supple.  No JVD present.  trachea midline.   Cardiovascular:  Normal rate, regular rhythm with systolic murmur + in the mitral area.   Pulmonary/Chest:  No respiratory distress, no wheezes, no rales, with normal breath sounds and good air movement.  Abdomen:  Soft.  Bowel sounds are normal.  No distension and no tenderness. No organomegaly.   Musculoskeletal:  No edema, no tenderness, and no deformity.  No red or swollen joints anywhere.    Neurological: oriented to self, limited to person, but not to time and place. NFND.   Skin:  Skin is warm and dry. No rash noted. No pallor.   Peripheral vascular:  pulses in all 4 extremities with no clubbing, no cyanosis, no edema.  Genitourinary: no antunez  ----------------------------------------------------------------------------------------------------------------------  ----------------------------------------------------------------------------------------------------------------------  Results from last 7 days   Lab Units 04/02/19  1030 03/30/19  0538 03/28/19  0300   WBC 10*3/mm3 5.22 5.16 5.35   HEMOGLOBIN g/dL 10.3* 10.1* 11.3*   HEMATOCRIT % 33.2* 32.3* 36.8   MCV fL 102.8* 105.2* 106.1*   MCHC g/dL 31.0* 31.3* 30.7*   PLATELETS 10*3/mm3 198 207 227     Results from last 7 days   Lab Units 04/03/19  0335 04/02/19  045  04/01/19 2113 04/01/19  0434 03/31/19  0136   SODIUM mmol/L  --  138  --  139 141   POTASSIUM mmol/L 4.7 4.6  --  3.6 3.9   MAGNESIUM mg/dL 2.2  --  1.9  --   --    CHLORIDE mmol/L  --  101  --  97* 100   CO2 mmol/L  --  26.1  --  28.4 27.7   BUN mg/dL  --  16  --  24* 24*   CREATININE mg/dL  --  0.74  --  0.97 0.99   PHOSPHORUS mg/dL  --  3.4  --   --   --    EGFR IF NONAFRICN AM mL/min/1.73  --  81  --  60* 58*   CALCIUM mg/dL  --  9.5  --  9.5 9.4   GLUCOSE mg/dL  --  101*  --  101* 79   Estimated Creatinine Clearance: 62.9 mL/min (by C-G formula based on SCr of 0.74 mg/dL).      No results found for: HGBA1C, POCGLU  No results found for: AMMONIA        Blood Culture   Date Value Ref Range Status   03/19/2019 No growth at 24 hours  Preliminary   03/19/2019 Abnormal Stain (A)  Preliminary     Respiratory Culture   Date Value Ref Range Status   03/14/2019 Rare Normal Respiratory Obdulia  Final         I have personally looked at the labs and they are summarized above.  ----------------------------------------------------------------------------------------------------------------------  Imaging Results (last 24 hours)     ** No results found for the last 24 hours. **        I have personally reviewed the radiology images and read the final radiology report.    Assessment & Plan      Assessment and Plan:  - Acute diastolic CHF secondary to severe MR: resolved. dc. Torsemide and start on low dose lasix since BP borderline. continue to monitor closely. monitor renal function and electrolytes. on fluid restriction 1800 cc/day.      -RLL pneumonia. Resolved.     -Atrial fibrillation with RVR([id number] score is 2) with H/O HTN and female sex.  In SR.  On amiodarone, digoxin and metoprolol and lovenox full dose.  Digoxin level elevated so will change digoxin to every 48 hours.  Patient was on flecainide but that had been discontinued. Cardiology on board and recommend no need for anticoagulation due to low embolic risk  and significant alcohol use history.  Stress test showed normal myocardial perfusion study with no evidence of ischemia.  QTC prolonged.       -Acute hypoxemic respiratory failure. Resolved.    Continue Pulmicort nebs and  Atrovent nebs.  Venous Doppler negative for DVT.  2D echo showed normal ejection fraction, grade I diastolic dysfunction, moderate AR and severe MR.     -Delirium:  Resolved. Continue thiamine and folic acid. Continue with 5 mg of Zyprexa since the patient has episodes of delusions. Consult psychiatry for the evaluation of the mental status.      -Septic shock due to bilateral pneumonia  Resolved. Treated with vancomycin, Zosyn and doxycycline.  No growth on blood culture so far    -NSVT, resolved. on mexitil by Dr. Abbasi.     -Hypokalemia.  Resolved.      -Hyponatremia, resolved  Sodium level was 126 on admission is normal now. Continue water flushes with tube feeds.     -Esophageal thickening  May be due to reflux esophagitis.  She will need EGD as an outpatient.     -Hypothyroidism  TSH was elevated on admission.  Continue levothyroxine.  repeat TSH elevated so increase dose of levothyroxin.     -Nutrition. SLP evaluated and started on the dysphagia diet.  -Debility: PT/OT on board.     - Prophylaxis  DVT: Lovenox subq bid  GI: PPI.   - on board for discharge planning.    The patient is high risk due to: respiratory failure, bilateral pneumonia, septic shock, atrial fibrillation, hyponatremia.     I discussed the patients findings and my recommendations with the patient and the nursing staff.       Sayra Milton MD  04/03/19  2:31 PM

## 2019-04-03 NOTE — DISCHARGE PLACEMENT REQUEST
"Shagufta Loza (53 y.o. Female)     Date of Birth Social Security Number Address Home Phone MRN    1965  [address] [place] [address] [phone] [id number]    [employer] Marital Status          Unknown        Admission Date Admission Type Admitting Provider Attending Provider Department, Room/Bed    2/22/19 Emergency Carito Leone MD Srinivas, Priyanka, MD [address], 3309/1S    Discharge Date Discharge Disposition Discharge Destination                       Attending Provider:  Sayra Milton MD    Allergies:  No Known Allergies    Isolation:  None   Infection:  None   Code Status:  CPR    Ht:  165.1 cm (65\")   Wt:  46.4 kg (102 lb 6.4 oz)    Admission Cmt:  None   Principal Problem:  None                Active Insurance as of 2/22/2019     Primary Coverage     Payor Plan Insurance Group Employer/Plan Group    HUMANA MEDICAID HU[employer]NA CAR[employer]OURCE [id number]     Payor Plan Address Payor Plan Phone Number Payor Plan Fax Number Effective Dates    PO  [phone]  2/14/2019 - None Entered    [facility] [id number]       Subscriber Name Subscriber Birth Date Member ID       SHAGUFTA LOZA 1965 [id number]                 Emergency Contacts      (Rel.) Home Phone Work Phone Mobile Phone    Madeline Loza (Daughter) -- -- [phone]    Faustino Mclain (Friend) [phone] -- --    Mumtaz Bundy (Friend) [phone] -- --    Unknown, Sylwia (Sister) [phone] -- --            Emergency Contact Information     Name Relation Home Work Mobile    Madeline Loza Daughter   [phone]    Faustino Mclain Friend [phone]      Mumtaz Bundy Friend [phone]      Unknown, Sylwia Sister [phone]            Insurance Information                HUMANA MEDICAID/HUMANA CARESOURCE Phone: [phone]    Subscriber: Shagufta Loza Subscriber#: [id number]    Group#: [id number] Precert#:           Treatment Team     Provider Relationship Specialty Contact    " "Sayra Milton MD Attending -- [phone]    Maureen Arevalo, RN Registered Nurse --     Helena Murphy, LIANE Case Manager --     Siena Pardo, RRT Respiratory Therapist -- [phone]    Natalie Oconnor RN Registered Nurse --     Ayana Long, RN Registered Nurse --     Nadine Gee Patient Care Technician -- [phone]    Marcelle Hoang MD Consulting Physician Psychiatry [phone]    Britany Jefferson MD Consulting Physician Psychiatry [phone]    Kyung Lr, NAVDEEP Physical Therapist Physical Therapy     Asia Carvajal APRN Consulting Physician Hospice and Palliative Medicine [phone]          Problem List           Codes Noted - Resolved       Hospital    PAF (paroxysmal atrial fibrillation) (CMS/[facility]) ICD-10-CM: I48.0  ICD-9-CM: 427.31 3/11/2019 - Present    ETOH abuse ICD-10-CM: F10.10  ICD-9-CM: 305.00 3/11/2019 - Present    Acute respiratory failure with hypoxia (CMS/[facility]) ICD-10-CM: J96.01  ICD-9-CM: 518.81 3/11/2019 - Present    Chest pain ICD-10-CM: R07.9  ICD-9-CM: 786.50 2019 - Present             History & Physical      Jhon Messer PA-C at 2019 12:23 PM                   HISTORY AND PHYSICAL        Patient Identification:  Name:  Altagracia King  Age:  53 y.o.  Sex:  female  :  1965  MRN:  [id number]   Visit Number:  [id number]  Primary Care Physician:  Provider, No Known       Subjective     Subjective     Chief complaint:     Chief Complaint   Patient presents with   • Chest Pain       History of presenting illness:     The patient is a 53 year old female for presented to the ED with complaints of substernal chest pain that began yesterday morning. She reports the pain radiates down her left arm and her arm feels somewhat numb. She also reports issues at home and needing  as she has been living in a homeless shelter where she was recently \"kicked out.\" Vital signs stable. Troponin levels negative.EKG shows sinus rhythm " with premature ventricular complexes or fusion complexes and septal infarct , age undetermined.   History of alcohol abuse but normal ethanol level and she reports she has not had any alcohol for around a week. TSH elevated at 6.995. White count and CRP normal. Chest xray is unremarkable. EKG shows sinus rhythm with premature ventricular complexes or fusion complexes and septal infarct , age undetermined. Admitted to the observation unit for further evaluation and monitoring.       ---------------------------------------------------------------------------------------------------------------------     Review Of Systems:    Constitutional: no fever, chills and night sweats. No appetite change or unexpected weight change. No fatigue.  Eyes: no eye drainage, itching or redness.  HEENT: no mouth sores, dysphagia or nose bleed.  Respiratory: no for shortness of breath, cough or production of sputum.  Cardiovascular: See HPI  Gastrointestinal: no nausea, vomiting or diarrhea. No abdominal pain, hematemesis or rectal bleeding.  Genitourinary: no dysuria or polyuria.  Hematologic/lymphatic: no lymph node abnormalities, no easy bruising or easy bleeding.  Musculoskeletal: no muscle or joint pain.  Skin: No rash and no itching.  Neurological: no loss of consciousness, no seizure, no headache.  Behavioral/Psych: no depression or suicidal ideation.  Endocrine: no hot flashes.  Immunologic: negative.    ---------------------------------------------------------------------------------------------------------------------     Past Medical History    Past Medical History:   Diagnosis Date   • Alcoholism (CMS/HCC)    • Depression    • GERD (gastroesophageal reflux disease)        Past Surgical History    Past Surgical History:   Procedure Laterality Date   • CARDIAC CATHETERIZATION     • COLONOSCOPY     • ENDOSCOPY     • TUBAL ABDOMINAL LIGATION         Family History    Family History   Problem Relation Age of Onset   • Heart  disease Mother    • Depression Mother    • Heart disease Father    • Depression Sister    • Heart disease Brother    • Depression Maternal Grandmother    • Alcohol abuse Maternal Grandfather        Social History    Social History     Tobacco Use   • Smoking status: Current Every Day Smoker     Packs/day: 1.00   • Smokeless tobacco: Never Used   Substance Use Topics   • Alcohol use: Yes     Alcohol/week: 2.4 oz     Types: 4 Cans of beer per week   • Drug use: No       Allergies    Patient has no known allergies.  ---------------------------------------------------------------------------------------------------------------------     Home Medications:    Prior to Admission Medications     Prescriptions Last Dose Informant Patient Reported? Taking?    aspirin 325 MG tablet 2/22/2019 Self Yes Yes    Take 650 mg by mouth Daily.    calcium carbonate (TUMS) 500 MG chewable tablet 2/23/2019 Self Yes Yes    Chew 2 tablets As Needed for Indigestion or Heartburn.        ---------------------------------------------------------------------------------------------------------------------    Objective     Objective     Hospital Scheduled Meds:    aspirin 325 mg Oral Daily   enoxaparin 40 mg Subcutaneous Q24H   LORazepam 2 mg Oral 3 times per day   Followed by      [START ON 2/24/2019] LORazepam 1.5 mg Oral 3 times per day   Followed by      [START ON 2/25/2019] LORazepam 1 mg Oral 3 times per day   Followed by      [START ON 2/26/2019] LORazepam 0.5 mg Oral 3 times per day   sodium chloride 3 mL Intravenous Q12H   sodium chloride 3 mL Intravenous Q12H        ---------------------------------------------------------------------------------------------------------------------   Vital Signs:  Temp:  [98.2 °F (36.8 °C)-99.2 °F (37.3 °C)] 98.6 °F (37 °C)  Heart Rate:  [] 85  Resp:  [18-24] 18  BP: (104-164)/(52-98) 104/61  Mean Arterial Pressure (Non-Invasive) for the past 24 hrs (Last 3 readings):   Noninvasive MAP (mmHg)    02/23/19 1126 74   02/23/19 0716 81   02/23/19 0440 88     SpO2 Percentage    02/23/19 0440 02/23/19 0716 02/23/19 1126   SpO2: 99% 100% 100%     SpO2:  [96 %-100 %] 100 %  on   ;   Device (Oxygen Therapy): room air    Body mass index is 18.99 kg/m².  Wt Readings from Last 3 Encounters:   02/22/19 51.8 kg (114 lb 2 oz)   02/14/19 56.7 kg (125 lb)   02/14/19 56.7 kg (125 lb)     ---------------------------------------------------------------------------------------------------------------------     Physical Exam:    Constitutional:  Well-developed and well-nourished.  No respiratory distress.      HENT:  Head: Normocephalic and atraumatic.  Mouth:  Moist mucous membranes.    Eyes: Left cataract.  Conjunctivae and EOM are normal.  No scleral icterus.  Neck:  Neck supple.  No JVD present.    Cardiovascular:  Normal rate, regular rhythm and normal heart sounds with no murmur. No edema.  Pulmonary/Chest: Mild bilateral wheezes. No respiratory distress, no crackles, with normal breath sounds and good air movement.  Abdominal:  Soft.  Bowel sounds are normal.  No distension and no tenderness.   Musculoskeletal:  No edema, no tenderness, and no deformity.  No swelling or redness of joints.  Neurological:  Alert and oriented to person, place, and time.  No facial droop.  No slurred speech.   Skin:  Skin is warm and dry.  No rash noted.  No pallor.   Psychiatric:  Normal mood and affect.  Behavior is normal.    ---------------------------------------------------------------------------------------------------------------------  I have personally reviewed the EKG/Telemetry strip  ---------------------------------------------------------------------------------------------------------------------   Results from last 7 days   Lab Units 02/23/19  1134 02/23/19  0849 02/23/19  0132  02/22/19  1804   CK TOTAL U/L  --   --   --   --  41   TROPONIN I ng/mL <0.006 <0.006 <0.006   < > <0.006   MYOGLOBIN ng/mL  --   --   --   --  21.0     < > = values in this interval not displayed.           Results from last 7 days   Lab Units 02/23/19  0132 02/22/19  1944   CRP mg/dL <0.50  --    WBC 10*3/mm3 4.84 5.66   HEMOGLOBIN g/dL 11.1* 11.2*   HEMATOCRIT % 34.6* 34.4*   MCV fL 106.5* 104.2*   MCHC g/dL 32.1* 32.6*   PLATELETS 10*3/mm3 147 143     Results from last 7 days   Lab Units 02/23/19  0132 02/22/19  1804   SODIUM mmol/L 137 132*   POTASSIUM mmol/L 3.6 3.9   MAGNESIUM mg/dL 2.0 2.0   CHLORIDE mmol/L 105 102   CO2 mmol/L 25.4 24.5   BUN mg/dL 14 16   CREATININE mg/dL 0.74 0.69   EGFR IF NONAFRICN AM mL/min/1.73 81 88   CALCIUM mg/dL 9.3 9.1   GLUCOSE mg/dL 89 104   ALBUMIN g/dL 3.90 4.20   BILIRUBIN mg/dL 0.2 0.2   ALK PHOS U/L 61 69   AST (SGOT) U/L 21 23   ALT (SGPT) U/L 14 15   Estimated Creatinine Clearance: 70.2 mL/min (by C-G formula based on SCr of 0.74 mg/dL).  No results found for: AMMONIA    No results found for: HGBA1C, POCGLU  No results found for: HGBA1C  Lab Results   Component Value Date    TSH 6.995 (H) 02/22/2019                      Pain Management Panel     Pain Management Panel Latest Ref Rng & Units 2/22/2019    AMPHETAMINES SCREEN, URINE Negative Negative    BARBITURATES SCREEN Negative Negative    BENZODIAZEPINE SCREEN, URINE Negative Negative    BUPRENORPHINE Negative Negative    COCAINE SCREEN, URINE Negative Negative    METHADONE SCREEN, URINE Negative Negative        I have personally reviewed the above laboratory results.   ---------------------------------------------------------------------------------------------------------------------  Imaging Results (last 7 days)     Procedure Component Value Units Date/Time    XR Chest 1 View [[id number]] Collected:  02/23/19 0924     Updated:  02/23/19 0927    Narrative:       EXAMINATION: XR CHEST 1 VW-      CLINICAL INDICATION:     cp     TECHNIQUE:  XR CHEST 1 VW-      COMPARISON: NONE      FINDINGS:   Coarse interstitial markings suggestive of chronic interstitial  "lung  disease.  Heart and mediastinum contours are unremarkable.  No pleural effusion.  No pneumothorax.   Bony and soft tissue structures are unremarkable.       Impression:       No radiographic evidence of acute cardiac or pulmonary  disease.     This report was finalized on 2/23/2019 9:24 AM by Dr. Keven Lux MD.           I have personally reviewed the above radiology results.   ---------------------------------------------------------------------------------------------------------------------      Assessment & Plan        Assessment/Plan       ASSESSMENT:    1. Chest pain    PLAN:    The patient is a 53 year old female for presented to the ED with complaints of substernal chest pain that began yesterday morning. She reports the pain radiates down her left arm and her arm feels somewhat numb. She also reports issues at home and needing  as she has been living in a homeless shelter where she was recently \"kicked out.\" Vital signs stable. Troponin levels negative.EKG shows sinus rhythm with premature ventricular complexes or fusion complexes and septal infarct , age undetermined.   History of alcohol abuse but normal ethanol level and she reports she has not had any alcohol for around a week. TSH elevated at 6.995. White count and CRP normal. Chest xray is unremarkable. EKG shows sinus rhythm with premature ventricular complexes or fusion complexes and septal infarct , age undetermined. Admitted to the observation unit for further evaluation and monitoring.    Cardiology consulted for abnormality with new infarct on EKG with no further workup recommended. Would recommend to follow up with her cardiologist as outpatient.     Lovenox 40 mg subcutaneous q 24 hours for DVT prophylaxis.    Echo ordered. Cardiology was consulted for acute EKG changes.     D-dimer found to be elevated and CT per PE protocol ordered.     Patient's findings and recommendations were discussed with patient, nursing staff " and consulting provider      Code Status:   Code Status and Medical Interventions:   Ordered at: 02/22/19 2236     Code Status:    CPR     Medical Interventions (Level of Support Prior to Arrest):    Full     Patient seen with LIANE Goddard.     Jhon Messer PA-C  02/23/19  12:23 PM      Electronically signed by Carito Leone MD at 2/23/2019  2:27 PM       ICU Vital Signs     Row Name 04/03/19 1403 04/03/19 1025 04/03/19 0900 04/03/19 0638 04/03/19 0615       Vitals    Temp  97.7 °F (36.5 °C)  97.5 °F (36.4 °C)  --  --  --    Temp src  Oral  Oral  --  --  --    Pulse  68  68  --  72  --    Heart Rate Source  Monitor  Monitor  --  --  --    Resp  18  18  --  18  --    Resp Rate Source  Visual  Visual  --  --  --    BP  142/60  119/53  --  --  --    Noninvasive MAP (mmHg)  88  78  --  --  --    BP Location  --  --  --  --  --    BP Method  Automatic  Automatic  --  --  --    Patient Position  Lying  Lying  --  --  --       Oxygen Therapy    SpO2  98 %  99 %  --  98 %  --    Device (Oxygen Therapy)  --  --  room air  room air  --    Row Name 04/03/19 0558 04/03/19 0340 04/02/19 1944 04/02/19 1849 04/02/19 1813       Height and Weight    Weight  --  46.4 kg (102 lb 6.4 oz)  --  --  --    Weight Method  --  Bed scale  --  --  --       Vitals    Temp  97.8 °F (36.6 °C)  97.4 °F (36.3 °C)  --  --  97.9 °F (36.6 °C)    Temp src  Axillary  Axillary  --  --  Axillary    Pulse  75  72  69  --  70    Heart Rate Source  Monitor  Monitor  Monitor  --  Monitor    Resp  18  18  --  --  18    Resp Rate Source  Visual  Visual  --  --  Visual    BP  127/65  116/45  106/58  --  103/55    Noninvasive MAP (mmHg)  90  80  76  --  70    BP Location  Left arm  Left arm  --  --  Left arm    BP Method  Automatic  Automatic  --  --  Automatic    Patient Position  Lying  Lying  --  --  Lying       Oxygen Therapy    SpO2  95 %  94 %  --  95 %  96 %    Device (Oxygen Therapy)  --  room air  --  room air  room air        Intake & Output  "(last day)       04/02 0701 - 04/03 0700 04/03 0701 - 04/04 0700    P.O. 220 360    Total Intake(mL/kg) 220 (4.7) 360 (7.8)    Urine (mL/kg/hr)      Stool      Total Output      Net +220 +360          Urine Unmeasured Occurrence 4 x 1 x        Hospital Medications (active)       Dose Frequency Start End    acetaminophen (TYLENOL) tablet 650 mg 650 mg Every 4 Hours PRN 2/22/2019     Sig - Route: Take 2 tablets by mouth Every 4 (Four) Hours As Needed for Mild Pain . - Oral    Cosign for Ordering: Accepted by Carito Leone MD on 2/23/2019  9:06 AM    amiodarone (PACERONE) tablet 200 mg 200 mg Daily 3/28/2019     Sig - Route: Take 1 tablet by mouth Daily. - Oral    aspirin EC tablet 81 mg 81 mg Daily 3/19/2019     Sig - Route: Take 1 tablet by mouth Daily. - Oral    bisacodyl (DULCOLAX) suppository 10 mg 10 mg Daily PRN 3/12/2019     Sig - Route: Insert 1 suppository into the rectum Daily As Needed for Constipation (if oral meds do not help). - Rectal    bisacodyl (DULCOLAX) suppository 10 mg 10 mg Daily 3/29/2019     Sig - Route: Insert 1 suppository into the rectum Daily. - Rectal    budesonide (PULMICORT) nebulizer solution 0.5 mg 0.5 mg 2 Times Daily - RT 3/8/2019     Sig - Route: Take 2 mL by nebulization 2 (Two) Times a Day. - Nebulization    calcium carbonate (TUMS) chewable tablet 500 mg (200 mg elemental) 2 tablet 2 Times Daily PRN 2/22/2019     Sig - Route: Chew 1,000 mg 2 (Two) Times a Day As Needed for Heartburn. - Oral    Cosign for Ordering: Accepted by Carito Leone MD on 2/23/2019  9:06 AM    cyanocobalamin injection 1,000 mcg 1,000 mcg Every 30 Days 4/1/2019     Sig - Route: Inject 1 mL into the appropriate muscle as directed by prescriber Every 30 (Thirty) Days. - Intramuscular    Linked Group 1:  \"Followed by\" Linked Group Details        digoxin (LANOXIN) tablet 125 mcg 125 mcg Every 48 Hours 4/5/2019     Sig - Route: Take 1 tablet by mouth Every Other Day. - Oral    docusate sodium " (COLACE) capsule 100 mg 100 mg 2 Times Daily PRN 3/12/2019     Sig - Route: Take 1 capsule by mouth 2 (Two) Times a Day As Needed for Constipation. - Oral    enoxaparin (LOVENOX) syringe 50 mg 1 mg/kg × 47.4 kg Every 12 Hours 3/25/2019     Sig - Route: Inject 0.5 mL under the skin into the appropriate area as directed Every 12 (Twelve) Hours. - Subcutaneous    folic acid (FOLVITE) tablet 1 mg 1 mg Daily 2/23/2019     Sig - Route: Take 1 tablet by mouth Daily. - Oral    Cosign for Ordering: Accepted by Alberto Connolly MD on 2/23/2019  3:57 PM    furosemide (LASIX) tablet 20 mg 20 mg Daily 4/3/2019     Sig - Route: Take 1 tablet by mouth Daily. - Oral    ipratropium (ATROVENT) nebulizer solution 0.5 mg 0.5 mg Every 6 Hours PRN 3/27/2019     Sig - Route: Take 2.5 mL by nebulization Every 6 (Six) Hours As Needed for Shortness of Air. - Nebulization    Cosign for Ordering: Accepted by Christy Irizarry MD on 3/27/2019 11:04 AM    lansoprazole (FIRST) oral suspension 30 mg 30 mg Daily 3/15/2019     Sig - Route: 10 mL by Per G Tube route Daily. - Per G Tube    levothyroxine (SYNTHROID, LEVOTHROID) tablet 75 mcg 75 mcg Every Early Morning 4/3/2019     Sig - Route: Take 1 tablet by mouth Every Morning. - Oral    magnesium hydroxide (MILK OF MAGNESIA) suspension 2400 mg/10mL 10 mL 10 mL Daily PRN 3/12/2019     Sig - Route: Take 10 mL by mouth Daily As Needed for Constipation. - Oral    magnesium oxide (MAGOX) tablet 400 mg 400 mg Daily 3/24/2019     Sig - Route: Take 1 tablet by mouth Daily. - Oral    metoprolol tartrate (LOPRESSOR) tablet 75 mg 75 mg Every 12 Hours Scheduled 3/28/2019     Sig - Route: Take 75 mg by mouth Every 12 (Twelve) Hours. - Oral    mexiletine (MEXITIL) capsule 150 mg 150 mg Every 8 Hours Scheduled 3/21/2019     Sig - Route: Take 1 capsule by mouth Every 8 (Eight) Hours. - Oral    nitroglycerin (NITROSTAT) SL tablet 0.4 mg 0.4 mg Every 5 Minutes PRN 2/22/2019     Sig - Route: Place 1 tablet  under the tongue Every 5 (Five) Minutes As Needed for Chest Pain (Chest Pain With Systolic Blood Pressure Greater Than 100). - Sublingual    Cosign for Ordering: Accepted by Carito Leone MD on 2/23/2019  9:06 AM    OLANZapine (zyPREXA) tablet 5 mg 5 mg Nightly 4/2/2019     Sig - Route: Take 1 tablet by mouth Every Night. - Oral    sodium chloride 0.9 % flush 3 mL 3 mL Every 12 Hours Scheduled 2/23/2019     Sig - Route: Infuse 3 mL into a venous catheter Every 12 (Twelve) Hours. - Intravenous    Cosign for Ordering: Accepted by Carito Leone MD on 2/23/2019  9:06 AM    sodium chloride 0.9 % flush 3 mL 3 mL Every 12 Hours Scheduled 2/23/2019     Sig - Route: Infuse 3 mL into a venous catheter Every 12 (Twelve) Hours. - Intravenous    sodium chloride 0.9 % flush 3-10 mL 3-10 mL As Needed 2/22/2019     Sig - Route: Infuse 3-10 mL into a venous catheter As Needed for Line Care. - Intravenous    Cosign for Ordering: Accepted by Carito Leone MD on 2/23/2019  9:06 AM    sodium chloride 0.9 % flush 5 mL 5 mL As Needed 2/23/2019     Sig - Route: Infuse 5 mL into a venous catheter As Needed for Line Care (After Medication Administration or Blood Draw). - Intravenous    spironolactone (ALDACTONE) tablet 25 mg 25 mg Daily With Lunch 3/25/2019     Sig - Route: Take 1 tablet by mouth Daily With Lunch. - Oral    thiamine (VITAMIN B-1) tablet 100 mg 100 mg Daily 2/23/2019     Sig - Route: Take 1 tablet by mouth Daily. - Oral    Cosign for Ordering: Accepted by Alberto Connolly MD on 2/23/2019  3:57 PM    digoxin (LANOXIN) tablet 125 mcg (Discontinued) 125 mcg Daily 3/27/2019 4/3/2019    Sig - Route: Take 1 tablet by mouth Daily. - Oral    Notes to Pharmacy: Please schedule a dose for this evening    levothyroxine (SYNTHROID, LEVOTHROID) tablet 50 mcg (Discontinued) 50 mcg Every Early Morning 3/12/2019 4/2/2019    Sig - Route: Take 1 tablet by mouth Every Morning. - Oral    Cosign for Ordering:  Accepted by Romario Martinez MD on 3/11/2019  2:12 PM            Lab Results (last 24 hours)     Procedure Component Value Units Date/Time    Digoxin Level [[id number]]  (Abnormal) Collected:  04/03/19 0335    Specimen:  Blood Updated:  04/03/19 1008     Digoxin 1.30 ng/mL     Magnesium [[id number]]  (Normal) Collected:  04/03/19 0335    Specimen:  Blood Updated:  04/03/19 0505     Magnesium 2.2 mg/dL     Potassium [[id number]]  (Normal) Collected:  04/03/19 0335    Specimen:  Blood Updated:  04/03/19 0505     Potassium 4.7 mmol/L         Imaging Results (last 24 hours)     ** No results found for the last 24 hours. **        ECG/EMG Results (last 24 hours)     ** No results found for the last 24 hours. **           Physician Progress Notes (most recent note)      Britany Jefferson MD at 4/3/2019  2:37 PM           LOS: 39 days     Allergies  No Known Allergies    Assessment completed within view of staff    Chief Complaint:  hallucinations    Subjective     Interval History: The patient is a 53-year-old female who is currently in the hospital because she came in with chest pain and was feeling depressed and overwhelmed and had verbalized suicidal thoughts previously.  The patient has had an extended stay in the hospital and was reported to be experiencing some visual hallucinations.  Patient reports she is feeling better today.  She denies any auditory or visual hallucinations.  She was able to talk about her stressor being homeless.  She stated that she is hoping to go and stay with her brother who lives in [place].  She has a daughter who is in [place] but she does not want to bother her.  She is oriented to person place and situation but not to time.  She is denying any thoughts of harm to self or others.  She reports that she is feeling weak and is reluctant to take part in physical therapy because it is painful.  She is encouraged to work with the physical therapy and to build her strength back up.    Review  "of Systems:   General ROS: reports generalize weakness  Respiratory ROS: no cough, shortness of breath, or wheezing  Cardiovascular ROS: no chest pain   Gastrointestinal ROS: no nvd    Objective     Vital Signs  /60 (Patient Position: Lying)   Pulse 68   Temp 97.7 °F (36.5 °C) (Oral)   Resp 18   Ht 165.1 cm (65\")   Wt 46.4 kg (102 lb 6.4 oz)   SpO2 98%   BMI 17.04 kg/m²      Mental Status Exam:   Mood/Affect: sad/depressed  Thought Processes:  linear, logical, and goal directed  Thought Content:  normal  Hallucination(s): no  Hopelessness: no  Suicidal Thoughts:  none  Suicidal Plan/Intent: none  Homicidal Thoughts:  absent     Results Review:    Lab Results (last 24 hours)     Procedure Component Value Units Date/Time    Digoxin Level [[id number]]  (Abnormal) Collected:  04/03/19 0335    Specimen:  Blood Updated:  04/03/19 1008     Digoxin 1.30 ng/mL     Magnesium [[id number]]  (Normal) Collected:  04/03/19 0335    Specimen:  Blood Updated:  04/03/19 0505     Magnesium 2.2 mg/dL     Potassium [[id number]]  (Normal) Collected:  04/03/19 0335    Specimen:  Blood Updated:  04/03/19 0505     Potassium 4.7 mmol/L         Imaging Results (last 24 hours)     ** No results found for the last 24 hours. **          Medication Review:   Scheduled Medications:    amiodarone 200 mg Oral Daily   aspirin 81 mg Oral Daily   bisacodyl 10 mg Rectal Daily   budesonide 0.5 mg Nebulization BID - RT   cyanocobalamin 1,000 mcg Intramuscular Q30 Days   [START ON 4/5/2019] digoxin 125 mcg Oral Q48H   enoxaparin 1 mg/kg Subcutaneous Q12H   folic acid 1 mg Oral Daily   furosemide 20 mg Oral Daily   lansoprazole 30 mg Per G Tube Daily   levothyroxine 75 mcg Oral Q AM   magnesium oxide 400 mg Oral Daily   metoprolol tartrate 75 mg Oral Q12H   mexiletine 150 mg Oral Q8H   OLANZapine 5 mg Oral Nightly   sodium chloride 3 mL Intravenous Q12H   sodium chloride 3 mL Intravenous Q12H   spironolactone 25 mg Oral Daily With Lunch   thiamine " 100 mg Oral Daily        PRN Medications:  •  acetaminophen  •  bisacodyl  •  calcium carbonate  •  docusate sodium  •  ipratropium  •  magnesium hydroxide  •  nitroglycerin  •  sodium chloride  •  sodium chloride   All medications reviewed.      Assessment/Plan   Patient Active Problem List   Diagnosis Code   • Chest pain R07.9   • PAF (paroxysmal atrial fibrillation) (CMS/[facility]) I48.0   • ETOH abuse F10.10   • Acute respiratory failure with hypoxia (CMS/[facility]) J96.01     Plan:  The patient's mental status appears to be at baseline and she is not reporting any hallucinations. She also reports no suicidal thoughts. She is encouraged to work with physical therapy and build up her physical strength.       Britany Jefferson MD  04/03/19  2:38 PM        Electronically signed by Britany Jefferson MD at 4/3/2019  2:48 PM

## 2019-04-03 NOTE — PROGRESS NOTES
" LOS: 39 days     Allergies  No Known Allergies    Assessment completed within view of staff    Chief Complaint:  hallucinations    Subjective     Interval History: The patient is a 53-year-old female who is currently in the hospital because she came in with chest pain and was feeling depressed and overwhelmed and had verbalized suicidal thoughts previously.  The patient has had an extended stay in the hospital and was reported to be experiencing some visual hallucinations.  Patient reports she is feeling better today.  She denies any auditory or visual hallucinations.  She was able to talk about her stressor being homeless.  She stated that she is hoping to go and stay with her brother who lives in [place].  She has a daughter who is in [place] but she does not want to bother her.  She is oriented to person place and situation but not to time.  She is denying any thoughts of harm to self or others.  She reports that she is feeling weak and is reluctant to take part in physical therapy because it is painful.  She is encouraged to work with the physical therapy and to build her strength back up.    Review of Systems:   General ROS: reports generalize weakness  Respiratory ROS: no cough, shortness of breath, or wheezing  Cardiovascular ROS: no chest pain   Gastrointestinal ROS: no nvd    Objective     Vital Signs  /60 (Patient Position: Lying)   Pulse 68   Temp 97.7 °F (36.5 °C) (Oral)   Resp 18   Ht 165.1 cm (65\")   Wt 46.4 kg (102 lb 6.4 oz)   SpO2 98%   BMI 17.04 kg/m²     Mental Status Exam:   Mood/Affect: sad/depressed  Thought Processes:  linear, logical, and goal directed  Thought Content:  normal  Hallucination(s): no  Hopelessness: no  Suicidal Thoughts:  none  Suicidal Plan/Intent: none  Homicidal Thoughts:  absent     Results Review:    Lab Results (last 24 hours)     Procedure Component Value Units Date/Time    Digoxin Level [[id number]]  (Abnormal) Collected:  04/03/19 0335    Specimen:  " Blood Updated:  04/03/19 1008     Digoxin 1.30 ng/mL     Magnesium [[id number]]  (Normal) Collected:  04/03/19 0335    Specimen:  Blood Updated:  04/03/19 0505     Magnesium 2.2 mg/dL     Potassium [[id number]]  (Normal) Collected:  04/03/19 0335    Specimen:  Blood Updated:  04/03/19 0505     Potassium 4.7 mmol/L         Imaging Results (last 24 hours)     ** No results found for the last 24 hours. **          Medication Review:   Scheduled Medications:    amiodarone 200 mg Oral Daily   aspirin 81 mg Oral Daily   bisacodyl 10 mg Rectal Daily   budesonide 0.5 mg Nebulization BID - RT   cyanocobalamin 1,000 mcg Intramuscular Q30 Days   [START ON 4/5/2019] digoxin 125 mcg Oral Q48H   enoxaparin 1 mg/kg Subcutaneous Q12H   folic acid 1 mg Oral Daily   furosemide 20 mg Oral Daily   lansoprazole 30 mg Per G Tube Daily   levothyroxine 75 mcg Oral Q AM   magnesium oxide 400 mg Oral Daily   metoprolol tartrate 75 mg Oral Q12H   mexiletine 150 mg Oral Q8H   OLANZapine 5 mg Oral Nightly   sodium chloride 3 mL Intravenous Q12H   sodium chloride 3 mL Intravenous Q12H   spironolactone 25 mg Oral Daily With Lunch   thiamine 100 mg Oral Daily        PRN Medications:  •  acetaminophen  •  bisacodyl  •  calcium carbonate  •  docusate sodium  •  ipratropium  •  magnesium hydroxide  •  nitroglycerin  •  sodium chloride  •  sodium chloride   All medications reviewed.      Assessment/Plan   Patient Active Problem List   Diagnosis Code   • Chest pain R07.9   • PAF (paroxysmal atrial fibrillation) (CMS/[facility]) I48.0   • ETOH abuse F10.10   • Acute respiratory failure with hypoxia (CMS/[facility]) J96.01     Plan:  The patient's mental status appears to be at baseline and she is not reporting any hallucinations. She also reports no suicidal thoughts. She is encouraged to work with physical therapy and build up her physical strength.       Britany Jefferson MD  04/03/19  2:38 PM

## 2019-04-03 NOTE — DISCHARGE PLACEMENT REQUEST
"Shagufta Loza (53 y.o. Female)     Date of Birth Social Security Number Address Home Phone MRN    1965  [address] [place] [address] [phone] [id number]    [employer] Marital Status          Unknown        Admission Date Admission Type Admitting Provider Attending Provider Department, Room/Bed    2/22/19 Emergency Carito Leone MD Srinivas, Priyanka, MD [address], 3309/1S    Discharge Date Discharge Disposition Discharge Destination                       Attending Provider:  Sayra Milton MD    Allergies:  No Known Allergies    Isolation:  None   Infection:  None   Code Status:  CPR    Ht:  165.1 cm (65\")   Wt:  46.4 kg (102 lb 6.4 oz)    Admission Cmt:  None   Principal Problem:  None                Active Insurance as of 2/22/2019     Primary Coverage     Payor Plan Insurance Group Employer/Plan Group    HUMANA MEDICAID HU[employer]NA [employer] [id number]     Payor Plan Address Payor Plan Phone Number Payor Plan Fax Number Effective Dates    PO  [phone]  2/14/2019 - None Entered    [facility] [id number]       Subscriber Name Subscriber Birth Date Member ID       SHAGUFTA LOZA 1965 [id number]                 Emergency Contacts      (Rel.) Home Phone Work Phone Mobile Phone    Madeline Loza (Daughter) -- -- [phone]    Faustino Mclain (Friend) [phone] -- --    Mumtaz Bundy (Friend) [phone] -- --    Unknown, Sylwia (Sister) [phone] -- --            Emergency Contact Information     Name Relation Home Work Mobile    Madeline Loza Daughter   [phone]    Faustino Mclain Friend [phone]      Mumtaz Bundy Friend [phone]      Unknown, Sylwia Sister [phone]            Insurance Information                HUMANA MEDICAID/HU[employer]NA [employer] Phone: [phone]    Subscriber: Shagufta Loza Subscriber#: [id number]    Group#: [id number] Precert#:              History & Physical      Jhon Messer PA-C at 2/23/2019 " "12:23 PM                   HISTORY AND PHYSICAL        Patient Identification:  Name:  Altagracia King  Age:  53 y.o.  Sex:  female  :  1965  MRN:  [id number]   Visit Number:  [id number]  Primary Care Physician:  Provider, No Known       Subjective     Subjective     Chief complaint:     Chief Complaint   Patient presents with   • Chest Pain       History of presenting illness:     The patient is a 53 year old female for presented to the ED with complaints of substernal chest pain that began yesterday morning. She reports the pain radiates down her left arm and her arm feels somewhat numb. She also reports issues at home and needing  as she has been living in a homeless shelter where she was recently \"kicked out.\" Vital signs stable. Troponin levels negative.EKG shows sinus rhythm with premature ventricular complexes or fusion complexes and septal infarct , age undetermined.   History of alcohol abuse but normal ethanol level and she reports she has not had any alcohol for around a week. TSH elevated at 6.995. White count and CRP normal. Chest xray is unremarkable. EKG shows sinus rhythm with premature ventricular complexes or fusion complexes and septal infarct , age undetermined. Admitted to the observation unit for further evaluation and monitoring.       ---------------------------------------------------------------------------------------------------------------------     Review Of Systems:    Constitutional: no fever, chills and night sweats. No appetite change or unexpected weight change. No fatigue.  Eyes: no eye drainage, itching or redness.  HEENT: no mouth sores, dysphagia or nose bleed.  Respiratory: no for shortness of breath, cough or production of sputum.  Cardiovascular: See HPI  Gastrointestinal: no nausea, vomiting or diarrhea. No abdominal pain, hematemesis or rectal bleeding.  Genitourinary: no dysuria or polyuria.  Hematologic/lymphatic: no lymph node abnormalities, no " easy bruising or easy bleeding.  Musculoskeletal: no muscle or joint pain.  Skin: No rash and no itching.  Neurological: no loss of consciousness, no seizure, no headache.  Behavioral/Psych: no depression or suicidal ideation.  Endocrine: no hot flashes.  Immunologic: negative.    ---------------------------------------------------------------------------------------------------------------------     Past Medical History    Past Medical History:   Diagnosis Date   • Alcoholism (CMS/[facility])    • Depression    • GERD (gastroesophageal reflux disease)        Past Surgical History    Past Surgical History:   Procedure Laterality Date   • CARDIAC CATHETERIZATION     • COLONOSCOPY     • ENDOSCOPY     • TUBAL ABDOMINAL LIGATION         Family History    Family History   Problem Relation Age of Onset   • Heart disease Mother    • Depression Mother    • Heart disease Father    • Depression Sister    • Heart disease Brother    • Depression Maternal Grandmother    • Alcohol abuse Maternal Grandfather        Social History    Social History     Tobacco Use   • Smoking status: Current Every Day Smoker     Packs/day: 1.00   • Smokeless tobacco: Never Used   Substance Use Topics   • Alcohol use: Yes     Alcohol/week: 2.4 oz     Types: 4 Cans of beer per week   • Drug use: No       Allergies    Patient has no known allergies.  ---------------------------------------------------------------------------------------------------------------------     Home Medications:    Prior to Admission Medications     Prescriptions Last Dose Informant Patient Reported? Taking?    aspirin 325 MG tablet 2/22/2019 Self Yes Yes    Take 650 mg by mouth Daily.    calcium carbonate (TUMS) 500 MG chewable tablet 2/23/2019 Self Yes Yes    Chew 2 tablets As Needed for Indigestion or Heartburn.        ---------------------------------------------------------------------------------------------------------------------    Objective     Objective     Hospital  Scheduled Meds:    aspirin 325 mg Oral Daily   enoxaparin 40 mg Subcutaneous Q24H   LORazepam 2 mg Oral 3 times per day   Followed by      [START ON 2/24/2019] LORazepam 1.5 mg Oral 3 times per day   Followed by      [START ON 2/25/2019] LORazepam 1 mg Oral 3 times per day   Followed by      [START ON 2/26/2019] LORazepam 0.5 mg Oral 3 times per day   sodium chloride 3 mL Intravenous Q12H   sodium chloride 3 mL Intravenous Q12H        ---------------------------------------------------------------------------------------------------------------------   Vital Signs:  Temp:  [98.2 °F (36.8 °C)-99.2 °F (37.3 °C)] 98.6 °F (37 °C)  Heart Rate:  [] 85  Resp:  [18-24] 18  BP: (104-164)/(52-98) 104/61  Mean Arterial Pressure (Non-Invasive) for the past 24 hrs (Last 3 readings):   Noninvasive MAP (mmHg)   02/23/19 1126 74   02/23/19 0716 81   02/23/19 0440 88     SpO2 Percentage    02/23/19 0440 02/23/19 0716 02/23/19 1126   SpO2: 99% 100% 100%     SpO2:  [96 %-100 %] 100 %  on   ;   Device (Oxygen Therapy): room air    Body mass index is 18.99 kg/m².  Wt Readings from Last 3 Encounters:   02/22/19 51.8 kg (114 lb 2 oz)   02/14/19 56.7 kg (125 lb)   02/14/19 56.7 kg (125 lb)     ---------------------------------------------------------------------------------------------------------------------     Physical Exam:    Constitutional:  Well-developed and well-nourished.  No respiratory distress.      HENT:  Head: Normocephalic and atraumatic.  Mouth:  Moist mucous membranes.    Eyes: Left cataract.  Conjunctivae and EOM are normal.  No scleral icterus.  Neck:  Neck supple.  No JVD present.    Cardiovascular:  Normal rate, regular rhythm and normal heart sounds with no murmur. No edema.  Pulmonary/Chest: Mild bilateral wheezes. No respiratory distress, no crackles, with normal breath sounds and good air movement.  Abdominal:  Soft.  Bowel sounds are normal.  No distension and no tenderness.   Musculoskeletal:  No edema, no  tenderness, and no deformity.  No swelling or redness of joints.  Neurological:  Alert and oriented to person, place, and time.  No facial droop.  No slurred speech.   Skin:  Skin is warm and dry.  No rash noted.  No pallor.   Psychiatric:  Normal mood and affect.  Behavior is normal.    ---------------------------------------------------------------------------------------------------------------------  I have personally reviewed the EKG/Telemetry strip  ---------------------------------------------------------------------------------------------------------------------   Results from last 7 days   Lab Units 02/23/19  1134 02/23/19  0849 02/23/19  0132  02/22/19  1804   CK TOTAL U/L  --   --   --   --  41   TROPONIN I ng/mL <0.006 <0.006 <0.006   < > <0.006   MYOGLOBIN ng/mL  --   --   --   --  21.0    < > = values in this interval not displayed.           Results from last 7 days   Lab Units 02/23/19  0132 02/22/19  1944   CRP mg/dL <0.50  --    WBC 10*3/mm3 4.84 5.66   HEMOGLOBIN g/dL 11.1* 11.2*   HEMATOCRIT % 34.6* 34.4*   MCV fL 106.5* 104.2*   MCHC g/dL 32.1* 32.6*   PLATELETS 10*3/mm3 147 143     Results from last 7 days   Lab Units 02/23/19  0132 02/22/19  1804   SODIUM mmol/L 137 132*   POTASSIUM mmol/L 3.6 3.9   MAGNESIUM mg/dL 2.0 2.0   CHLORIDE mmol/L 105 102   CO2 mmol/L 25.4 24.5   BUN mg/dL 14 16   CREATININE mg/dL 0.74 0.69   EGFR IF NONAFRICN AM mL/min/1.73 81 88   CALCIUM mg/dL 9.3 9.1   GLUCOSE mg/dL 89 104   ALBUMIN g/dL 3.90 4.20   BILIRUBIN mg/dL 0.2 0.2   ALK PHOS U/L 61 69   AST (SGOT) U/L 21 23   ALT (SGPT) U/L 14 15   Estimated Creatinine Clearance: 70.2 mL/min (by C-G formula based on SCr of 0.74 mg/dL).  No results found for: AMMONIA    No results found for: HGBA1C, POCGLU  No results found for: HGBA1C  Lab Results   Component Value Date    TSH 6.995 (H) 02/22/2019                      Pain Management Panel     Pain Management Panel Latest Ref Rng & Units 2/22/2019    AMPHETAMINES  "SCREEN, URINE Negative Negative    BARBITURATES SCREEN Negative Negative    BENZODIAZEPINE SCREEN, URINE Negative Negative    BUPRENORPHINE Negative Negative    COCAINE SCREEN, URINE Negative Negative    METHADONE SCREEN, URINE Negative Negative        I have personally reviewed the above laboratory results.   ---------------------------------------------------------------------------------------------------------------------  Imaging Results (last 7 days)     Procedure Component Value Units Date/Time    XR Chest 1 View [[id number]] Collected:  02/23/19 0924     Updated:  02/23/19 0927    Narrative:       EXAMINATION: XR CHEST 1 VW-      CLINICAL INDICATION:     cp     TECHNIQUE:  XR CHEST 1 VW-      COMPARISON: NONE      FINDINGS:   Coarse interstitial markings suggestive of chronic interstitial lung  disease.  Heart and mediastinum contours are unremarkable.  No pleural effusion.  No pneumothorax.   Bony and soft tissue structures are unremarkable.       Impression:       No radiographic evidence of acute cardiac or pulmonary  disease.     This report was finalized on 2/23/2019 9:24 AM by Dr. Keven Lux MD.           I have personally reviewed the above radiology results.   ---------------------------------------------------------------------------------------------------------------------      Assessment & Plan        Assessment/Plan       ASSESSMENT:    1. Chest pain    PLAN:    The patient is a 53 year old female for presented to the ED with complaints of substernal chest pain that began yesterday morning. She reports the pain radiates down her left arm and her arm feels somewhat numb. She also reports issues at home and needing  as she has been living in a homeless shelter where she was recently \"kicked out.\" Vital signs stable. Troponin levels negative.EKG shows sinus rhythm with premature ventricular complexes or fusion complexes and septal infarct , age undetermined.   History of alcohol " abuse but normal ethanol level and she reports she has not had any alcohol for around a week. TSH elevated at 6.995. White count and CRP normal. Chest xray is unremarkable. EKG shows sinus rhythm with premature ventricular complexes or fusion complexes and septal infarct , age undetermined. Admitted to the observation unit for further evaluation and monitoring.    Cardiology consulted for abnormality with new infarct on EKG with no further workup recommended. Would recommend to follow up with her cardiologist as outpatient.     Lovenox 40 mg subcutaneous q 24 hours for DVT prophylaxis.    Echo ordered. Cardiology was consulted for acute EKG changes.     D-dimer found to be elevated and CT per PE protocol ordered.     Patient's findings and recommendations were discussed with patient, nursing staff and consulting provider      Code Status:   Code Status and Medical Interventions:   Ordered at: 02/22/19 2236     Code Status:    CPR     Medical Interventions (Level of Support Prior to Arrest):    Full     Patient seen with LIANE Goddard.     Jhon Messer PA-C  02/23/19  12:23 PM      Electronically signed by Carito Leone MD at 2/23/2019  2:27 PM       Hospital Medications (active)       Dose Frequency Start End    acetaminophen (TYLENOL) tablet 650 mg 650 mg Every 4 Hours PRN 2/22/2019     Sig - Route: Take 2 tablets by mouth Every 4 (Four) Hours As Needed for Mild Pain . - Oral    Cosign for Ordering: Accepted by Carito Leone MD on 2/23/2019  9:06 AM    amiodarone (PACERONE) tablet 200 mg 200 mg Daily 3/28/2019     Sig - Route: Take 1 tablet by mouth Daily. - Oral    aspirin EC tablet 81 mg 81 mg Daily 3/19/2019     Sig - Route: Take 1 tablet by mouth Daily. - Oral    bisacodyl (DULCOLAX) suppository 10 mg 10 mg Daily PRN 3/12/2019     Sig - Route: Insert 1 suppository into the rectum Daily As Needed for Constipation (if oral meds do not help). - Rectal    bisacodyl (DULCOLAX) suppository 10 mg 10 mg  "Daily 3/29/2019     Sig - Route: Insert 1 suppository into the rectum Daily. - Rectal    budesonide (PULMICORT) nebulizer solution 0.5 mg 0.5 mg 2 Times Daily - RT 3/8/2019     Sig - Route: Take 2 mL by nebulization 2 (Two) Times a Day. - Nebulization    calcium carbonate (TUMS) chewable tablet 500 mg (200 mg elemental) 2 tablet 2 Times Daily PRN 2/22/2019     Sig - Route: Chew 1,000 mg 2 (Two) Times a Day As Needed for Heartburn. - Oral    Cosign for Ordering: Accepted by Carito Leone MD on 2/23/2019  9:06 AM    cyanocobalamin injection 1,000 mcg 1,000 mcg Every 30 Days 4/1/2019     Sig - Route: Inject 1 mL into the appropriate muscle as directed by prescriber Every 30 (Thirty) Days. - Intramuscular    Linked Group 1:  \"Followed by\" Linked Group Details        digoxin (LANOXIN) tablet 125 mcg 125 mcg Every 48 Hours 4/5/2019     Sig - Route: Take 1 tablet by mouth Every Other Day. - Oral    docusate sodium (COLACE) capsule 100 mg 100 mg 2 Times Daily PRN 3/12/2019     Sig - Route: Take 1 capsule by mouth 2 (Two) Times a Day As Needed for Constipation. - Oral    enoxaparin (LOVENOX) syringe 50 mg 1 mg/kg × 47.4 kg Every 12 Hours 3/25/2019     Sig - Route: Inject 0.5 mL under the skin into the appropriate area as directed Every 12 (Twelve) Hours. - Subcutaneous    folic acid (FOLVITE) tablet 1 mg 1 mg Daily 2/23/2019     Sig - Route: Take 1 tablet by mouth Daily. - Oral    Cosign for Ordering: Accepted by Alberto Connolly MD on 2/23/2019  3:57 PM    furosemide (LASIX) tablet 20 mg 20 mg Daily 4/3/2019     Sig - Route: Take 1 tablet by mouth Daily. - Oral    ipratropium (ATROVENT) nebulizer solution 0.5 mg 0.5 mg Every 6 Hours PRN 3/27/2019     Sig - Route: Take 2.5 mL by nebulization Every 6 (Six) Hours As Needed for Shortness of Air. - Nebulization    Cosign for Ordering: Accepted by Christy Irizarry MD on 3/27/2019 11:04 AM    lansoprazole (FIRST) oral suspension 30 mg 30 mg Daily 3/15/2019     " Sig - Route: 10 mL by Per G Tube route Daily. - Per G Tube    levothyroxine (SYNTHROID, LEVOTHROID) tablet 75 mcg 75 mcg Every Early Morning 4/3/2019     Sig - Route: Take 1 tablet by mouth Every Morning. - Oral    magnesium hydroxide (MILK OF MAGNESIA) suspension 2400 mg/10mL 10 mL 10 mL Daily PRN 3/12/2019     Sig - Route: Take 10 mL by mouth Daily As Needed for Constipation. - Oral    magnesium oxide (MAGOX) tablet 400 mg 400 mg Daily 3/24/2019     Sig - Route: Take 1 tablet by mouth Daily. - Oral    metoprolol tartrate (LOPRESSOR) tablet 75 mg 75 mg Every 12 Hours Scheduled 3/28/2019     Sig - Route: Take 75 mg by mouth Every 12 (Twelve) Hours. - Oral    mexiletine (MEXITIL) capsule 150 mg 150 mg Every 8 Hours Scheduled 3/21/2019     Sig - Route: Take 1 capsule by mouth Every 8 (Eight) Hours. - Oral    nitroglycerin (NITROSTAT) SL tablet 0.4 mg 0.4 mg Every 5 Minutes PRN 2/22/2019     Sig - Route: Place 1 tablet under the tongue Every 5 (Five) Minutes As Needed for Chest Pain (Chest Pain With Systolic Blood Pressure Greater Than 100). - Sublingual    Cosign for Ordering: Accepted by Carito Leone MD on 2/23/2019  9:06 AM    OLANZapine (zyPREXA) tablet 5 mg 5 mg Nightly 4/2/2019     Sig - Route: Take 1 tablet by mouth Every Night. - Oral    sodium chloride 0.9 % flush 3 mL 3 mL Every 12 Hours Scheduled 2/23/2019     Sig - Route: Infuse 3 mL into a venous catheter Every 12 (Twelve) Hours. - Intravenous    Cosign for Ordering: Accepted by Carito Leone MD on 2/23/2019  9:06 AM    sodium chloride 0.9 % flush 3 mL 3 mL Every 12 Hours Scheduled 2/23/2019     Sig - Route: Infuse 3 mL into a venous catheter Every 12 (Twelve) Hours. - Intravenous    sodium chloride 0.9 % flush 3-10 mL 3-10 mL As Needed 2/22/2019     Sig - Route: Infuse 3-10 mL into a venous catheter As Needed for Line Care. - Intravenous    Cosign for Ordering: Accepted by Carito Leone MD on 2/23/2019  9:06 AM    sodium chloride 0.9  % flush 5 mL 5 mL As Needed 2/23/2019     Sig - Route: Infuse 5 mL into a venous catheter As Needed for Line Care (After Medication Administration or Blood Draw). - Intravenous    spironolactone (ALDACTONE) tablet 25 mg 25 mg Daily With Lunch 3/25/2019     Sig - Route: Take 1 tablet by mouth Daily With Lunch. - Oral    thiamine (VITAMIN B-1) tablet 100 mg 100 mg Daily 2/23/2019     Sig - Route: Take 1 tablet by mouth Daily. - Oral    Cosign for Ordering: Accepted by Alberto Connolly MD on 2/23/2019  3:57 PM    digoxin (LANOXIN) tablet 125 mcg (Discontinued) 125 mcg Daily 3/27/2019 4/3/2019    Sig - Route: Take 1 tablet by mouth Daily. - Oral    Notes to Pharmacy: Please schedule a dose for this evening    levothyroxine (SYNTHROID, LEVOTHROID) tablet 50 mcg (Discontinued) 50 mcg Every Early Morning 3/12/2019 4/2/2019    Sig - Route: Take 1 tablet by mouth Every Morning. - Oral    Cosign for Ordering: Accepted by Romario Martinez MD on 3/11/2019  2:12 PM    OLANZapine (zyPREXA) tablet 2.5 mg (Discontinued) 2.5 mg Nightly 4/1/2019 4/2/2019    Sig - Route: Take 1 tablet by mouth Every Night. - Oral            Lab Results (last 24 hours)     Procedure Component Value Units Date/Time    Digoxin Level [[id number]]  (Abnormal) Collected:  04/03/19 0335    Specimen:  Blood Updated:  04/03/19 1008     Digoxin 1.30 ng/mL     Magnesium [[id number]]  (Normal) Collected:  04/03/19 0335    Specimen:  Blood Updated:  04/03/19 0505     Magnesium 2.2 mg/dL     Potassium [[id number]]  (Normal) Collected:  04/03/19 0335    Specimen:  Blood Updated:  04/03/19 0505     Potassium 4.7 mmol/L         Imaging Results (last 24 hours)     ** No results found for the last 24 hours. **        ECG/EMG Results (last 24 hours)     ** No results found for the last 24 hours. **           Physician Progress Notes (most recent note)      Sayra Milton MD at 4/2/2019  4:24 PM              [facility]      Patient Identification:  Name:  Altagracia King  Age:  53 y.o.  Sex:  female  :  1965  MRN:  [id number]  Visit Number:  [id number]  ROOM: [address] [place] [address]     Primary Care Provider:  Provider, No Known    Length of stay:  38    Subjective        Chief Compliant follow-up for AMS    Patient seen and examined this morning with no family at the bedside.  Patient is alert, awake and oriented to self, time, limited to person and place. Does have episodes of delusions where she states that she has to work and pay the bills. Did mention about having a brother and going to home with him. Overall doing much better. Appetite improving. Denies cough.  Denies any chest pain, shortness of breath. on room air.  Afebrile and no events overnight.        Objective     Current Hospital Meds:    amiodarone 200 mg Oral Daily   aspirin 81 mg Oral Daily   bisacodyl 10 mg Rectal Daily   budesonide 0.5 mg Nebulization BID - RT   cyanocobalamin 1,000 mcg Intramuscular Q30 Days   digoxin 125 mcg Oral Daily   enoxaparin 1 mg/kg Subcutaneous Q12H   folic acid 1 mg Oral Daily   [START ON 4/3/2019] furosemide 20 mg Oral Daily   lansoprazole 30 mg Per G Tube Daily   levothyroxine 50 mcg Oral Q AM   magnesium oxide 400 mg Oral Daily   metoprolol tartrate 75 mg Oral Q12H   mexiletine 150 mg Oral Q8H   OLANZapine 5 mg Oral Nightly   sodium chloride 3 mL Intravenous Q12H   sodium chloride 3 mL Intravenous Q12H   spironolactone 25 mg Oral Daily With Lunch   thiamine 100 mg Oral Daily        ----------------------------------------------------------------------------------------------------------------------  Vital Signs:  Temp:  [98 °F (36.7 °C)-98.4 °F (36.9 °C)] 98.2 °F (36.8 °C)  Heart Rate:  [65-83] 68  Resp:  [18] 18  BP: (108-127)/(56-72) 127/72  SpO2:  [95 %-97 %] 97 %  on   ;   Device (Oxygen Therapy): room air  Body mass index is 16.89 kg/m².    Wt Readings from Last 3 Encounters:   19 46 kg (101 lb 8 oz)   19 56.7 kg (125 lb)    02/14/19 56.7 kg (125 lb)       Intake/Output Summary (Last 24 hours) at 4/2/2019 1626  Last data filed at 4/2/2019 0300  Gross per 24 hour   Intake 420 ml   Output --   Net 420 ml     Diet Soft Texture; Chopped; Thin; Daily Fluid Restriction; Other; 1,800  ----------------------------------------------------------------------------------------------------------------------  Physical exam:  General: Comfortable, Not in distress.  Well-developed and well-nourished.   HENT:  Head:  Normocephalic and atraumatic.  Mouth:  Moist mucous membranes.    Eyes:  Conjunctivae and EOM are normal.  Right eye with cataract and left eye opacified.   No scleral icterus.    Neck:  Neck supple.  No JVD present.  trachea midline.   Cardiovascular:  Normal rate, regular rhythm with systolic murmur + in the mitral area.   Pulmonary/Chest:  No respiratory distress, no wheezes, no rales, with normal breath sounds and good air movement.  Abdomen:  Soft.  Bowel sounds are normal.  No distension and no tenderness. No organomegaly.   Musculoskeletal:  No edema, no tenderness, and no deformity.  No red or swollen joints anywhere.    Neurological: oriented to self, limited to person, but not to time and place. NFND.   Skin:  Skin is warm and dry. No rash noted. No pallor.   Peripheral vascular:  pulses in all 4 extremities with no clubbing, no cyanosis, no edema.  Genitourinary: no antunez  ----------------------------------------------------------------------------------------------------------------------  ----------------------------------------------------------------------------------------------------------------------  Results from last 7 days   Lab Units 04/02/19  1030 03/30/19  0538 03/28/19  0300   WBC 10*3/mm3 5.22 5.16 5.35   HEMOGLOBIN g/dL 10.3* 10.1* 11.3*   HEMATOCRIT % 33.2* 32.3* 36.8   MCV fL 102.8* 105.2* 106.1*   MCHC g/dL 31.0* 31.3* 30.7*   PLATELETS 10*3/mm3 198 207 227     Results from last 7 days   Lab Units  04/02/19  0456 04/01/19  2113 04/01/19  0434 03/31/19  0136  03/27/19  0423   SODIUM mmol/L 138  --  139 141   < > 145   POTASSIUM mmol/L 4.6  --  3.6 3.9   < > 3.5   MAGNESIUM mg/dL  --  1.9  --   --   --  2.0   CHLORIDE mmol/L 101  --  97* 100   < > 105   CO2 mmol/L 26.1  --  28.4 27.7   < > 26.6   BUN mg/dL 16  --  24* 24*   < > 23*   CREATININE mg/dL 0.74  --  0.97 0.99   < > 0.87   PHOSPHORUS mg/dL 3.4  --   --   --   --   --    EGFR IF NONAFRICN AM mL/min/1.73 81  --  60* 58*   < > 68   CALCIUM mg/dL 9.5  --  9.5 9.4   < > 10.0   GLUCOSE mg/dL 101*  --  101* 79   < > 109*    < > = values in this interval not displayed.   Estimated Creatinine Clearance: 62.4 mL/min (by C-G formula based on SCr of 0.74 mg/dL).      No results found for: HGBA1C, POCGLU  No results found for: AMMONIA        Blood Culture   Date Value Ref Range Status   03/19/2019 No growth at 24 hours  Preliminary   03/19/2019 Abnormal Stain (A)  Preliminary     Respiratory Culture   Date Value Ref Range Status   03/14/2019 Rare Normal Respiratory Obdulia  Final         I have personally looked at the labs and they are summarized above.  ----------------------------------------------------------------------------------------------------------------------  Imaging Results (last 24 hours)     ** No results found for the last 24 hours. **        I have personally reviewed the radiology images and read the final radiology report.    Assessment & Plan      Assessment and Plan:  - Acute diastolic CHF secondary to severe MR: resolved. dc. Torsemide and start on low dose lasix since BP borderline. continue to monitor closely. monitor renal function and electrolytes. on fluid restriction 1800 cc/day.      -RLL pneumonia. Resolved.     -Atrial fibrillation with RVR([id number] score is 2) with H/O HTN and female sex.  In SR.  On amiodarone, digoxin and metoprolol and lovenox full dose.   Patient was on flecainide but that had been discontinued. Cardiology on  board and recommend no need for anticoagulation due to low embolic risk and significant alcohol use history.  Stress test showed normal myocardial perfusion study with no evidence of ischemia.  QTC prolonged.       -Acute hypoxemic respiratory failure. Resolved.    Continue Pulmicort nebs and  Atrovent nebs.  Venous Doppler negative for DVT.  2D echo showed normal ejection fraction, grade I diastolic dysfunction, moderate AR and severe MR.     -Delirium:  Resolved. Continue thiamine and folic acid. Continue with 5 mg of Zyprexa since the patient has episodes of delusions.     -Septic shock due to bilateral pneumonia  Resolved. Treated with vancomycin, Zosyn and doxycycline.  No growth on blood culture so far    -NSVT, resolved. on mexitil by Dr. Abbasi.     -Hypokalemia.  Resolved.      -Hyponatremia, resolved  Sodium level was 126 on admission is normal now. Continue water flushes with tube feeds.     -Esophageal thickening  May be due to reflux esophagitis.  She will need EGD as an outpatient.     -Hypothyroidism  TSH was elevated on admission.  Continue levothyroxine.  repeat  TSH elevated so increase dose of levothyroxin.     -Nutrition. SLP evaluated and started on the dysphagia diet.  -Debility: PT/OT on board.     - Prophylaxis  DVT: Lovenox subq bid  GI: PPI.   - on board for discharge planning.    The patient is high risk due to: respiratory failure, bilateral pneumonia, septic shock, atrial fibrillation, hyponatremia.     I discussed the patients findings and my recommendations with the patient and the nursing staff.       Sayra Milton MD  04/02/19  4:26 PM      Electronically signed by Sayra Milton MD at 4/2/2019  4:42 PM          Nutrition Notes (most recent note)      Madeline Molina RD at 3/9/2019  7:24 PM        Nutrition Services    Patient Name:  Altagracia King  YOB: 1965  MRN: [id number]  Admit Date:  2/22/2019    Note if tube feeding started, recommend  Replete, start @ 20 ml/hr advance as tolerated to 50 ml/hr. Flush with 10 ml/hr h2o.      Thank you    Electronically signed by:  Madeline Molina RD  19 8:24 PM     Electronically signed by Madeline Molina RD at 3/9/2019  8:25 PM          Physical Therapy Notes (most recent note)      Kyung Lr, PT at 2019  2:26 PM  Version 1 of 1            19 1425   Rehab Treatment   Discipline physical therapist   Reason Treatment Not Performed patient/family declined treatment  (Pt declines treatment today. I continue to explain the benefit of participation with pt and she refuses. Will continue to follow. )       Electronically signed by Kyung Lr, PT at 2019  2:26 PM          Occupational Therapy Notes (most recent note)      Roni Raines, OT at 3/26/2019  1:03 PM          Acute Care - Occupational Therapy Initial Evaluation  VLADIMIR Saravia     Patient Name: Altagracia King  : 1965  MRN: [id number]  Today's Date: 3/27/2019  Onset of Illness/Injury or Date of Surgery: 19(original admit date)     Referring Physician: Gui    Admit Date: 2019       ICD-10-CM ICD-9-CM   1. Chest pain, unspecified type R07.9 786.50   2. Acute cystitis without hematuria N30.00 595.0     Patient Active Problem List   Diagnosis   • Chest pain   • PAF (paroxysmal atrial fibrillation) (CMS/HCC)   • ETOH abuse   • Acute respiratory failure with hypoxia (CMS/HCC)     Past Medical History:   Diagnosis Date   • Alcoholism (CMS/HCC)    • Blindness of left eye    • Depression    • GERD (gastroesophageal reflux disease)    • Paroxysmal atrial fibrillation (CMS/HCC)      Past Surgical History:   Procedure Laterality Date   • CARDIAC CATHETERIZATION     • COLONOSCOPY     • ENDOSCOPY     • TUBAL ABDOMINAL LIGATION            OT ASSESSMENT FLOWSHEET (last 72 hours)      Occupational Therapy Evaluation     Row Name 19 1255                   OT Evaluation Time/Intention    Document Type  evaluation  -KR         Mode of Treatment  occupational therapy  -KR        Patient Effort  poor  -KR        Comment  Pt. presents unable to adequately participate in skilled therapy at this time due to cognitive status. Pt. also presents with agitation and apprehension toward staff throughout activity. Not a candidate for skilled therapy at this time. Please re-consult as appropriate to participate.   -KR           Cognitive Assessment/Intervention- PT/OT    Orientation Status (Cognition)  oriented to;person  -KR        Follows Commands (Cognition)  verbal cues/prompting required;physical/tactile prompts required  -KR           ADL Assessment/Intervention    BADL Assessment/Intervention  upper body dressing;lower body dressing;grooming;feeding;toileting  -KR           Bathing Assessment/Intervention    Bathing [place] Level  bathing skills;maximum assist (25% patient effort)  -KR           Upper Body Dressing Assessment/Training    Upper Body Dressing [place] Level  upper body dressing skills;maximum assist (25% patient effort)  -KR           Lower Body Dressing Assessment/Training    Lower Body Dressing [place] Level  lower body dressing skills;maximum assist (25% patient effort)  -KR           Grooming Assessment/Training    [place] Level (Grooming)  grooming skills;maximum assist (25% patient effort)  -KR           Self-Feeding Assessment/Training    [place] Level (Feeding)  feeding skills;maximum assist (25% patient effort)  -KR           Toileting Assessment/Training    [place] Level (Toileting)  toileting skills;maximum assist (25% patient effort)  -KR           Clinical Impression (OT)    Criteria for Skilled Therapeutic Interventions Met (OT Eval)  -- Unable to consistently follow directions or participate   -KR          User Key  (r) = Recorded By, (t) = Taken By, (c) = Cosigned By    Initials Name Effective Dates    KR Roni Raines, OT 04/03/18 -                OT Recommendation and Plan  Planned  Therapy Interventions (OT Eval): activity tolerance training, BADL retraining, strengthening exercise       Outcome Measures     Row Name 03/27/19 1000 03/26/19 1302 03/25/19 1600       How much help from another person do you currently need...    Turning from your back to your side while in flat bed without using bedrails?  --  --  2  -CT    Moving from lying on back to sitting on the side of a flat bed without bedrails?  --  --  2  -CT    Moving to and from a bed to a chair (including a wheelchair)?  --  --  2  -CT    Standing up from a chair using your arms (e.g., wheelchair, bedside chair)?  --  --  2  -CT    Climbing 3-5 steps with a railing?  --  --  1  -CT    To walk in hospital room?  --  --  1  -CT    AM-PAC 6 Clicks Score  --  --  10  -CT       How much help from another is currently needed...    Putting on and taking off regular lower body clothing?  --  2  -KR  --    Bathing (including washing, rinsing, and drying)  --  2  -KR  --    Toileting (which includes using toilet bed pan or urinal)  --  2  -KR  --    Putting on and taking off regular upper body clothing  --  2  -KR  --    Taking care of personal grooming (such as brushing teeth)  --  2  -KR  --    Eating meals  --  2  -KR  --    Score  --  12  -KR  --       Functional Assessment    Outcome Measure Options  AM-PAC 6 Clicks Daily Activity (OT)  -KR  --  AM-PAC 6 Clicks Basic Mobility (PT)  -CT      User Key  (r) = Recorded By, (t) = Taken By, (c) = Cosigned By    Initials Name Provider Type    Roni Pereira OT Occupational Therapist    CT Kyung Lr PT Physical Therapist          Time Calculation:     Therapy Charges for Today     Code Description Service Date Service Provider Modifiers Qty    27254101402  OT EVAL HIGH COMPLEXITY 2 3/26/2019 Roni Raines OT GO 1               Roni Raines OT  3/27/2019    Electronically signed by Roni Raines OT at 3/27/2019 10:04 AM          Speech Language Pathology Notes (most recent note)     "  Mirian Barber, MS CCC-SLP at 3/25/2019 11:04 AM          Acute Care - Speech Language Pathology   Swallow Progress Note/Discharge    Manjit     Patient Name: Altagracia King  : 1965  MRN: [id number]  Today's Date: 3/25/2019  Onset of Illness/Injury or Date of Surgery: 19     Referring Physician: Juan      Admit Date: 2019    Visit Dx:      ICD-10-CM ICD-9-CM   1. Chest pain, unspecified type R07.9 786.50   2. Acute cystitis without hematuria N30.00 595.0     Patient Active Problem List   Diagnosis   • Chest pain   • PAF (paroxysmal atrial fibrillation) (CMS/HCC)   • ETOH abuse   • Acute respiratory failure with hypoxia (CMS/HCC)     Therapy Treatment  Ms. King was seen at bedside this am on 3S to assess diet tolerance, safety/candidacy for consistency upgrade. She is much improved in a/a status, participates in conversational exchanges, and follow simple directives. Her sitter is present to report minimal po acceptance w/ breakfast tray. Ms. King indicates that she was \"waiting on brunch. Breakfast was too early.\"     She is positioned upright and centered in bed to accept ashley cracker and thin liquids. No overt s/s aspiration, no silent aspiration suspected. She is felt to be able to upgrade to chopped meats.     Outcome Summary   Ms. King is improved in a/a status. She will benefit from upgrade to mechanical soft, chopped meats, thin liquids. This is felt to be her least restrictive po diet.     EDUCATION  The patient has been educated in the following areas:   Dysphagia (Swallowing Impairment) Oral Care/Hydration Modified Diet Instruction.    SLP Recommendation and Plan   1. Mechanical soft diet, chopped meats, thin liquids.   2. Medications whole in puree/thins. Single pill presentations.   3. Upright and centered for all po intake.   4. ISAIAH precautions.   5. Oral care protocol.   No further formal SLP f/u recommended.     Thank you for allowing me to participate in the " care of your patient.     Mirian Barber M.S., CCC/SLP                             Time Calculation:       Therapy Charges for Today     Code Description Service Date Service Provider Modifiers Qty    10997944444 HC ST TREATMENT SWALLOW 3 3/25/2019 Mirian Barber, MS CCC-SLP GN 1                    Mirian Barber MS CCC-SLP  3/25/2019    Electronically signed by Mirian Barber, MS CCC-SLP at 3/25/2019 11:09 AM          Respiratory Therapy Notes (most recent note)      Bushra Cueva, RRT at 3/27/2019  6:44 AM        Pt combative at this time, no notable distress at this time, sitter at bedside.    Electronically signed by Bushra Cueva, JON at 3/27/2019  8:55 AM

## 2019-04-03 NOTE — SIGNIFICANT NOTE
04/03/19 1500   Rehab Treatment   Discipline physical therapist   Reason Treatment Not Performed patient/family declined treatment  (Pt continues to declined PT treatment despite strong encouragement. )

## 2019-04-04 PROCEDURE — 25010000002 ENOXAPARIN PER 10 MG

## 2019-04-04 PROCEDURE — 94799 UNLISTED PULMONARY SVC/PX: CPT

## 2019-04-04 PROCEDURE — 99232 SBSQ HOSP IP/OBS MODERATE 35: CPT | Performed by: HOSPITALIST

## 2019-04-04 PROCEDURE — 97530 THERAPEUTIC ACTIVITIES: CPT

## 2019-04-04 PROCEDURE — 97116 GAIT TRAINING THERAPY: CPT

## 2019-04-04 RX ORDER — SENNA AND DOCUSATE SODIUM 50; 8.6 MG/1; MG/1
2 TABLET, FILM COATED ORAL NIGHTLY
Status: DISCONTINUED | OUTPATIENT
Start: 2019-04-04 | End: 2019-04-18 | Stop reason: HOSPADM

## 2019-04-04 RX ADMIN — FUROSEMIDE 20 MG: 20 TABLET ORAL at 10:02

## 2019-04-04 RX ADMIN — MEXILETINE HYDROCHLORIDE 150 MG: 150 CAPSULE ORAL at 05:45

## 2019-04-04 RX ADMIN — METOPROLOL TARTRATE 75 MG: 50 TABLET, FILM COATED ORAL at 10:01

## 2019-04-04 RX ADMIN — SODIUM CHLORIDE, PRESERVATIVE FREE 3 ML: 5 INJECTION INTRAVENOUS at 20:06

## 2019-04-04 RX ADMIN — LANSOPRAZOLE 30 MG: KIT at 10:02

## 2019-04-04 RX ADMIN — Medication 100 MG: at 10:01

## 2019-04-04 RX ADMIN — MAGNESIUM GLUCONATE 500 MG ORAL TABLET 400 MG: 500 TABLET ORAL at 10:02

## 2019-04-04 RX ADMIN — ENOXAPARIN SODIUM 50 MG: 80 INJECTION SUBCUTANEOUS at 10:02

## 2019-04-04 RX ADMIN — SENNOSIDES AND DOCUSATE SODIUM 2 TABLET: 8.6; 5 TABLET ORAL at 20:06

## 2019-04-04 RX ADMIN — LEVOTHYROXINE SODIUM 75 MCG: 75 TABLET ORAL at 05:45

## 2019-04-04 RX ADMIN — MAGNESIUM HYDROXIDE 10 ML: 2400 SUSPENSION ORAL at 18:29

## 2019-04-04 RX ADMIN — OLANZAPINE 5 MG: 5 TABLET, FILM COATED ORAL at 20:04

## 2019-04-04 RX ADMIN — ASPIRIN 81 MG: 81 TABLET ORAL at 10:01

## 2019-04-04 RX ADMIN — FOLIC ACID 1 MG: 1 TABLET ORAL at 11:29

## 2019-04-04 RX ADMIN — METOPROLOL TARTRATE 75 MG: 50 TABLET, FILM COATED ORAL at 20:04

## 2019-04-04 RX ADMIN — MEXILETINE HYDROCHLORIDE 150 MG: 150 CAPSULE ORAL at 20:04

## 2019-04-04 RX ADMIN — MEXILETINE HYDROCHLORIDE 150 MG: 150 CAPSULE ORAL at 14:43

## 2019-04-04 RX ADMIN — AMIODARONE HYDROCHLORIDE 200 MG: 200 TABLET ORAL at 10:01

## 2019-04-04 RX ADMIN — APIXABAN 5 MG: 5 TABLET, FILM COATED ORAL at 20:04

## 2019-04-04 NOTE — PROGRESS NOTES
[facility] HOSPITALIST PROGRESS NOTE     Patient Identification:  Name:  Altagracia King  Age:  53 y.o.  Sex:  female  :  1965  MRN:  [id number]  Visit Number:  [id number]  ROOM: [address] [place] [address]     Primary Care Provider:  Provider, No Known    Length of stay:  40    Subjective        Chief Compliant follow-up for AMS    Patient seen and examined this morning with sitter at the bedside.  Patient is alert, awake and oriented to self, time, limited to person and place.  Today the patient did overall normal conversation with eye contact.  No delusional thoughts.  Looks like patient is back to her baseline.  She remembered the name of therapist and did ambulate with PT today down in the gold.  Overall doing much better. Denies cough.  Denies any chest pain, shortness of breath. on room air.  Afebrile and no events overnight.  Stated that she does not want to go back to shelter.  She would like us to keep trying for nursing home.  Patient did get emotional and started crying because of her blindness.       Objective     Current Hospital Meds:    amiodarone 200 mg Oral Daily   aspirin 81 mg Oral Daily   bisacodyl 10 mg Rectal Daily   budesonide 0.5 mg Nebulization BID - RT   cyanocobalamin 1,000 mcg Intramuscular Q30 Days   [START ON 2019] digoxin 125 mcg Oral Q48H   enoxaparin 1 mg/kg Subcutaneous Q12H   folic acid 1 mg Oral Daily   furosemide 20 mg Oral Daily   lansoprazole 30 mg Per G Tube Daily   levothyroxine 75 mcg Oral Q AM   magnesium oxide 400 mg Oral Daily   metoprolol tartrate 75 mg Oral Q12H   mexiletine 150 mg Oral Q8H   OLANZapine 5 mg Oral Nightly   sennosides-docusate sodium 2 tablet Oral Nightly   sodium chloride 3 mL Intravenous Q12H   sodium chloride 3 mL Intravenous Q12H   spironolactone 25 mg Oral Daily With Lunch   thiamine 100 mg Oral Daily        ----------------------------------------------------------------------------------------------------------------------  Vital  Signs:  Temp:  [97.7 °F (36.5 °C)-97.9 °F (36.6 °C)] 97.7 °F (36.5 °C)  Heart Rate:  [71-79] 71  Resp:  [18] 18  BP: (121-140)/(59-65) 121/65  SpO2:  [98 %-99 %] 99 %  on   ;   Device (Oxygen Therapy): room air  Body mass index is 17.06 kg/m².    Wt Readings from Last 3 Encounters:   04/04/19 46.5 kg (102 lb 8 oz)   02/14/19 56.7 kg (125 lb)   02/14/19 56.7 kg (125 lb)       Intake/Output Summary (Last 24 hours) at 4/4/2019 1408  Last data filed at 4/4/2019 0306  Gross per 24 hour   Intake 720 ml   Output --   Net 720 ml     Diet Soft Texture; Chopped; Thin; Daily Fluid Restriction; Other; 1,800  ----------------------------------------------------------------------------------------------------------------------  Physical exam:  General: Comfortable, Not in distress.    HENT:  Head:  Normocephalic and atraumatic.  Mouth:  Moist mucous membranes.    Eyes:  Conjunctivae and EOM are normal.  Right eye with cataract and left eye opacified.   No scleral icterus.    Neck:  Neck supple.  No JVD present.  trachea midline.   Cardiovascular:  Normal rate, regular rhythm with systolic murmur + in the mitral area.   Pulmonary/Chest:  No respiratory distress, no wheezes, no rales, with normal breath sounds and good air movement.  Abdomen:  Soft.  Bowel sounds are normal.  No distension and no tenderness. No organomegaly.   Musculoskeletal:  No edema, no tenderness, and no deformity.  No red or swollen joints anywhere.    Neurological: oriented to self, limited to person, but not to time and place. NFND. Back to normal self.   Skin:  Skin is warm and dry. No rash noted. No pallor.   Peripheral vascular:  pulses in all 4 extremities with no clubbing, no cyanosis, no edema.  Genitourinary: no  antunez  ----------------------------------------------------------------------------------------------------------------------  ----------------------------------------------------------------------------------------------------------------------  Results from last 7 days   Lab Units 04/02/19  1030 03/30/19  0538   WBC 10*3/mm3 5.22 5.16   HEMOGLOBIN g/dL 10.3* 10.1*   HEMATOCRIT % 33.2* 32.3*   MCV fL 102.8* 105.2*   MCHC g/dL 31.0* 31.3*   PLATELETS 10*3/mm3 198 207     Results from last 7 days   Lab Units 04/03/19  0335 04/02/19  0456 04/01/19  2113 04/01/19  0434 03/31/19  0136   SODIUM mmol/L  --  138  --  139 141   POTASSIUM mmol/L 4.7 4.6  --  3.6 3.9   MAGNESIUM mg/dL 2.2  --  1.9  --   --    CHLORIDE mmol/L  --  101  --  97* 100   CO2 mmol/L  --  26.1  --  28.4 27.7   BUN mg/dL  --  16  --  24* 24*   CREATININE mg/dL  --  0.74  --  0.97 0.99   PHOSPHORUS mg/dL  --  3.4  --   --   --    EGFR IF NONAFRICN AM mL/min/1.73  --  81  --  60* 58*   CALCIUM mg/dL  --  9.5  --  9.5 9.4   GLUCOSE mg/dL  --  101*  --  101* 79   Estimated Creatinine Clearance: 63.1 mL/min (by C-G formula based on SCr of 0.74 mg/dL).      No results found for: HGBA1C, POCGLU  No results found for: AMMONIA        Blood Culture   Date Value Ref Range Status   03/19/2019 No growth at 24 hours  Preliminary   03/19/2019 Abnormal Stain (A)  Preliminary     Respiratory Culture   Date Value Ref Range Status   03/14/2019 Rare Normal Respiratory Obdulia  Final         I have personally looked at the labs and they are summarized above.  ----------------------------------------------------------------------------------------------------------------------  Imaging Results (last 24 hours)     ** No results found for the last 24 hours. **        I have personally reviewed the radiology images and read the final radiology report.    Assessment & Plan      Assessment and Plan:  - Acute diastolic CHF secondary to severe MR: resolved. on low dose lasix since  BP borderline. continue to monitor closely. monitor renal function and electrolytes. on fluid restriction 1800 cc/day.      -RLL pneumonia. Resolved.     -Atrial fibrillation with RVR([id number]-RGCr score is 2) with H/O HTN and female sex.  In SR.  On amiodarone, digoxin and metoprolol and lovenox full dose.  Digoxin level elevated so will change digoxin to every 48 hours.  Patient was on flecainide but that had been discontinued. Cardiology on board and recommend no need for anticoagulation due to low embolic risk and significant alcohol use history.  Stress test showed normal myocardial perfusion study with no evidence of ischemia.  QTC prolonged.       -Acute hypoxemic respiratory failure. Resolved.    Continue Pulmicort nebs and  Atrovent nebs.  Venous Doppler negative for DVT.  2D echo showed normal ejection fraction, grade I diastolic dysfunction, moderate AR and severe MR.     -Delirium:  Resolved. Continue thiamine and folic acid. Continue with 5 mg of Zyprexa since the patient has episodes of delusions. Consult psychiatry for the evaluation of the mental status.      -Septic shock due to bilateral pneumonia  Resolved. Treated with vancomycin, Zosyn and doxycycline.  No growth on blood culture so far    -NSVT, resolved. on mexitil by Dr. Abbasi.     -Hypokalemia.  Resolved.      -Hyponatremia, resolved  Sodium level was 126 on admission is normal now. Continue water flushes with tube feeds.     -Esophageal thickening  May be due to reflux esophagitis.  She will need EGD as an outpatient.     -Hypothyroidism  TSH was elevated on admission.  Continue levothyroxine.  repeat TSH elevated so increase dose of levothyroxin.     -Nutrition. SLP evaluated and started on the dysphagia diet.  -Debility: PT/OT on board.     - Prophylaxis  DVT: Lovenox subq bid  GI: PPI.   - on board for discharge planning.    The patient is high risk due to: respiratory failure, bilateral pneumonia, septic shock, atrial  fibrillation, hyponatremia.     I discussed the patients findings and my recommendations with the patient and the nursing staff.       Sayra Milton MD  04/04/19  2:08 PM     Addendum:  Discussed with Dr. Abbasi regarding oral anticoagulation and he advised Eliquis.  Pharmacy check the price and is 0$ co-pay with Eliquis.  Will discontinue Lovenox and start on Eliquis p.o. twice daily 5 mg.  Discontinue aspirin since the patient's stress test is negative and patient is blind with high risk of fall so we will continue with just Eliquis for now.

## 2019-04-04 NOTE — DISCHARGE PLACEMENT REQUEST
"Shagufta Loza (53 y.o. Female)     Date of Birth Social Security Number Address Home Phone MRN    1965 [id number] [id number] [phone] [id number]    [employer] Marital Status          Unknown        Admission Date Admission Type Admitting Provider Attending Provider Department, Room/Bed    2/22/19 Emergency Carito Leone MD Srinivas, Priyanka, MD [address], 3309/1S    Discharge Date Discharge Disposition Discharge Destination                       Attending Provider:  Sayra Milton MD    Allergies:  No Known Allergies    Isolation:  None   Infection:  None   Code Status:  CPR    Ht:  165.1 cm (65\")   Wt:  46.5 kg (102 lb 8 oz)    Admission Cmt:  None   Principal Problem:  None                Active Insurance as of 2/22/2019     Primary Coverage     Payor Plan Insurance Group Employer/Plan Group    HU[employer]NA MEDICAID [employer] [id number]     Payor Plan Address Payor Plan Phone Number Payor Plan Fax Number Effective Dates    PO  [phone]  2/14/2019 - None Entered    [facility] [id number]       Subscriber Name Subscriber Birth Date Member ID       SHAGUFTA LOZA 1965 [id number]                 Emergency Contacts      (Rel.) Home Phone Work Phone Mobile Phone    Madeline Loza (Daughter) -- -- [phone]    Faustino Mclain (Friend) [phone] -- --    Mumtaz Bundy (Friend) [phone] -- --    Unknown, Sylwia (Sister) [phone] -- --            Emergency Contact Information     Name Relation Home Work Mobile    Madeline Loza Daughter   [phone]    Faustino Mclain Friend [phone]      Mumtaz Bundy Friend [phone]      Unknown, Sylwia Sister [phone]            Insurance Information                HU[employer]NA MEDICAID/[employer]NA [employer] Phone: [phone]    Subscriber: Shagufta Loza Subscriber#: [id number]    Group#: [id number] Precert#:              History & Physical      Jhon Messer PA-C at 2/23/2019 " "12:23 PM                   HISTORY AND PHYSICAL        Patient Identification:  Name:  Altagracia King  Age:  53 y.o.  Sex:  female  :  1965  MRN:  [id number]   Visit Number:  [id number]  Primary Care Physician:  Provider, No Known       Subjective     Subjective     Chief complaint:     Chief Complaint   Patient presents with   • Chest Pain       History of presenting illness:     The patient is a 53 year old female for presented to the ED with complaints of substernal chest pain that began yesterday morning. She reports the pain radiates down her left arm and her arm feels somewhat numb. She also reports issues at home and needing  as she has been living in a homeless shelter where she was recently \"kicked out.\" Vital signs stable. Troponin levels negative.EKG shows sinus rhythm with premature ventricular complexes or fusion complexes and septal infarct , age undetermined.   History of alcohol abuse but normal ethanol level and she reports she has not had any alcohol for around a week. TSH elevated at 6.995. White count and CRP normal. Chest xray is unremarkable. EKG shows sinus rhythm with premature ventricular complexes or fusion complexes and septal infarct , age undetermined. Admitted to the observation unit for further evaluation and monitoring.       ---------------------------------------------------------------------------------------------------------------------     Review Of Systems:    Constitutional: no fever, chills and night sweats. No appetite change or unexpected weight change. No fatigue.  Eyes: no eye drainage, itching or redness.  HEENT: no mouth sores, dysphagia or nose bleed.  Respiratory: no for shortness of breath, cough or production of sputum.  Cardiovascular: See HPI  Gastrointestinal: no nausea, vomiting or diarrhea. No abdominal pain, hematemesis or rectal bleeding.  Genitourinary: no dysuria or polyuria.  Hematologic/lymphatic: no lymph node abnormalities, no " easy bruising or easy bleeding.  Musculoskeletal: no muscle or joint pain.  Skin: No rash and no itching.  Neurological: no loss of consciousness, no seizure, no headache.  Behavioral/Psych: no depression or suicidal ideation.  Endocrine: no hot flashes.  Immunologic: negative.    ---------------------------------------------------------------------------------------------------------------------     Past Medical History    Past Medical History:   Diagnosis Date   • Alcoholism (CMS/[facility])    • Depression    • GERD (gastroesophageal reflux disease)        Past Surgical History    Past Surgical History:   Procedure Laterality Date   • CARDIAC CATHETERIZATION     • COLONOSCOPY     • ENDOSCOPY     • TUBAL ABDOMINAL LIGATION         Family History    Family History   Problem Relation Age of Onset   • Heart disease Mother    • Depression Mother    • Heart disease Father    • Depression Sister    • Heart disease Brother    • Depression Maternal Grandmother    • Alcohol abuse Maternal Grandfather        Social History    Social History     Tobacco Use   • Smoking status: Current Every Day Smoker     Packs/day: 1.00   • Smokeless tobacco: Never Used   Substance Use Topics   • Alcohol use: Yes     Alcohol/week: 2.4 oz     Types: 4 Cans of beer per week   • Drug use: No       Allergies    Patient has no known allergies.  ---------------------------------------------------------------------------------------------------------------------     Home Medications:    Prior to Admission Medications     Prescriptions Last Dose Informant Patient Reported? Taking?    aspirin 325 MG tablet 2/22/2019 Self Yes Yes    Take 650 mg by mouth Daily.    calcium carbonate (TUMS) 500 MG chewable tablet 2/23/2019 Self Yes Yes    Chew 2 tablets As Needed for Indigestion or Heartburn.        ---------------------------------------------------------------------------------------------------------------------    Objective     Objective     Hospital  Scheduled Meds:    aspirin 325 mg Oral Daily   enoxaparin 40 mg Subcutaneous Q24H   LORazepam 2 mg Oral 3 times per day   Followed by      [START ON 2/24/2019] LORazepam 1.5 mg Oral 3 times per day   Followed by      [START ON 2/25/2019] LORazepam 1 mg Oral 3 times per day   Followed by      [START ON 2/26/2019] LORazepam 0.5 mg Oral 3 times per day   sodium chloride 3 mL Intravenous Q12H   sodium chloride 3 mL Intravenous Q12H        ---------------------------------------------------------------------------------------------------------------------   Vital Signs:  Temp:  [98.2 °F (36.8 °C)-99.2 °F (37.3 °C)] 98.6 °F (37 °C)  Heart Rate:  [] 85  Resp:  [18-24] 18  BP: (104-164)/(52-98) 104/61  Mean Arterial Pressure (Non-Invasive) for the past 24 hrs (Last 3 readings):   Noninvasive MAP (mmHg)   02/23/19 1126 74   02/23/19 0716 81   02/23/19 0440 88     SpO2 Percentage    02/23/19 0440 02/23/19 0716 02/23/19 1126   SpO2: 99% 100% 100%     SpO2:  [96 %-100 %] 100 %  on   ;   Device (Oxygen Therapy): room air    Body mass index is 18.99 kg/m².  Wt Readings from Last 3 Encounters:   02/22/19 51.8 kg (114 lb 2 oz)   02/14/19 56.7 kg (125 lb)   02/14/19 56.7 kg (125 lb)     ---------------------------------------------------------------------------------------------------------------------     Physical Exam:    Constitutional:  Well-developed and well-nourished.  No respiratory distress.      HENT:  Head: Normocephalic and atraumatic.  Mouth:  Moist mucous membranes.    Eyes: Left cataract.  Conjunctivae and EOM are normal.  No scleral icterus.  Neck:  Neck supple.  No JVD present.    Cardiovascular:  Normal rate, regular rhythm and normal heart sounds with no murmur. No edema.  Pulmonary/Chest: Mild bilateral wheezes. No respiratory distress, no crackles, with normal breath sounds and good air movement.  Abdominal:  Soft.  Bowel sounds are normal.  No distension and no tenderness.   Musculoskeletal:  No edema, no  tenderness, and no deformity.  No swelling or redness of joints.  Neurological:  Alert and oriented to person, place, and time.  No facial droop.  No slurred speech.   Skin:  Skin is warm and dry.  No rash noted.  No pallor.   Psychiatric:  Normal mood and affect.  Behavior is normal.    ---------------------------------------------------------------------------------------------------------------------  I have personally reviewed the EKG/Telemetry strip  ---------------------------------------------------------------------------------------------------------------------   Results from last 7 days   Lab Units 02/23/19  1134 02/23/19  0849 02/23/19  0132  02/22/19  1804   CK TOTAL U/L  --   --   --   --  41   TROPONIN I ng/mL <0.006 <0.006 <0.006   < > <0.006   MYOGLOBIN ng/mL  --   --   --   --  21.0    < > = values in this interval not displayed.           Results from last 7 days   Lab Units 02/23/19  0132 02/22/19  1944   CRP mg/dL <0.50  --    WBC 10*3/mm3 4.84 5.66   HEMOGLOBIN g/dL 11.1* 11.2*   HEMATOCRIT % 34.6* 34.4*   MCV fL 106.5* 104.2*   MCHC g/dL 32.1* 32.6*   PLATELETS 10*3/mm3 147 143     Results from last 7 days   Lab Units 02/23/19  0132 02/22/19  1804   SODIUM mmol/L 137 132*   POTASSIUM mmol/L 3.6 3.9   MAGNESIUM mg/dL 2.0 2.0   CHLORIDE mmol/L 105 102   CO2 mmol/L 25.4 24.5   BUN mg/dL 14 16   CREATININE mg/dL 0.74 0.69   EGFR IF NONAFRICN AM mL/min/1.73 81 88   CALCIUM mg/dL 9.3 9.1   GLUCOSE mg/dL 89 104   ALBUMIN g/dL 3.90 4.20   BILIRUBIN mg/dL 0.2 0.2   ALK PHOS U/L 61 69   AST (SGOT) U/L 21 23   ALT (SGPT) U/L 14 15   Estimated Creatinine Clearance: 70.2 mL/min (by C-G formula based on SCr of 0.74 mg/dL).  No results found for: AMMONIA    No results found for: HGBA1C, POCGLU  No results found for: HGBA1C  Lab Results   Component Value Date    TSH 6.995 (H) 02/22/2019                      Pain Management Panel     Pain Management Panel Latest Ref Rng & Units 2/22/2019    AMPHETAMINES  "SCREEN, URINE Negative Negative    BARBITURATES SCREEN Negative Negative    BENZODIAZEPINE SCREEN, URINE Negative Negative    BUPRENORPHINE Negative Negative    COCAINE SCREEN, URINE Negative Negative    METHADONE SCREEN, URINE Negative Negative        I have personally reviewed the above laboratory results.   ---------------------------------------------------------------------------------------------------------------------  Imaging Results (last 7 days)     Procedure Component Value Units Date/Time    XR Chest 1 View [[id number]] Collected:  02/23/19 0924     Updated:  02/23/19 0927    Narrative:       EXAMINATION: XR CHEST 1 VW-      CLINICAL INDICATION:     cp     TECHNIQUE:  XR CHEST 1 VW-      COMPARISON: NONE      FINDINGS:   Coarse interstitial markings suggestive of chronic interstitial lung  disease.  Heart and mediastinum contours are unremarkable.  No pleural effusion.  No pneumothorax.   Bony and soft tissue structures are unremarkable.       Impression:       No radiographic evidence of acute cardiac or pulmonary  disease.     This report was finalized on 2/23/2019 9:24 AM by Dr. Keven Lux MD.           I have personally reviewed the above radiology results.   ---------------------------------------------------------------------------------------------------------------------      Assessment & Plan        Assessment/Plan       ASSESSMENT:    1. Chest pain    PLAN:    The patient is a 53 year old female for presented to the ED with complaints of substernal chest pain that began yesterday morning. She reports the pain radiates down her left arm and her arm feels somewhat numb. She also reports issues at home and needing  as she has been living in a homeless shelter where she was recently \"kicked out.\" Vital signs stable. Troponin levels negative.EKG shows sinus rhythm with premature ventricular complexes or fusion complexes and septal infarct , age undetermined.   History of alcohol " abuse but normal ethanol level and she reports she has not had any alcohol for around a week. TSH elevated at 6.995. White count and CRP normal. Chest xray is unremarkable. EKG shows sinus rhythm with premature ventricular complexes or fusion complexes and septal infarct , age undetermined. Admitted to the observation unit for further evaluation and monitoring.    Cardiology consulted for abnormality with new infarct on EKG with no further workup recommended. Would recommend to follow up with her cardiologist as outpatient.     Lovenox 40 mg subcutaneous q 24 hours for DVT prophylaxis.    Echo ordered. Cardiology was consulted for acute EKG changes.     D-dimer found to be elevated and CT per PE protocol ordered.     Patient's findings and recommendations were discussed with patient, nursing staff and consulting provider      Code Status:   Code Status and Medical Interventions:   Ordered at: 02/22/19 2236     Code Status:    CPR     Medical Interventions (Level of Support Prior to Arrest):    Full     Patient seen with LIANE Goddard.     Jhon Messer PA-C  02/23/19  12:23 PM      Electronically signed by Carito Leone MD at 2/23/2019  2:27 PM       Hospital Medications (active)       Dose Frequency Start End    acetaminophen (TYLENOL) tablet 650 mg 650 mg Every 4 Hours PRN 2/22/2019     Sig - Route: Take 2 tablets by mouth Every 4 (Four) Hours As Needed for Mild Pain . - Oral    Cosign for Ordering: Accepted by Carito Leone MD on 2/23/2019  9:06 AM    amiodarone (PACERONE) tablet 200 mg 200 mg Daily 3/28/2019     Sig - Route: Take 1 tablet by mouth Daily. - Oral    aspirin EC tablet 81 mg 81 mg Daily 3/19/2019     Sig - Route: Take 1 tablet by mouth Daily. - Oral    bisacodyl (DULCOLAX) suppository 10 mg 10 mg Daily PRN 3/12/2019     Sig - Route: Insert 1 suppository into the rectum Daily As Needed for Constipation (if oral meds do not help). - Rectal    bisacodyl (DULCOLAX) suppository 10 mg 10 mg  "Daily 3/29/2019     Sig - Route: Insert 1 suppository into the rectum Daily. - Rectal    budesonide (PULMICORT) nebulizer solution 0.5 mg 0.5 mg 2 Times Daily - RT 3/8/2019     Sig - Route: Take 2 mL by nebulization 2 (Two) Times a Day. - Nebulization    calcium carbonate (TUMS) chewable tablet 500 mg (200 mg elemental) 2 tablet 2 Times Daily PRN 2/22/2019     Sig - Route: Chew 1,000 mg 2 (Two) Times a Day As Needed for Heartburn. - Oral    Cosign for Ordering: Accepted by Carito Leone MD on 2/23/2019  9:06 AM    cyanocobalamin injection 1,000 mcg 1,000 mcg Every 30 Days 4/1/2019     Sig - Route: Inject 1 mL into the appropriate muscle as directed by prescriber Every 30 (Thirty) Days. - Intramuscular    Linked Group 1:  \"Followed by\" Linked Group Details        digoxin (LANOXIN) tablet 125 mcg 125 mcg Every 48 Hours 4/5/2019     Sig - Route: Take 1 tablet by mouth Every Other Day. - Oral    docusate sodium (COLACE) capsule 100 mg 100 mg 2 Times Daily PRN 3/12/2019     Sig - Route: Take 1 capsule by mouth 2 (Two) Times a Day As Needed for Constipation. - Oral    enoxaparin (LOVENOX) syringe 50 mg 1 mg/kg × 47.4 kg Every 12 Hours 3/25/2019     Sig - Route: Inject 0.5 mL under the skin into the appropriate area as directed Every 12 (Twelve) Hours. - Subcutaneous    folic acid (FOLVITE) tablet 1 mg 1 mg Daily 2/23/2019     Sig - Route: Take 1 tablet by mouth Daily. - Oral    Cosign for Ordering: Accepted by Alberto Connolly MD on 2/23/2019  3:57 PM    furosemide (LASIX) tablet 20 mg 20 mg Daily 4/3/2019     Sig - Route: Take 1 tablet by mouth Daily. - Oral    ipratropium (ATROVENT) nebulizer solution 0.5 mg 0.5 mg Every 6 Hours PRN 3/27/2019     Sig - Route: Take 2.5 mL by nebulization Every 6 (Six) Hours As Needed for Shortness of Air. - Nebulization    Cosign for Ordering: Accepted by Christy Irizarry MD on 3/27/2019 11:04 AM    lansoprazole (FIRST) oral suspension 30 mg 30 mg Daily 3/15/2019     " Sig - Route: 10 mL by Per G Tube route Daily. - Per G Tube    levothyroxine (SYNTHROID, LEVOTHROID) tablet 75 mcg 75 mcg Every Early Morning 4/3/2019     Sig - Route: Take 1 tablet by mouth Every Morning. - Oral    magnesium hydroxide (MILK OF MAGNESIA) suspension 2400 mg/10mL 10 mL 10 mL Daily PRN 3/12/2019     Sig - Route: Take 10 mL by mouth Daily As Needed for Constipation. - Oral    magnesium oxide (MAGOX) tablet 400 mg 400 mg Daily 3/24/2019     Sig - Route: Take 1 tablet by mouth Daily. - Oral    metoprolol tartrate (LOPRESSOR) tablet 75 mg 75 mg Every 12 Hours Scheduled 3/28/2019     Sig - Route: Take 75 mg by mouth Every 12 (Twelve) Hours. - Oral    mexiletine (MEXITIL) capsule 150 mg 150 mg Every 8 Hours Scheduled 3/21/2019     Sig - Route: Take 1 capsule by mouth Every 8 (Eight) Hours. - Oral    nitroglycerin (NITROSTAT) SL tablet 0.4 mg 0.4 mg Every 5 Minutes PRN 2/22/2019     Sig - Route: Place 1 tablet under the tongue Every 5 (Five) Minutes As Needed for Chest Pain (Chest Pain With Systolic Blood Pressure Greater Than 100). - Sublingual    Cosign for Ordering: Accepted by Carito Leone MD on 2/23/2019  9:06 AM    OLANZapine (zyPREXA) tablet 5 mg 5 mg Nightly 4/2/2019     Sig - Route: Take 1 tablet by mouth Every Night. - Oral    sodium chloride 0.9 % flush 3 mL 3 mL Every 12 Hours Scheduled 2/23/2019     Sig - Route: Infuse 3 mL into a venous catheter Every 12 (Twelve) Hours. - Intravenous    Cosign for Ordering: Accepted by Carito Leone MD on 2/23/2019  9:06 AM    sodium chloride 0.9 % flush 3 mL 3 mL Every 12 Hours Scheduled 2/23/2019     Sig - Route: Infuse 3 mL into a venous catheter Every 12 (Twelve) Hours. - Intravenous    sodium chloride 0.9 % flush 3-10 mL 3-10 mL As Needed 2/22/2019     Sig - Route: Infuse 3-10 mL into a venous catheter As Needed for Line Care. - Intravenous    Cosign for Ordering: Accepted by Carito Leone MD on 2/23/2019  9:06 AM    sodium chloride 0.9  % flush 5 mL 5 mL As Needed 2/23/2019     Sig - Route: Infuse 5 mL into a venous catheter As Needed for Line Care (After Medication Administration or Blood Draw). - Intravenous    spironolactone (ALDACTONE) tablet 25 mg 25 mg Daily With Lunch 3/25/2019     Sig - Route: Take 1 tablet by mouth Daily With Lunch. - Oral    thiamine (VITAMIN B-1) tablet 100 mg 100 mg Daily 2/23/2019     Sig - Route: Take 1 tablet by mouth Daily. - Oral    Cosign for Ordering: Accepted by Alberto Connolly MD on 2/23/2019  3:57 PM    digoxin (LANOXIN) tablet 125 mcg (Discontinued) 125 mcg Daily 3/27/2019 4/3/2019    Sig - Route: Take 1 tablet by mouth Daily. - Oral    Notes to Pharmacy: Please schedule a dose for this evening            Lab Results (last 24 hours)     Procedure Component Value Units Date/Time    Digoxin Level [[id number]]  (Abnormal) Collected:  04/03/19 0335    Specimen:  Blood Updated:  04/03/19 1008     Digoxin 1.30 ng/mL         Imaging Results (last 24 hours)     ** No results found for the last 24 hours. **        ECG/EMG Results (last 24 hours)     ** No results found for the last 24 hours. **           Physician Progress Notes (last 24 hours) (Notes from 4/3/2019  9:26 AM through 4/4/2019  9:26 AM)      Britany Jefferson MD at 4/3/2019  2:37 PM           LOS: 39 days     Allergies  No Known Allergies    Assessment completed within view of staff    Chief Complaint:  hallucinations    Subjective     Interval History: The patient is a 53-year-old female who is currently in the hospital because she came in with chest pain and was feeling depressed and overwhelmed and had verbalized suicidal thoughts previously.  The patient has had an extended stay in the hospital and was reported to be experiencing some visual hallucinations.  Patient reports she is feeling better today.  She denies any auditory or visual hallucinations.  She was able to talk about her stressor being homeless.  She stated that she is hoping to  "go and stay with her brother who lives in [place].  She has a daughter who is in [place] but she does not want to bother her.  She is oriented to person place and situation but not to time.  She is denying any thoughts of harm to self or others.  She reports that she is feeling weak and is reluctant to take part in physical therapy because it is painful.  She is encouraged to work with the physical therapy and to build her strength back up.    Review of Systems:   General ROS: reports generalize weakness  Respiratory ROS: no cough, shortness of breath, or wheezing  Cardiovascular ROS: no chest pain   Gastrointestinal ROS: no nvd    Objective     Vital Signs  /60 (Patient Position: Lying)   Pulse 68   Temp 97.7 °F (36.5 °C) (Oral)   Resp 18   Ht 165.1 cm (65\")   Wt 46.4 kg (102 lb 6.4 oz)   SpO2 98%   BMI 17.04 kg/m²      Mental Status Exam:   Mood/Affect: sad/depressed  Thought Processes:  linear, logical, and goal directed  Thought Content:  normal  Hallucination(s): no  Hopelessness: no  Suicidal Thoughts:  none  Suicidal Plan/Intent: none  Homicidal Thoughts:  absent     Results Review:    Lab Results (last 24 hours)     Procedure Component Value Units Date/Time    Digoxin Level [[id number]]  (Abnormal) Collected:  04/03/19 0335    Specimen:  Blood Updated:  04/03/19 1008     Digoxin 1.30 ng/mL     Magnesium [[id number]]  (Normal) Collected:  04/03/19 0335    Specimen:  Blood Updated:  04/03/19 0505     Magnesium 2.2 mg/dL     Potassium [[id number]]  (Normal) Collected:  04/03/19 0335    Specimen:  Blood Updated:  04/03/19 0505     Potassium 4.7 mmol/L         Imaging Results (last 24 hours)     ** No results found for the last 24 hours. **          Medication Review:   Scheduled Medications:    amiodarone 200 mg Oral Daily   aspirin 81 mg Oral Daily   bisacodyl 10 mg Rectal Daily   budesonide 0.5 mg Nebulization BID - RT   cyanocobalamin 1,000 mcg Intramuscular Q30 Days   [START ON 4/5/2019] " digoxin 125 mcg Oral Q48H   enoxaparin 1 mg/kg Subcutaneous Q12H   folic acid 1 mg Oral Daily   furosemide 20 mg Oral Daily   lansoprazole 30 mg Per G Tube Daily   levothyroxine 75 mcg Oral Q AM   magnesium oxide 400 mg Oral Daily   metoprolol tartrate 75 mg Oral Q12H   mexiletine 150 mg Oral Q8H   OLANZapine 5 mg Oral Nightly   sodium chloride 3 mL Intravenous Q12H   sodium chloride 3 mL Intravenous Q12H   spironolactone 25 mg Oral Daily With Lunch   thiamine 100 mg Oral Daily        PRN Medications:  •  acetaminophen  •  bisacodyl  •  calcium carbonate  •  docusate sodium  •  ipratropium  •  magnesium hydroxide  •  nitroglycerin  •  sodium chloride  •  sodium chloride   All medications reviewed.      Assessment/Plan   Patient Active Problem List   Diagnosis Code   • Chest pain R07.9   • PAF (paroxysmal atrial fibrillation) (CMS/[facility]) I48.0   • ETOH abuse F10.10   • Acute respiratory failure with hypoxia (CMS/[facility]) J96.01     Plan:  The patient's mental status appears to be at baseline and she is not reporting any hallucinations. She also reports no suicidal thoughts. She is encouraged to work with physical therapy and build up her physical strength.       Britany Jefferson MD  19  2:38 PM        Electronically signed by Britany Jefferson MD at 4/3/2019  2:48 PM     Sayra Milton MD at 4/3/2019  2:31 PM              [facility] HOSPITALIST PROGRESS NOTE     Patient Identification:  Name:  Altagracia King  Age:  53 y.o.  Sex:  female  :  1965  MRN:  [id number]  Visit Number:  [id number]  ROOM: [address] [place] [address]     Primary Care Provider:  Provider, No Known    Length of stay:  39    Subjective        Chief Compliant follow-up for AMS    Patient seen and examined this morning with no family at the bedside.  Patient is alert, awake and oriented to self, time, limited to person and place. Does have episodes of delusions.  Advised to ambulate with walker.  Overall doing much better. Appetite improving.  Denies cough.  Denies any chest pain, shortness of breath. on room air.  Afebrile and no events overnight.        Objective     Current Hospital Meds:    amiodarone 200 mg Oral Daily   aspirin 81 mg Oral Daily   bisacodyl 10 mg Rectal Daily   budesonide 0.5 mg Nebulization BID - RT   cyanocobalamin 1,000 mcg Intramuscular Q30 Days   [START ON 4/5/2019] digoxin 125 mcg Oral Q48H   enoxaparin 1 mg/kg Subcutaneous Q12H   folic acid 1 mg Oral Daily   furosemide 20 mg Oral Daily   lansoprazole 30 mg Per G Tube Daily   levothyroxine 75 mcg Oral Q AM   magnesium oxide 400 mg Oral Daily   metoprolol tartrate 75 mg Oral Q12H   mexiletine 150 mg Oral Q8H   OLANZapine 5 mg Oral Nightly   sodium chloride 3 mL Intravenous Q12H   sodium chloride 3 mL Intravenous Q12H   spironolactone 25 mg Oral Daily With Lunch   thiamine 100 mg Oral Daily        ----------------------------------------------------------------------------------------------------------------------  Vital Signs:  Temp:  [97.4 °F (36.3 °C)-98.2 °F (36.8 °C)] 97.7 °F (36.5 °C)  Heart Rate:  [68-75] 68  Resp:  [18] 18  BP: (103-142)/(45-72) 142/60  SpO2:  [94 %-99 %] 98 %  on   ;   Device (Oxygen Therapy): room air  Body mass index is 17.04 kg/m².    Wt Readings from Last 3 Encounters:   04/03/19 46.4 kg (102 lb 6.4 oz)   02/14/19 56.7 kg (125 lb)   02/14/19 56.7 kg (125 lb)       Intake/Output Summary (Last 24 hours) at 4/3/2019 1431  Last data filed at 4/3/2019 1300  Gross per 24 hour   Intake 580 ml   Output —   Net 580 ml     Diet Soft Texture; Chopped; Thin; Daily Fluid Restriction; Other; 1,800  ----------------------------------------------------------------------------------------------------------------------  Physical exam:  General: Comfortable, Not in distress.  Well-developed and well-nourished.   HENT:  Head:  Normocephalic and atraumatic.  Mouth:  Moist mucous membranes.    Eyes:  Conjunctivae and EOM are normal.  Right eye with cataract and left eye  opacified.   No scleral icterus.    Neck:  Neck supple.  No JVD present.  trachea midline.   Cardiovascular:  Normal rate, regular rhythm with systolic murmur + in the mitral area.   Pulmonary/Chest:  No respiratory distress, no wheezes, no rales, with normal breath sounds and good air movement.  Abdomen:  Soft.  Bowel sounds are normal.  No distension and no tenderness. No organomegaly.   Musculoskeletal:  No edema, no tenderness, and no deformity.  No red or swollen joints anywhere.    Neurological: oriented to self, limited to person, but not to time and place. NFND.   Skin:  Skin is warm and dry. No rash noted. No pallor.   Peripheral vascular:  pulses in all 4 extremities with no clubbing, no cyanosis, no edema.  Genitourinary: no antunez  ----------------------------------------------------------------------------------------------------------------------  ----------------------------------------------------------------------------------------------------------------------  Results from last 7 days   Lab Units 04/02/19  1030 03/30/19  0538 03/28/19  0300   WBC 10*3/mm3 5.22 5.16 5.35   HEMOGLOBIN g/dL 10.3* 10.1* 11.3*   HEMATOCRIT % 33.2* 32.3* 36.8   MCV fL 102.8* 105.2* 106.1*   MCHC g/dL 31.0* 31.3* 30.7*   PLATELETS 10*3/mm3 198 207 227     Results from last 7 days   Lab Units 04/03/19  0335 04/02/19  0456 04/01/19 2113 04/01/19  0434 03/31/19  0136   SODIUM mmol/L  --  138  --  139 141   POTASSIUM mmol/L 4.7 4.6  --  3.6 3.9   MAGNESIUM mg/dL 2.2  --  1.9  --   --    CHLORIDE mmol/L  --  101  --  97* 100   CO2 mmol/L  --  26.1  --  28.4 27.7   BUN mg/dL  --  16  --  24* 24*   CREATININE mg/dL  --  0.74  --  0.97 0.99   PHOSPHORUS mg/dL  --  3.4  --   --   --    EGFR IF NONAFRICN AM mL/min/1.73  --  81  --  60* 58*   CALCIUM mg/dL  --  9.5  --  9.5 9.4   GLUCOSE mg/dL  --  101*  --  101* 79   Estimated Creatinine Clearance: 62.9 mL/min (by C-G formula based on SCr of 0.74 mg/dL).      No results found for:  HGBA1C, POCGLU  No results found for: AMMONIA        Blood Culture   Date Value Ref Range Status   03/19/2019 No growth at 24 hours  Preliminary   03/19/2019 Abnormal Stain (A)  Preliminary     Respiratory Culture   Date Value Ref Range Status   03/14/2019 Rare Normal Respiratory Obdulia  Final         I have personally looked at the labs and they are summarized above.  ----------------------------------------------------------------------------------------------------------------------  Imaging Results (last 24 hours)     ** No results found for the last 24 hours. **        I have personally reviewed the radiology images and read the final radiology report.    Assessment & Plan      Assessment and Plan:  - Acute diastolic CHF secondary to severe MR: resolved. dc. Torsemide and start on low dose lasix since BP borderline. continue to monitor closely. monitor renal function and electrolytes. on fluid restriction 1800 cc/day.      -RLL pneumonia. Resolved.     -Atrial fibrillation with RVR([id number] score is 2) with H/O HTN and female sex.  In SR.  On amiodarone, digoxin and metoprolol and lovenox full dose.  Digoxin level elevated so will change digoxin to every 48 hours.  Patient was on flecainide but that had been discontinued. Cardiology on board and recommend no need for anticoagulation due to low embolic risk and significant alcohol use history.  Stress test showed normal myocardial perfusion study with no evidence of ischemia.  QTC prolonged.       -Acute hypoxemic respiratory failure. Resolved.    Continue Pulmicort nebs and  Atrovent nebs.  Venous Doppler negative for DVT.  2D echo showed normal ejection fraction, grade I diastolic dysfunction, moderate AR and severe MR.     -Delirium:  Resolved. Continue thiamine and folic acid. Continue with 5 mg of Zyprexa since the patient has episodes of delusions. Consult psychiatry for the evaluation of the mental status.      -Septic shock due to bilateral  pneumonia  Resolved. Treated with vancomycin, Zosyn and doxycycline.  No growth on blood culture so far    -NSVT, resolved. on mexitil by Dr. Abbasi.     -Hypokalemia.  Resolved.      -Hyponatremia, resolved  Sodium level was 126 on admission is normal now. Continue water flushes with tube feeds.     -Esophageal thickening  May be due to reflux esophagitis.  She will need EGD as an outpatient.     -Hypothyroidism  TSH was elevated on admission.  Continue levothyroxine.  repeat TSH elevated so increase dose of levothyroxin.     -Nutrition. SLP evaluated and started on the dysphagia diet.  -Debility: PT/OT on board.     - Prophylaxis  DVT: Lovenox subq bid  GI: PPI.   - on board for discharge planning.    The patient is high risk due to: respiratory failure, bilateral pneumonia, septic shock, atrial fibrillation, hyponatremia.     I discussed the patients findings and my recommendations with the patient and the nursing staff.       Sayra Milton MD  04/03/19  2:31 PM      Electronically signed by Sayra Milton MD at 4/3/2019  2:33 PM       Medical Student Notes (last 24 hours) (Notes from 4/3/2019  9:27 AM through 4/4/2019  9:27 AM)     No notes of this type exist for this encounter.        Consult Notes (last 24 hours) (Notes from 4/3/2019  9:27 AM through 4/4/2019  9:27 AM)     No notes of this type exist for this encounter.        Nutrition Notes (last 24 hours) (Notes from 4/3/2019  9:27 AM through 4/4/2019  9:27 AM)     No notes of this type exist for this encounter.           Physical Therapy Notes (last 24 hours) (Notes from 4/3/2019  9:27 AM through 4/4/2019  9:27 AM)      Kyung Lr, PT at 4/3/2019  3:01 PM  Version 1 of 1            04/03/19 1500   Rehab Treatment   Discipline physical therapist   Reason Treatment Not Performed patient/family declined treatment  (Pt continues to declined PT treatment despite strong encouragement. )       Electronically signed by Kyung Lr  PT at 4/3/2019  3:01 PM       Occupational Therapy Notes (last 24 hours) (Notes from 4/3/2019  9:27 AM through 4/4/2019  9:27 AM)     No notes of this type exist for this encounter.        Speech Language Pathology Notes (last 24 hours) (Notes from 4/3/2019  9:27 AM through 4/4/2019  9:27 AM)     No notes of this type exist for this encounter.        Respiratory Therapy Notes (last 24 hours) (Notes from 4/3/2019  9:27 AM through 4/4/2019  9:27 AM)     No notes of this type exist for this encounter.

## 2019-04-04 NOTE — PLAN OF CARE
Problem: Patient Care Overview  Goal: Individualization and Mutuality  Outcome: Ongoing (interventions implemented as appropriate)    Goal: Individualization and Mutuality  Outcome: Ongoing (interventions implemented as appropriate)      Problem: Mobility, Physical Impaired (Adult)  Goal: Identify Related Risk Factors and Signs and Symptoms  Outcome: Ongoing (interventions implemented as appropriate)    Goal: Enhanced Mobility Skills  Outcome: Ongoing (interventions implemented as appropriate)    Goal: Enhanced Functional Ability  Outcome: Ongoing (interventions implemented as appropriate)

## 2019-04-04 NOTE — THERAPY TREATMENT NOTE
Acute Care - Physical Therapy Treatment Note   [place]     Patient Name: Altagracia King  : 1965  MRN: [id number]  Today's Date: 2019  Onset of Illness/Injury or Date of Surgery: 19(original admit date)  Date of Referral to PT: 19  Referring Physician: Gui    Admit Date: 2019    Visit Dx:    ICD-10-CM ICD-9-CM   1. Chest pain, unspecified type R07.9 786.50   2. Acute cystitis without hematuria N30.00 595.0     Patient Active Problem List   Diagnosis   • Chest pain   • PAF (paroxysmal atrial fibrillation) (CMS/HCC)   • ETOH abuse   • Acute respiratory failure with hypoxia (CMS/HCC)       Therapy Treatment    Rehabilitation Treatment Summary     Row Name 19             Treatment Time/Intention    Discipline  physical therapist  -AG      Document Type  therapy note (daily note)  -AG      Subjective Information  complains of;weakness  -AG      Mode of Treatment  individual therapy;physical therapy  -AG      Patient/Family Observations  pt. lying at foot of bed; RN leaving room.  Pt. is anxious but agreeable to participation.   -AG      Care Plan Review  care plan/treatment goals reviewed;risks/benefits reviewed  -AG      Therapy Frequency (PT Clinical Impression)  -- 3-5 times/ week per priority policy  -AG      Patient Effort  fair  -AG      Treatment Considerations/Comments  blindness  -AG      Recorded by [AG] Parisa Orellana, PT 19 1823      Row Name 196             Cognitive Assessment/Intervention- PT/OT    Orientation Status (Cognition)  oriented to;person  -AG      Follows Commands (Cognition)  follows one step commands;physical/tactile prompts required;repetition of directions required;verbal cues/prompting required  -AG      Safety Deficit (Cognitive)  moderate deficit;awareness of need for assistance;insight into deficits/self awareness;safety precautions awareness;safety precautions follow-through/compliance  -AG      Recorded by [AG] Parisa Orellana,  PT 04/04/19 1823      Row Name 04/04/19 1816             Safety Issues, Functional Mobility    Safety Issues Affecting Function (Mobility)  awareness of need for assistance;insight into deficits/self awareness;safety precaution awareness;safety precautions follow-through/compliance;other (see comments) severe visual impairment  -AG      Impairments Affecting Function (Mobility)  balance;coordination;endurance/activity tolerance;strength;visual/perceptual  -AG      Recorded by [AG] Parisa Orellana, PT 04/04/19 1823      Row Name 04/04/19 1816             Bed Mobility Assessment/Treatment    Comment (Bed Mobility)  pt. observed to perform supine<>sit and rolling with modified independence/ stand-by assistance  -AG      Recorded by [AG] Parisa Orellana, PT 04/04/19 1823      Row Name 04/04/19 1816             Transfer Assessment/Treatment    Transfer Assessment/Treatment  sit-stand transfer;stand-sit transfer;stand pivot/stand step transfer  -AG      Recorded by [AG] Parisa Orellana, PT 04/04/19 1823      Row Name 04/04/19 1816             Bed-Chair Transfer    Bed-Chair [place] (Transfers)  verbal cues;nonverbal cues (demo/gesture);minimum assist (75% patient effort);2 person assist  -AG      Assistive Device (Bed-Chair Transfers)  walker, front-wheeled  -AG      Recorded by [AG] Parisa Orlelana, PT 04/04/19 1823      Row Name 04/04/19 1816             Sit-Stand Transfer    Sit-Stand [place] (Transfers)  verbal cues;nonverbal cues (demo/gesture);minimum assist (75% patient effort);2 person assist  -AG      Assistive Device (Sit-Stand Transfers)  walker, front-wheeled  -AG      Recorded by [AG] Parisa Orellana, PT 04/04/19 1823      Row Name 04/04/19 1816             Stand-Sit Transfer    Stand-Sit [place] (Transfers)  verbal cues;nonverbal cues (demo/gesture);minimum assist (75% patient effort);2 person assist  -AG      Assistive Device (Stand-Sit Transfers)  walker, front-wheeled  -AG      Recorded by [AG]  Parisa Orellana, PT 04/04/19 1823      Row Name 04/04/19 1816             Stand Pivot/Stand Step Transfer    Stand Pivot/Stand Step [place]  verbal cues;nonverbal cues (demo/gesture);minimum assist (75% patient effort);2 person assist  -AG      Assistive Device (Stand Pivot Stand Step Transfer)  walker, front-wheeled  -AG      Recorded by [AG] Parisa Orellana, PT 04/04/19 1823      Row Name 04/04/19 1816             Gait/Stairs Assessment/Training    Gait/Stairs Assessment/Training  gait/ambulation independence;gait/ambulation assistive device;distance ambulated;gait pattern;gait deviations;maintains weight-bearing status  -AG      [place] Level (Gait)  verbal cues;nonverbal cues (demo/gesture);minimum assist (75% patient effort);2 person assist  -AG      Assistive Device (Gait)  walker, front-wheeled  -AG      Distance in Feet (Gait)  20  -AG      Pattern (Gait)  step-to  -AG      Deviations/Abnormal Patterns (Gait)  base of support, narrow  -AG      Bilateral Gait Deviations  forward flexed posture  -AG      Comment (Gait/Stairs)  verbal, tactile cues required for patient to navigate path.   -AG      Recorded by [AG] Parisa Orellana, PT 04/04/19 1823      Row Name 04/04/19 1816             Balance    Balance  static sitting balance;static standing balance;dynamic sitting balance;dynamic standing balance  -AG      Recorded by [AG] Parisa Orellana, PT 04/04/19 1823      Row Name 04/04/19 1816             Static Sitting Balance    Level of [place] (Unsupported Sitting, Static Balance)  standby assist  -AG      Sitting Position (Unsupported Sitting, Static Balance)  sitting on edge of bed  -AG      Time Able to Maintain Position (Unsupported Sitting, Static Balance)  more than 5 minutes  -AG      Recorded by [AG] Parisa Orellana, PT 04/04/19 1823      Row Name 04/04/19 1816             Positioning and Restraints    Pre-Treatment Position  in bed  -AG      Post Treatment Position  bed  -AG      In Bed   notified nsg;sitting EOB;call light within reach;encouraged to call for assist;exit alarm on;side rails up x3  -AG      Recorded by [AG] Parisa Orellana, PT 04/04/19 1823      Row Name 04/04/19 1816             Pain Assessment    Additional Documentation  Pain Scale: FACES Pre/Post-Treatment (Group)  -AG      Recorded by [AG] Parisa Orellana, PT 04/04/19 1823      Row Name 04/04/19 1816             Pain Scale: FACES Pre/Post-Treatment    Pain: FACES Scale, Pretreatment  0-->no hurt  -AG      Pain: FACES Scale, Post-Treatment  0-->no hurt  -AG      Recorded by [AG] Parisa Orellana, PT 04/04/19 1823      Row Name 04/04/19 1816             Coping    Observed Emotional State  anxious;attention-seeking behavior  -AG      Verbalized Emotional State  acceptance  -AG      Recorded by [AG] Parisa Orellana, PT 04/04/19 1823      Row Name 04/04/19 1816             Plan of Care Review    Plan of Care Reviewed With  patient  -AG      Recorded by [AG] Parisa Orellana, PT 04/04/19 1823      Row Name 04/04/19 1816             Outcome Summary/Treatment Plan (PT)    Daily Summary of Progress (PT)  progress towards functional goals is fair  -AG      Barriers to Overall Progress (PT)  inconsistent participation with PT  -AG      Plan for Continued Treatment (PT)  continue POC  -AG      Anticipated Discharge Disposition (PT)  skilled nursing facility  -AG      Recorded by [AG] Parisa Orellana, PT 04/04/19 1823        User Key  (r) = Recorded By, (t) = Taken By, (c) = Cosigned By    Initials Name Effective Dates Discipline     Parisa Orellana, PT 04/03/18 -  PT                   Physical Therapy Education     Title: PT OT SLP Therapies (In Progress)     Topic: Physical Therapy (In Progress)     Point: Mobility training (In Progress)     Learning Progress Summary           Patient Acceptance, E,D, NR by CATALINO at 4/4/2019  6:23 PM    Acceptance, E, NR by DAGO at 4/2/2019  6:10 PM    Acceptance, E, VU by JACOB at 4/2/2019 12:28 AM    Acceptance, E,  VU,NR by AG1 at 4/1/2019 11:20 AM    Acceptance, E, NR by KB at 3/31/2019  9:45 PM    Acceptance, E, VU by MC at 3/31/2019  2:55 AM    Acceptance, E, VU by MC at 3/31/2019  2:45 AM    Acceptance, E, VU,NR by MC at 3/30/2019  1:57 AM    Acceptance, E,TB, VU,NR by CT at 3/29/2019 10:45 AM    Acceptance, E, NR by EA at 3/28/2019 10:44 PM    Acceptance, E,TB, NR,NL by CT at 3/28/2019  2:19 PM    Acceptance, E,TB, VU by JS at 3/28/2019 12:47 AM    Acceptance, E, VU by AM at 3/27/2019  4:07 PM    Acceptance, E, NR by EA at 3/26/2019  9:47 PM    Acceptance, E, VU by LAZARO at 3/20/2019 10:09 PM    Acceptance, E, VU by AW at 3/16/2019  6:34 PM    Acceptance, E,TB, VU by ILYA at 3/14/2019 12:21 AM    Comment:  PT unable to rcve teaching at this time    Acceptance, E,TB, VU by ILYA at 3/12/2019 11:00 PM    Comment:  PT unable to rcve teaching at this time    Nonacceptance, E, NL by SB at 3/12/2019  9:17 AM    Comment:  pt intubated and sedated; no one present at the bedside    Acceptance, E, NR by EV at 3/11/2019  9:02 PM    Comment:  intubated/sedated. No family or caretaker at bedside    Nonacceptance, E, NL by WS at 3/9/2019  9:06 AM    Comment:  pt sedated and intubated    Acceptance, E, VU by WS at 3/9/2019  9:05 AM    Acceptance, E,TB, VU by ILYA at 3/8/2019  9:46 PM    Comment:  PT unable to rcve teaching at this time    Nonacceptance, E, NL by WS at 3/8/2019  4:13 PM    Acceptance, E, VU by LT at 3/7/2019 10:13 AM    Acceptance, E, VU by BC at 3/5/2019  5:55 PM    Acceptance, E,TB, VU by CL at 3/4/2019  7:53 PM    Acceptance, E,TB, VU by CL at 3/4/2019  7:52 PM    Acceptance, E,TB, VU by CT at 3/1/2019  3:23 PM    Acceptance, E,TB, VU,NR by KB1 at 2/26/2019  3:13 PM    Acceptance, E,TB, NR by CT at 2/25/2019  3:25 PM   Family Acceptance, E, VU by AW at 3/15/2019 12:08 PM    Acceptance, E, VU by SB at 3/13/2019 11:01 AM   Significant Other Acceptance, E, VU by WS at 3/9/2019  9:05 AM                   Point: Home exercise  program (In Progress)     Learning Progress Summary           Patient Acceptance, E,D, NR by AG at 4/4/2019  6:23 PM    Acceptance, E, NR by KB at 4/2/2019  6:10 PM    Acceptance, E, VU by SM at 4/2/2019 12:28 AM    Acceptance, E, VU,NR by AG1 at 4/1/2019 11:20 AM    Acceptance, E, NR by KB at 3/31/2019  9:45 PM    Acceptance, E, VU by MC at 3/31/2019  2:55 AM    Acceptance, E, VU by MC at 3/31/2019  2:45 AM    Acceptance, E, VU,NR by MC at 3/30/2019  1:57 AM    Acceptance, E,TB, VU,NR by CT at 3/29/2019 10:45 AM    Acceptance, E, NR by EA at 3/28/2019 10:44 PM    Acceptance, E,TB, NR,NL by CT at 3/28/2019  2:19 PM    Acceptance, E,TB, VU by JS at 3/28/2019 12:47 AM    Acceptance, E, VU by AM at 3/27/2019  4:07 PM    Acceptance, E, NR by EA at 3/26/2019  9:47 PM    Acceptance, E, VU by LAZARO at 3/20/2019 10:09 PM    Acceptance, E, VU by AW at 3/16/2019  6:34 PM    Acceptance, E,TB, VU by ILYA at 3/14/2019 12:21 AM    Comment:  PT unable to rcve teaching at this time    Acceptance, E,TB, VU by ILYA at 3/12/2019 11:00 PM    Comment:  PT unable to rcve teaching at this time    Nonacceptance, E, NL by SB at 3/12/2019  9:17 AM    Comment:  pt intubated and sedated; no one present at the bedside    Acceptance, E, NR by EV at 3/11/2019  9:02 PM    Comment:  intubated/sedated. No family or caretaker at bedside    Nonacceptance, E, NL by WS at 3/9/2019  9:06 AM    Comment:  pt sedated and intubated    Acceptance, E, VU by WS at 3/9/2019  9:05 AM    Acceptance, E,TB, VU by ILYA at 3/8/2019  9:46 PM    Comment:  PT unable to rcve teaching at this time    Nonacceptance, E, NL by WS at 3/8/2019  4:13 PM    Acceptance, E, VU by LT at 3/7/2019 10:13 AM    Acceptance, E, VU by BC at 3/5/2019  5:55 PM    Acceptance, E,TB, VU by CL at 3/4/2019  7:53 PM    Acceptance, E,TB, VU by CL at 3/4/2019  7:52 PM    Acceptance, E,TB, VU by CT at 3/1/2019  3:23 PM    Acceptance, E,TB, VU,NR by KB1 at 2/26/2019  3:13 PM    Acceptance, E,TB, NR by CT at  2/25/2019  3:25 PM   Family Acceptance, E, VU by AW at 3/15/2019 12:08 PM    Acceptance, E, VU by SB at 3/13/2019 11:01 AM   Significant Other Acceptance, E, VU by WS at 3/9/2019  9:05 AM                   Point: Body mechanics (In Progress)     Learning Progress Summary           Patient Acceptance, E,D, NR by AG at 4/4/2019  6:23 PM    Acceptance, E, NR by KB at 4/2/2019  6:10 PM    Acceptance, E, VU by SM at 4/2/2019 12:28 AM    Acceptance, E, VU,NR by AG1 at 4/1/2019 11:20 AM    Acceptance, E, NR by KB at 3/31/2019  9:45 PM    Acceptance, E, VU by MC at 3/31/2019  2:55 AM    Acceptance, E, VU by MC at 3/31/2019  2:45 AM    Acceptance, E, VU,NR by MC at 3/30/2019  1:57 AM    Acceptance, E,TB, VU,NR by CT at 3/29/2019 10:45 AM    Acceptance, E, NR by EA at 3/28/2019 10:44 PM    Acceptance, E,TB, NR,NL by CT at 3/28/2019  2:19 PM    Acceptance, E,TB, VU by JS at 3/28/2019 12:47 AM    Acceptance, E, VU by AM at 3/27/2019  4:07 PM    Acceptance, E, NR by EA at 3/26/2019  9:47 PM    Acceptance, E, VU by LAZARO at 3/20/2019 10:09 PM    Acceptance, E, VU by AW at 3/16/2019  6:34 PM    Acceptance, E,TB, VU by ILYA at 3/14/2019 12:21 AM    Comment:  PT unable to rcve teaching at this time    Acceptance, E,TB, VU by ILYA at 3/12/2019 11:00 PM    Comment:  PT unable to rcve teaching at this time    Nonacceptance, E, NL by SB at 3/12/2019  9:17 AM    Comment:  pt intubated and sedated; no one present at the bedside    Acceptance, E, NR by EV at 3/11/2019  9:02 PM    Comment:  intubated/sedated. No family or caretaker at bedside    Nonacceptance, E, NL by WS at 3/9/2019  9:06 AM    Comment:  pt sedated and intubated    Acceptance, E, VU by WS at 3/9/2019  9:05 AM    Acceptance, E,TB, VU by ILYA at 3/8/2019  9:46 PM    Comment:  PT unable to rcve teaching at this time    Nonacceptance, E, NL by WS at 3/8/2019  4:13 PM    Acceptance, E, VU by LT at 3/7/2019 10:13 AM    Acceptance, E, VU by BC at 3/5/2019  5:55 PM    Acceptance, E,TB, VU  by CL at 3/4/2019  7:53 PM    Acceptance, E,TB, VU by CL at 3/4/2019  7:52 PM    Acceptance, E,TB, VU by CT at 3/1/2019  3:23 PM    Acceptance, E,TB, VU,NR by KB1 at 2/26/2019  3:13 PM    Acceptance, E,TB, NR by CT at 2/25/2019  3:25 PM   Family Acceptance, E, VU by AW at 3/15/2019 12:08 PM    Acceptance, E, VU by SB at 3/13/2019 11:01 AM   Significant Other Acceptance, E, VU by WS at 3/9/2019  9:05 AM                   Point: Precautions (In Progress)     Learning Progress Summary           Patient Acceptance, E,D, NR by AG at 4/4/2019  6:23 PM    Acceptance, E, NR by KB at 4/2/2019  6:10 PM    Acceptance, E, VU by SM at 4/2/2019 12:28 AM    Acceptance, E, VU,NR by AG1 at 4/1/2019 11:20 AM    Acceptance, E, NR by KB at 3/31/2019  9:45 PM    Acceptance, E, VU by MC at 3/31/2019  2:55 AM    Acceptance, E, VU by MC at 3/31/2019  2:45 AM    Acceptance, E, VU,NR by MC at 3/30/2019  1:57 AM    Acceptance, E,TB, VU,NR by CT at 3/29/2019 10:45 AM    Acceptance, E, NR by EA at 3/28/2019 10:44 PM    Acceptance, E,TB, NR,NL by CT at 3/28/2019  2:19 PM    Acceptance, E,TB, VU by JS at 3/28/2019 12:47 AM    Acceptance, E, VU by AM at 3/27/2019  4:07 PM    Acceptance, E, NR by EA at 3/26/2019  9:47 PM    Acceptance, E, VU by LAZARO at 3/20/2019 10:09 PM    Acceptance, E, VU by AW at 3/16/2019  6:34 PM    Acceptance, E,TB, VU by ILYA at 3/14/2019 12:21 AM    Comment:  PT unable to rcve teaching at this time    Acceptance, E,TB, VU by ILYA at 3/12/2019 11:00 PM    Comment:  PT unable to rcve teaching at this time    Nonacceptance, E, NL by SB at 3/12/2019  9:17 AM    Comment:  pt intubated and sedated; no one present at the bedside    Acceptance, E, NR by EV at 3/11/2019  9:02 PM    Comment:  intubated/sedated. No family or caretaker at bedside    Nonacceptance, E, NL by WS at 3/9/2019  9:06 AM    Comment:  pt sedated and intubated    Acceptance, E, VU by WS at 3/9/2019  9:05 AM    Acceptance, E,TB, VU by ILYA at 3/8/2019  9:46 PM     Comment:  PT unable to rcve teaching at this time    Nonacceptance, E, NL by WS at 3/8/2019  4:13 PM    Acceptance, E, VU by LT at 3/7/2019 10:13 AM    Acceptance, E, VU by BC at 3/5/2019  5:55 PM    Acceptance, E,TB, VU by CL at 3/4/2019  7:53 PM    Acceptance, E,TB, VU by CL at 3/4/2019  7:52 PM    Acceptance, E,TB, VU by CT at 3/1/2019  3:23 PM    Acceptance, E,TB, VU,NR by KB1 at 2/26/2019  3:13 PM    Acceptance, E,TB, NR by CT at 2/25/2019  3:25 PM   Family Acceptance, E, VU by AW at 3/15/2019 12:08 PM    Acceptance, E, VU by SB at 3/13/2019 11:01 AM   Significant Other Acceptance, E, VU by WS at 3/9/2019  9:05 AM                               User Key     Initials Effective Dates Name Provider Type Discipline    SB 06/16/16 -  Kathy Rowell RN Registered Nurse Nurse    ILYA 06/16/16 -  Alberto Mendoza, RN Registered Nurse Nurse    LT 06/16/16 -  Denae Carvajal, RN Registered Nurse Nurse    EA 06/16/16 -  Mary Blanton, RN Registered Nurse Nurse    KB 06/16/16 -  Lorena Garcia, RN Registered Nurse Nurse    AW 06/16/16 -  Heather Godinez, RN Registered Nurse Nurse    WS 06/16/16 -  Keisha Messer, RN Registered Nurse Nurse    AM 06/16/16 -  Madeline Santiago, RN Registered Nurse Nurse    BC 03/14/16 -  Ekaterina Cifuentes, PT Physical Therapist PT    AG 04/03/18 -  Parisa Orellana, PT Physical Therapist PT    CT 04/03/18 -  Kyung Lr, PT Physical Therapist PT    AG1 06/16/16 -  Ayana Corbin, RN Registered Nurse Nurse    CL 07/23/18 -  Rj Godfrey, RN Registered Nurse Nurse    KB1 12/20/17 -  Ekaterina Parry, PTA Physical Therapy Assistant PT    LAZARO 05/22/18 -  Renata Mcbride, RN Registered Nurse Nurse     09/06/18 -  Collett, Morgan, RN Registered Nurse Nurse    SM 09/12/18 -  Sean Kohler, RN Registered Nurse Nurse    EV 10/26/18 -  Corry Bagley, RN Registered Nurse Nurse    JS 11/26/18 -  Sg Smith, RN Registered Nurse Nurse                 PT Recommendation and Plan  Anticipated Discharge Disposition (PT): skilled nursing facility  Therapy Frequency (PT Clinical Impression): (3-5 times/ week per priority policy)  Outcome Summary/Treatment Plan (PT)  Daily Summary of Progress (PT): progress towards functional goals is fair  Barriers to Overall Progress (PT): inconsistent participation with PT  Plan for Continued Treatment (PT): continue POC  Anticipated Discharge Disposition (PT): skilled nursing facility  Plan of Care Reviewed With: patient     Time Calculation:   PT Charges     Row Name 04/04/19 1823             Time Calculation    PT Received On  04/04/19  -      PT - Next Appointment  04/05/19  -      PT Goal Re-Cert Due Date  04/08/19  -         Time Calculation- PT    TCU Minutes- PT  25 min  -AG        User Key  (r) = Recorded By, (t) = Taken By, (c) = Cosigned By    Initials Name Provider Type    Parisa Tate, PT Physical Therapist        Therapy Charges for Today     Code Description Service Date Service Provider Modifiers Qty    01253287210 HC GAIT TRAINING EA 15 MIN 4/4/2019 Parisa Orellana, PT GP 1    14846873685 HC PT THERAPEUTIC ACT EA 15 MIN 4/4/2019 Parisa Orellana, PT GP 1    32803801253 HC PT THER SUPP EA 15 MIN 4/4/2019 Parisa Orellana, PT GP 1          PT G-Codes  Outcome Measure Options: AM-PAC 6 Clicks Basic Mobility (PT)  AM-PAC 6 Clicks Score: 14  Score: 12    Parisa Orellana PT  4/4/2019

## 2019-04-04 NOTE — PLAN OF CARE
Problem: Fall Risk (Adult)  Goal: Absence of Fall  Outcome: Ongoing (interventions implemented as appropriate)      Problem: Skin Injury Risk (Adult)  Goal: Skin Health and Integrity  Outcome: Ongoing (interventions implemented as appropriate)      Problem: Mobility, Physical Impaired (Adult)  Goal: Enhanced Mobility Skills  Outcome: Ongoing (interventions implemented as appropriate)    Goal: Enhanced Functional Ability  Outcome: Ongoing (interventions implemented as appropriate)

## 2019-04-04 NOTE — PHARMACY PATIENT ASSISTANCE
Pharmacy checked on the cost of Eliquis for this patient. Per her insurance, patient will have a $0 copay. No issues noted at this time.    Thank you,  Fernanda Martinez

## 2019-04-04 NOTE — PROGRESS NOTES
Discharge Planning Assessment   Manjit     Patient Name: Altagracia King  MRN: [id number]  Today's Date: 4/4/2019    Admit Date: 2/22/2019    [facility], [facility], [facility], [facility], [facility], [facility], [facility], [facility], [facility] [place], [facility], [facility], [facility], [facility], [facility], [facility], [facility], [facility], [facility], [facility], [facility], [facility], [facility], [facility], [facility], [facility], [facility] [place], [facility], [place], [place], [place] [facility], Razia Walker [facility], [facility], [facility] and faxed the referral to them. SS will follow.          Iliana Navas

## 2019-04-05 LAB
BASOPHILS # BLD AUTO: 0.03 10*3/MM3 (ref 0–0.2)
BASOPHILS NFR BLD AUTO: 0.5 % (ref 0–1.5)
DEPRECATED RDW RBC AUTO: 49.4 FL (ref 37–54)
EOSINOPHIL # BLD AUTO: 0.29 10*3/MM3 (ref 0–0.4)
EOSINOPHIL NFR BLD AUTO: 4.7 % (ref 0.3–6.2)
ERYTHROCYTE [DISTWIDTH] IN BLOOD BY AUTOMATED COUNT: 13.7 % (ref 12.3–15.4)
HCT VFR BLD AUTO: 30.7 % (ref 34–46.6)
HGB BLD-MCNC: 9.7 G/DL (ref 12–15.9)
IMM GRANULOCYTES # BLD AUTO: 0.01 10*3/MM3 (ref 0–0.05)
IMM GRANULOCYTES NFR BLD AUTO: 0.2 % (ref 0–0.5)
LYMPHOCYTES # BLD AUTO: 1.3 10*3/MM3 (ref 0.7–3.1)
LYMPHOCYTES NFR BLD AUTO: 21.3 % (ref 19.6–45.3)
MAGNESIUM SERPL-MCNC: 2.4 MG/DL (ref 1.6–2.6)
MCH RBC QN AUTO: 32.3 PG (ref 26.6–33)
MCHC RBC AUTO-ENTMCNC: 31.6 G/DL (ref 31.5–35.7)
MCV RBC AUTO: 102.3 FL (ref 79–97)
MONOCYTES # BLD AUTO: 0.89 10*3/MM3 (ref 0.1–0.9)
MONOCYTES NFR BLD AUTO: 14.6 % (ref 5–12)
NEUTROPHILS # BLD AUTO: 3.59 10*3/MM3 (ref 1.4–7)
NEUTROPHILS NFR BLD AUTO: 58.7 % (ref 42.7–76)
PLATELET # BLD AUTO: 195 10*3/MM3 (ref 140–450)
PMV BLD AUTO: 10.8 FL (ref 6–12)
POTASSIUM BLD-SCNC: 3.9 MMOL/L (ref 3.5–5.2)
RBC # BLD AUTO: 3 10*6/MM3 (ref 3.77–5.28)
WBC NRBC COR # BLD: 6.11 10*3/MM3 (ref 3.4–10.8)

## 2019-04-05 PROCEDURE — 83735 ASSAY OF MAGNESIUM: CPT | Performed by: HOSPITALIST

## 2019-04-05 PROCEDURE — 84132 ASSAY OF SERUM POTASSIUM: CPT | Performed by: HOSPITALIST

## 2019-04-05 PROCEDURE — 85025 COMPLETE CBC W/AUTO DIFF WBC: CPT | Performed by: INTERNAL MEDICINE

## 2019-04-05 PROCEDURE — 94799 UNLISTED PULMONARY SVC/PX: CPT

## 2019-04-05 PROCEDURE — 99232 SBSQ HOSP IP/OBS MODERATE 35: CPT | Performed by: HOSPITALIST

## 2019-04-05 RX ADMIN — FOLIC ACID 1 MG: 1 TABLET ORAL at 07:43

## 2019-04-05 RX ADMIN — LANSOPRAZOLE 30 MG: KIT at 07:44

## 2019-04-05 RX ADMIN — METOPROLOL TARTRATE 75 MG: 50 TABLET, FILM COATED ORAL at 20:00

## 2019-04-05 RX ADMIN — DIGOXIN 125 MCG: 125 TABLET ORAL at 07:44

## 2019-04-05 RX ADMIN — Medication 100 MG: at 07:43

## 2019-04-05 RX ADMIN — APIXABAN 5 MG: 5 TABLET, FILM COATED ORAL at 07:44

## 2019-04-05 RX ADMIN — METOPROLOL TARTRATE 75 MG: 50 TABLET, FILM COATED ORAL at 07:43

## 2019-04-05 RX ADMIN — SENNOSIDES AND DOCUSATE SODIUM 2 TABLET: 8.6; 5 TABLET ORAL at 20:02

## 2019-04-05 RX ADMIN — MAGNESIUM GLUCONATE 500 MG ORAL TABLET 400 MG: 500 TABLET ORAL at 07:43

## 2019-04-05 RX ADMIN — MEXILETINE HYDROCHLORIDE 150 MG: 150 CAPSULE ORAL at 20:02

## 2019-04-05 RX ADMIN — LEVOTHYROXINE SODIUM 75 MCG: 75 TABLET ORAL at 04:52

## 2019-04-05 RX ADMIN — MEXILETINE HYDROCHLORIDE 150 MG: 150 CAPSULE ORAL at 12:08

## 2019-04-05 RX ADMIN — SPIRONOLACTONE 25 MG: 25 TABLET ORAL at 12:08

## 2019-04-05 RX ADMIN — MEXILETINE HYDROCHLORIDE 150 MG: 150 CAPSULE ORAL at 04:52

## 2019-04-05 RX ADMIN — OLANZAPINE 5 MG: 5 TABLET, FILM COATED ORAL at 20:01

## 2019-04-05 RX ADMIN — AMIODARONE HYDROCHLORIDE 200 MG: 200 TABLET ORAL at 07:43

## 2019-04-05 RX ADMIN — APIXABAN 5 MG: 5 TABLET, FILM COATED ORAL at 20:01

## 2019-04-05 RX ADMIN — FUROSEMIDE 20 MG: 20 TABLET ORAL at 07:43

## 2019-04-05 NOTE — PLAN OF CARE
Problem: Fall Risk (Adult)  Goal: Absence of Fall  Outcome: Ongoing (interventions implemented as appropriate)      Problem: Skin Injury Risk (Adult)  Goal: Skin Health and Integrity  Outcome: Ongoing (interventions implemented as appropriate)      Problem: Patient Care Overview  Goal: Plan of Care Review  Outcome: Ongoing (interventions implemented as appropriate)    Goal: Individualization and Mutuality  Outcome: Ongoing (interventions implemented as appropriate)    Goal: Individualization and Mutuality  Outcome: Ongoing (interventions implemented as appropriate)      Problem: Pain, Chronic (Adult)  Goal: Acceptable Pain/Comfort Level and Functional Ability  Outcome: Ongoing (interventions implemented as appropriate)

## 2019-04-05 NOTE — NURSING NOTE
Bedside skin assessment completed with LIANE Pulido. No new issues noted. Coccyx/heels red/blanchable. Will continue to monitor.

## 2019-04-05 NOTE — PROGRESS NOTES
[facility] HOSPITALIST PROGRESS NOTE     Patient Identification:  Name:  Altagracia King  Age:  53 y.o.  Sex:  female  :  1965  MRN:  [id number]  Visit Number:  [id number]  ROOM: [address] [place] [address]     Primary Care Provider:  Provider, No Known    Length of stay:  41    Subjective        Chief Compliant follow-up for AMS    Patient seen and examined this morning.  Patient is alert, awake and oriented to self, time, limited to person and place.  Today the patient did overall normal conversation with eye contact. Few delusional thoughts noted again today.  Overall stable. Denies cough.  Denies any chest pain, shortness of breath. on room air.  Afebrile and no events overnight.  Stated that she does not want to go back to shelter.  She would like us to keep trying for nursing home.        Objective     Current Hospital Meds:    amiodarone 200 mg Oral Daily   apixaban 5 mg Oral Q12H   bisacodyl 10 mg Rectal Daily   budesonide 0.5 mg Nebulization BID - RT   cyanocobalamin 1,000 mcg Intramuscular Q30 Days   digoxin 125 mcg Oral Q48H   folic acid 1 mg Oral Daily   furosemide 20 mg Oral Daily   lansoprazole 30 mg Per G Tube Daily   levothyroxine 75 mcg Oral Q AM   [START ON 2019] magnesium oxide 200 mg Oral Daily   metoprolol tartrate 75 mg Oral Q12H   mexiletine 150 mg Oral Q8H   OLANZapine 5 mg Oral Nightly   sennosides-docusate sodium 2 tablet Oral Nightly   sodium chloride 3 mL Intravenous Q12H   sodium chloride 3 mL Intravenous Q12H   spironolactone 25 mg Oral Daily With Lunch   thiamine 100 mg Oral Daily       Pharmacy Consult      ----------------------------------------------------------------------------------------------------------------------  Vital Signs:  Temp:  [97.9 °F (36.6 °C)-98.3 °F (36.8 °C)] 97.9 °F (36.6 °C)  Heart Rate:  [64-76] 69  Resp:  [18-20] 18  BP: (100-133)/(55-60) 125/55  SpO2:  [96 %-100 %] 96 %  on   ;   Device (Oxygen Therapy): room air  Body mass index is 16.47  kg/m².    Wt Readings from Last 3 Encounters:   04/05/19 44.9 kg (99 lb)   02/14/19 56.7 kg (125 lb)   02/14/19 56.7 kg (125 lb)       Intake/Output Summary (Last 24 hours) at 4/5/2019 1554  Last data filed at 4/5/2019 1300  Gross per 24 hour   Intake 720 ml   Output --   Net 720 ml     Diet Soft Texture; Chopped; Thin; Daily Fluid Restriction; Other; 1,800  ----------------------------------------------------------------------------------------------------------------------  Physical exam:  General: Comfortable, Not in distress.    HENT:  Head:  Normocephalic and atraumatic.  Mouth:  Moist mucous membranes.    Eyes:  Conjunctivae and EOM are normal.  Right eye with cataract and left eye opacified.   No scleral icterus.    Neck:  Neck supple.  No JVD present.  trachea midline.   Cardiovascular:  Normal rate, regular rhythm with systolic murmur + in the mitral area.   Pulmonary/Chest:  No respiratory distress, no wheezes, no rales, with normal breath sounds and good air movement.  Abdomen:  Soft.  Bowel sounds are normal.  No distension and no tenderness. No organomegaly.   Musculoskeletal:  No edema, no tenderness, and no deformity.  No red or swollen joints anywhere.    Neurological: oriented to self, limited to person, but not to time and place. NFND. Back to normal self.   Skin:  Skin is warm and dry. No rash noted. No pallor.   Peripheral vascular:  pulses in all 4 extremities with no clubbing, no cyanosis, no edema.  Genitourinary: no antunez  ----------------------------------------------------------------------------------------------------------------------  ----------------------------------------------------------------------------------------------------------------------  Results from last 7 days   Lab Units 04/05/19  1031 04/02/19  1030 03/30/19  0538   WBC 10*3/mm3 6.11 5.22 5.16   HEMOGLOBIN g/dL 9.7* 10.3* 10.1*   HEMATOCRIT % 30.7* 33.2* 32.3*   MCV fL 102.3* 102.8* 105.2*   MCHC g/dL 31.6 31.0* 31.3*    PLATELETS 10*3/mm3 195 198 207     Results from last 7 days   Lab Units 04/05/19  0417 04/03/19  0335 04/02/19  0456 04/01/19 2113 04/01/19  0434 03/31/19  0136   SODIUM mmol/L  --   --  138  --  139 141   POTASSIUM mmol/L 3.9 4.7 4.6  --  3.6 3.9   MAGNESIUM mg/dL 2.4 2.2  --  1.9  --   --    CHLORIDE mmol/L  --   --  101  --  97* 100   CO2 mmol/L  --   --  26.1  --  28.4 27.7   BUN mg/dL  --   --  16  --  24* 24*   CREATININE mg/dL  --   --  0.74  --  0.97 0.99   PHOSPHORUS mg/dL  --   --  3.4  --   --   --    EGFR IF NONAFRICN AM mL/min/1.73  --   --  81  --  60* 58*   CALCIUM mg/dL  --   --  9.5  --  9.5 9.4   GLUCOSE mg/dL  --   --  101*  --  101* 79   Estimated Creatinine Clearance: 60.9 mL/min (by C-G formula based on SCr of 0.74 mg/dL).      No results found for: HGBA1C, POCGLU  No results found for: AMMONIA        Blood Culture   Date Value Ref Range Status   03/19/2019 No growth at 24 hours  Preliminary   03/19/2019 Abnormal Stain (A)  Preliminary     Respiratory Culture   Date Value Ref Range Status   03/14/2019 Rare Normal Respiratory Obdulia  Final         I have personally looked at the labs and they are summarized above.  ----------------------------------------------------------------------------------------------------------------------  Imaging Results (last 24 hours)     ** No results found for the last 24 hours. **        I have personally reviewed the radiology images and read the final radiology report.    Assessment & Plan      Assessment and Plan:  - Acute diastolic CHF secondary to severe MR: resolved. on low dose lasix since BP borderline. continue to monitor closely. monitor renal function and electrolytes. on fluid restriction 1800 cc/day.      -RLL pneumonia. Resolved.     -Atrial fibrillation with RVR([id number] score is 2) with H/O HTN and female sex.  In SR.  On amiodarone, digoxin and metoprolol and eliquis.  Digoxin level elevated so will change digoxin to every 48 hours. Patient  was on flecainide but that had been discontinued. Cardiology on board and recommend no need for anticoagulation due to low embolic risk and significant alcohol use history.  Stress test showed normal myocardial perfusion study with no evidence of ischemia.  QTC prolonged.       -Acute hypoxemic respiratory failure. Resolved.    Continue Pulmicort nebs and  Atrovent nebs.  Venous Doppler negative for DVT.  2D echo showed normal ejection fraction, grade I diastolic dysfunction, moderate AR and severe MR.     -Delirium:  Resolved. Continue thiamine and folic acid. Continue with 5 mg of Zyprexa.      -Septic shock due to bilateral pneumonia  Resolved. Treated with vancomycin, Zosyn and doxycycline.  No growth on blood culture so far    -NSVT, resolved. on mexitil by Dr. Abbasi.     -Hypokalemia and hyponatremia.  Resolved. For hypomagnesemia, on po Magnesium oxide. Decrease the dose.      -Esophageal thickening  May be due to reflux esophagitis.  She will need EGD as an outpatient.     -Hypothyroidism  TSH was elevated on admission.  Continue levothyroxine.  repeat TSH elevated so increase dose of levothyroxin.     -Nutrition. SLP evaluated and started on the dysphagia diet.  -Debility: PT/OT on board.     - Prophylaxis  DVT: eliquis   GI: PPI.   - on board for discharge planning.    The patient is high risk due to: respiratory failure, bilateral pneumonia, septic shock, atrial fibrillation, hyponatremia.     I discussed the patients findings and my recommendations with the patient and the nursing staff.       Sayra Milton MD  04/05/19  3:54 PM     Addendum:  Discussed with Dr. Abbasi regarding oral anticoagulation and he advised Eliquis.  Pharmacy check the price and is 0$ co-pay with Eliquis.  Will discontinue Lovenox and start on Eliquis p.o. twice daily 5 mg.  Discontinue aspirin since the patient's stress test is negative and patient is blind with high risk of fall so we will continue with just  Eliquis for now.

## 2019-04-06 ENCOUNTER — APPOINTMENT (OUTPATIENT)
Dept: CT IMAGING | Facility: HOSPITAL | Age: 54
End: 2019-04-06

## 2019-04-06 PROCEDURE — 70450 CT HEAD/BRAIN W/O DYE: CPT | Performed by: RADIOLOGY

## 2019-04-06 PROCEDURE — 70450 CT HEAD/BRAIN W/O DYE: CPT

## 2019-04-06 PROCEDURE — 99232 SBSQ HOSP IP/OBS MODERATE 35: CPT | Performed by: PHYSICIAN ASSISTANT

## 2019-04-06 PROCEDURE — 94799 UNLISTED PULMONARY SVC/PX: CPT

## 2019-04-06 RX ORDER — PANTOPRAZOLE SODIUM 40 MG/1
40 TABLET, DELAYED RELEASE ORAL
Status: DISCONTINUED | OUTPATIENT
Start: 2019-04-07 | End: 2019-04-18 | Stop reason: HOSPADM

## 2019-04-06 RX ADMIN — AMIODARONE HYDROCHLORIDE 200 MG: 200 TABLET ORAL at 07:44

## 2019-04-06 RX ADMIN — APIXABAN 5 MG: 5 TABLET, FILM COATED ORAL at 20:39

## 2019-04-06 RX ADMIN — METOPROLOL TARTRATE 75 MG: 50 TABLET, FILM COATED ORAL at 07:44

## 2019-04-06 RX ADMIN — MAGNESIUM GLUCONATE 500 MG ORAL TABLET 200 MG: 500 TABLET ORAL at 07:48

## 2019-04-06 RX ADMIN — APIXABAN 5 MG: 5 TABLET, FILM COATED ORAL at 07:48

## 2019-04-06 RX ADMIN — SPIRONOLACTONE 25 MG: 25 TABLET ORAL at 11:00

## 2019-04-06 RX ADMIN — FOLIC ACID 1 MG: 1 TABLET ORAL at 07:48

## 2019-04-06 RX ADMIN — MEXILETINE HYDROCHLORIDE 150 MG: 150 CAPSULE ORAL at 14:36

## 2019-04-06 RX ADMIN — OLANZAPINE 5 MG: 5 TABLET, FILM COATED ORAL at 20:40

## 2019-04-06 RX ADMIN — FUROSEMIDE 20 MG: 20 TABLET ORAL at 07:44

## 2019-04-06 RX ADMIN — Medication 100 MG: at 07:47

## 2019-04-06 RX ADMIN — LANSOPRAZOLE 30 MG: KIT at 09:33

## 2019-04-06 RX ADMIN — MEXILETINE HYDROCHLORIDE 150 MG: 150 CAPSULE ORAL at 20:36

## 2019-04-06 RX ADMIN — LEVOTHYROXINE SODIUM 75 MCG: 75 TABLET ORAL at 06:30

## 2019-04-06 RX ADMIN — MEXILETINE HYDROCHLORIDE 150 MG: 150 CAPSULE ORAL at 06:34

## 2019-04-06 NOTE — NURSING NOTE
Patient was found to be sitting in floor next to her bed stated to the nurse she fell and hit her head.  Assessment revealed no obvious injury, patient denies any pain with movement.  Vital signs stable, hospitalist team notified.  CT scan ordered. SAFE report complete.

## 2019-04-06 NOTE — PROGRESS NOTES
Patient Identification:  Name:  Altagracia King  Age:  53 y.o.  Sex:  female  :  1965  MRN:  [id number]  Visit Number:  [id number]  Primary Care Provider:  Provider, No Known    Length of stay:  42    Subjective     Chief complaint: Follow-up CHF, Afib, delirium, electrolyte disturbances, debility, nutrition    HPI/Hospital course:   53-year-old  woman, homeless with history of ETOH abuse, initially admitted to our observation unit 19 after ED presentation on late evening of 19.  She was transferred to the Hospital Medicine Service on 19 due to debility, UTI, and need for possible SNF placement.   She remained hospitalized for placement and in that time developed acute diastolic CHF 2/2 severe MR, atrial fibrillation, acute hypoxemic respiratory failure that resolved (she was intubated at one point during admission), intermittent delirium in setting of known ETOH abuse, septic shock (requiring pressor support)2/2 bilateral pneuomonia that has since resolved with completion of IV abx with Vancomycin. Zosyn, and doxycycline.  She did also have events of NSVT.  She has also been found to have esophageal thickening. At present, she remains hospitalized for placement.  SS on board and information has been sent to apparently nearly every nursing home in this state.      Subjective:      Ms. King is resting comfortably in bed. She reports she is doing well aside from wishing to have a liter of coca cola.  We discussed the difficulties in having such an amount given her fluid restriction.  She tells me we are very strict here but she is appreciate of her care. We discuss her missing telemetry monitor and alleged refusal to wear it. She reports that it went missing during a gown change and denies prior refusal.  She is agreeable to wear it today.      She denies chest pain, dyspnea, cough, and wheeze.  She tells me she is working to build up her strength.      Discussed with AM LIANE Kramer with  no known acute events overnight.   ----------------------------------------------------------------------------------------------------------------------  Current Hospital Meds:    amiodarone 200 mg Oral Daily   apixaban 5 mg Oral Q12H   bisacodyl 10 mg Rectal Daily   cyanocobalamin 1,000 mcg Intramuscular Q30 Days   digoxin 125 mcg Oral Q48H   folic acid 1 mg Oral Daily   furosemide 20 mg Oral Daily   levothyroxine 75 mcg Oral Q AM   magnesium oxide 200 mg Oral Daily   metoprolol tartrate 75 mg Oral Q12H   mexiletine 150 mg Oral Q8H   OLANZapine 5 mg Oral Nightly   [START ON 4/7/2019] pantoprazole 40 mg Oral Q AM   sennosides-docusate sodium 2 tablet Oral Nightly   sodium chloride 3 mL Intravenous Q12H   sodium chloride 3 mL Intravenous Q12H   spironolactone 25 mg Oral Daily With Lunch   thiamine 100 mg Oral Daily       Pharmacy Consult      ----------------------------------------------------------------------------------------------------------------------      Objective     Vital Signs:  Temp:  [97.8 °F (36.6 °C)-98.1 °F (36.7 °C)] 98 °F (36.7 °C)  Heart Rate:  [67-74] 68  Resp:  [18] 18  BP: ()/(50-60) 125/60      04/04/19  0306 04/05/19  0258 04/06/19  0300   Weight: 46.5 kg (102 lb 8 oz) 44.9 kg (99 lb) 45.9 kg (101 lb 4.8 oz)     Body mass index is 16.86 kg/m².    Intake/Output Summary (Last 24 hours) at 4/6/2019 1104  Last data filed at 4/6/2019 0836  Gross per 24 hour   Intake 560 ml   Output --   Net 560 ml     I/O this shift:  In: 200 [P.O.:200]  Out: -   Diet Soft Texture; Chopped; Thin; Daily Fluid Restriction; Other; 1,800  ----------------------------------------------------------------------------------------------------------------------  Physical exam:  Constitutional:  Well-developed and well-nourished.  No respiratory distress.      HENT:  Head:  Normocephalic and atraumatic.  Mouth:  Moist mucous membranes.    Eyes:  Conjunctivae and EOM are normal. Right eye cataract.  Left eye  opacified.  No scleral icterus.    Neck:  Neck supple.  No JVD present.    Cardiovascular:  Regular rate, regular rhythm and normal heart sounds with no murmur.  Pulmonary/Chest:  No respiratory distress, no wheezes, no crackles, with normal breath sounds and good air movement.  Abdominal:  Soft.  Bowel sounds are normal.  No distension and no tenderness.   Musculoskeletal:  No edema, no tenderness, and no deformity.  No red or swollen joints appreciated during examination.    Neurological:  Alert and oriented to self and location.  No cranial nerve deficit.  No tongue deviation.  No facial droop.  No slurred speech.   Skin:  Skin is warm and dry. No rash noted. No pallor.   ----------------------------------------------------------------------------------------------------------------------  Tele:  Reportedly refusing telemetry. Cardiac monitoring ordered again and re-added.    ----------------------------------------------------------------------------------------------------------------------  Results from last 7 days   Lab Units 04/01/19  2113   PROBNP pg/mL 260.8     Results from last 7 days   Lab Units 04/05/19  1031 04/02/19  1030   WBC 10*3/mm3 6.11 5.22   HEMOGLOBIN g/dL 9.7* 10.3*   HEMATOCRIT % 30.7* 33.2*   MCV fL 102.3* 102.8*   MCHC g/dL 31.6 31.0*   PLATELETS 10*3/mm3 195 198         Results from last 7 days   Lab Units 04/05/19  0417 04/03/19  0335 04/02/19  0456 04/01/19  2113 04/01/19  0434 03/31/19  0136   SODIUM mmol/L  --   --  138  --  139 141   POTASSIUM mmol/L 3.9 4.7 4.6  --  3.6 3.9   MAGNESIUM mg/dL 2.4 2.2  --  1.9  --   --    CHLORIDE mmol/L  --   --  101  --  97* 100   CO2 mmol/L  --   --  26.1  --  28.4 27.7   BUN mg/dL  --   --  16  --  24* 24*   CREATININE mg/dL  --   --  0.74  --  0.97 0.99   EGFR IF NONAFRICN AM mL/min/1.73  --   --  81  --  60* 58*   CALCIUM mg/dL  --   --  9.5  --  9.5 9.4   GLUCOSE mg/dL  --   --  101*  --  101* 79   Estimated Creatinine Clearance: 62.2 mL/min  (by C-G formula based on SCr of 0.74 mg/dL).  No results found for: AMMONIA                  ----------------------------------------------------------------------------------------------------------------------  Imaging Results (last 24 hours)     ** No results found for the last 24 hours. **        ----------------------------------------------------------------------------------------------------------------------      Assessment/Plan     Active Hospital Problems    Diagnosis POA   • PAF (paroxysmal atrial fibrillation) (CMS/[facility]) [I48.0] Unknown   • ETOH abuse [F10.10] Unknown   • Acute respiratory failure with hypoxia (CMS/[facility]) [J96.01] Unknown   • Chest pain [R07.9] Yes         ASSESSMENT/PLAN:  · Acute diastolic CHF 2/2 severe MR:  Continue low dose lasix with borderline BP and close monitoring.  Will continue 1800ml/day fluid restriction.  Will recheck renal fxn in AM.     · Paroxysmal atrial fibrillation with RVR: [id number] at least 2.  Anticoagulated with Eliquis. Continue amiodarone, digoxin, Metoprolol. Dig level recheck in AM.     · Acute hypoxemic respiratory  Failure resolved: Continue to monitor.     · Delirium: Resolved. Psychiatry evaluated.  Continue Zyprexa.      · Septic shock 2/2 bilateral pneumonia: Resolved.  Received prior treatment.     · NSVT: Mexitil per Dr. Abbasi.     · Hypokalemia, resolved: Recheck in AM.     · Hyponatremia, resolved: Recheck in AM.     · Hypomagnesemia, resolved: On daily replacement. Will recheck.     · Esophageal thickening: Possibly 2/2 GERD.  Will add PPI. Will need EGD as an outpatient.      · Hypothyroidism: Continue 75 mcg of Levothyroxine.     · Nutrition: SLP previously evaluated.  Continue Boost Plus.     · Debility: PT/OT therapies following.     · Homelessness:  SS following.     -----------  -DVT prophylaxis: Eliquis will serve  -GI prophylaxis: PPI added  -Activity: Ad janie  -Disposition: Awaiting placement  -Diet: Soft, chopped with thin liquids and  daily fluid restriction of 1800ml/day    The patient is high risk due to the following diagnoses/reasons:  Acute diastolic CHF, paroxysmal atrial fibrillation, acute hypoxemic respiratory failure, debility, homelessness    Neema Martinez PA-C  04/06/19  11:04 AM  Pager # [phone]

## 2019-04-07 LAB
ANION GAP SERPL CALCULATED.3IONS-SCNC: 12.6 MMOL/L
BUN BLD-MCNC: 22 MG/DL (ref 6–20)
BUN/CREAT SERPL: 26.2 (ref 7–25)
CALCIUM SPEC-SCNC: 9.7 MG/DL (ref 8.6–10.5)
CHLORIDE SERPL-SCNC: 104 MMOL/L (ref 98–107)
CO2 SERPL-SCNC: 24.4 MMOL/L (ref 22–29)
CREAT BLD-MCNC: 0.84 MG/DL (ref 0.57–1)
DIGOXIN SERPL-MCNC: 0.4 NG/ML (ref 0.6–1.2)
GFR SERPL CREATININE-BSD FRML MDRD: 70 ML/MIN/1.73
GLUCOSE BLD-MCNC: 91 MG/DL (ref 65–99)
MAGNESIUM SERPL-MCNC: 1.7 MG/DL (ref 1.6–2.6)
MAGNESIUM SERPL-MCNC: 2.7 MG/DL (ref 1.6–2.6)
POTASSIUM BLD-SCNC: 4.3 MMOL/L (ref 3.5–5.2)
SODIUM BLD-SCNC: 141 MMOL/L (ref 136–145)

## 2019-04-07 PROCEDURE — 80048 BASIC METABOLIC PNL TOTAL CA: CPT | Performed by: PHYSICIAN ASSISTANT

## 2019-04-07 PROCEDURE — 83735 ASSAY OF MAGNESIUM: CPT | Performed by: HOSPITALIST

## 2019-04-07 PROCEDURE — 80162 ASSAY OF DIGOXIN TOTAL: CPT | Performed by: HOSPITALIST

## 2019-04-07 PROCEDURE — 99232 SBSQ HOSP IP/OBS MODERATE 35: CPT | Performed by: PHYSICIAN ASSISTANT

## 2019-04-07 PROCEDURE — 94799 UNLISTED PULMONARY SVC/PX: CPT

## 2019-04-07 PROCEDURE — 83735 ASSAY OF MAGNESIUM: CPT | Performed by: PHYSICIAN ASSISTANT

## 2019-04-07 RX ORDER — MAGNESIUM SULFATE HEPTAHYDRATE 40 MG/ML
4 INJECTION, SOLUTION INTRAVENOUS ONCE
Status: COMPLETED | OUTPATIENT
Start: 2019-04-07 | End: 2019-04-07

## 2019-04-07 RX ORDER — FUROSEMIDE 20 MG/1
10 TABLET ORAL DAILY
Status: DISCONTINUED | OUTPATIENT
Start: 2019-04-08 | End: 2019-04-18 | Stop reason: HOSPADM

## 2019-04-07 RX ORDER — MAGNESIUM SULFATE HEPTAHYDRATE 40 MG/ML
2 INJECTION, SOLUTION INTRAVENOUS AS NEEDED
Status: DISCONTINUED | OUTPATIENT
Start: 2019-04-07 | End: 2019-04-18 | Stop reason: HOSPADM

## 2019-04-07 RX ORDER — MAGNESIUM SULFATE HEPTAHYDRATE 40 MG/ML
4 INJECTION, SOLUTION INTRAVENOUS AS NEEDED
Status: DISCONTINUED | OUTPATIENT
Start: 2019-04-07 | End: 2019-04-18 | Stop reason: HOSPADM

## 2019-04-07 RX ADMIN — FOLIC ACID 1 MG: 1 TABLET ORAL at 07:35

## 2019-04-07 RX ADMIN — SODIUM CHLORIDE, PRESERVATIVE FREE 3 ML: 5 INJECTION INTRAVENOUS at 07:36

## 2019-04-07 RX ADMIN — LEVOTHYROXINE SODIUM 75 MCG: 75 TABLET ORAL at 04:18

## 2019-04-07 RX ADMIN — APIXABAN 5 MG: 5 TABLET, FILM COATED ORAL at 07:36

## 2019-04-07 RX ADMIN — PANTOPRAZOLE SODIUM 40 MG: 40 TABLET, DELAYED RELEASE ORAL at 04:18

## 2019-04-07 RX ADMIN — DIGOXIN 125 MCG: 125 TABLET ORAL at 07:35

## 2019-04-07 RX ADMIN — APIXABAN 5 MG: 5 TABLET, FILM COATED ORAL at 20:25

## 2019-04-07 RX ADMIN — METOPROLOL TARTRATE 75 MG: 50 TABLET, FILM COATED ORAL at 20:27

## 2019-04-07 RX ADMIN — MAGNESIUM SULFATE HEPTAHYDRATE 4 G: 40 INJECTION, SOLUTION INTRAVENOUS at 10:49

## 2019-04-07 RX ADMIN — MEXILETINE HYDROCHLORIDE 150 MG: 150 CAPSULE ORAL at 04:18

## 2019-04-07 RX ADMIN — SENNOSIDES AND DOCUSATE SODIUM 2 TABLET: 8.6; 5 TABLET ORAL at 20:26

## 2019-04-07 RX ADMIN — ACETAMINOPHEN 650 MG: 325 TABLET, FILM COATED ORAL at 16:14

## 2019-04-07 RX ADMIN — METOPROLOL TARTRATE 75 MG: 50 TABLET, FILM COATED ORAL at 07:35

## 2019-04-07 RX ADMIN — OLANZAPINE 5 MG: 5 TABLET, FILM COATED ORAL at 20:26

## 2019-04-07 RX ADMIN — AMIODARONE HYDROCHLORIDE 200 MG: 200 TABLET ORAL at 07:35

## 2019-04-07 RX ADMIN — Medication 100 MG: at 07:35

## 2019-04-07 RX ADMIN — MEXILETINE HYDROCHLORIDE 150 MG: 150 CAPSULE ORAL at 13:17

## 2019-04-07 RX ADMIN — SPIRONOLACTONE 25 MG: 25 TABLET ORAL at 10:48

## 2019-04-07 RX ADMIN — MEXILETINE HYDROCHLORIDE 150 MG: 150 CAPSULE ORAL at 20:25

## 2019-04-07 RX ADMIN — MAGNESIUM GLUCONATE 500 MG ORAL TABLET 200 MG: 500 TABLET ORAL at 07:35

## 2019-04-07 RX ADMIN — FUROSEMIDE 20 MG: 20 TABLET ORAL at 07:34

## 2019-04-07 NOTE — PLAN OF CARE
Problem: Fall Risk (Adult)  Goal: Absence of Fall  Outcome: Ongoing (interventions implemented as appropriate)      Problem: Skin Injury Risk (Adult)  Goal: Skin Health and Integrity  Outcome: Ongoing (interventions implemented as appropriate)      Problem: Patient Care Overview  Goal: Plan of Care Review  Outcome: Ongoing (interventions implemented as appropriate)    Goal: Individualization and Mutuality  Outcome: Ongoing (interventions implemented as appropriate)    Goal: Discharge Needs Assessment  Outcome: Ongoing (interventions implemented as appropriate)    Goal: Interprofessional Rounds/Family Conf  Outcome: Ongoing (interventions implemented as appropriate)    Goal: Plan of Care Review  Outcome: Outcome(s) achieved Date Met: 04/07/19    Goal: Individualization and Mutuality  Outcome: Ongoing (interventions implemented as appropriate)    Goal: Discharge Needs Assessment  Outcome: Ongoing (interventions implemented as appropriate)    Goal: Interprofessional Rounds/Family Conf  Outcome: Ongoing (interventions implemented as appropriate)      Problem: Pain, Chronic (Adult)  Goal: Acceptable Pain/Comfort Level and Functional Ability  Outcome: Ongoing (interventions implemented as appropriate)      Problem: Mobility, Physical Impaired (Adult)  Goal: Enhanced Mobility Skills  Outcome: Ongoing (interventions implemented as appropriate)    Goal: Enhanced Functional Ability  Outcome: Ongoing (interventions implemented as appropriate)    Goal: Identify Related Risk Factors and Signs and Symptoms  Outcome: Ongoing (interventions implemented as appropriate)    Goal: Enhanced Mobility Skills  Outcome: Ongoing (interventions implemented as appropriate)    Goal: Enhanced Functional Ability  Outcome: Ongoing (interventions implemented as appropriate)      Problem: Arrhythmia/Dysrhythmia (Symptomatic) (Adult)  Goal: Signs and Symptoms of Listed Potential Problems Will be Absent, Minimized or Managed  (Arrhythmia/Dysrhythmia)  Outcome: Ongoing (interventions implemented as appropriate)

## 2019-04-07 NOTE — PROGRESS NOTES
Patient Identification:  Name:  Altagracia King  Age:  53 y.o.  Sex:  female  :  1965  MRN:  [id number]  Visit Number:  [id number]  Primary Care Provider:  Provider, No Known    Length of stay:  43    Subjective     Chief complaint: Follow-up CHF, Afib, delirium, electrolyte disturbances, debility, nutrition    HPI/Hospital course:   53-year-old  woman, homeless with history of ETOH abuse, initially admitted to our observation unit 19 after ED presentation on late evening of 19.  She was transferred to the Hospital Medicine Service on 19 due to debility, UTI, and need for possible SNF placement.   She remained hospitalized for placement and in that time developed acute diastolic CHF 2/2 severe MR, atrial fibrillation, acute hypoxemic respiratory failure that resolved (she was intubated at one point during admission), intermittent delirium in setting of known ETOH abuse, septic shock (requiring pressor support)2/2 bilateral pneuomonia that has since resolved with completion of IV abx with Vancomycin. Zosyn, and doxycycline.  She did also have events of NSVT.  She has also been found to have esophageal thickening. At present, she remains hospitalized for placement.  SS on board and information has been sent to apparently nearly every nursing home in this state.      Subjective:      Ms. King is resting comfortably in bed.  She reports she is doing well. She is alert to self and birth date.  She has some intermittent confusion, which has been her baseline.   She tells me a landlord came by to see how the house is doing and she told him she stayed up late watching movies.  She is redirected easily to being in the hospital and is aware she should call out for assistance prior to attempting to get up and use walker.  She did fall out of bed yesterday and reportedly struck her head.  She does not remember this in conversation.  She denies headache.        Discussed with AM LIANE Moya with  no known acute events overnight.   ----------------------------------------------------------------------------------------------------------------------  Current Hospital Meds:    amiodarone 200 mg Oral Daily   apixaban 5 mg Oral Q12H   bisacodyl 10 mg Rectal Daily   cyanocobalamin 1,000 mcg Intramuscular Q30 Days   digoxin 125 mcg Oral Q48H   folic acid 1 mg Oral Daily   [START ON 4/8/2019] furosemide 10 mg Oral Daily   levothyroxine 75 mcg Oral Q AM   [START ON 4/8/2019] magnesium oxide 400 mg Oral Daily   magnesium sulfate 4 g Intravenous Once   metoprolol tartrate 75 mg Oral Q12H   mexiletine 150 mg Oral Q8H   OLANZapine 5 mg Oral Nightly   pantoprazole 40 mg Oral Q AM   sennosides-docusate sodium 2 tablet Oral Nightly   sodium chloride 3 mL Intravenous Q12H   sodium chloride 3 mL Intravenous Q12H   spironolactone 25 mg Oral Daily With Lunch   thiamine 100 mg Oral Daily       Pharmacy Consult      ----------------------------------------------------------------------------------------------------------------------      Objective     Vital Signs:  Temp:  [98 °F (36.7 °C)-98.2 °F (36.8 °C)] 98.1 °F (36.7 °C)  Heart Rate:  [67-78] 68  Resp:  [18] 18  BP: ()/(50-62) 99/52      04/05/19  0258 04/06/19  0300 04/07/19  0312   Weight: 44.9 kg (99 lb) 45.9 kg (101 lb 4.8 oz) 46.8 kg (103 lb 1.6 oz)     Body mass index is 17.16 kg/m².    Intake/Output Summary (Last 24 hours) at 4/7/2019 1022  Last data filed at 4/6/2019 1343  Gross per 24 hour   Intake 200 ml   Output --   Net 200 ml     No intake/output data recorded.  Diet Soft Texture; Chopped; Thin; Daily Fluid Restriction; Other; 1,800  ----------------------------------------------------------------------------------------------------------------------  Physical exam:  Constitutional:  Well-developed and well-nourished.  No respiratory distress.      HENT:  Head:  Normocephalic and atraumatic.  Mouth:  Moist mucous membranes.    Eyes:  Conjunctivae and EOM  are normal. Right eye cataract.  Left eye opacified.  No scleral icterus.    Neck:  Neck supple.  No JVD present.    Cardiovascular:  Regular rate, regular rhythm and normal heart sounds with no murmur.  Pulmonary/Chest:  No respiratory distress, no wheezes, no crackles, with normal breath sounds and good air movement.  Abdominal:  Soft.  Bowel sounds are normal.  No distension and no tenderness.   Musculoskeletal:  No edema, no tenderness, and no deformity.  No red or swollen joints appreciated during examination.    Neurological:  Alert and oriented to self and location.  No cranial nerve deficit.  No tongue deviation.  No facial droop.  No slurred speech.   Skin:  Skin is warm and dry. No rash noted. No pallor.   ----------------------------------------------------------------------------------------------------------------------  Tele:  SR 80s-90s  ----------------------------------------------------------------------------------------------------------------------  Results from last 7 days   Lab Units 04/01/19  2113   PROBNP pg/mL 260.8     Results from last 7 days   Lab Units 04/05/19  1031 04/02/19  1030   WBC 10*3/mm3 6.11 5.22   HEMOGLOBIN g/dL 9.7* 10.3*   HEMATOCRIT % 30.7* 33.2*   MCV fL 102.3* 102.8*   MCHC g/dL 31.6 31.0*   PLATELETS 10*3/mm3 195 198         Results from last 7 days   Lab Units 04/07/19  0743 04/07/19  0103 04/05/19  0417 04/03/19  0335 04/02/19  0456  04/01/19  0434   SODIUM mmol/L 141  --   --   --  138  --  139   POTASSIUM mmol/L 4.3  --  3.9 4.7 4.6  --  3.6   MAGNESIUM mg/dL  --  1.7 2.4 2.2  --    < >  --    CHLORIDE mmol/L 104  --   --   --  101  --  97*   CO2 mmol/L 24.4  --   --   --  26.1  --  28.4   BUN mg/dL 22*  --   --   --  16  --  24*   CREATININE mg/dL 0.84  --   --   --  0.74  --  0.97   EGFR IF NONAFRICN AM mL/min/1.73 70  --   --   --  81  --  60*   CALCIUM mg/dL 9.7  --   --   --  9.5  --  9.5   GLUCOSE mg/dL 91  --   --   --  101*  --  101*    < > = values in this  interval not displayed.   Estimated Creatinine Clearance: 55.9 mL/min (by C-G formula based on SCr of 0.84 mg/dL).  No results found for: AMMONIA                  ----------------------------------------------------------------------------------------------------------------------  Imaging Results (last 24 hours)     Procedure Component Value Units Date/Time    CT Head Without Contrast [[id number]] Collected:  04/07/19 0918     Updated:  04/07/19 0920    Narrative:       EXAMINATION: CT HEAD WO CONTRAST-      CLINICAL INDICATION:     Hit head, fall, on Eliquis; R07.9-Chest pain,  unspecified; N30.00-Acute cystitis without hematuria     COMPARISON:    None     Technique: Multiple CT axial images were obtained through the level of  the brain without IV contrast administration. Reformatted images in the  coronal and/or sagittal plane(s) were generated from the axial data set  to facilitate diagnostic accuracy and/or surgical planning.     Radiation dose reduction techniques were utilized per ALARA protocol.  Automated exposure control was initiated through either or [employer] or  [employer] software packages by  protocol.       1121.70 mGy.cm     FINDINGS:    There is no mass effect or midline shift. No  ventriculomegaly. There is no CT evidence of acute vascular territory  infarct. No intraparenchymal or extra-axial hemorrhage. Bone windows  show no acute osseous abnormality.       Impression:       No acute intracranial abnormality.     This report was finalized on 4/7/2019 9:18 AM by Dr. Keven Lux MD.           ----------------------------------------------------------------------------------------------------------------------      Assessment/Plan     Active Hospital Problems    Diagnosis POA   • PAF (paroxysmal atrial fibrillation) (CMS/HCC) [I48.0] Unknown   • ETOH abuse [F10.10] Unknown   • Acute respiratory failure with hypoxia (CMS/HCC) [J96.01] Unknown   • Chest pain [R07.9] Yes          ASSESSMENT/PLAN:  · Acute diastolic CHF 2/2 severe MR: Low dose lasix decreased further to 10mg daily with old parameters.  Will continue 1800ml/day fluid restriction.  Will recheck renal fxn in AM.     · Paroxysmal atrial fibrillation with RVR: [id number] at least 2.  Anticoagulated with Eliquis. Continue amiodarone, digoxin, Metoprolol. Dig level rechecked and low, though given prior elevation and HR remaining WNL on this date will continue with QOD dose.     · Acute hypoxemic respiratory  Failure resolved: Continue to monitor.     · Delirium: Resolved. Psychiatry evaluated.  Continue Zyprexa.      · Septic shock 2/2 bilateral pneumonia: Resolved.  Received prior treatment.     · NSVT: Mexitil per Dr. Abbasi.     · Hypokalemia, resolved: Recheck in AM.     · Hyponatremia, resolved: Recheck in AM.     · Hypomagnesemia: On daily replacement. Protocol re-added as well given Mag of 1.7.  Will recheck in AM.     · Esophageal thickening: Possibly 2/2 GERD.  Will add PPI. Will need EGD as an outpatient.      · Hypothyroidism: Continue 75 mcg of Levothyroxine.     · Nutrition: SLP previously evaluated.  Continue Boost Plus.     · Debility: PT/OT therapies following.     · Homelessness:  SS following.     -----------  -DVT prophylaxis: Eliquis will serve  -GI prophylaxis: PPI added  -Activity: Ad janie  -Disposition: Awaiting placement  -Diet: Soft, chopped with thin liquids and daily fluid restriction of 1800ml/day    The patient is high risk due to the following diagnoses/reasons:  Acute diastolic CHF, paroxysmal atrial fibrillation, acute hypoxemic respiratory failure, debility, homelessness    Neema Martinez PA-C  04/07/19  10:22 AM  Pager # [phone]

## 2019-04-08 LAB
ANION GAP SERPL CALCULATED.3IONS-SCNC: 12 MMOL/L
BASOPHILS # BLD AUTO: 0.05 10*3/MM3 (ref 0–0.2)
BASOPHILS NFR BLD AUTO: 0.7 % (ref 0–1.5)
BUN BLD-MCNC: 19 MG/DL (ref 6–20)
BUN/CREAT SERPL: 25 (ref 7–25)
CALCIUM SPEC-SCNC: 9.5 MG/DL (ref 8.6–10.5)
CHLORIDE SERPL-SCNC: 102 MMOL/L (ref 98–107)
CO2 SERPL-SCNC: 25 MMOL/L (ref 22–29)
CREAT BLD-MCNC: 0.76 MG/DL (ref 0.57–1)
DEPRECATED RDW RBC AUTO: 49.4 FL (ref 37–54)
EOSINOPHIL # BLD AUTO: 0.42 10*3/MM3 (ref 0–0.4)
EOSINOPHIL NFR BLD AUTO: 6.3 % (ref 0.3–6.2)
ERYTHROCYTE [DISTWIDTH] IN BLOOD BY AUTOMATED COUNT: 13.8 % (ref 12.3–15.4)
GFR SERPL CREATININE-BSD FRML MDRD: 79 ML/MIN/1.73
GLUCOSE BLD-MCNC: 90 MG/DL (ref 65–99)
HCT VFR BLD AUTO: 34 % (ref 34–46.6)
HGB BLD-MCNC: 11 G/DL (ref 12–15.9)
IMM GRANULOCYTES # BLD AUTO: 0.08 10*3/MM3 (ref 0–0.05)
IMM GRANULOCYTES NFR BLD AUTO: 1.2 % (ref 0–0.5)
LYMPHOCYTES # BLD AUTO: 1.55 10*3/MM3 (ref 0.7–3.1)
LYMPHOCYTES NFR BLD AUTO: 23.1 % (ref 19.6–45.3)
MAGNESIUM SERPL-MCNC: 2.1 MG/DL (ref 1.6–2.6)
MCH RBC QN AUTO: 32.6 PG (ref 26.6–33)
MCHC RBC AUTO-ENTMCNC: 32.4 G/DL (ref 31.5–35.7)
MCV RBC AUTO: 100.9 FL (ref 79–97)
MONOCYTES # BLD AUTO: 0.81 10*3/MM3 (ref 0.1–0.9)
MONOCYTES NFR BLD AUTO: 12.1 % (ref 5–12)
NEUTROPHILS # BLD AUTO: 3.81 10*3/MM3 (ref 1.4–7)
NEUTROPHILS NFR BLD AUTO: 56.6 % (ref 42.7–76)
PLATELET # BLD AUTO: 236 10*3/MM3 (ref 140–450)
PMV BLD AUTO: 10.8 FL (ref 6–12)
POTASSIUM BLD-SCNC: 4.3 MMOL/L (ref 3.5–5.2)
RBC # BLD AUTO: 3.37 10*6/MM3 (ref 3.77–5.28)
SODIUM BLD-SCNC: 139 MMOL/L (ref 136–145)
WBC NRBC COR # BLD: 6.72 10*3/MM3 (ref 3.4–10.8)

## 2019-04-08 PROCEDURE — 80048 BASIC METABOLIC PNL TOTAL CA: CPT | Performed by: HOSPITALIST

## 2019-04-08 PROCEDURE — 99232 SBSQ HOSP IP/OBS MODERATE 35: CPT | Performed by: HOSPITALIST

## 2019-04-08 PROCEDURE — 83735 ASSAY OF MAGNESIUM: CPT | Performed by: HOSPITALIST

## 2019-04-08 PROCEDURE — 97116 GAIT TRAINING THERAPY: CPT

## 2019-04-08 PROCEDURE — 85025 COMPLETE CBC W/AUTO DIFF WBC: CPT | Performed by: INTERNAL MEDICINE

## 2019-04-08 PROCEDURE — 97530 THERAPEUTIC ACTIVITIES: CPT

## 2019-04-08 PROCEDURE — 94799 UNLISTED PULMONARY SVC/PX: CPT

## 2019-04-08 RX ADMIN — SODIUM CHLORIDE, PRESERVATIVE FREE 3 ML: 5 INJECTION INTRAVENOUS at 07:42

## 2019-04-08 RX ADMIN — SPIRONOLACTONE 25 MG: 25 TABLET ORAL at 11:13

## 2019-04-08 RX ADMIN — SENNOSIDES AND DOCUSATE SODIUM 2 TABLET: 8.6; 5 TABLET ORAL at 20:26

## 2019-04-08 RX ADMIN — APIXABAN 5 MG: 5 TABLET, FILM COATED ORAL at 07:42

## 2019-04-08 RX ADMIN — MEXILETINE HYDROCHLORIDE 150 MG: 150 CAPSULE ORAL at 05:42

## 2019-04-08 RX ADMIN — MEXILETINE HYDROCHLORIDE 150 MG: 150 CAPSULE ORAL at 12:18

## 2019-04-08 RX ADMIN — OLANZAPINE 5 MG: 5 TABLET, FILM COATED ORAL at 20:26

## 2019-04-08 RX ADMIN — APIXABAN 5 MG: 5 TABLET, FILM COATED ORAL at 20:26

## 2019-04-08 RX ADMIN — MEXILETINE HYDROCHLORIDE 150 MG: 150 CAPSULE ORAL at 20:26

## 2019-04-08 RX ADMIN — SODIUM CHLORIDE, PRESERVATIVE FREE 3 ML: 5 INJECTION INTRAVENOUS at 20:26

## 2019-04-08 RX ADMIN — METOPROLOL TARTRATE 75 MG: 50 TABLET, FILM COATED ORAL at 07:41

## 2019-04-08 RX ADMIN — FUROSEMIDE 10 MG: 20 TABLET ORAL at 07:41

## 2019-04-08 RX ADMIN — PANTOPRAZOLE SODIUM 40 MG: 40 TABLET, DELAYED RELEASE ORAL at 05:42

## 2019-04-08 RX ADMIN — FOLIC ACID 1 MG: 1 TABLET ORAL at 07:41

## 2019-04-08 RX ADMIN — AMIODARONE HYDROCHLORIDE 200 MG: 200 TABLET ORAL at 07:41

## 2019-04-08 RX ADMIN — LEVOTHYROXINE SODIUM 75 MCG: 75 TABLET ORAL at 05:42

## 2019-04-08 RX ADMIN — MAGNESIUM GLUCONATE 500 MG ORAL TABLET 400 MG: 500 TABLET ORAL at 07:41

## 2019-04-08 RX ADMIN — Medication 100 MG: at 07:41

## 2019-04-08 RX ADMIN — METOPROLOL TARTRATE 75 MG: 50 TABLET, FILM COATED ORAL at 20:26

## 2019-04-08 NOTE — PROGRESS NOTES
[facility] HOSPITALIST PROGRESS NOTE     Patient Identification:  Name:  Altagracia King  Age:  53 y.o.  Sex:  female  :  1965  MRN:  [id number]  Visit Number:  [id number]  Primary Care Provider:  Provider, No Known    Length of stay:  44    Subjective: Patient is currently eating lunch, he is participating with rehab, no reports of recurrence of arrhythmia.  She is tolerating well with amiodarone digoxin and mexiletine.  Vital signs stable.  Awaiting for nursing home placement.  Patient previously while waiting for nursing home placement developed pneumonia, cardiac arrhythmia and respiratory failure requiring intubation.    Chief Complaint: Shortness of breath.  ----------------------------------------------------------------------------------------------------------------------  Current Hospital Meds:    amiodarone 200 mg Oral Daily   apixaban 5 mg Oral Q12H   bisacodyl 10 mg Rectal Daily   cyanocobalamin 1,000 mcg Intramuscular Q30 Days   digoxin 125 mcg Oral Q48H   folic acid 1 mg Oral Daily   furosemide 10 mg Oral Daily   levothyroxine 75 mcg Oral Q AM   magnesium oxide 400 mg Oral Daily   metoprolol tartrate 75 mg Oral Q12H   mexiletine 150 mg Oral Q8H   OLANZapine 5 mg Oral Nightly   pantoprazole 40 mg Oral Q AM   sennosides-docusate sodium 2 tablet Oral Nightly   sodium chloride 3 mL Intravenous Q12H   sodium chloride 3 mL Intravenous Q12H   spironolactone 25 mg Oral Daily With Lunch   thiamine 100 mg Oral Daily       Pharmacy Consult      ----------------------------------------------------------------------------------------------------------------------  Vital Signs:  Temp:  [97.3 °F (36.3 °C)-98.2 °F (36.8 °C)] 97.3 °F (36.3 °C)  Heart Rate:  [71-81] 71  Resp:  [18] 18  BP: (120-142)/(59-83) 137/83       Tele: Sinus rhythm 71 bpm      19  0300 19  0312 19  0354   Weight: 45.9 kg (101 lb 4.8 oz) 46.8 kg (103 lb 1.6 oz) 46.7 kg (102 lb 14.4 oz)     Body mass index  is 17.12 kg/m².    Intake/Output Summary (Last 24 hours) at 4/8/2019 1208  Last data filed at 4/8/2019 0802  Gross per 24 hour   Intake 660 ml   Output 200 ml   Net 460 ml     Diet Soft Texture; Chopped; Thin; Daily Fluid Restriction; Other; 1,800  ----------------------------------------------------------------------------------------------------------------------  Physical exam:  General: Comfortable,awake, alert, oriented to self, place, and time, well-developed, chronically ill-appearing,  No respiratory distress.    Skin:  Skin is warm and dry. No rash noted.  She is pale.  HENT:  Head:  Normocephalic and atraumatic.  Mouth:  Moist mucous membranes.    Eyes:  Conjunctivae and EOM are normal.   No scleral icterus.    Neck:  Neck supple.  No JVD present.    Pulmonary/Chest:  No respiratory distress, no wheezes, no crackles, with normal breath sounds and good air movement.  Cardiovascular:  Normal rate, regular rhythm and normal heart sounds with no murmur.  Abdominal:  Soft.  Bowel sounds are normal.  No distension and no tenderness.   Extremities:  No edema, no tenderness, and no deformity.  No red or swollen joints anywhere.  Strong pulses in all 4 extremities with no clubbing, no cyanosis, no edema.  Neurological:  Motor strength equal no obvious deficit, sensory grossly intact.   No cranial nerve deficit.  No tongue deviation.  No facial droop.  No slurred speech.      ----------------------------------------------------------------------------------------------------------------------  ----------------------------------------------------------------------------------------------------------------------      Results from last 7 days   Lab Units 04/08/19  0718 04/05/19  1031 04/02/19  1030   WBC 10*3/mm3 6.72 6.11 5.22   HEMOGLOBIN g/dL 11.0* 9.7* 10.3*   HEMATOCRIT % 34.0 30.7* 33.2*   MCV fL 100.9* 102.3* 102.8*   MCHC g/dL 32.4 31.6 31.0*   PLATELETS 10*3/mm3 236 195 198         Results from last 7 days    Lab Units 04/08/19  0445 04/07/19  1745 04/07/19  0743 04/07/19  0103 04/05/19  0417  04/02/19  0456   SODIUM mmol/L 139  --  141  --   --   --  138   POTASSIUM mmol/L 4.3  --  4.3  --  3.9   < > 4.6   MAGNESIUM mg/dL 2.1 2.7*  --  1.7 2.4   < >  --    CHLORIDE mmol/L 102  --  104  --   --   --  101   CO2 mmol/L 25.0  --  24.4  --   --   --  26.1   BUN mg/dL 19  --  22*  --   --   --  16   CREATININE mg/dL 0.76  --  0.84  --   --   --  0.74   EGFR IF NONAFRICN AM mL/min/1.73 79  --  70  --   --   --  81   CALCIUM mg/dL 9.5  --  9.7  --   --   --  9.5   GLUCOSE mg/dL 90  --  91  --   --   --  101*    < > = values in this interval not displayed.   Estimated Creatinine Clearance: 61.7 mL/min (by C-G formula based on SCr of 0.76 mg/dL).    No results found for: AMMONIA                    I have personally looked at the labs and they are summarized above.  ----------------------------------------------------------------------------------------------------------------------  Imaging Results (last 24 hours)     ** No results found for the last 24 hours. **        ----------------------------------------------------------------------------------------------------------------------  Assessment and Plan:  -Acute diastolic CHF now compensated  -Severe mitral regurgitation  -Paroxysmal atrial fibrillation with chads score of 2  -Chronic anticoagulation  -Nonsustained V. Tach    Monitor elect lites closely keep magnesium at normal level, keep potassium for above.  Continue with physical therapy, awaiting for nursing home placement.      Kaye Wilson MD  04/08/19  12:08 PM

## 2019-04-08 NOTE — NURSING NOTE
Pt moved from 309a to 309b b/c bed alarm did not work. Unable to reach daughter Madeline King but spoke with friend Mumtaz Bundy who stated he would inform Madeline of transfer. No issues noted.

## 2019-04-08 NOTE — PLAN OF CARE
Problem: Fall Risk (Adult)  Goal: Absence of Fall  Outcome: Ongoing (interventions implemented as appropriate)      Problem: Skin Injury Risk (Adult)  Goal: Skin Health and Integrity  Outcome: Ongoing (interventions implemented as appropriate)      Problem: Patient Care Overview  Goal: Plan of Care Review  Outcome: Ongoing (interventions implemented as appropriate)    Goal: Individualization and Mutuality  Outcome: Ongoing (interventions implemented as appropriate)    Goal: Discharge Needs Assessment  Outcome: Ongoing (interventions implemented as appropriate)    Goal: Interprofessional Rounds/Family Conf  Outcome: Ongoing (interventions implemented as appropriate)    Goal: Individualization and Mutuality  Outcome: Ongoing (interventions implemented as appropriate)    Goal: Discharge Needs Assessment  Outcome: Ongoing (interventions implemented as appropriate)    Goal: Interprofessional Rounds/Family Conf  Outcome: Ongoing (interventions implemented as appropriate)      Problem: Pain, Chronic (Adult)  Goal: Acceptable Pain/Comfort Level and Functional Ability  Outcome: Ongoing (interventions implemented as appropriate)      Problem: Mobility, Physical Impaired (Adult)  Goal: Enhanced Mobility Skills  Outcome: Ongoing (interventions implemented as appropriate)    Goal: Enhanced Functional Ability  Outcome: Ongoing (interventions implemented as appropriate)    Goal: Identify Related Risk Factors and Signs and Symptoms  Outcome: Ongoing (interventions implemented as appropriate)    Goal: Enhanced Mobility Skills  Outcome: Ongoing (interventions implemented as appropriate)    Goal: Enhanced Functional Ability  Outcome: Ongoing (interventions implemented as appropriate)      Problem: Arrhythmia/Dysrhythmia (Symptomatic) (Adult)  Goal: Signs and Symptoms of Listed Potential Problems Will be Absent, Minimized or Managed (Arrhythmia/Dysrhythmia)  Outcome: Ongoing (interventions implemented as appropriate)

## 2019-04-08 NOTE — PROGRESS NOTES
Discharge Planning Assessment  [facility]     Patient Name: Altagracia King  MRN: [id number]  Today's Date: 4/8/2019    Admit Date: 2/22/2019     SS spoke with [facility], they are not accepting Medicaid patients at [facility] time. [facility] for [facility]. SS spoke with [facility] and spoke with Emely who states they will look at referral on this date. SS spoke with [facility] per Jazmyn. SS faxed updated informaiton on this date. SS spoke with [facility] and left message. SS spoke with [facility] per Rhea, who is agreeable to look at referral. SS faxed pt's information on this date. SS spoke with [facility], who requested for updated information. SS psoke with [facility] and left admission a message. SS spoke with [facility] and left message for admissions. SS spoke with [facility] and [facility], who states they are not accepting Medicaid at this time. SS spoke with [facility] and [facility], who states no Medicaid at this time.     SS is working on applying for Disability on this date for the pt. SS will follow and assist as needed. Application ID # [id number].    SS spoke with [facility] on this date per Bessie who states they do have female beds available. Bessie states that they are agreeable to look at referral. Bessie states that the pt would have to pass a PAE. SS will follow.     SS spoke with Emily Pleitez at [facility]. Emily states that this program would help find housing for the pt in a group home setting.  Emily states that for the pt to qualify for their programs, the pt would have to have a learning disability. Emily states that the pt would have to have an IQ test completed before they are able to move forward. SS will follow.       Jazmyn Mendoza

## 2019-04-08 NOTE — THERAPY TREATMENT NOTE
Acute Care - Physical Therapy Treatment Note   [place]     Patient Name: Altagracia King  : 1965  MRN: [id number]  Today's Date: 2019  Onset of Illness/Injury or Date of Surgery: 19(original admit date)  Date of Referral to PT: 19  Referring Physician: Gui    Admit Date: 2019    Visit Dx:    ICD-10-CM ICD-9-CM   1. Chest pain, unspecified type R07.9 786.50   2. Acute cystitis without hematuria N30.00 595.0     Patient Active Problem List   Diagnosis   • Chest pain   • PAF (paroxysmal atrial fibrillation) (CMS/HCC)   • ETOH abuse   • Acute respiratory failure with hypoxia (CMS/HCC)       Therapy Treatment    Rehabilitation Treatment Summary     Row Name 19 1441             Treatment Time/Intention    Discipline  physical therapist  -CT      Document Type  therapy note (daily note)  -CT      Subjective Information  no complaints  -CT      Mode of Treatment  individual therapy;physical therapy  -CT      Therapy Frequency (PT Clinical Impression)  3 times/wk 3-5 times/ week per priority policy  -CT      Patient Effort  fair  -CT      Patient Response to Treatment  Pt tolerated treatment session fairly well today with rest breaks provided as needed. Pt able to ambulate today with Min A.   -CT      Recorded by [CT] Kyung Lr, PT 19 1447      Row Name 19 1441             Cognitive Assessment/Intervention- PT/OT    Orientation Status (Cognition)  oriented to;person  -CT      Follows Commands (Cognition)  follows one step commands;physical/tactile prompts required;repetition of directions required;verbal cues/prompting required  -CT      Recorded by [CT] Kyung Lr, PT 19 144      Row Name 19 1441             Safety Issues, Functional Mobility    Impairments Affecting Function (Mobility)  balance;coordination;endurance/activity tolerance;strength;visual/perceptual  -CT      Recorded by [CT] Kyung Lr, PT 19 1447      Row Name 19 1441              Transfer Assessment/Treatment    Transfer Assessment/Treatment  sit-stand transfer;stand-sit transfer;stand pivot/stand step transfer  -CT      Recorded by [CT] Kyung Lr, PT 04/08/19 1447      Row Name 04/08/19 1441             Sit-Stand Transfer    Sit-Stand [place] (Transfers)  minimum assist (75% patient effort);2 person assist;verbal cues;nonverbal cues (demo/gesture)  -CT      Assistive Device (Sit-Stand Transfers)  walker, front-wheeled  -CT      Recorded by [CT] Kyung Lr, PT 04/08/19 1447      Row Name 04/08/19 1441             Stand-Sit Transfer    Stand-Sit [place] (Transfers)  minimum assist (75% patient effort);2 person assist;verbal cues;nonverbal cues (demo/gesture)  -CT      Assistive Device (Stand-Sit Transfers)  walker, front-wheeled  -CT      Recorded by [CT] Kyung Lr, PT 04/08/19 1447      Row Name 04/08/19 1441             Gait/Stairs Assessment/Training    Gait/Stairs Assessment/Training  gait/ambulation independence;gait/ambulation assistive device;distance ambulated;gait pattern;gait deviations;maintains weight-bearing status  -CT      [place] Level (Gait)  minimum assist (75% patient effort);2 person assist;verbal cues;nonverbal cues (demo/gesture)  -CT      Assistive Device (Gait)  walker, front-wheeled  -CT      Distance in Feet (Gait)  120  -CT      Pattern (Gait)  step-to  -CT      Deviations/Abnormal Patterns (Gait)  base of support, narrow  -CT      Bilateral Gait Deviations  forward flexed posture  -CT      Recorded by [CT] Kyung Lr, PT 04/08/19 1447      Row Name 04/08/19 1441             Positioning and Restraints    Pre-Treatment Position  in bed  -CT      Post Treatment Position  bed  -CT      In Bed  side lying left;call light within reach;encouraged to call for assist;exit alarm on;side rails up x3;notified nsg  -CT      Recorded by [CT] Kyung Lr, PT 04/08/19 1447      Row Name 04/08/19 1441             Pain Scale:  FACES Pre/Post-Treatment    Pain: FACES Scale, Pretreatment  0-->no hurt  -CT      Pain: FACES Scale, Post-Treatment  0-->no hurt  -CT      Recorded by [CT] Kyung Lr, PT 04/08/19 1447      Row Name 04/08/19 1441             Coping    Observed Emotional State  anxious;attention-seeking behavior  -CT      Verbalized Emotional State  acceptance  -CT      Recorded by [CT] Kyung Lr, PT 04/08/19 1447      Row Name 04/08/19 1441             Plan of Care Review    Plan of Care Reviewed With  patient  -CT      Recorded by [CT] Kyung Lr, PT 04/08/19 1447      Row Name 04/08/19 1441             Outcome Summary/Treatment Plan (PT)    Daily Summary of Progress (PT)  progress toward functional goals is good  -CT      Anticipated Discharge Disposition (PT)  skilled nursing facility  -CT      Recorded by [CT] Kyung Lr, PT 04/08/19 1447        User Key  (r) = Recorded By, (t) = Taken By, (c) = Cosigned By    Initials Name Effective Dates Discipline    CT Kyung Lr, PT 04/03/18 -  PT                   Physical Therapy Education     Title: PT OT SLP Therapies (In Progress)     Topic: Physical Therapy (In Progress)     Point: Mobility training (In Progress)     Learning Progress Summary           Patient Acceptance, E,TB, NR by CT at 4/8/2019  2:48 PM    Acceptance, E,TB, VU by GAVINO at 4/8/2019  8:21 AM    Acceptance, E, NL,VU by  at 4/8/2019  2:37 AM    Acceptance, E,TB, VU by GAVINO at 4/7/2019  8:53 AM    Acceptance, E, NL by JACOB at 4/6/2019 11:18 PM    Acceptance, E, NL by JACOB at 4/5/2019 10:02 PM    Acceptance, E,D, NR by AG at 4/4/2019  6:23 PM    Acceptance, E, NR by DAGO at 4/2/2019  6:10 PM    Acceptance, E, VU by JACOB at 4/2/2019 12:28 AM    Acceptance, E, VU,NR by CATALINO1 at 4/1/2019 11:20 AM    Acceptance, E, NR by DAGO at 3/31/2019  9:45 PM    Acceptance, E, VU by CIERA at 3/31/2019  2:55 AM    Acceptance, E, VU by CIERA at 3/31/2019  2:45 AM    AcceptanceJONNATHAN VU, NR by MC at 3/30/2019  1:57 AM    Acceptance,  E,TB, VU,NR by CT at 3/29/2019 10:45 AM    Acceptance, E, NR by EA at 3/28/2019 10:44 PM    Acceptance, E,TB, NR,NL by CT at 3/28/2019  2:19 PM    Acceptance, E,TB, VU by JS at 3/28/2019 12:47 AM    Acceptance, E, VU by AM at 3/27/2019  4:07 PM    Acceptance, E, NR by EA at 3/26/2019  9:47 PM    Acceptance, E, VU by LAZARO at 3/20/2019 10:09 PM    Acceptance, E, VU by AW at 3/16/2019  6:34 PM    Acceptance, E,TB, VU by ILYA at 3/14/2019 12:21 AM    Comment:  PT unable to rcve teaching at this time    Acceptance, E,TB, VU by ILYA at 3/12/2019 11:00 PM    Comment:  PT unable to rcve teaching at this time    Nonacceptance, E, NL by SB at 3/12/2019  9:17 AM    Comment:  pt intubated and sedated; no one present at the bedside    Acceptance, E, NR by EV at 3/11/2019  9:02 PM    Comment:  intubated/sedated. No family or caretaker at bedside    Nonacceptance, E, NL by WS at 3/9/2019  9:06 AM    Comment:  pt sedated and intubated    Acceptance, E, VU by WS at 3/9/2019  9:05 AM    Acceptance, E,TB, VU by ILYA at 3/8/2019  9:46 PM    Comment:  PT unable to rcve teaching at this time    Nonacceptance, E, NL by WS at 3/8/2019  4:13 PM    Acceptance, E, VU by LT at 3/7/2019 10:13 AM    Acceptance, E, VU by BC at 3/5/2019  5:55 PM    Acceptance, E,TB, VU by CL at 3/4/2019  7:53 PM    Acceptance, E,TB, VU by CL at 3/4/2019  7:52 PM    Acceptance, E,TB, VU by CT at 3/1/2019  3:23 PM    Acceptance, E,TB, VU,NR by KB1 at 2/26/2019  3:13 PM    Acceptance, E,TB, NR by CT at 2/25/2019  3:25 PM   Family Acceptance, E, VU by AW at 3/15/2019 12:08 PM    Acceptance, E, VU by SB at 3/13/2019 11:01 AM   Significant Other Acceptance, E, VU by WS at 3/9/2019  9:05 AM                   Point: Home exercise program (In Progress)     Learning Progress Summary           Patient Acceptance, E,TB, NR by CT at 4/8/2019  2:48 PM    Acceptance, E,TB, VU by GAVINO at 4/8/2019  8:21 AM    Acceptance, E, NL,VU by SM at 4/8/2019  2:37 AM    Acceptance, E,TB, VU by GAVINO at  4/7/2019  8:53 AM    Acceptance, E, NL by SM at 4/6/2019 11:18 PM    Acceptance, E, NL by SM at 4/5/2019 10:02 PM    Acceptance, E,D, NR by AG at 4/4/2019  6:23 PM    Acceptance, E, NR by KB at 4/2/2019  6:10 PM    Acceptance, E, VU by SM at 4/2/2019 12:28 AM    Acceptance, E, VU,NR by AG1 at 4/1/2019 11:20 AM    Acceptance, E, NR by KB at 3/31/2019  9:45 PM    Acceptance, E, VU by MC at 3/31/2019  2:55 AM    Acceptance, E, VU by MC at 3/31/2019  2:45 AM    Acceptance, E, VU,NR by MC at 3/30/2019  1:57 AM    Acceptance, E,TB, VU,NR by CT at 3/29/2019 10:45 AM    Acceptance, E, NR by EA at 3/28/2019 10:44 PM    Acceptance, E,TB, NR,NL by CT at 3/28/2019  2:19 PM    Acceptance, E,TB, VU by JS at 3/28/2019 12:47 AM    Acceptance, E, VU by AM at 3/27/2019  4:07 PM    Acceptance, E, NR by EA at 3/26/2019  9:47 PM    Acceptance, E, VU by LAZARO at 3/20/2019 10:09 PM    Acceptance, E, VU by AW at 3/16/2019  6:34 PM    Acceptance, E,TB, VU by ILYA at 3/14/2019 12:21 AM    Comment:  PT unable to rcve teaching at this time    Acceptance, E,TB, VU by ILYA at 3/12/2019 11:00 PM    Comment:  PT unable to rcve teaching at this time    Nonacceptance, E, NL by SB at 3/12/2019  9:17 AM    Comment:  pt intubated and sedated; no one present at the bedside    Acceptance, E, NR by EV at 3/11/2019  9:02 PM    Comment:  intubated/sedated. No family or caretaker at bedside    Nonacceptance, E, NL by WS at 3/9/2019  9:06 AM    Comment:  pt sedated and intubated    Acceptance, E, VU by WS at 3/9/2019  9:05 AM    Acceptance, E,TB, VU by ILYA at 3/8/2019  9:46 PM    Comment:  PT unable to rcve teaching at this time    Nonacceptance, E, NL by WS at 3/8/2019  4:13 PM    Acceptance, E, VU by LT at 3/7/2019 10:13 AM    Acceptance, E, VU by BC at 3/5/2019  5:55 PM    Acceptance, E,TB, VU by CL at 3/4/2019  7:53 PM    Acceptance, E,TB, VU by CL at 3/4/2019  7:52 PM    Acceptance, E,TB, VU by CT at 3/1/2019  3:23 PM    Acceptance, E,TB, VU,NR by KB1 at  2/26/2019  3:13 PM    Acceptance, E,TB, NR by CT at 2/25/2019  3:25 PM   Family Acceptance, E, VU by AW at 3/15/2019 12:08 PM    Acceptance, E, VU by SB at 3/13/2019 11:01 AM   Significant Other Acceptance, E, VU by WS at 3/9/2019  9:05 AM                   Point: Body mechanics (In Progress)     Learning Progress Summary           Patient Acceptance, E,TB, NR by CT at 4/8/2019  2:48 PM    Acceptance, E,TB, VU by GAVINO at 4/8/2019  8:21 AM    Acceptance, E, NL,VU by SM at 4/8/2019  2:37 AM    Acceptance, E,TB, VU by GAVINO at 4/7/2019  8:53 AM    Acceptance, E, NL by SM at 4/6/2019 11:18 PM    Acceptance, E, NL by SM at 4/5/2019 10:02 PM    Acceptance, E,D, NR by AG at 4/4/2019  6:23 PM    Acceptance, E, NR by KB at 4/2/2019  6:10 PM    Acceptance, E, VU by SM at 4/2/2019 12:28 AM    Acceptance, E, VU,NR by AG1 at 4/1/2019 11:20 AM    Acceptance, E, NR by KB at 3/31/2019  9:45 PM    Acceptance, E, VU by MC at 3/31/2019  2:55 AM    Acceptance, E, VU by MC at 3/31/2019  2:45 AM    Acceptance, E, VU,NR by MC at 3/30/2019  1:57 AM    Acceptance, E,TB, VU,NR by CT at 3/29/2019 10:45 AM    Acceptance, E, NR by EA at 3/28/2019 10:44 PM    Acceptance, E,TB, NR,NL by CT at 3/28/2019  2:19 PM    Acceptance, E,TB, VU by JS at 3/28/2019 12:47 AM    Acceptance, E, VU by AM at 3/27/2019  4:07 PM    Acceptance, E, NR by EA at 3/26/2019  9:47 PM    Acceptance, E, VU by LAZARO at 3/20/2019 10:09 PM    Acceptance, E, VU by AW at 3/16/2019  6:34 PM    Acceptance, E,TB, VU by ILYA at 3/14/2019 12:21 AM    Comment:  PT unable to rcve teaching at this time    Acceptance, E,TB, VU by ILYA at 3/12/2019 11:00 PM    Comment:  PT unable to rcve teaching at this time    Nonacceptance, E, NL by SB at 3/12/2019  9:17 AM    Comment:  pt intubated and sedated; no one present at the bedside    Acceptance, E, NR by EV at 3/11/2019  9:02 PM    Comment:  intubated/sedated. No family or caretaker at bedside    Nonacceptance, E, NL by WS at 3/9/2019  9:06 AM     Comment:  pt sedated and intubated    Acceptance, E, VU by WS at 3/9/2019  9:05 AM    Acceptance, E,TB, VU by ILYA at 3/8/2019  9:46 PM    Comment:  PT unable to rcve teaching at this time    Nonacceptance, E, NL by WS at 3/8/2019  4:13 PM    Acceptance, E, VU by LT at 3/7/2019 10:13 AM    Acceptance, E, VU by BC at 3/5/2019  5:55 PM    Acceptance, E,TB, VU by CL at 3/4/2019  7:53 PM    Acceptance, E,TB, VU by CL at 3/4/2019  7:52 PM    Acceptance, E,TB, VU by CT at 3/1/2019  3:23 PM    Acceptance, E,TB, VU,NR by KB1 at 2/26/2019  3:13 PM    Acceptance, E,TB, NR by CT at 2/25/2019  3:25 PM   Family Acceptance, E, VU by AW at 3/15/2019 12:08 PM    Acceptance, E, VU by SB at 3/13/2019 11:01 AM   Significant Other Acceptance, E, VU by WS at 3/9/2019  9:05 AM                   Point: Precautions (In Progress)     Learning Progress Summary           Patient Acceptance, E,TB, NR by CT at 4/8/2019  2:48 PM    Acceptance, E,TB, VU by GAVINO at 4/8/2019  8:21 AM    Acceptance, E, NL,VU by SM at 4/8/2019  2:37 AM    Acceptance, E,TB, VU by GAVINO at 4/7/2019  8:53 AM    Acceptance, E, NL by SM at 4/6/2019 11:18 PM    Acceptance, E, NL by SM at 4/5/2019 10:02 PM    Acceptance, E,D, NR by AG at 4/4/2019  6:23 PM    Acceptance, E, NR by KB at 4/2/2019  6:10 PM    Acceptance, E, VU by SM at 4/2/2019 12:28 AM    Acceptance, E, VU,NR by AG1 at 4/1/2019 11:20 AM    Acceptance, E, NR by KB at 3/31/2019  9:45 PM    Acceptance, E, VU by MC at 3/31/2019  2:55 AM    Acceptance, E, VU by MC at 3/31/2019  2:45 AM    Acceptance, E, VU,NR by MC at 3/30/2019  1:57 AM    Acceptance, E,TB, VU,NR by CT at 3/29/2019 10:45 AM    Acceptance, E, NR by EA at 3/28/2019 10:44 PM    Acceptance, E,TB, NR,NL by CT at 3/28/2019  2:19 PM    Acceptance, E,TB, VU by JS at 3/28/2019 12:47 AM    Acceptance, E, VU by AM at 3/27/2019  4:07 PM    Acceptance, E, NR by EA at 3/26/2019  9:47 PM    Acceptance, E, VU by LAZARO at 3/20/2019 10:09 PM    Acceptance, E, VU by AW at  3/16/2019  6:34 PM    Acceptance, E,TB, VU by ILYA at 3/14/2019 12:21 AM    Comment:  PT unable to rcve teaching at this time    Acceptance, E,TB, VU by ILYA at 3/12/2019 11:00 PM    Comment:  PT unable to rcve teaching at this time    Nonacceptance, E, NL by SB at 3/12/2019  9:17 AM    Comment:  pt intubated and sedated; no one present at the bedside    Acceptance, E, NR by EV at 3/11/2019  9:02 PM    Comment:  intubated/sedated. No family or caretaker at bedside    Nonacceptance, E, NL by WS at 3/9/2019  9:06 AM    Comment:  pt sedated and intubated    Acceptance, E, VU by WS at 3/9/2019  9:05 AM    Acceptance, E,TB, VU by ILYA at 3/8/2019  9:46 PM    Comment:  PT unable to rcve teaching at this time    Nonacceptance, E, NL by WS at 3/8/2019  4:13 PM    Acceptance, E, VU by LT at 3/7/2019 10:13 AM    Acceptance, E, VU by BC at 3/5/2019  5:55 PM    Acceptance, E,TB, VU by CL at 3/4/2019  7:53 PM    Acceptance, E,TB, VU by CL at 3/4/2019  7:52 PM    Acceptance, E,TB, VU by CT at 3/1/2019  3:23 PM    Acceptance, E,TB, VU,NR by KB1 at 2/26/2019  3:13 PM    Acceptance, E,TB, NR by CT at 2/25/2019  3:25 PM   Family Acceptance, E, VU by AW at 3/15/2019 12:08 PM    Acceptance, E, VU by SB at 3/13/2019 11:01 AM   Significant Other Acceptance, E, VU by WS at 3/9/2019  9:05 AM                               User Key     Initials Effective Dates Name Provider Type Discipline    SB 06/16/16 -  Kathy Rowell RN Registered Nurse Nurse    ILYA 06/16/16 -  Alberto Mendoza, RN Registered Nurse Nurse    LT 06/16/16 -  Denae Carvajal, RN Registered Nurse Nurse    KATELYN 06/16/16 -  Mary Blanton, RN Registered Nurse Nurse    KB 06/16/16 -  Lorena Garcia, RN Registered Nurse Nurse    AW 06/16/16 -  Heather Godinez, RN Registered Nurse Nurse    WS 06/16/16 -  Keisha Messer, RN Registered Nurse Nurse    AM 06/16/16 -  Madeline Santiago, RN Registered Nurse Nurse    BC 03/14/16 -  Ekaterina Cifuentes  PT Physical Therapist PT    AG 04/03/18 -  Parisa Orellana, PT Physical Therapist PT    CT 04/03/18 -  Kyung Lr PT Physical Therapist PT    AG1 06/16/16 -  Ayana Corbin, RN Registered Nurse Nurse    CL 07/23/18 -  Rj Godfrey, RN Registered Nurse Nurse    KB1 12/20/17 -  Ekaterina Parry PTA Physical Therapy Assistant PT    LAZARO 05/22/18 -  Renata Mcbride, RN Registered Nurse Nurse    MC 09/06/18 -  Collett, Morgan, RN Registered Nurse Nurse    SM 09/12/18 -  Sean Kohler, RN Registered Nurse Nurse    GAVINO 10/18/18 -  Marko Cifuentes, RN Registered Nurse Nurse    EV 10/26/18 -  Corry Bagley, RN Registered Nurse Nurse    JS 11/26/18 -  Sg Smith, RN Registered Nurse Nurse                PT Recommendation and Plan  Anticipated Discharge Disposition (PT): skilled nursing facility  Planned Therapy Interventions (PT Eval): balance training, gait training, bed mobility training, home exercise program, manual therapy techniques, motor coordination training, neuromuscular re-education, patient/family education, postural re-education, ROM (range of motion), stair training, strengthening, stretching, transfer training  Therapy Frequency (PT Clinical Impression): 3 times/wk(3-5 times/ week per priority policy)  Outcome Summary/Treatment Plan (PT)  Daily Summary of Progress (PT): progress toward functional goals is good  Anticipated Equipment Needs at Discharge (PT): front wheeled walker  Anticipated Discharge Disposition (PT): skilled nursing facility  Plan of Care Reviewed With: patient  Progress: improving  Outcome Measures     Row Name 04/08/19 1400             How much help from another person do you currently need...    Turning from your back to your side while in flat bed without using bedrails?  3  -CT      Moving from lying on back to sitting on the side of a flat bed without bedrails?  3  -CT      Moving to and from a bed to a chair (including a wheelchair)?  3  -CT      Standing up from  a chair using your arms (e.g., wheelchair, bedside chair)?  3  -CT      Climbing 3-5 steps with a railing?  2  -CT      To walk in hospital room?  3  -CT      AM-PAC 6 Clicks Score  17  -CT         Functional Assessment    Outcome Measure Options  AM-PAC 6 Clicks Basic Mobility (PT)  -CT        User Key  (r) = Recorded By, (t) = Taken By, (c) = Cosigned By    Initials Name Provider Type    CT Kyung Lr, PT Physical Therapist         Time Calculation:   PT Charges     Row Name 04/08/19 1449             Time Calculation    PT Received On  04/08/19  -CT      PT - Next Appointment  04/09/19  -CT         Time Calculation- PT    Total Timed Code Minutes- PT  30 minute(s)  -CT         Timed Charges    20745 - Gait Training Minutes   20  -CT      26242 - PT Therapeutic Activity Minutes  10  -CT        User Key  (r) = Recorded By, (t) = Taken By, (c) = Cosigned By    Initials Name Provider Type    CT Kyung Lr, NAVDEEP Physical Therapist        Therapy Charges for Today     Code Description Service Date Service Provider Modifiers Qty    19181351695 HC GAIT TRAINING EA 15 MIN 4/8/2019 Kyung Lr, PT GP 1    41593133641 HC PT THERAPEUTIC ACT EA 15 MIN 4/8/2019 Kyung Lr, PT GP 1    29578623547 HC PT THER SUPP EA 15 MIN 4/8/2019 Kyung Lr, PT GP 2          PT G-Codes  Outcome Measure Options: AM-PAC 6 Clicks Basic Mobility (PT)  AM-PAC 6 Clicks Score: 17  Score: 12    Kyung Lr PT  4/8/2019

## 2019-04-08 NOTE — NURSING NOTE
Altagracia King was seen/examined with LIANE Estrada, at shift change. A head-to-toe skin assessment was done on the Pt. Pt cooperated well. Abnormal findings documented within the flowsheets.

## 2019-04-08 NOTE — PLAN OF CARE
Problem: Patient Care Overview  Goal: Plan of Care Review  Outcome: Ongoing (interventions implemented as appropriate)   04/08/19 8960   Plan of Care Review   Progress improving   Coping/Psychosocial   Plan of Care Reviewed With patient

## 2019-04-09 PROCEDURE — 97530 THERAPEUTIC ACTIVITIES: CPT

## 2019-04-09 PROCEDURE — 99232 SBSQ HOSP IP/OBS MODERATE 35: CPT | Performed by: HOSPITALIST

## 2019-04-09 PROCEDURE — 97116 GAIT TRAINING THERAPY: CPT

## 2019-04-09 RX ADMIN — MEXILETINE HYDROCHLORIDE 150 MG: 150 CAPSULE ORAL at 05:40

## 2019-04-09 RX ADMIN — AMIODARONE HYDROCHLORIDE 200 MG: 200 TABLET ORAL at 11:46

## 2019-04-09 RX ADMIN — FOLIC ACID 1 MG: 1 TABLET ORAL at 11:47

## 2019-04-09 RX ADMIN — Medication 100 MG: at 11:47

## 2019-04-09 RX ADMIN — SPIRONOLACTONE 25 MG: 25 TABLET ORAL at 11:46

## 2019-04-09 RX ADMIN — APIXABAN 5 MG: 5 TABLET, FILM COATED ORAL at 11:47

## 2019-04-09 RX ADMIN — MEXILETINE HYDROCHLORIDE 150 MG: 150 CAPSULE ORAL at 20:14

## 2019-04-09 RX ADMIN — SODIUM CHLORIDE, PRESERVATIVE FREE 3 ML: 5 INJECTION INTRAVENOUS at 20:20

## 2019-04-09 RX ADMIN — METOPROLOL TARTRATE 75 MG: 50 TABLET, FILM COATED ORAL at 20:20

## 2019-04-09 RX ADMIN — SENNOSIDES AND DOCUSATE SODIUM 2 TABLET: 8.6; 5 TABLET ORAL at 20:17

## 2019-04-09 RX ADMIN — OLANZAPINE 5 MG: 5 TABLET, FILM COATED ORAL at 20:17

## 2019-04-09 RX ADMIN — MEXILETINE HYDROCHLORIDE 150 MG: 150 CAPSULE ORAL at 14:15

## 2019-04-09 RX ADMIN — FUROSEMIDE 10 MG: 20 TABLET ORAL at 11:47

## 2019-04-09 RX ADMIN — DIGOXIN 125 MCG: 125 TABLET ORAL at 11:46

## 2019-04-09 RX ADMIN — METOPROLOL TARTRATE 75 MG: 50 TABLET, FILM COATED ORAL at 11:46

## 2019-04-09 RX ADMIN — LEVOTHYROXINE SODIUM 75 MCG: 75 TABLET ORAL at 05:40

## 2019-04-09 RX ADMIN — APIXABAN 5 MG: 5 TABLET, FILM COATED ORAL at 20:16

## 2019-04-09 RX ADMIN — MAGNESIUM GLUCONATE 500 MG ORAL TABLET 400 MG: 500 TABLET ORAL at 11:46

## 2019-04-09 RX ADMIN — PANTOPRAZOLE SODIUM 40 MG: 40 TABLET, DELAYED RELEASE ORAL at 05:40

## 2019-04-09 NOTE — THERAPY PROGRESS REPORT/RE-CERT
Acute Care - Physical Therapy Progress Note   [place]     Patient Name: Altagracia King  : 1965  MRN: [id number]  Today's Date: 2019  Onset of Illness/Injury or Date of Surgery: 19(original admit date)  Date of Referral to PT: 19  Referring Physician: Gui    Admit Date: 2019    Visit Dx:    ICD-10-CM ICD-9-CM   1. Chest pain, unspecified type R07.9 786.50   2. Acute cystitis without hematuria N30.00 595.0     Patient Active Problem List   Diagnosis   • Chest pain   • PAF (paroxysmal atrial fibrillation) (CMS/HCC)   • ETOH abuse   • Acute respiratory failure with hypoxia (CMS/HCC)       Therapy Treatment    Rehabilitation Treatment Summary     Row Name 19 1403             Treatment Time/Intention    Discipline  physical therapist  -CT      Document Type  progress note/recertification  -CT      Subjective Information  no complaints  -CT      Mode of Treatment  individual therapy;physical therapy  -CT      Therapy Frequency (PT Clinical Impression)  3 times/wk 3-5 times/ week per priority policy  -CT      Patient Effort  adequate  -CT      Patient Response to Treatment  Pt tolerated treatment session well with rest breaks provided as needed. Pt needs Min A during ambulation to control RW.   -CT      Recorded by [CT] Kyung Lr, PT 19 1405      Row Name 19 1403             Cognitive Assessment/Intervention- PT/OT    Orientation Status (Cognition)  oriented to;person  -CT      Follows Commands (Cognition)  follows one step commands;physical/tactile prompts required;repetition of directions required;verbal cues/prompting required  -CT      Recorded by [CT] Kyung Lr, PT 19 1405      Row Name 19 1403             Safety Issues, Functional Mobility    Impairments Affecting Function (Mobility)  balance;coordination;endurance/activity tolerance;strength;visual/perceptual  -CT      Recorded by [CT] Kyung Lr, PT 19 1405      Row Name 19  1403             Transfer Assessment/Treatment    Transfer Assessment/Treatment  sit-stand transfer;stand-sit transfer;stand pivot/stand step transfer  -CT      Recorded by [CT] Kyung Lr, PT 04/09/19 1405      Row Name 04/09/19 1403             Sit-Stand Transfer    Sit-Stand [place] (Transfers)  minimum assist (75% patient effort);2 person assist;verbal cues;nonverbal cues (demo/gesture)  -CT      Assistive Device (Sit-Stand Transfers)  walker, front-wheeled  -CT      Recorded by [CT] Kyung Lr, PT 04/09/19 1405      Row Name 04/09/19 1403             Stand-Sit Transfer    Stand-Sit [place] (Transfers)  minimum assist (75% patient effort);2 person assist;verbal cues;nonverbal cues (demo/gesture)  -CT      Assistive Device (Stand-Sit Transfers)  walker, front-wheeled  -CT      Recorded by [CT] Kyung Lr, PT 04/09/19 1405      Row Name 04/09/19 1403             Gait/Stairs Assessment/Training    Gait/Stairs Assessment/Training  gait/ambulation independence;gait/ambulation assistive device;distance ambulated;gait pattern;gait deviations;maintains weight-bearing status  -CT      [place] Level (Gait)  minimum assist (75% patient effort);2 person assist;verbal cues;nonverbal cues (demo/gesture)  -CT      Assistive Device (Gait)  walker, front-wheeled  -CT      Distance in Feet (Gait)  120  -CT      Pattern (Gait)  step-to  -CT      Deviations/Abnormal Patterns (Gait)  base of support, narrow  -CT      Bilateral Gait Deviations  forward flexed posture  -CT      Recorded by [CT] Kyung Lr, PT 04/09/19 1405      Row Name 04/09/19 1403             Pain Scale: FACES Pre/Post-Treatment    Pain: FACES Scale, Pretreatment  0-->no hurt  -CT      Pain: FACES Scale, Post-Treatment  0-->no hurt  -CT      Recorded by [CT] Kyung Lr, PT 04/09/19 1405      Row Name 04/09/19 1403             Coping    Observed Emotional State  anxious;attention-seeking behavior  -CT      Verbalized Emotional  State  acceptance  -CT      Recorded by [CT] Kyung Lr, PT 04/09/19 1405      Row Name 04/09/19 1403             Plan of Care Review    Plan of Care Reviewed With  patient  -CT      Recorded by [CT] Kyung Lr, PT 04/09/19 1405      Row Name 04/09/19 1403             Outcome Summary/Treatment Plan (PT)    Daily Summary of Progress (PT)  progress toward functional goals is good  -CT      Anticipated Discharge Disposition (PT)  skilled nursing facility  -CT      Recorded by [CT] Kyung Lr, PT 04/09/19 1405        User Key  (r) = Recorded By, (t) = Taken By, (c) = Cosigned By    Initials Name Effective Dates Discipline    CT Kyung Lr, PT 04/03/18 -  PT               Rehab Goal Summary     Row Name 04/09/19 1400             Bed Mobility Goal 1 (PT)    Progress/Outcomes (Bed Mobility Goal 1, PT)  goal met  -CT         Bed Mobility Goal 2 (PT)    Activity/Assistive Device (Bed Mobility Goal 2, PT)  bed mobility activities, all  -CT      [place] Level/Cues Needed (Bed Mobility Goal 2, PT)  contact guard assist;minimum assist (75% or more patient effort)  -CT      Time Frame (Bed Mobility Goal 2, PT)  by discharge  -CT         Transfer Goal 1 (PT)    Progress/Outcome (Transfer Goal 1, PT)  goal met  -CT         Transfer Goal 2 (PT)    Activity/Assistive Device (Transfer Goal 2, PT)  sit-to-stand/stand-to-sit;bed-to-chair/chair-to-bed  -CT      [place] Level/Cues Needed (Transfer Goal 2, PT)  contact guard assist;minimum assist (75% or more patient effort)  -CT      Time Frame (Transfer Goal 2, PT)  by discharge  -CT         Gait Training Goal 1 (PT)    Progress/Outcome (Gait Training Goal 1, PT)  goal met  -CT         Gait Training Goal 2 (PT)    Activity/Assistive Device (Gait Training Goal 2, PT)  gait (walking locomotion);assistive device use  -CT      [place] Level (Gait Training Goal 2, PT)  contact guard assist;minimum assist (75% or more patient effort)  -CT      Time Frame  (Gait Training Goal 2, PT)  by discharge  -CT        User Key  (r) = Recorded By, (t) = Taken By, (c) = Cosigned By    Initials Name Provider Type Discipline    CT Kyung Lr PT Physical Therapist PT          Physical Therapy Education     Title: PT OT SLP Therapies (Done)     Topic: Physical Therapy (Done)     Point: Mobility training (Done)     Learning Progress Summary           Patient Acceptance, E,TB, VU,NR by CT at 4/9/2019  2:08 PM    Acceptance, E, VU by AM at 4/9/2019 12:08 PM    Acceptance, E,TB, NR by CT at 4/8/2019  2:48 PM    Acceptance, E,TB, VU by GAVINO at 4/8/2019  8:21 AM    Acceptance, E, NL,VU by  at 4/8/2019  2:37 AM    Acceptance, E,TB, VU by GAVINO at 4/7/2019  8:53 AM    Acceptance, E, NL by SM at 4/6/2019 11:18 PM    Acceptance, E, NL by SM at 4/5/2019 10:02 PM    Acceptance, E,D, NR by AG at 4/4/2019  6:23 PM    Acceptance, E, NR by KB at 4/2/2019  6:10 PM    Acceptance, E, VU by  at 4/2/2019 12:28 AM    Acceptance, E, VU,NR by AG1 at 4/1/2019 11:20 AM    Acceptance, E, NR by KB at 3/31/2019  9:45 PM    Acceptance, E, VU by  at 3/31/2019  2:55 AM    Acceptance, E, VU by MC at 3/31/2019  2:45 AM    Acceptance, E, VU,NR by CIERA at 3/30/2019  1:57 AM    Acceptance, E,TB, VU,NR by CT at 3/29/2019 10:45 AM    Acceptance, E, NR by KATELYN at 3/28/2019 10:44 PM    Acceptance, E,TB, NR,NL by CT at 3/28/2019  2:19 PM    Acceptance, E,TB, VU by EVERETT at 3/28/2019 12:47 AM    Acceptance, E, VU by AM at 3/27/2019  4:07 PM    Acceptance, E, NR by KATELYN at 3/26/2019  9:47 PM    Acceptance, E, VU by LAZARO at 3/20/2019 10:09 PM    Acceptance, E, VU by AW at 3/16/2019  6:34 PM    Acceptance, E,TB, VU by ILYA at 3/14/2019 12:21 AM    Comment:  PT unable to rcve teaching at this time    Acceptance, E,TB, VU by ILYA at 3/12/2019 11:00 PM    Comment:  PT unable to rcve teaching at this time    Nonacceptance, E, NL by SB at 3/12/2019  9:17 AM    Comment:  pt intubated and sedated; no one present at the bedside    Acceptance,  E, NR by EV at 3/11/2019  9:02 PM    Comment:  intubated/sedated. No family or caretaker at bedside    Nonacceptance, E, NL by WS at 3/9/2019  9:06 AM    Comment:  pt sedated and intubated    Acceptance, E, VU by WS at 3/9/2019  9:05 AM    Acceptance, E,TB, VU by ILYA at 3/8/2019  9:46 PM    Comment:  PT unable to rcve teaching at this time    Nonacceptance, E, NL by WS at 3/8/2019  4:13 PM    Acceptance, E, VU by LT at 3/7/2019 10:13 AM    Acceptance, E, VU by BC at 3/5/2019  5:55 PM    Acceptance, E,TB, VU by CL at 3/4/2019  7:53 PM    Acceptance, E,TB, VU by CL at 3/4/2019  7:52 PM    Acceptance, E,TB, VU by CT at 3/1/2019  3:23 PM    Acceptance, E,TB, VU,NR by KB1 at 2/26/2019  3:13 PM    Acceptance, E,TB, NR by CT at 2/25/2019  3:25 PM   Family Acceptance, E, VU by AW at 3/15/2019 12:08 PM    Acceptance, E, VU by SB at 3/13/2019 11:01 AM   Significant Other Acceptance, E, VU by WS at 3/9/2019  9:05 AM                   Point: Home exercise program (Done)     Learning Progress Summary           Patient Acceptance, E,TB, VU,NR by CT at 4/9/2019  2:08 PM    Acceptance, E, VU by AM at 4/9/2019 12:08 PM    Acceptance, E,TB, NR by CT at 4/8/2019  2:48 PM    Acceptance, E,TB, VU by GAVINO at 4/8/2019  8:21 AM    Acceptance, E, NL,VU by SM at 4/8/2019  2:37 AM    Acceptance, E,TB, VU by GAVINO at 4/7/2019  8:53 AM    Acceptance, E, NL by SM at 4/6/2019 11:18 PM    Acceptance, E, NL by SM at 4/5/2019 10:02 PM    Acceptance, E,D, NR by AG at 4/4/2019  6:23 PM    Acceptance, E, NR by KB at 4/2/2019  6:10 PM    Acceptance, E, VU by SM at 4/2/2019 12:28 AM    Acceptance, E, VU,NR by AG1 at 4/1/2019 11:20 AM    Acceptance, E, NR by KB at 3/31/2019  9:45 PM    Acceptance, E, VU by MC at 3/31/2019  2:55 AM    Acceptance, E, VU by MC at 3/31/2019  2:45 AM    Acceptance, E, VU,NR by MC at 3/30/2019  1:57 AM    Acceptance, E,TB, VU,NR by CT at 3/29/2019 10:45 AM    Acceptance, E, NR by EA at 3/28/2019 10:44 PM    Acceptance, E,TB, NR,NL by  CT at 3/28/2019  2:19 PM    Acceptance, E,TB, VU by JS at 3/28/2019 12:47 AM    Acceptance, E, VU by AM at 3/27/2019  4:07 PM    Acceptance, E, NR by EA at 3/26/2019  9:47 PM    Acceptance, E, VU by LAZARO at 3/20/2019 10:09 PM    Acceptance, E, VU by AW at 3/16/2019  6:34 PM    Acceptance, E,TB, VU by ILYA at 3/14/2019 12:21 AM    Comment:  PT unable to rcve teaching at this time    Acceptance, E,TB, VU by ILYA at 3/12/2019 11:00 PM    Comment:  PT unable to rcve teaching at this time    Nonacceptance, E, NL by SB at 3/12/2019  9:17 AM    Comment:  pt intubated and sedated; no one present at the bedside    Acceptance, E, NR by EV at 3/11/2019  9:02 PM    Comment:  intubated/sedated. No family or caretaker at bedside    Nonacceptance, E, NL by WS at 3/9/2019  9:06 AM    Comment:  pt sedated and intubated    Acceptance, E, VU by WS at 3/9/2019  9:05 AM    Acceptance, E,TB, VU by ILYA at 3/8/2019  9:46 PM    Comment:  PT unable to rcve teaching at this time    Nonacceptance, E, NL by WS at 3/8/2019  4:13 PM    Acceptance, E, VU by LT at 3/7/2019 10:13 AM    Acceptance, E, VU by BC at 3/5/2019  5:55 PM    Acceptance, E,TB, VU by CL at 3/4/2019  7:53 PM    Acceptance, E,TB, VU by CL at 3/4/2019  7:52 PM    Acceptance, E,TB, VU by CT at 3/1/2019  3:23 PM    Acceptance, E,TB, VU,NR by KB1 at 2/26/2019  3:13 PM    Acceptance, E,TB, NR by CT at 2/25/2019  3:25 PM   Family Acceptance, E, VU by AW at 3/15/2019 12:08 PM    Acceptance, E, VU by SB at 3/13/2019 11:01 AM   Significant Other Acceptance, E, VU by WS at 3/9/2019  9:05 AM                   Point: Body mechanics (Done)     Learning Progress Summary           Patient Acceptance, E,TB, VU,NR by CT at 4/9/2019  2:08 PM    Acceptance, E, VU by AM at 4/9/2019 12:08 PM    Acceptance, E,TB, NR by CT at 4/8/2019  2:48 PM    Acceptance, E,TB, VU by GAVINO at 4/8/2019  8:21 AM    Acceptance, E, NL,VU by  at 4/8/2019  2:37 AM    Acceptance, E,TB, VU by GAVINO at 4/7/2019  8:53 AM    Acceptance,  E, NL by SM at 4/6/2019 11:18 PM    Acceptance, E, NL by SM at 4/5/2019 10:02 PM    Acceptance, E,D, NR by AG at 4/4/2019  6:23 PM    Acceptance, E, NR by KB at 4/2/2019  6:10 PM    Acceptance, E, VU by SM at 4/2/2019 12:28 AM    Acceptance, E, VU,NR by AG1 at 4/1/2019 11:20 AM    Acceptance, E, NR by KB at 3/31/2019  9:45 PM    Acceptance, E, VU by MC at 3/31/2019  2:55 AM    Acceptance, E, VU by MC at 3/31/2019  2:45 AM    Acceptance, E, VU,NR by MC at 3/30/2019  1:57 AM    Acceptance, E,TB, VU,NR by CT at 3/29/2019 10:45 AM    Acceptance, E, NR by EA at 3/28/2019 10:44 PM    Acceptance, E,TB, NR,NL by CT at 3/28/2019  2:19 PM    Acceptance, E,TB, VU by JS at 3/28/2019 12:47 AM    Acceptance, E, VU by AM at 3/27/2019  4:07 PM    Acceptance, E, NR by EA at 3/26/2019  9:47 PM    Acceptance, E, VU by LAZARO at 3/20/2019 10:09 PM    Acceptance, E, VU by AW at 3/16/2019  6:34 PM    Acceptance, E,TB, VU by ILYA at 3/14/2019 12:21 AM    Comment:  PT unable to rcve teaching at this time    Acceptance, E,TB, VU by ILYA at 3/12/2019 11:00 PM    Comment:  PT unable to rcve teaching at this time    Nonacceptance, E, NL by SB at 3/12/2019  9:17 AM    Comment:  pt intubated and sedated; no one present at the bedside    Acceptance, E, NR by EV at 3/11/2019  9:02 PM    Comment:  intubated/sedated. No family or caretaker at bedside    Nonacceptance, E, NL by WS at 3/9/2019  9:06 AM    Comment:  pt sedated and intubated    Acceptance, E, VU by WS at 3/9/2019  9:05 AM    Acceptance, E,TB, VU by ILYA at 3/8/2019  9:46 PM    Comment:  PT unable to rcve teaching at this time    Nonacceptance, E, NL by WS at 3/8/2019  4:13 PM    Acceptance, E, VU by LT at 3/7/2019 10:13 AM    Acceptance, E, VU by BC at 3/5/2019  5:55 PM    Acceptance, E,TB, VU by CL at 3/4/2019  7:53 PM    Acceptance, E,TB, VU by CL at 3/4/2019  7:52 PM    Acceptance, E,TB, VU by CT at 3/1/2019  3:23 PM    Acceptance, E,TB, VU,NR by KB1 at 2/26/2019  3:13 PM    Acceptance, E,TB,  NR by CT at 2/25/2019  3:25 PM   Family Acceptance, E, VU by AW at 3/15/2019 12:08 PM    Acceptance, E, VU by SB at 3/13/2019 11:01 AM   Significant Other Acceptance, E, VU by WS at 3/9/2019  9:05 AM                   Point: Precautions (Done)     Learning Progress Summary           Patient Acceptance, E,TB, VU,NR by CT at 4/9/2019  2:08 PM    Acceptance, E, VU by AM at 4/9/2019 12:08 PM    Acceptance, E,TB, NR by CT at 4/8/2019  2:48 PM    Acceptance, E,TB, VU by GAVINO at 4/8/2019  8:21 AM    Acceptance, E, NL,VU by SM at 4/8/2019  2:37 AM    Acceptance, E,TB, VU by GAVINO at 4/7/2019  8:53 AM    Acceptance, E, NL by SM at 4/6/2019 11:18 PM    Acceptance, E, NL by SM at 4/5/2019 10:02 PM    Acceptance, E,D, NR by AG at 4/4/2019  6:23 PM    Acceptance, E, NR by KB at 4/2/2019  6:10 PM    Acceptance, E, VU by SM at 4/2/2019 12:28 AM    Acceptance, E, VU,NR by AG1 at 4/1/2019 11:20 AM    Acceptance, E, NR by KB at 3/31/2019  9:45 PM    Acceptance, E, VU by MC at 3/31/2019  2:55 AM    Acceptance, E, VU by MC at 3/31/2019  2:45 AM    Acceptance, E, VU,NR by MC at 3/30/2019  1:57 AM    Acceptance, E,TB, VU,NR by CT at 3/29/2019 10:45 AM    Acceptance, E, NR by EA at 3/28/2019 10:44 PM    Acceptance, E,TB, NR,NL by CT at 3/28/2019  2:19 PM    Acceptance, E,TB, VU by JS at 3/28/2019 12:47 AM    Acceptance, E, VU by AM at 3/27/2019  4:07 PM    Acceptance, E, NR by EA at 3/26/2019  9:47 PM    Acceptance, E, VU by LAZARO at 3/20/2019 10:09 PM    Acceptance, E, VU by AW at 3/16/2019  6:34 PM    Acceptance, E,TB, VU by ILYA at 3/14/2019 12:21 AM    Comment:  PT unable to rcve teaching at this time    Acceptance, E,TB, VU by ILYA at 3/12/2019 11:00 PM    Comment:  PT unable to rcve teaching at this time    Nonacceptance, E, NL by SB at 3/12/2019  9:17 AM    Comment:  pt intubated and sedated; no one present at the bedside    Acceptance, E, NR by EV at 3/11/2019  9:02 PM    Comment:  intubated/sedated. No family or caretaker at bedside     Nonacceptance, E, NL by WS at 3/9/2019  9:06 AM    Comment:  pt sedated and intubated    Acceptance, E, VU by WS at 3/9/2019  9:05 AM    Acceptance, E,TB, VU by ILYA at 3/8/2019  9:46 PM    Comment:  PT unable to rcve teaching at this time    Nonacceptance, E, NL by WS at 3/8/2019  4:13 PM    Acceptance, E, VU by LT at 3/7/2019 10:13 AM    Acceptance, E, VU by BC at 3/5/2019  5:55 PM    Acceptance, E,TB, VU by CL at 3/4/2019  7:53 PM    Acceptance, E,TB, VU by CL at 3/4/2019  7:52 PM    Acceptance, E,TB, VU by CT at 3/1/2019  3:23 PM    Acceptance, E,TB, VU,NR by KB1 at 2/26/2019  3:13 PM    Acceptance, E,TB, NR by CT at 2/25/2019  3:25 PM   Family Acceptance, E, VU by AW at 3/15/2019 12:08 PM    Acceptance, E, VU by SB at 3/13/2019 11:01 AM   Significant Other Acceptance, E, VU by WS at 3/9/2019  9:05 AM                               User Key     Initials Effective Dates Name Provider Type Discipline    SB 06/16/16 -  Kathy Rowell, RN Registered Nurse Nurse    ILYA 06/16/16 -  Alberto Mendoza, RN Registered Nurse Nurse    LT 06/16/16 -  Denae Carvajal, RN Registered Nurse Nurse    EA 06/16/16 -  Mary Blanton, RN Registered Nurse Nurse    KB 06/16/16 -  Lorena Garcia, RN Registered Nurse Nurse    AW 06/16/16 -  Heather Godinez, RN Registered Nurse Nurse    WS 06/16/16 -  Keisha Messer, RN Registered Nurse Nurse    AM 06/16/16 -  Madeline Santiago, RN Registered Nurse Nurse    BC 03/14/16 -  Ekaterina Cifuentes, PT Physical Therapist PT    AG 04/03/18 -  Parisa Orellana, PT Physical Therapist PT    CT 04/03/18 -  Kyung Lr, PT Physical Therapist PT    AG1 06/16/16 -  Ayana Corbin, RN Registered Nurse Nurse    CL 07/23/18 -  Rj Godfrey, RN Registered Nurse Nurse    KB1 12/20/17 -  Ekaterina Parry PTA Physical Therapy Assistant PT    LAZARO 05/22/18 -  Renata Mcbride, RN Registered Nurse Nurse     09/06/18 -  Collett, Morgan, RN Registered Nurse Nurse      09/12/18 -  Sean Kohler, RN Registered Nurse Nurse    GAVINO 10/18/18 -  Marko Cifuentes, RN Registered Nurse Nurse    EV 10/26/18 -  Corry Bagley, RN Registered Nurse Nurse    JS 11/26/18 -  Sg Smith, RN Registered Nurse Nurse                PT Recommendation and Plan  Anticipated Discharge Disposition (PT): skilled nursing facility  Planned Therapy Interventions (PT Eval): balance training, gait training, bed mobility training, home exercise program, manual therapy techniques, motor coordination training, neuromuscular re-education, patient/family education, postural re-education, ROM (range of motion), stair training, strengthening, stretching, transfer training  Therapy Frequency (PT Clinical Impression): 3 times/wk(3-5 times/ week per priority policy)  Outcome Summary/Treatment Plan (PT)  Daily Summary of Progress (PT): progress toward functional goals is good  Anticipated Equipment Needs at Discharge (PT): front wheeled walker  Anticipated Discharge Disposition (PT): skilled nursing facility  Plan of Care Reviewed With: patient  Progress: improving  Outcome Summary: Pt met all goals at time of progress note. New goals established.   Outcome Measures     Row Name 04/08/19 1400             How much help from another person do you currently need...    Turning from your back to your side while in flat bed without using bedrails?  3  -CT      Moving from lying on back to sitting on the side of a flat bed without bedrails?  3  -CT      Moving to and from a bed to a chair (including a wheelchair)?  3  -CT      Standing up from a chair using your arms (e.g., wheelchair, bedside chair)?  3  -CT      Climbing 3-5 steps with a railing?  2  -CT      To walk in hospital room?  3  -CT      AM-PAC 6 Clicks Score  17  -CT         Functional Assessment    Outcome Measure Options  AM-PAC 6 Clicks Basic Mobility (PT)  -CT        User Key  (r) = Recorded By, (t) = Taken By, (c) = Cosigned By    Initials Name  Provider Type    CT Kyung Lr, PT Physical Therapist         Time Calculation:   PT Charges     Row Name 04/09/19 1408             Time Calculation    PT Received On  04/09/19  -CT      PT - Next Appointment  04/10/19  -CT      PT Goal Re-Cert Due Date  04/23/19  -CT         Time Calculation- PT    Total Timed Code Minutes- PT  32 minute(s)  -CT      TCU Minutes- PT  --  -CT         Timed Charges    57357 - Gait Training Minutes   17  -CT      97324 - PT Therapeutic Activity Minutes  15  -CT        User Key  (r) = Recorded By, (t) = Taken By, (c) = Cosigned By    Initials Name Provider Type    CT Kyung Lr, PT Physical Therapist        Therapy Charges for Today     Code Description Service Date Service Provider Modifiers Qty    73954491969 HC GAIT TRAINING EA 15 MIN 4/8/2019 Kyung Lr, PT GP 1    80976515271 HC PT THERAPEUTIC ACT EA 15 MIN 4/8/2019 Kyung Lr, PT GP 1    05066203376 HC PT THER SUPP EA 15 MIN 4/8/2019 Kyung Lr, PT GP 2    62143255670 HC GAIT TRAINING EA 15 MIN 4/9/2019 Kyung Lr, PT GP 1    16837306867 HC PT THERAPEUTIC ACT EA 15 MIN 4/9/2019 Kyung Lr, PT GP 1    27340707781 HC PT THER SUPP EA 15 MIN 4/9/2019 Kyung Lr, PT GP 2          PT G-Codes  Outcome Measure Options: AM-PAC 6 Clicks Basic Mobility (PT)  AM-PAC 6 Clicks Score: 17  Score: 12    Kyung Lr PT  4/9/2019

## 2019-04-09 NOTE — PROGRESS NOTES
"Palliative Care Progress Note    Date of Admission: 2/22/2019    Code Status:   Current Code Status     Date Active Code Status Order ID Comments User Context       2/22/2019 22:36 CPR [id number]  Jhon Messer PA-C ED       Questions for Current Code Status     Question Answer Comment    Code Status CPR     Medical Interventions (Level of Support Prior to Arrest) Full         Advance Directive: None  Surrogate decision maker: None    Subjective:    Patient sitting up on side of bed with bed alarm on.  She is hard at seeing therefore bed alarm so she does not go too far or try to stand without assistance.  She denies any SOB today.  Denies any pain today.  She is alert and oriented today x3.     Reviewed current scheduled and prn medications for route, type, dose, and frequency.    Pharmacy Consult      •  acetaminophen  •  bisacodyl  •  calcium carbonate  •  magnesium hydroxide  •  magnesium sulfate **OR** magnesium sulfate **OR** magnesium sulfate  •  nitroglycerin  •  Pharmacy Consult  •  sodium chloride  •  sodium chloride    Objective:  PPS 50 /80 (BP Location: Right arm, Patient Position: Sitting)   Pulse 81   Temp 97.6 °F (36.4 °C) (Oral)   Resp 20   Ht 165.1 cm (65\")   Wt 46.7 kg (102 lb 14.4 oz)   SpO2 99%   BMI 17.12 kg/m²    Intake & Output (last day)       04/08 0701 - 04/09 0700    P.O. 240    Total Intake(mL/kg) 240 (5.1)    Urine (mL/kg/hr) 400 (0.6)    Stool 0    Total Output 400    Net -160         Urine Unmeasured Occurrence 1 x    Stool Unmeasured Occurrence 3 x        Lab Results (last 24 hours)     Procedure Component Value Units Date/Time    CBC & Differential [[id number]] Collected:  04/08/19 0718    Specimen:  Blood Updated:  04/08/19 0813    Narrative:       The following orders were created for panel order CBC & Differential.  Procedure                               Abnormality         Status                     ---------                               -----------         " ------                     CBC Auto Differential[[id number]]        Abnormal            Final result                 Please view results for these tests on the individual orders.    CBC Auto Differential [[id number]]  (Abnormal) Collected:  04/08/19 0718    Specimen:  Blood Updated:  04/08/19 0813     WBC 6.72 10*3/mm3      RBC 3.37 10*6/mm3      Hemoglobin 11.0 g/dL      Hematocrit 34.0 %      .9 fL      MCH 32.6 pg      MCHC 32.4 g/dL      RDW 13.8 %      RDW-SD 49.4 fl      MPV 10.8 fL      Platelets 236 10*3/mm3      Neutrophil % 56.6 %      Lymphocyte % 23.1 %      Monocyte % 12.1 %      Eosinophil % 6.3 %      Basophil % 0.7 %      Immature Grans % 1.2 %      Neutrophils, Absolute 3.81 10*3/mm3      Lymphocytes, Absolute 1.55 10*3/mm3      Monocytes, Absolute 0.81 10*3/mm3      Eosinophils, Absolute 0.42 10*3/mm3      Basophils, Absolute 0.05 10*3/mm3      Immature Grans, Absolute 0.08 10*3/mm3     Basic Metabolic Panel [[id number]] Collected:  04/08/19 0445    Specimen:  Blood Updated:  04/08/19 0535     Glucose 90 mg/dL      BUN 19 mg/dL      Creatinine 0.76 mg/dL      Sodium 139 mmol/L      Potassium 4.3 mmol/L      Chloride 102 mmol/L      CO2 25.0 mmol/L      Calcium 9.5 mg/dL      eGFR Non African Amer 79 mL/min/1.73      BUN/Creatinine Ratio 25.0     Anion Gap 12.0 mmol/L     Narrative:       GFR Normal >60  Chronic Kidney Disease <60  Kidney Failure <15    Magnesium [[id number]]  (Normal) Collected:  04/08/19 0445    Specimen:  Blood Updated:  04/08/19 0535     Magnesium 2.1 mg/dL         Imaging Results (last 24 hours)     ** No results found for the last 24 hours. **          Physical Exam:  Gen: NAD  Skin: warm, dry   Eyes: left eye injry, legally blind with poor vision   HEENT: oral cavity moist, no lesions or thrush, dental decay   Resp/thorax: even effort, non labored, clear   CV: RRR   ABD: soft, bowel sounds normal   Ext: no edema, no redness   Neuro: alert, interactive, no myoclonus  "  Psych: appropriate conversation and mood     Reviewed labs and diagnostic results.   No results found for: HGBA1C  Results from last 7 days   Lab Units 04/08/19  0718   WBC 10*3/mm3 6.72   HEMOGLOBIN g/dL 11.0*   HEMATOCRIT % 34.0   PLATELETS 10*3/mm3 236     Results from last 7 days   Lab Units 04/08/19  0445   SODIUM mmol/L 139   POTASSIUM mmol/L 4.3   CHLORIDE mmol/L 102   CO2 mmol/L 25.0   BUN mg/dL 19   CREATININE mg/dL 0.76   CALCIUM mg/dL 9.5   GLUCOSE mg/dL 90       Impression: 53 y.o. female s/p long illness and awaiting placement.    Plan:     1.  Goals of Care   - Patient is hoping to get into facility but if unable to get in, she states she has had family come visit and agree to let her stay with them as long as there are no drinking or smoking in the house.  She states it's her brother and his wife.  I have discussed with her the negative effects of alcohol and smoking with as sick as she has been.  I have encouraged her to remain free of both.  I encouraged her to also speak with family and determine her healthcare wishes and discuss with family what those are and appoint someone to be her HCS.     2.  Altered Mental Status   - Improved tremendously.  She told me today that if I ask her \"who the president is\" she is going to kick me out. She is definitely more alert and oriented x3 today and doing much better.      3.  Dyspnea   - Improved and now at baseline.  She is on room air and does not get SOB with exertion and movements.  Again I encouraged to stay away from alcohol use and tobacco products to ensure her healthy longer.     4.  Weakness/fatigue   - Up with gait belt and PT today and walked in hallway.  She was moving around bed without dyspnea or cough. This is much improved also.  She tells me she can walk alone but that they won't let her yet.  But she feels strong enough to do so.      Time: 30 minutes   > 50% time spent in counseling and education concerning current orders for symptom " management with patient     Asia Carvajal, APRN  [phone]  04/08/19  10:19 PM

## 2019-04-09 NOTE — PLAN OF CARE
Problem: Patient Care Overview  Goal: Plan of Care Review  Outcome: Ongoing (interventions implemented as appropriate)   04/09/19 6663   Plan of Care Review   Progress improving   Coping/Psychosocial   Plan of Care Reviewed With patient   OTHER   Outcome Summary Pt met all goals at time of progress note. New goals established.

## 2019-04-09 NOTE — PROGRESS NOTES
[facility] HOSPITALIST PROGRESS NOTE     Patient Identification:  Name:  Altagracia King  Age:  53 y.o.  Sex:  female  :  1965  MRN:  [id number]  Visit Number:  [id number]  Primary Care Provider:  Provider, No Known    Length of stay:  45    Subjective: Patient currently out of bed eating lunch, she is very well, no complaints, no palpitation no shortness of breath nausea or vomiting.   is actively pursuing for nursing home placement so far to no avail.  Patient is very well motivated with physical therapy.    Chief Complaint: Short of breath  ----------------------------------------------------------------------------------------------------------------------  Current Hospital Meds:    amiodarone 200 mg Oral Daily   apixaban 5 mg Oral Q12H   bisacodyl 10 mg Rectal Daily   cyanocobalamin 1,000 mcg Intramuscular Q30 Days   digoxin 125 mcg Oral Q48H   folic acid 1 mg Oral Daily   furosemide 10 mg Oral Daily   levothyroxine 75 mcg Oral Q AM   magnesium oxide 400 mg Oral Daily   metoprolol tartrate 75 mg Oral Q12H   mexiletine 150 mg Oral Q8H   OLANZapine 5 mg Oral Nightly   pantoprazole 40 mg Oral Q AM   sennosides-docusate sodium 2 tablet Oral Nightly   sodium chloride 3 mL Intravenous Q12H   sodium chloride 3 mL Intravenous Q12H   spironolactone 25 mg Oral Daily With Lunch   thiamine 100 mg Oral Daily       Pharmacy Consult      ----------------------------------------------------------------------------------------------------------------------  Vital Signs:  Temp:  [97.4 °F (36.3 °C)-97.7 °F (36.5 °C)] 97.5 °F (36.4 °C)  Heart Rate:  [78-92] 84  Resp:  [18-20] 18  BP: (103-152)/(57-80) 103/57       Tele: Sinus rhythm 81 bpm      19  0354 19  0322 19  0500   Weight: 46.7 kg (102 lb 14.4 oz) 46.5 kg (102 lb 8 oz) 46.6 kg (102 lb 12.8 oz)     Body mass index is 17.11 kg/m².    Intake/Output Summary (Last 24 hours) at 2019 1247  Last data filed at 2019  0811  Gross per 24 hour   Intake 720 ml   Output 650 ml   Net 70 ml     Diet Soft Texture; Chopped; Thin; Daily Fluid Restriction; Other; 1,800  ----------------------------------------------------------------------------------------------------------------------  Physical exam:  General: Comfortable,awake, alert, oriented to self, place, and time, well-developed, chronically ill-appearing,  No respiratory distress.    Skin:  Skin is warm and dry. No rash noted.  She is pale.  HENT:  Head:  Normocephalic and atraumatic.  Mouth:  Moist mucous membranes.    Eyes:  Conjunctivae and EOM are normal.   No scleral icterus.  Left eye completely opacified, right eye has cataract  Neck:  Neck supple.  No JVD present.    Pulmonary/Chest:  No respiratory distress, no wheezes, no crackles, with normal breath sounds and good air movement.  Cardiovascular:  Normal rate, regular rhythm and normal heart sounds with no murmur.  Abdominal:  Soft.  Bowel sounds are normal.  No distension and no tenderness.   Extremities:  No edema, no tenderness, and no deformity.  No red or swollen joints anywhere.  Strong pulses in all 4 extremities with no clubbing, no cyanosis, no edema.  Neurological:  Motor strength equal no obvious deficit, sensory grossly intact.   No cranial nerve deficit.  No tongue deviation.  No facial droop.  No slurred speech.      ----------------------------------------------------------------------------------------------------------------------  ----------------------------------------------------------------------------------------------------------------------      Results from last 7 days   Lab Units 04/08/19  0718 04/05/19  1031   WBC 10*3/mm3 6.72 6.11   HEMOGLOBIN g/dL 11.0* 9.7*   HEMATOCRIT % 34.0 30.7*   MCV fL 100.9* 102.3*   MCHC g/dL 32.4 31.6   PLATELETS 10*3/mm3 236 195         Results from last 7 days   Lab Units 04/08/19  0445 04/07/19  1745 04/07/19  0743 04/07/19  0103 04/05/19  0417   SODIUM mmol/L  139  --  141  --   --    POTASSIUM mmol/L 4.3  --  4.3  --  3.9   MAGNESIUM mg/dL 2.1 2.7*  --  1.7 2.4   CHLORIDE mmol/L 102  --  104  --   --    CO2 mmol/L 25.0  --  24.4  --   --    BUN mg/dL 19  --  22*  --   --    CREATININE mg/dL 0.76  --  0.84  --   --    EGFR IF NONAFRICN AM mL/min/1.73 79  --  70  --   --    CALCIUM mg/dL 9.5  --  9.7  --   --    GLUCOSE mg/dL 90  --  91  --   --    Estimated Creatinine Clearance: 61.5 mL/min (by C-G formula based on SCr of 0.76 mg/dL).    No results found for: AMMONIA                    I have personally looked at the labs and they are summarized above.  ----------------------------------------------------------------------------------------------------------------------  Imaging Results (last 24 hours)     ** No results found for the last 24 hours. 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 Tach  -Paroxysmal atrial fibrillation, currently sinus rhythm  -Chronic anticoagulation    No change in current treatment at this time, continue with physical therapy, will repeat BMP in the morning, awaiting for nursing home placement.      Kaye Wilson MD  04/09/19  12:47 PM

## 2019-04-09 NOTE — DISCHARGE PLACEMENT REQUEST
"Shagufta Loza (53 y.o. Female)     Date of Birth Social Security Number Address Home Phone MRN    1965  [address] [place] [address] [phone] [id number]    M[employer]on Marital Status          Unknown        Admission Date Admission Type Admitting Provider Attending Provider Department, Room/Bed    2/22/19 Emergency Carito Leone MD Oculam, Claire Chin, MD [address], 3309/2S    Discharge Date Discharge Disposition Discharge Destination                       Attending Provider:  Kaye Wilson MD    Allergies:  No Known Allergies    Isolation:  None   Infection:  None   Code Status:  CPR    Ht:  165.1 cm (65\")   Wt:  46.6 kg (102 lb 12.8 oz)    Admission Cmt:  None   Principal Problem:  None                Active Insurance as of 2/22/2019     Primary Coverage     Payor Plan Insurance Group Employer/Plan Group    HU[employer] MEDICAID [employer] [id number]     Payor Plan Address Payor Plan Phone Number Payor Plan Fax Number Effective Dates    PO  [phone]  2/14/2019 - None Entered    [facility] [id number]       Subscriber Name Subscriber Birth Date Member ID       SHAGUFTA LOZA 1965 [id number]                 Emergency Contacts      (Rel.) Home Phone Work Phone Mobile Phone    Madeline Loza (Daughter) -- -- [phone]    Faustino Mclain (Friend) [phone] -- --    Mumtaz Bundy (Friend) [phone] -- --    Unknown, Sylwia (Sister) [phone] -- --            Emergency Contact Information     Name Relation Home Work Mobile    Madeline Loza Daughter   [phone]    Faustino Mclain Friend [phone]      Mumtaz Bundy Friend [phone]      Unknown, Sylwia Sister [phone]            Insurance Information                [employer]NA MEDICAID/[employer] Phone: [phone]    Subscriber: Shagufta Loza Subscriber#: [id number]    Group#: [id number] Precert#:              History & Physical      Jhon Messer PA-C at " "2019 12:23 PM                   HISTORY AND PHYSICAL        Patient Identification:  Name:  Altagracia King  Age:  53 y.o.  Sex:  female  :  1965  MRN:  [id number]   Visit Number:  [id number]  Primary Care Physician:  Provider, No Known       Subjective     Subjective     Chief complaint:     Chief Complaint   Patient presents with   • Chest Pain       History of presenting illness:     The patient is a 53 year old female for presented to the ED with complaints of substernal chest pain that began yesterday morning. She reports the pain radiates down her left arm and her arm feels somewhat numb. She also reports issues at home and needing  as she has been living in a homeless shelter where she was recently \"kicked out.\" Vital signs stable. Troponin levels negative.EKG shows sinus rhythm with premature ventricular complexes or fusion complexes and septal infarct , age undetermined.   History of alcohol abuse but normal ethanol level and she reports she has not had any alcohol for around a week. TSH elevated at 6.995. White count and CRP normal. Chest xray is unremarkable. EKG shows sinus rhythm with premature ventricular complexes or fusion complexes and septal infarct , age undetermined. Admitted to the observation unit for further evaluation and monitoring.       ---------------------------------------------------------------------------------------------------------------------     Review Of Systems:    Constitutional: no fever, chills and night sweats. No appetite change or unexpected weight change. No fatigue.  Eyes: no eye drainage, itching or redness.  HEENT: no mouth sores, dysphagia or nose bleed.  Respiratory: no for shortness of breath, cough or production of sputum.  Cardiovascular: See HPI  Gastrointestinal: no nausea, vomiting or diarrhea. No abdominal pain, hematemesis or rectal bleeding.  Genitourinary: no dysuria or polyuria.  Hematologic/lymphatic: no lymph node " abnormalities, no easy bruising or easy bleeding.  Musculoskeletal: no muscle or joint pain.  Skin: No rash and no itching.  Neurological: no loss of consciousness, no seizure, no headache.  Behavioral/Psych: no depression or suicidal ideation.  Endocrine: no hot flashes.  Immunologic: negative.    ---------------------------------------------------------------------------------------------------------------------     Past Medical History    Past Medical History:   Diagnosis Date   • Alcoholism (CMS/[facility])    • Depression    • GERD (gastroesophageal reflux disease)        Past Surgical History    Past Surgical History:   Procedure Laterality Date   • CARDIAC CATHETERIZATION     • COLONOSCOPY     • ENDOSCOPY     • TUBAL ABDOMINAL LIGATION         Family History    Family History   Problem Relation Age of Onset   • Heart disease Mother    • Depression Mother    • Heart disease Father    • Depression Sister    • Heart disease Brother    • Depression Maternal Grandmother    • Alcohol abuse Maternal Grandfather        Social History    Social History     Tobacco Use   • Smoking status: Current Every Day Smoker     Packs/day: 1.00   • Smokeless tobacco: Never Used   Substance Use Topics   • Alcohol use: Yes     Alcohol/week: 2.4 oz     Types: 4 Cans of beer per week   • Drug use: No       Allergies    Patient has no known allergies.  ---------------------------------------------------------------------------------------------------------------------     Home Medications:    Prior to Admission Medications     Prescriptions Last Dose Informant Patient Reported? Taking?    aspirin 325 MG tablet 2/22/2019 Self Yes Yes    Take 650 mg by mouth Daily.    calcium carbonate (TUMS) 500 MG chewable tablet 2/23/2019 Self Yes Yes    Chew 2 tablets As Needed for Indigestion or Heartburn.        ---------------------------------------------------------------------------------------------------------------------    Objective     Objective      Hospital Scheduled Meds:    aspirin 325 mg Oral Daily   enoxaparin 40 mg Subcutaneous Q24H   LORazepam 2 mg Oral 3 times per day   Followed by      [START ON 2/24/2019] LORazepam 1.5 mg Oral 3 times per day   Followed by      [START ON 2/25/2019] LORazepam 1 mg Oral 3 times per day   Followed by      [START ON 2/26/2019] LORazepam 0.5 mg Oral 3 times per day   sodium chloride 3 mL Intravenous Q12H   sodium chloride 3 mL Intravenous Q12H        ---------------------------------------------------------------------------------------------------------------------   Vital Signs:  Temp:  [98.2 °F (36.8 °C)-99.2 °F (37.3 °C)] 98.6 °F (37 °C)  Heart Rate:  [] 85  Resp:  [18-24] 18  BP: (104-164)/(52-98) 104/61  Mean Arterial Pressure (Non-Invasive) for the past 24 hrs (Last 3 readings):   Noninvasive MAP (mmHg)   02/23/19 1126 74   02/23/19 0716 81   02/23/19 0440 88     SpO2 Percentage    02/23/19 0440 02/23/19 0716 02/23/19 1126   SpO2: 99% 100% 100%     SpO2:  [96 %-100 %] 100 %  on   ;   Device (Oxygen Therapy): room air    Body mass index is 18.99 kg/m².  Wt Readings from Last 3 Encounters:   02/22/19 51.8 kg (114 lb 2 oz)   02/14/19 56.7 kg (125 lb)   02/14/19 56.7 kg (125 lb)     ---------------------------------------------------------------------------------------------------------------------     Physical Exam:    Constitutional:  Well-developed and well-nourished.  No respiratory distress.      HENT:  Head: Normocephalic and atraumatic.  Mouth:  Moist mucous membranes.    Eyes: Left cataract.  Conjunctivae and EOM are normal.  No scleral icterus.  Neck:  Neck supple.  No JVD present.    Cardiovascular:  Normal rate, regular rhythm and normal heart sounds with no murmur. No edema.  Pulmonary/Chest: Mild bilateral wheezes. No respiratory distress, no crackles, with normal breath sounds and good air movement.  Abdominal:  Soft.  Bowel sounds are normal.  No distension and no tenderness.   Musculoskeletal:   No edema, no tenderness, and no deformity.  No swelling or redness of joints.  Neurological:  Alert and oriented to person, place, and time.  No facial droop.  No slurred speech.   Skin:  Skin is warm and dry.  No rash noted.  No pallor.   Psychiatric:  Normal mood and affect.  Behavior is normal.    ---------------------------------------------------------------------------------------------------------------------  I have personally reviewed the EKG/Telemetry strip  ---------------------------------------------------------------------------------------------------------------------   Results from last 7 days   Lab Units 02/23/19  1134 02/23/19  0849 02/23/19  0132  02/22/19  1804   CK TOTAL U/L  --   --   --   --  41   TROPONIN I ng/mL <0.006 <0.006 <0.006   < > <0.006   MYOGLOBIN ng/mL  --   --   --   --  21.0    < > = values in this interval not displayed.           Results from last 7 days   Lab Units 02/23/19  0132 02/22/19  1944   CRP mg/dL <0.50  --    WBC 10*3/mm3 4.84 5.66   HEMOGLOBIN g/dL 11.1* 11.2*   HEMATOCRIT % 34.6* 34.4*   MCV fL 106.5* 104.2*   MCHC g/dL 32.1* 32.6*   PLATELETS 10*3/mm3 147 143     Results from last 7 days   Lab Units 02/23/19  0132 02/22/19  1804   SODIUM mmol/L 137 132*   POTASSIUM mmol/L 3.6 3.9   MAGNESIUM mg/dL 2.0 2.0   CHLORIDE mmol/L 105 102   CO2 mmol/L 25.4 24.5   BUN mg/dL 14 16   CREATININE mg/dL 0.74 0.69   EGFR IF NONAFRICN AM mL/min/1.73 81 88   CALCIUM mg/dL 9.3 9.1   GLUCOSE mg/dL 89 104   ALBUMIN g/dL 3.90 4.20   BILIRUBIN mg/dL 0.2 0.2   ALK PHOS U/L 61 69   AST (SGOT) U/L 21 23   ALT (SGPT) U/L 14 15   Estimated Creatinine Clearance: 70.2 mL/min (by C-G formula based on SCr of 0.74 mg/dL).  No results found for: AMMONIA    No results found for: HGBA1C, POCGLU  No results found for: HGBA1C  Lab Results   Component Value Date    TSH 6.995 (H) 02/22/2019                      Pain Management Panel     Pain Management Panel Latest Ref Rng & Units 2/22/2019     "AMPHETAMINES SCREEN, URINE Negative Negative    BARBITURATES SCREEN Negative Negative    BENZODIAZEPINE SCREEN, URINE Negative Negative    BUPRENORPHINE Negative Negative    COCAINE SCREEN, URINE Negative Negative    METHADONE SCREEN, URINE Negative Negative        I have personally reviewed the above laboratory results.   ---------------------------------------------------------------------------------------------------------------------  Imaging Results (last 7 days)     Procedure Component Value Units Date/Time    XR Chest 1 View [[id number]] Collected:  02/23/19 0924     Updated:  02/23/19 0927    Narrative:       EXAMINATION: XR CHEST 1 VW-      CLINICAL INDICATION:     cp     TECHNIQUE:  XR CHEST 1 VW-      COMPARISON: NONE      FINDINGS:   Coarse interstitial markings suggestive of chronic interstitial lung  disease.  Heart and mediastinum contours are unremarkable.  No pleural effusion.  No pneumothorax.   Bony and soft tissue structures are unremarkable.       Impression:       No radiographic evidence of acute cardiac or pulmonary  disease.     This report was finalized on 2/23/2019 9:24 AM by Dr. Keven Lux MD.           I have personally reviewed the above radiology results.   ---------------------------------------------------------------------------------------------------------------------      Assessment & Plan        Assessment/Plan       ASSESSMENT:    1. Chest pain    PLAN:    The patient is a 53 year old female for presented to the ED with complaints of substernal chest pain that began yesterday morning. She reports the pain radiates down her left arm and her arm feels somewhat numb. She also reports issues at home and needing  as she has been living in a homeless shelter where she was recently \"kicked out.\" Vital signs stable. Troponin levels negative.EKG shows sinus rhythm with premature ventricular complexes or fusion complexes and septal infarct , age undetermined.   History " of alcohol abuse but normal ethanol level and she reports she has not had any alcohol for around a week. TSH elevated at 6.995. White count and CRP normal. Chest xray is unremarkable. EKG shows sinus rhythm with premature ventricular complexes or fusion complexes and septal infarct , age undetermined. Admitted to the observation unit for further evaluation and monitoring.    Cardiology consulted for abnormality with new infarct on EKG with no further workup recommended. Would recommend to follow up with her cardiologist as outpatient.     Lovenox 40 mg subcutaneous q 24 hours for DVT prophylaxis.    Echo ordered. Cardiology was consulted for acute EKG changes.     D-dimer found to be elevated and CT per PE protocol ordered.     Patient's findings and recommendations were discussed with patient, nursing staff and consulting provider      Code Status:   Code Status and Medical Interventions:   Ordered at: 02/22/19 2236     Code Status:    CPR     Medical Interventions (Level of Support Prior to Arrest):    Full     Patient seen with LIANE Goddard.     Jhon Messer PA-C  02/23/19  12:23 PM      Electronically signed by Carito Leone MD at 2/23/2019  2:27 PM       Hospital Medications (active)       Dose Frequency Start End    acetaminophen (TYLENOL) tablet 650 mg 650 mg Every 4 Hours PRN 2/22/2019     Sig - Route: Take 2 tablets by mouth Every 4 (Four) Hours As Needed for Mild Pain . - Oral    Cosign for Ordering: Accepted by Carito Leone MD on 2/23/2019  9:06 AM    amiodarone (PACERONE) tablet 200 mg 200 mg Daily 3/28/2019     Sig - Route: Take 1 tablet by mouth Daily. - Oral    apixaban (ELIQUIS) tablet 5 mg 5 mg Every 12 Hours Scheduled 4/4/2019     Sig - Route: Take 1 tablet by mouth Every 12 (Twelve) Hours. - Oral    bisacodyl (DULCOLAX) suppository 10 mg 10 mg Daily PRN 3/12/2019     Sig - Route: Insert 1 suppository into the rectum Daily As Needed for Constipation (if oral meds do not help). -  "Rectal    bisacodyl (DULCOLAX) suppository 10 mg 10 mg Daily 3/29/2019     Sig - Route: Insert 1 suppository into the rectum Daily. - Rectal    calcium carbonate (TUMS) chewable tablet 500 mg (200 mg elemental) 2 tablet 2 Times Daily PRN 2/22/2019     Sig - Route: Chew 1,000 mg 2 (Two) Times a Day As Needed for Heartburn. - Oral    Cosign for Ordering: Accepted by Carito Leone MD on 2/23/2019  9:06 AM    cyanocobalamin injection 1,000 mcg 1,000 mcg Every 30 Days 4/1/2019     Sig - Route: Inject 1 mL into the appropriate muscle as directed by prescriber Every 30 (Thirty) Days. - Intramuscular    Linked Group 1:  \"Followed by\" Linked Group Details        digoxin (LANOXIN) tablet 125 mcg 125 mcg Every 48 Hours 4/5/2019     Sig - Route: Take 1 tablet by mouth Every Other Day. - Oral    folic acid (FOLVITE) tablet 1 mg 1 mg Daily 2/23/2019     Sig - Route: Take 1 tablet by mouth Daily. - Oral    Cosign for Ordering: Accepted by Alberto Connolly MD on 2/23/2019  3:57 PM    furosemide (LASIX) tablet 10 mg 10 mg Daily 4/8/2019     Sig - Route: Take 0.5 tablets by mouth Daily. - Oral    levothyroxine (SYNTHROID, LEVOTHROID) tablet 75 mcg 75 mcg Every Early Morning 4/3/2019     Sig - Route: Take 1 tablet by mouth Every Morning. - Oral    magnesium hydroxide (MILK OF MAGNESIA) suspension 2400 mg/10mL 10 mL 10 mL Daily PRN 3/12/2019     Sig - Route: Take 10 mL by mouth Daily As Needed for Constipation. - Oral    magnesium oxide (MAGOX) tablet 400 mg 400 mg Daily 4/8/2019     Sig - Route: Take 1 tablet by mouth Daily. - Oral    Magnesium Sulfate 2 gram / 50mL Infusion (GIVE X 3 BAGS TO EQUAL 6GM TOTAL DOSE) - Mg 1.1 - 1.5 mg/dl 2 g As Needed 4/7/2019     Sig - Route: Infuse 50 mL into a venous catheter As Needed (See Administration Instructions). - Intravenous    Cosign for Ordering: Accepted by Sayra Milton MD on 4/7/2019 10:04 AM    Linked Group 2:  \"Or\" Linked Group Details        Magnesium Sulfate 2 " "gram Bolus, followed by 8 gram infusion (total Mg dose 10 grams)- Mg less than or equal to 1mg/dL 2 g As Needed 4/7/2019     Sig - Route: Infuse 50 mL into a venous catheter As Needed (See Administration Instructions). - Intravenous    Cosign for Ordering: Accepted by Sayra Milton MD on 4/7/2019 10:04 AM    Linked Group 2:  \"Or\" Linked Group Details        Magnesium Sulfate 4 gram infusion- Mg 1.6-1.9 mg/dL 4 g As Needed 4/7/2019     Sig - Route: Infuse 100 mL into a venous catheter As Needed (See Administration Instructions). - Intravenous    Cosign for Ordering: Accepted by Sayra Milton MD on 4/7/2019 10:04 AM    Linked Group 2:  \"Or\" Linked Group Details        metoprolol tartrate (LOPRESSOR) tablet 75 mg 75 mg Every 12 Hours Scheduled 3/28/2019     Sig - Route: Take 75 mg by mouth Every 12 (Twelve) Hours. - Oral    mexiletine (MEXITIL) capsule 150 mg 150 mg Every 8 Hours Scheduled 3/21/2019     Sig - Route: Take 1 capsule by mouth Every 8 (Eight) Hours. - Oral    nitroglycerin (NITROSTAT) SL tablet 0.4 mg 0.4 mg Every 5 Minutes PRN 2/22/2019     Sig - Route: Place 1 tablet under the tongue Every 5 (Five) Minutes As Needed for Chest Pain (Chest Pain With Systolic Blood Pressure Greater Than 100). - Sublingual    Cosign for Ordering: Accepted by Carito Leone MD on 2/23/2019  9:06 AM    OLANZapine (zyPREXA) tablet 5 mg 5 mg Nightly 4/2/2019     Sig - Route: Take 1 tablet by mouth Every Night. - Oral    pantoprazole (PROTONIX) EC tablet 40 mg 40 mg Every Early Morning 4/7/2019     Sig - Route: Take 1 tablet by mouth Every Morning. - Oral    Cosign for Ordering: Accepted by Sayra Milton MD on 4/6/2019 12:12 PM    Pharmacy Consult  Continuous PRN 4/4/2019     Sig - Route: Continuous As Needed for Consult. - Does not apply    sennosides-docusate sodium (SENOKOT-S) 8.6-50 MG tablet 2 tablet 2 tablet Nightly 4/4/2019     Sig - Route: Take 2 tablets by mouth Every Night. - Oral    sodium " chloride 0.9 % flush 3 mL 3 mL Every 12 Hours Scheduled 2019     Sig - Route: Infuse 3 mL into a venous catheter Every 12 (Twelve) Hours. - Intravenous    Cosign for Ordering: Accepted by Carito Leone MD on 2019  9:06 AM    sodium chloride 0.9 % flush 3 mL 3 mL Every 12 Hours Scheduled 2019     Sig - Route: Infuse 3 mL into a venous catheter Every 12 (Twelve) Hours. - Intravenous    sodium chloride 0.9 % flush 3-10 mL 3-10 mL As Needed 2019     Sig - Route: Infuse 3-10 mL into a venous catheter As Needed for Line Care. - Intravenous    Cosign for Ordering: Accepted by Carito Leone MD on 2019  9:06 AM    sodium chloride 0.9 % flush 5 mL 5 mL As Needed 2019     Sig - Route: Infuse 5 mL into a venous catheter As Needed for Line Care (After Medication Administration or Blood Draw). - Intravenous    spironolactone (ALDACTONE) tablet 25 mg 25 mg Daily With Lunch 3/25/2019     Sig - Route: Take 1 tablet by mouth Daily With Lunch. - Oral    thiamine (VITAMIN B-1) tablet 100 mg 100 mg Daily 2019     Sig - Route: Take 1 tablet by mouth Daily. - Oral    Cosign for Ordering: Accepted by Alberto Connolly MD on 2019  3:57 PM            Lab Results (last 24 hours)     ** No results found for the last 24 hours. **        Imaging Results (last 24 hours)     ** No results found for the last 24 hours. **        ECG/EMG Results (last 24 hours)     ** No results found for the last 24 hours. **           Physician Progress Notes (most recent note)      Kaye Wilson MD at 2019 12:47 PM              [facility]     Patient Identification:  Name:  Altagracia King  Age:  53 y.o.  Sex:  female  :  1965  MRN:  [id number]  Visit Number:  [id number]  Primary Care Provider:  Provider, No Known    Length of stay:  45    Subjective: Patient currently out of bed eating lunch, she is very well, no complaints, no palpitation no  shortness of breath nausea or vomiting.   is actively pursuing for nursing home placement so far to no avail.  Patient is very well motivated with physical therapy.    Chief Complaint: Short of breath  ----------------------------------------------------------------------------------------------------------------------  Current Hospital Meds:    amiodarone 200 mg Oral Daily   apixaban 5 mg Oral Q12H   bisacodyl 10 mg Rectal Daily   cyanocobalamin 1,000 mcg Intramuscular Q30 Days   digoxin 125 mcg Oral Q48H   folic acid 1 mg Oral Daily   furosemide 10 mg Oral Daily   levothyroxine 75 mcg Oral Q AM   magnesium oxide 400 mg Oral Daily   metoprolol tartrate 75 mg Oral Q12H   mexiletine 150 mg Oral Q8H   OLANZapine 5 mg Oral Nightly   pantoprazole 40 mg Oral Q AM   sennosides-docusate sodium 2 tablet Oral Nightly   sodium chloride 3 mL Intravenous Q12H   sodium chloride 3 mL Intravenous Q12H   spironolactone 25 mg Oral Daily With Lunch   thiamine 100 mg Oral Daily       Pharmacy Consult      ----------------------------------------------------------------------------------------------------------------------  Vital Signs:  Temp:  [97.4 °F (36.3 °C)-97.7 °F (36.5 °C)] 97.5 °F (36.4 °C)  Heart Rate:  [78-92] 84  Resp:  [18-20] 18  BP: (103-152)/(57-80) 103/57       Tele: Sinus rhythm 81 bpm      04/08/19  0354 04/09/19  0322 04/09/19  0500   Weight: 46.7 kg (102 lb 14.4 oz) 46.5 kg (102 lb 8 oz) 46.6 kg (102 lb 12.8 oz)     Body mass index is 17.11 kg/m².    Intake/Output Summary (Last 24 hours) at 4/9/2019 1247  Last data filed at 4/9/2019 0811  Gross per 24 hour   Intake 720 ml   Output 650 ml   Net 70 ml     Diet Soft Texture; Chopped; Thin; Daily Fluid Restriction; Other; 1,800  ----------------------------------------------------------------------------------------------------------------------  Physical exam:  General: Comfortable,awake, alert, oriented to self, place, and time, well-developed,  chronically ill-appearing,  No respiratory distress.    Skin:  Skin is warm and dry. No rash noted.  She is pale.  HENT:  Head:  Normocephalic and atraumatic.  Mouth:  Moist mucous membranes.    Eyes:  Conjunctivae and EOM are normal.   No scleral icterus.  Left eye completely opacified, right eye has cataract  Neck:  Neck supple.  No JVD present.    Pulmonary/Chest:  No respiratory distress, no wheezes, no crackles, with normal breath sounds and good air movement.  Cardiovascular:  Normal rate, regular rhythm and normal heart sounds with no murmur.  Abdominal:  Soft.  Bowel sounds are normal.  No distension and no tenderness.   Extremities:  No edema, no tenderness, and no deformity.  No red or swollen joints anywhere.  Strong pulses in all 4 extremities with no clubbing, no cyanosis, no edema.  Neurological:  Motor strength equal no obvious deficit, sensory grossly intact.   No cranial nerve deficit.  No tongue deviation.  No facial droop.  No slurred speech.      ----------------------------------------------------------------------------------------------------------------------  ----------------------------------------------------------------------------------------------------------------------      Results from last 7 days   Lab Units 04/08/19  0718 04/05/19  1031   WBC 10*3/mm3 6.72 6.11   HEMOGLOBIN g/dL 11.0* 9.7*   HEMATOCRIT % 34.0 30.7*   MCV fL 100.9* 102.3*   MCHC g/dL 32.4 31.6   PLATELETS 10*3/mm3 236 195         Results from last 7 days   Lab Units 04/08/19  0445 04/07/19  1745 04/07/19  0743 04/07/19  0103 04/05/19  0417   SODIUM mmol/L 139  --  141  --   --    POTASSIUM mmol/L 4.3  --  4.3  --  3.9   MAGNESIUM mg/dL 2.1 2.7*  --  1.7 2.4   CHLORIDE mmol/L 102  --  104  --   --    CO2 mmol/L 25.0  --  24.4  --   --    BUN mg/dL 19  --  22*  --   --    CREATININE mg/dL 0.76  --  0.84  --   --    EGFR IF NONAFRICN AM mL/min/1.73 79  --  70  --   --    CALCIUM mg/dL 9.5  --  9.7  --   --    GLUCOSE  mg/dL 90  --  91  --   --    Estimated Creatinine Clearance: 61.5 mL/min (by C-G formula based on SCr of 0.76 mg/dL).    No results found for: AMMONIA                    I have personally looked at the labs and they are summarized above.  ----------------------------------------------------------------------------------------------------------------------  Imaging Results (last 24 hours)     ** No results found for the last 24 hours. 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 Tach  -Paroxysmal atrial fibrillation, currently sinus rhythm  -Chronic with a combination    No change in current treatment at this time, continue with physical therapy, will repeat BMP in the morning, awaiting for nursing home placement.      Kaye Wilson MD  04/09/19  12:47 PM    Electronically signed by Kaye Wilson MD at 4/9/2019 12:53 PM          Consult Notes (most recent note)      Silvia Tillman MD at 3/26/2019  4:12 PM      Consult Orders    1. Inpatient Palliative Care MD Consult [[id number]] ordered by Romario Martinez MD at 03/15/19 1257              Duplicate consult. Please see consult note from 3/15 for further details.     Pt was not seen today. Will attempt to reach dtshreya Correa to discuss GOC.     We will continue to follow along. Please do not hesitate to contact us regarding further sx mgmt or GOC needs, including after hours or on weekends via our on call provider at [phone].        Silvia Tillman MD    3/26/2019      Electronically signed by Silvia Tillman MD at 3/26/2019  4:13 PM          Nutrition Notes (most recent note)      Madeline Molina RD at 3/9/2019  7:24 PM        Nutrition Services    Patient Name:  Altagracia King  Date of Birth:   1965  MRN: [id number]  Admit Date:  2019    Note if tube feeding started, recommend Replete, start @ 20 ml/hr advance as tolerated to 50 ml/hr. Flush with 10 ml/hr h2o.      Thank you    Electronically signed by:  Madeline Molina RD  19 8:24 PM     Electronically signed by Madeline Molina RD at 3/9/2019  8:25 PM          Physical Therapy Notes (most recent note)      Kyung Lr, PT at 2019  2:50 PM  Version 1 of 1         Acute Care - Physical Therapy Treatment Note   [place]     Patient Name: Altagracia King  : 1965  MRN: [id number]  Today's Date: 2019  Onset of Illness/Injury or Date of Surgery: 19(original admit date)  Date of Referral to PT: 19  Referring Physician: Gui    Admit Date: 2019    Visit Dx:    ICD-10-CM ICD-9-CM   1. Chest pain, unspecified type R07.9 786.50   2. Acute cystitis without hematuria N30.00 595.0     Patient Active Problem List   Diagnosis   • Chest pain   • PAF (paroxysmal atrial fibrillation) (CMS/HCC)   • ETOH abuse   • Acute respiratory failure with hypoxia (CMS/HCC)       Therapy Treatment    Rehabilitation Treatment Summary     Row Name 19 1441             Treatment Time/Intention    Discipline  physical therapist  -CT      Document Type  therapy note (daily note)  -CT      Subjective Information  no complaints  -CT      Mode of Treatment  individual therapy;physical therapy  -CT      Therapy Frequency (PT Clinical Impression)  3 times/wk 3-5 times/ week per priority policy  -CT      Patient Effort  fair  -CT      Patient Response to Treatment  Pt tolerated treatment session fairly well today with rest breaks provided as needed. Pt able to ambulate today with Min A.   -CT      Recorded by [CT] Kyung Lr, PT 19 1441      Row Name 19 1441             Cognitive Assessment/Intervention- PT/OT    Orientation Status (Cognition)  oriented to;person  -CT      Follows Commands (Cognition)  follows one step  commands;physical/tactile prompts required;repetition of directions required;verbal cues/prompting required  -CT      Recorded by [CT] Kyung Lr, PT 04/08/19 1447      Row Name 04/08/19 1441             Safety Issues, Functional Mobility    Impairments Affecting Function (Mobility)  balance;coordination;endurance/activity tolerance;strength;visual/perceptual  -CT      Recorded by [CT] Kyung Lr, PT 04/08/19 1447      Row Name 04/08/19 1441             Transfer Assessment/Treatment    Transfer Assessment/Treatment  sit-stand transfer;stand-sit transfer;stand pivot/stand step transfer  -CT      Recorded by [CT] Kyung Lr, PT 04/08/19 1447      Row Name 04/08/19 1441             Sit-Stand Transfer    Sit-Stand [place] (Transfers)  minimum assist (75% patient effort);2 person assist;verbal cues;nonverbal cues (demo/gesture)  -CT      Assistive Device (Sit-Stand Transfers)  walker, front-wheeled  -CT      Recorded by [CT] Kyung Lr, PT 04/08/19 1447      Row Name 04/08/19 1441             Stand-Sit Transfer    Stand-Sit [place] (Transfers)  minimum assist (75% patient effort);2 person assist;verbal cues;nonverbal cues (demo/gesture)  -CT      Assistive Device (Stand-Sit Transfers)  walker, front-wheeled  -CT      Recorded by [CT] Kyung Lr, PT 04/08/19 1447      Row Name 04/08/19 1441             Gait/Stairs Assessment/Training    Gait/Stairs Assessment/Training  gait/ambulation independence;gait/ambulation assistive device;distance ambulated;gait pattern;gait deviations;maintains weight-bearing status  -CT      Hendricks Level (Gait)  minimum assist (75% patient effort);2 person assist;verbal cues;nonverbal cues (demo/gesture)  -CT      Assistive Device (Gait)  walker, front-wheeled  -CT      Distance in Feet (Gait)  120  -CT      Pattern (Gait)  step-to  -CT      Deviations/Abnormal Patterns (Gait)  base of support, narrow  -CT      Bilateral Gait Deviations  forward flexed  posture  -CT      Recorded by [CT] Kyung Lr, PT 04/08/19 1447      Row Name 04/08/19 1441             Positioning and Restraints    Pre-Treatment Position  in bed  -CT      Post Treatment Position  bed  -CT      In Bed  side lying left;call light within reach;encouraged to call for assist;exit alarm on;side rails up x3;notified nsg  -CT      Recorded by [CT] Be Kyung, PT 04/08/19 1447      Row Name 04/08/19 1441             Pain Scale: FACES Pre/Post-Treatment    Pain: FACES Scale, Pretreatment  0-->no hurt  -CT      Pain: FACES Scale, Post-Treatment  0-->no hurt  -CT      Recorded by [CT] Kyung Lr, PT 04/08/19 1447      Row Name 04/08/19 1441             Coping    Observed Emotional State  anxious;attention-seeking behavior  -CT      Verbalized Emotional State  acceptance  -CT      Recorded by [CT] Kyung Lr, PT 04/08/19 1447      Row Name 04/08/19 1441             Plan of Care Review    Plan of Care Reviewed With  patient  -CT      Recorded by [CT] Kyung Lr, PT 04/08/19 1447      Row Name 04/08/19 1441             Outcome Summary/Treatment Plan (PT)    Daily Summary of Progress (PT)  progress toward functional goals is good  -CT      Anticipated Discharge Disposition (PT)  skilled nursing facility  -CT      Recorded by [CT] Kyung Lr, PT 04/08/19 1447        User Key  (r) = Recorded By, (t) = Taken By, (c) = Cosigned By    Initials Name Effective Dates Discipline    CT Be Kyung, PT 04/03/18 -  PT                   Physical Therapy Education     Title: PT OT SLP Therapies (In Progress)     Topic: Physical Therapy (In Progress)     Point: Mobility training (In Progress)     Learning Progress Summary           Patient Acceptance, E,TB, NR by CT at 4/8/2019  2:48 PM    Acceptance, E,TB, VU by GAVINO at 4/8/2019  8:21 AM    Acceptance, E, NL,VU by  at 4/8/2019  2:37 AM    Acceptance, E,TB, VU by GAVINO at 4/7/2019  8:53 AM    Acceptance, E, NL by  at 4/6/2019 11:18 PM     Acceptance, E, NL by SM at 4/5/2019 10:02 PM    Acceptance, E,D, NR by AG at 4/4/2019  6:23 PM    Acceptance, E, NR by KB at 4/2/2019  6:10 PM    Acceptance, E, VU by SM at 4/2/2019 12:28 AM    Acceptance, E, VU,NR by AG1 at 4/1/2019 11:20 AM    Acceptance, E, NR by KB at 3/31/2019  9:45 PM    Acceptance, E, VU by MC at 3/31/2019  2:55 AM    Acceptance, E, VU by MC at 3/31/2019  2:45 AM    Acceptance, E, VU,NR by MC at 3/30/2019  1:57 AM    Acceptance, E,TB, VU,NR by CT at 3/29/2019 10:45 AM    Acceptance, E, NR by EA at 3/28/2019 10:44 PM    Acceptance, E,TB, NR,NL by CT at 3/28/2019  2:19 PM    Acceptance, E,TB, VU by JS at 3/28/2019 12:47 AM    Acceptance, E, VU by AM at 3/27/2019  4:07 PM    Acceptance, E, NR by EA at 3/26/2019  9:47 PM    Acceptance, E, VU by LAZARO at 3/20/2019 10:09 PM    Acceptance, E, VU by AW at 3/16/2019  6:34 PM    Acceptance, E,TB, VU by ILYA at 3/14/2019 12:21 AM    Comment:  PT unable to rcve teaching at this time    Acceptance, E,TB, VU by ILYA at 3/12/2019 11:00 PM    Comment:  PT unable to rcve teaching at this time    Nonacceptance, E, NL by SB at 3/12/2019  9:17 AM    Comment:  pt intubated and sedated; no one present at the bedside    Acceptance, E, NR by EV at 3/11/2019  9:02 PM    Comment:  intubated/sedated. No family or caretaker at bedside    Nonacceptance, E, NL by WS at 3/9/2019  9:06 AM    Comment:  pt sedated and intubated    Acceptance, E, VU by WS at 3/9/2019  9:05 AM    Acceptance, E,TB, VU by ILYA at 3/8/2019  9:46 PM    Comment:  PT unable to rcve teaching at this time    Nonacceptance, E, NL by WS at 3/8/2019  4:13 PM    Acceptance, E, VU by LT at 3/7/2019 10:13 AM    Acceptance, E, VU by BC at 3/5/2019  5:55 PM    Acceptance, E,TB, VU by CL at 3/4/2019  7:53 PM    Acceptance, E,TB, VU by CL at 3/4/2019  7:52 PM    Acceptance, E,TB, VU by CT at 3/1/2019  3:23 PM    Acceptance, E,TB, VU,NR by KB1 at 2/26/2019  3:13 PM    Acceptance, E,TB, NR by CT at 2/25/2019  3:25 PM    Family Acceptance, E, VU by AW at 3/15/2019 12:08 PM    Acceptance, E, VU by SB at 3/13/2019 11:01 AM   Significant Other Acceptance, E, VU by WS at 3/9/2019  9:05 AM                   Point: Home exercise program (In Progress)     Learning Progress Summary           Patient Acceptance, E,TB, NR by CT at 4/8/2019  2:48 PM    Acceptance, E,TB, VU by GAVINO at 4/8/2019  8:21 AM    Acceptance, E, NL,VU by SM at 4/8/2019  2:37 AM    Acceptance, E,TB, VU by GAVINO at 4/7/2019  8:53 AM    Acceptance, E, NL by SM at 4/6/2019 11:18 PM    Acceptance, E, NL by SM at 4/5/2019 10:02 PM    Acceptance, E,D, NR by AG at 4/4/2019  6:23 PM    Acceptance, E, NR by KB at 4/2/2019  6:10 PM    Acceptance, E, VU by SM at 4/2/2019 12:28 AM    Acceptance, E, VU,NR by AG1 at 4/1/2019 11:20 AM    Acceptance, E, NR by KB at 3/31/2019  9:45 PM    Acceptance, E, VU by MC at 3/31/2019  2:55 AM    Acceptance, E, VU by MC at 3/31/2019  2:45 AM    Acceptance, E, VU,NR by MC at 3/30/2019  1:57 AM    Acceptance, E,TB, VU,NR by CT at 3/29/2019 10:45 AM    Acceptance, E, NR by EA at 3/28/2019 10:44 PM    Acceptance, E,TB, NR,NL by CT at 3/28/2019  2:19 PM    Acceptance, E,TB, VU by JS at 3/28/2019 12:47 AM    Acceptance, E, VU by AM at 3/27/2019  4:07 PM    Acceptance, E, NR by KATELYN at 3/26/2019  9:47 PM    Acceptance, E, VU by LAZARO at 3/20/2019 10:09 PM    Acceptance, E, VU by LEONCIO at 3/16/2019  6:34 PM    Acceptance, E,TB, VU by ILYA at 3/14/2019 12:21 AM    Comment:  PT unable to rcve teaching at this time    Acceptance, E,TB, VU by ILYA at 3/12/2019 11:00 PM    Comment:  PT unable to rcve teaching at this time    Nonacceptance, E, NL by SB at 3/12/2019  9:17 AM    Comment:  pt intubated and sedated; no one present at the bedside    Acceptance, E, NR by EV at 3/11/2019  9:02 PM    Comment:  intubated/sedated. No family or caretaker at bedside    Nonacceptance, E, NL by WS at 3/9/2019  9:06 AM    Comment:  pt sedated and intubated    Acceptance, E, VU by WS at  3/9/2019  9:05 AM    Acceptance, E,TB, VU by ILYA at 3/8/2019  9:46 PM    Comment:  PT unable to rcve teaching at this time    Nonacceptance, E, NL by WS at 3/8/2019  4:13 PM    Acceptance, E, VU by LT at 3/7/2019 10:13 AM    Acceptance, E, VU by BC at 3/5/2019  5:55 PM    Acceptance, E,TB, VU by CL at 3/4/2019  7:53 PM    Acceptance, E,TB, VU by CL at 3/4/2019  7:52 PM    Acceptance, E,TB, VU by CT at 3/1/2019  3:23 PM    Acceptance, E,TB, VU,NR by KB1 at 2/26/2019  3:13 PM    Acceptance, E,TB, NR by CT at 2/25/2019  3:25 PM   Family Acceptance, E, VU by AW at 3/15/2019 12:08 PM    Acceptance, E, VU by SB at 3/13/2019 11:01 AM   Significant Other Acceptance, E, VU by WS at 3/9/2019  9:05 AM                   Point: Body mechanics (In Progress)     Learning Progress Summary           Patient Acceptance, E,TB, NR by CT at 4/8/2019  2:48 PM    Acceptance, E,TB, VU by GAVINO at 4/8/2019  8:21 AM    Acceptance, E, NL,VU by SM at 4/8/2019  2:37 AM    Acceptance, E,TB, VU by GAVINO at 4/7/2019  8:53 AM    Acceptance, E, NL by SM at 4/6/2019 11:18 PM    Acceptance, E, NL by SM at 4/5/2019 10:02 PM    Acceptance, E,D, NR by AG at 4/4/2019  6:23 PM    Acceptance, E, NR by KB at 4/2/2019  6:10 PM    Acceptance, E, VU by SM at 4/2/2019 12:28 AM    Acceptance, E, VU,NR by AG1 at 4/1/2019 11:20 AM    Acceptance, E, NR by KB at 3/31/2019  9:45 PM    Acceptance, E, VU by MC at 3/31/2019  2:55 AM    Acceptance, E, VU by MC at 3/31/2019  2:45 AM    Acceptance, E, VU,NR by MC at 3/30/2019  1:57 AM    Acceptance, E,TB, VU,NR by CT at 3/29/2019 10:45 AM    Acceptance, E, NR by EA at 3/28/2019 10:44 PM    Acceptance, E,TB, NR,NL by CT at 3/28/2019  2:19 PM    Acceptance, E,TB, VU by JS at 3/28/2019 12:47 AM    Acceptance, E, VU by AM at 3/27/2019  4:07 PM    Acceptance, E, NR by EA at 3/26/2019  9:47 PM    Acceptance, E, VU by LAZARO at 3/20/2019 10:09 PM    Acceptance, E, VU by AW at 3/16/2019  6:34 PM    Acceptance, E,TB, VU by ILYA at 3/14/2019  12:21 AM    Comment:  PT unable to rcve teaching at this time    Acceptance, E,TB, VU by ILYA at 3/12/2019 11:00 PM    Comment:  PT unable to rcve teaching at this time    Nonacceptance, E, NL by SB at 3/12/2019  9:17 AM    Comment:  pt intubated and sedated; no one present at the bedside    Acceptance, E, NR by EV at 3/11/2019  9:02 PM    Comment:  intubated/sedated. No family or caretaker at bedside    Nonacceptance, E, NL by WS at 3/9/2019  9:06 AM    Comment:  pt sedated and intubated    Acceptance, E, VU by WS at 3/9/2019  9:05 AM    Acceptance, E,TB, VU by ILYA at 3/8/2019  9:46 PM    Comment:  PT unable to rcve teaching at this time    Nonacceptance, E, NL by WS at 3/8/2019  4:13 PM    Acceptance, E, VU by LT at 3/7/2019 10:13 AM    Acceptance, E, VU by BC at 3/5/2019  5:55 PM    Acceptance, E,TB, VU by CL at 3/4/2019  7:53 PM    Acceptance, E,TB, VU by CL at 3/4/2019  7:52 PM    Acceptance, E,TB, VU by CT at 3/1/2019  3:23 PM    Acceptance, E,TB, VU,NR by KB1 at 2/26/2019  3:13 PM    Acceptance, E,TB, NR by CT at 2/25/2019  3:25 PM   Family Acceptance, E, VU by AW at 3/15/2019 12:08 PM    Acceptance, E, VU by SB at 3/13/2019 11:01 AM   Significant Other Acceptance, E, VU by WS at 3/9/2019  9:05 AM                   Point: Precautions (In Progress)     Learning Progress Summary           Patient Acceptance, E,TB, NR by CT at 4/8/2019  2:48 PM    Acceptance, E,TB, VU by GAVINO at 4/8/2019  8:21 AM    Acceptance, E, NL,VU by SM at 4/8/2019  2:37 AM    Acceptance, E,TB, VU by GAVINO at 4/7/2019  8:53 AM    Acceptance, E, NL by SM at 4/6/2019 11:18 PM    Acceptance, E, NL by SM at 4/5/2019 10:02 PM    Acceptance, E,D, NR by AG at 4/4/2019  6:23 PM    Acceptance, E, NR by DAGO at 4/2/2019  6:10 PM    Acceptance, E, VU by JACOB at 4/2/2019 12:28 AM    Acceptance, E, VU,NR by CATALINO1 at 4/1/2019 11:20 AM    Acceptance, E, NR by DAGO at 3/31/2019  9:45 PM    Acceptance, E, VU by MC at 3/31/2019  2:55 AM    Acceptance, E, VU by MC at  3/31/2019  2:45 AM    Acceptance, E, VU,NR by MC at 3/30/2019  1:57 AM    Acceptance, E,TB, VU,NR by CT at 3/29/2019 10:45 AM    Acceptance, E, NR by EA at 3/28/2019 10:44 PM    Acceptance, E,TB, NR,NL by CT at 3/28/2019  2:19 PM    Acceptance, E,TB, VU by JS at 3/28/2019 12:47 AM    Acceptance, E, VU by AM at 3/27/2019  4:07 PM    Acceptance, E, NR by EA at 3/26/2019  9:47 PM    Acceptance, E, VU by LAZARO at 3/20/2019 10:09 PM    Acceptance, E, VU by AW at 3/16/2019  6:34 PM    Acceptance, E,TB, VU by ILYA at 3/14/2019 12:21 AM    Comment:  PT unable to rcve teaching at this time    Acceptance, E,TB, VU by ILYA at 3/12/2019 11:00 PM    Comment:  PT unable to rcve teaching at this time    Nonacceptance, E, NL by SB at 3/12/2019  9:17 AM    Comment:  pt intubated and sedated; no one present at the bedside    Acceptance, E, NR by EV at 3/11/2019  9:02 PM    Comment:  intubated/sedated. No family or caretaker at bedside    Nonacceptance, E, NL by WS at 3/9/2019  9:06 AM    Comment:  pt sedated and intubated    Acceptance, E, VU by WS at 3/9/2019  9:05 AM    Acceptance, E,TB, VU by ILYA at 3/8/2019  9:46 PM    Comment:  PT unable to rcve teaching at this time    Nonacceptance, E, NL by WS at 3/8/2019  4:13 PM    Acceptance, E, VU by LT at 3/7/2019 10:13 AM    Acceptance, E, VU by BC at 3/5/2019  5:55 PM    Acceptance, E,TB, VU by CL at 3/4/2019  7:53 PM    Acceptance, E,TB, VU by CL at 3/4/2019  7:52 PM    Acceptance, E,TB, VU by CT at 3/1/2019  3:23 PM    Acceptance, E,TB, VU,NR by KB1 at 2/26/2019  3:13 PM    Acceptance, E,TB, NR by CT at 2/25/2019  3:25 PM   Family Acceptance, E, VU by AW at 3/15/2019 12:08 PM    Acceptance, E, VU by SB at 3/13/2019 11:01 AM   Significant Other Acceptance, E, VU by WS at 3/9/2019  9:05 AM                               User Key     Initials Effective Dates Name Provider Type Discipline    SB 06/16/16 -  Kathy Rowell RN Registered Nurse Nurse    ILYA 06/16/16 -  Alberto Mendoza RN  Registered Nurse Nurse    LT 06/16/16 -  Denae Carvajal, RN Registered Nurse Nurse    EA 06/16/16 -  Mary Blanton, RN Registered Nurse Nurse    KB 06/16/16 -  Lorena Garcia, RN Registered Nurse Nurse    AW 06/16/16 -  Heather Godinez, RN Registered Nurse Nurse    WS 06/16/16 -  Keisha Messer, RN Registered Nurse Nurse    AM 06/16/16 -  Jack, Madeline Stevenson, RN Registered Nurse Nurse    BC 03/14/16 -  Ekaterina Cifuentes, PT Physical Therapist PT    AG 04/03/18 -  Parisa Orellana, PT Physical Therapist PT    CT 04/03/18 -  Kyung Lr, PT Physical Therapist PT    AG1 06/16/16 -  Ayana Corbin, RN Registered Nurse Nurse    CL 07/23/18 -  Rj Godfrey, RN Registered Nurse Nurse    KB1 12/20/17 -  Ekaterina Parry, PTA Physical Therapy Assistant PT    LAZARO 05/22/18 -  Renata Mcbride, RN Registered Nurse Nurse    MC 09/06/18 -  Collett, Morgan, RN Registered Nurse Nurse    SM 09/12/18 -  Sean Kohler, RN Registered Nurse Nurse    GAVINO 10/18/18 -  Marko Cifuentes, RN Registered Nurse Nurse    EV 10/26/18 -  Corry Bagley, RN Registered Nurse Nurse    JS 11/26/18 -  Sg Smith, RN Registered Nurse Nurse                PT Recommendation and Plan  Anticipated Discharge Disposition (PT): skilled nursing facility  Planned Therapy Interventions (PT Eval): balance training, gait training, bed mobility training, home exercise program, manual therapy techniques, motor coordination training, neuromuscular re-education, patient/family education, postural re-education, ROM (range of motion), stair training, strengthening, stretching, transfer training  Therapy Frequency (PT Clinical Impression): 3 times/wk(3-5 times/ week per priority policy)  Outcome Summary/Treatment Plan (PT)  Daily Summary of Progress (PT): progress toward functional goals is good  Anticipated Equipment Needs at Discharge (PT): front wheeled walker  Anticipated Discharge Disposition (PT): skilled  nursing facility  Plan of Care Reviewed With: patient  Progress: improving  Outcome Measures     Row Name 04/08/19 1400             How much help from another person do you currently need...    Turning from your back to your side while in flat bed without using bedrails?  3  -CT      Moving from lying on back to sitting on the side of a flat bed without bedrails?  3  -CT      Moving to and from a bed to a chair (including a wheelchair)?  3  -CT      Standing up from a chair using your arms (e.g., wheelchair, bedside chair)?  3  -CT      Climbing 3-5 steps with a railing?  2  -CT      To walk in hospital room?  3  -CT      AM-PAC 6 Clicks Score  17  -CT         Functional Assessment    Outcome Measure Options  AM-PAC 6 Clicks Basic Mobility (PT)  -CT        User Key  (r) = Recorded By, (t) = Taken By, (c) = Cosigned By    Initials Name Provider Type    CT Kyung Lr, PT Physical Therapist         Time Calculation:   PT Charges     Row Name 04/08/19 1449             Time Calculation    PT Received On  04/08/19  -CT      PT - Next Appointment  04/09/19  -CT         Time Calculation- PT    Total Timed Code Minutes- PT  30 minute(s)  -CT         Timed Charges    24795 - Gait Training Minutes   20  -CT      54020 - PT Therapeutic Activity Minutes  10  -CT        User Key  (r) = Recorded By, (t) = Taken By, (c) = Cosigned By    Initials Name Provider Type    CT Kyung Lr, NAVDEEP Physical Therapist        Therapy Charges for Today     Code Description Service Date Service Provider Modifiers Qty    30261408021 HC GAIT TRAINING EA 15 MIN 4/8/2019 Kyung Lr, PT GP 1    70640187038 HC PT THERAPEUTIC ACT EA 15 MIN 4/8/2019 Kyung Lr, PT GP 1    04283152897 HC PT THER SUPP EA 15 MIN 4/8/2019 Kyung Lr, PT GP 2          PT G-Codes  Outcome Measure Options: AM-PAC 6 Clicks Basic Mobility (PT)  AM-PAC 6 Clicks Score: 17  Score: 12    Kyung Lr PT  4/8/2019         Electronically signed by Be  Kyung, PT at 2019  2:50 PM          Occupational Therapy Notes (most recent note)      Roni Raines, OT at 3/26/2019  1:03 PM          Acute Care - Occupational Therapy Initial Evaluation  [facility]     Patient Name: Altagracia King  : 1965  MRN: [id number]  Today's Date: 3/27/2019  Onset of Illness/Injury or Date of Surgery: 19(original admit date)     Referring Physician: Gui    Admit Date: 2019       ICD-10-CM ICD-9-CM   1. Chest pain, unspecified type R07.9 786.50   2. Acute cystitis without hematuria N30.00 595.0     Patient Active Problem List   Diagnosis   • Chest pain   • PAF (paroxysmal atrial fibrillation) (CMS/HCC)   • ETOH abuse   • Acute respiratory failure with hypoxia (CMS/HCC)     Past Medical History:   Diagnosis Date   • Alcoholism (CMS/HCC)    • Blindness of left eye    • Depression    • GERD (gastroesophageal reflux disease)    • Paroxysmal atrial fibrillation (CMS/HCC)      Past Surgical History:   Procedure Laterality Date   • CARDIAC CATHETERIZATION     • COLONOSCOPY     • ENDOSCOPY     • TUBAL ABDOMINAL LIGATION            OT ASSESSMENT FLOWSHEET (last 72 hours)      Occupational Therapy Evaluation     Row Name 19 1255                   OT Evaluation Time/Intention    Document Type  evaluation  -KR        Mode of Treatment  occupational therapy  -KR        Patient Effort  poor  -KR        Comment  Pt. presents unable to adequately participate in skilled therapy at this time due to cognitive status. Pt. also presents with agitation and apprehension toward staff throughout activity. Not a candidate for skilled therapy at this time. Please re-consult as appropriate to participate.   -KR           Cognitive Assessment/Intervention- PT/OT    Orientation Status (Cognition)  oriented to;person  -KR        Follows Commands (Cognition)  verbal cues/prompting required;physical/tactile prompts required  -KR           ADL Assessment/Intervention    BADL  Assessment/Intervention  upper body dressing;lower body dressing;grooming;feeding;toileting  -KR           Bathing Assessment/Intervention    Bathing [place] Level  bathing skills;maximum assist (25% patient effort)  -KR           Upper Body Dressing Assessment/Training    Upper Body Dressing [place] Level  upper body dressing skills;maximum assist (25% patient effort)  -KR           Lower Body Dressing Assessment/Training    Lower Body Dressing [place] Level  lower body dressing skills;maximum assist (25% patient effort)  -KR           Grooming Assessment/Training    [place] Level (Grooming)  grooming skills;maximum assist (25% patient effort)  -KR           Self-Feeding Assessment/Training    [place] Level (Feeding)  feeding skills;maximum assist (25% patient effort)  -KR           Toileting Assessment/Training    [place] Level (Toileting)  toileting skills;maximum assist (25% patient effort)  -KR           Clinical Impression (OT)    Criteria for Skilled Therapeutic Interventions Met (OT Eval)  -- Unable to consistently follow directions or participate   -KR          User Key  (r) = Recorded By, (t) = Taken By, (c) = Cosigned By    Initials Name Effective Dates    KR Roni Raines, OT 04/03/18 -                OT Recommendation and Plan  Planned Therapy Interventions (OT Eval): activity tolerance training, BADL retraining, strengthening exercise       Outcome Measures     Row Name 03/27/19 1000 03/26/19 1302 03/25/19 1600       How much help from another person do you currently need...    Turning from your back to your side while in flat bed without using bedrails?  --  --  2  -CT    Moving from lying on back to sitting on the side of a flat bed without bedrails?  --  --  2  -CT    Moving to and from a bed to a chair (including a wheelchair)?  --  --  2  -CT    Standing up from a chair using your arms (e.g., wheelchair, bedside chair)?  --  --  2  -CT    Climbing 3-5 steps with a  railing?  --  --  1  -CT    To walk in hospital room?  --  --  1  -CT    AM-PAC 6 Clicks Score  --  --  10  -CT       How much help from another is currently needed...    Putting on and taking off regular lower body clothing?  --  2  -KR  --    Bathing (including washing, rinsing, and drying)  --  2  -KR  --    Toileting (which includes using toilet bed pan or urinal)  --  2  -KR  --    Putting on and taking off regular upper body clothing  --  2  -KR  --    Taking care of personal grooming (such as brushing teeth)  --  2  -KR  --    Eating meals  --  2  -KR  --    Score  --  12  -KR  --       Functional Assessment    Outcome Measure Options  AM-PAC 6 Clicks Daily Activity (OT)  -KR  --  AM-PAC 6 Clicks Basic Mobility (PT)  -CT      User Key  (r) = Recorded By, (t) = Taken By, (c) = Cosigned By    Initials Name Provider Type    KR Roni Raines OT Occupational Therapist    CT Kyung Lr PT Physical Therapist          Time Calculation:     Therapy Charges for Today     Code Description Service Date Service Provider Modifiers Qty    [id number]  OT EVAL HIGH COMPLEXITY 2 3/26/2019 Roni Raines OT GO 1               Roni Raines OT  3/27/2019    Electronically signed by Roni Raines OT at 3/27/2019 10:04 AM          Speech Language Pathology Notes (most recent note)      Mirian Barber, MS CCC-SLP at 3/25/2019 11:04 AM          Acute Care - Speech Language Pathology   Swallow Progress Note/Discharge    Manjit     Patient Name: Altagracia King  : 1965  MRN: [id number]  Today's Date: 3/25/2019  Onset of Illness/Injury or Date of Surgery: 19     Referring Physician: Juan      Admit Date: 2019    Visit Dx:      ICD-10-CM ICD-9-CM   1. Chest pain, unspecified type R07.9 786.50   2. Acute cystitis without hematuria N30.00 595.0     Patient Active Problem List   Diagnosis   • Chest pain   • PAF (paroxysmal atrial fibrillation) (CMS/HCC)   • ETOH abuse   • Acute respiratory failure  "with hypoxia (CMS/HCC)     Therapy Treatment  Ms. King was seen at bedside this am on 3S to assess diet tolerance, safety/candidacy for consistency upgrade. She is much improved in a/a status, participates in conversational exchanges, and follow simple directives. Her sitter is present to report minimal po acceptance w/ breakfast tray. Ms. King indicates that she was \"waiting on brunch. Breakfast was too early.\"     She is positioned upright and centered in bed to accept ashley cracker and thin liquids. No overt s/s aspiration, no silent aspiration suspected. She is felt to be able to upgrade to chopped meats.     Outcome Summary   Ms. King is improved in a/a status. She will benefit from upgrade to mechanical soft, chopped meats, thin liquids. This is felt to be her least restrictive po diet.     EDUCATION  The patient has been educated in the following areas:   Dysphagia (Swallowing Impairment) Oral Care/Hydration Modified Diet Instruction.    SLP Recommendation and Plan   1. Mechanical soft diet, chopped meats, thin liquids.   2. Medications whole in puree/thins. Single pill presentations.   3. Upright and centered for all po intake.   4. ISAIAH precautions.   5. Oral care protocol.   No further formal SLP f/u recommended.     Thank you for allowing me to participate in the care of your patient.     Mirian Barber M.S., CCC/SLP                             Time Calculation:       Therapy Charges for Today     Code Description Service Date Service Provider Modifiers Qty    [id number]  ST TREATMENT SWALLOW 3 3/25/2019 Mirian Barber, MS CCC-SLP GN 1                    Mirian Barber MS CCC-SLP  3/25/2019    Electronically signed by Mirian Barber, MS CCC-SLP at 3/25/2019 11:09 AM          Respiratory Therapy Notes (most recent note)      Bushra Cueva, RRT at 3/27/2019  6:44 AM        Pt combative at this time, no notable distress at this time, sitter at bedside.    Electronically signed " by Bushra Cueva, RRT at 3/27/2019  8:55 AM

## 2019-04-09 NOTE — PROGRESS NOTES
Discharge Planning Assessment  [facility]     Patient Name: Altagracia King  MRN: [id number]  Today's Date: 4/9/2019    Admit Date: 2/22/2019      Discharge Plan     Row Name 04/09/19 1129       Plan    Plan  SS spoke with [facility] [facility] in [place] on this date. Pt would not meet a PAE to receive [place] Medicaid. SS has contacted [facility] and left message. SS spoke with the [facility]ia bonner [place], who states that the pt would need a social security card, picture ID, and birth certificate. SS attempted to contact pt's daughter to see if she has access. SS will follow.    Patient/Family in Agreement with Plan  yes        Destination      Service Provider Request Status Selected Services Address Phone Number Fax Number    [facility] Pending - Request Sent N/A [address] [place] [phone]    [facility] Pending - Request Sent N/A [address] [place] [phone]    [facility] - Request Sent N/A [address] [place] [phone]    [facility] - Request Sent N/A [address] [facility] [address] [phone]    [facility] Declined N/A [address] [facility] [address] [phone]    [facility] Declined N/A [address] [address] [phone]     Expected Discharge Date and Time     Expected Discharge Date Expected Discharge Time    Apr 10, 2019           Jazmyn Mendoza

## 2019-04-09 NOTE — PLAN OF CARE
Problem: Fall Risk (Adult)  Goal: Absence of Fall  Outcome: Ongoing (interventions implemented as appropriate)      Problem: Skin Injury Risk (Adult)  Goal: Skin Health and Integrity  Outcome: Ongoing (interventions implemented as appropriate)      Problem: Patient Care Overview  Goal: Plan of Care Review  Outcome: Ongoing (interventions implemented as appropriate)    Goal: Individualization and Mutuality  Outcome: Ongoing (interventions implemented as appropriate)    Goal: Discharge Needs Assessment  Outcome: Ongoing (interventions implemented as appropriate)    Goal: Interprofessional Rounds/Family Conf  Outcome: Ongoing (interventions implemented as appropriate)    Goal: Individualization and Mutuality  Outcome: Ongoing (interventions implemented as appropriate)    Goal: Discharge Needs Assessment  Outcome: Ongoing (interventions implemented as appropriate)    Goal: Interprofessional Rounds/Family Conf  Outcome: Ongoing (interventions implemented as appropriate)      Problem: Pain, Chronic (Adult)  Goal: Acceptable Pain/Comfort Level and Functional Ability  Outcome: Ongoing (interventions implemented as appropriate)      Problem: Mobility, Physical Impaired (Adult)  Goal: Enhanced Mobility Skills  Outcome: Ongoing (interventions implemented as appropriate)    Goal: Enhanced Functional Ability  Outcome: Ongoing (interventions implemented as appropriate)      Problem: Arrhythmia/Dysrhythmia (Symptomatic) (Adult)  Goal: Signs and Symptoms of Listed Potential Problems Will be Absent, Minimized or Managed (Arrhythmia/Dysrhythmia)  Outcome: Ongoing (interventions implemented as appropriate)

## 2019-04-10 LAB
ANION GAP SERPL CALCULATED.3IONS-SCNC: 12.9 MMOL/L
BUN BLD-MCNC: 18 MG/DL (ref 6–20)
BUN/CREAT SERPL: 22 (ref 7–25)
CALCIUM SPEC-SCNC: 10.1 MG/DL (ref 8.6–10.5)
CHLORIDE SERPL-SCNC: 104 MMOL/L (ref 98–107)
CO2 SERPL-SCNC: 23.1 MMOL/L (ref 22–29)
CREAT BLD-MCNC: 0.82 MG/DL (ref 0.57–1)
GFR SERPL CREATININE-BSD FRML MDRD: 72 ML/MIN/1.73
GLUCOSE BLD-MCNC: 85 MG/DL (ref 65–99)
POTASSIUM BLD-SCNC: 4.2 MMOL/L (ref 3.5–5.2)
SODIUM BLD-SCNC: 140 MMOL/L (ref 136–145)

## 2019-04-10 PROCEDURE — 80048 BASIC METABOLIC PNL TOTAL CA: CPT | Performed by: HOSPITALIST

## 2019-04-10 PROCEDURE — 97530 THERAPEUTIC ACTIVITIES: CPT

## 2019-04-10 PROCEDURE — 97116 GAIT TRAINING THERAPY: CPT

## 2019-04-10 PROCEDURE — 99231 SBSQ HOSP IP/OBS SF/LOW 25: CPT | Performed by: HOSPITALIST

## 2019-04-10 RX ADMIN — METOPROLOL TARTRATE 75 MG: 50 TABLET, FILM COATED ORAL at 08:30

## 2019-04-10 RX ADMIN — LEVOTHYROXINE SODIUM 75 MCG: 75 TABLET ORAL at 05:38

## 2019-04-10 RX ADMIN — SPIRONOLACTONE 25 MG: 25 TABLET ORAL at 12:17

## 2019-04-10 RX ADMIN — MEXILETINE HYDROCHLORIDE 150 MG: 150 CAPSULE ORAL at 20:41

## 2019-04-10 RX ADMIN — FOLIC ACID 1 MG: 1 TABLET ORAL at 08:31

## 2019-04-10 RX ADMIN — METOPROLOL TARTRATE 75 MG: 50 TABLET, FILM COATED ORAL at 20:40

## 2019-04-10 RX ADMIN — MEXILETINE HYDROCHLORIDE 150 MG: 150 CAPSULE ORAL at 12:16

## 2019-04-10 RX ADMIN — FUROSEMIDE 10 MG: 20 TABLET ORAL at 08:31

## 2019-04-10 RX ADMIN — Medication 100 MG: at 08:31

## 2019-04-10 RX ADMIN — APIXABAN 5 MG: 5 TABLET, FILM COATED ORAL at 20:40

## 2019-04-10 RX ADMIN — AMIODARONE HYDROCHLORIDE 200 MG: 200 TABLET ORAL at 08:31

## 2019-04-10 RX ADMIN — APIXABAN 5 MG: 5 TABLET, FILM COATED ORAL at 08:32

## 2019-04-10 RX ADMIN — PANTOPRAZOLE SODIUM 40 MG: 40 TABLET, DELAYED RELEASE ORAL at 05:38

## 2019-04-10 RX ADMIN — MEXILETINE HYDROCHLORIDE 150 MG: 150 CAPSULE ORAL at 05:38

## 2019-04-10 RX ADMIN — MAGNESIUM GLUCONATE 500 MG ORAL TABLET 400 MG: 500 TABLET ORAL at 08:32

## 2019-04-10 RX ADMIN — OLANZAPINE 5 MG: 5 TABLET, FILM COATED ORAL at 20:40

## 2019-04-10 RX ADMIN — SODIUM CHLORIDE, PRESERVATIVE FREE 3 ML: 5 INJECTION INTRAVENOUS at 20:40

## 2019-04-10 NOTE — PROGRESS NOTES
[facility] HOSPITALIST PROGRESS NOTE     Patient Identification:  Name:  Altagracia King  Age:  53 y.o.  Sex:  female  :  1965  MRN:  [id number]  Visit Number:  [id number]  Primary Care Provider:  Provider, No Known    Length of stay:  46    Subjective: Patient reports  feeling she is not feeling good today, she is sleepy, no arrhythmia reported on monitor, electrolytes is good with potassium more than 4, no nausea or vomiting, no diarrhea.  Patient did participate physical therapy today.  No report of problems from nursing staff.   still working on placement.    Chief Complaint: Generalized weakness  ----------------------------------------------------------------------------------------------------------------------  Current Hospital Meds:    amiodarone 200 mg Oral Daily   apixaban 5 mg Oral Q12H   bisacodyl 10 mg Rectal Daily   cyanocobalamin 1,000 mcg Intramuscular Q30 Days   digoxin 125 mcg Oral Q48H   folic acid 1 mg Oral Daily   furosemide 10 mg Oral Daily   levothyroxine 75 mcg Oral Q AM   magnesium oxide 400 mg Oral Daily   metoprolol tartrate 75 mg Oral Q12H   mexiletine 150 mg Oral Q8H   OLANZapine 5 mg Oral Nightly   pantoprazole 40 mg Oral Q AM   sennosides-docusate sodium 2 tablet Oral Nightly   sodium chloride 3 mL Intravenous Q12H   sodium chloride 3 mL Intravenous Q12H   spironolactone 25 mg Oral Daily With Lunch   thiamine 100 mg Oral Daily       Pharmacy Consult      ----------------------------------------------------------------------------------------------------------------------  Vital Signs:  Temp:  [96 °F (35.6 °C)-98 °F (36.7 °C)] 97.7 °F (36.5 °C)  Heart Rate:  [71-78] 78  Resp:  [18] 18  BP: ()/(54-82) 106/62       Tele: This rhythm 74 bpm      19  0322 19  0500 04/10/19  0319   Weight: 46.5 kg (102 lb 8 oz) 46.6 kg (102 lb 12.8 oz) 47.3 kg (104 lb 3.2 oz)     Body mass index is 17.34 kg/m².    Intake/Output Summary (Last 24 hours)  at 4/10/2019 1530  Last data filed at 4/10/2019 1500  Gross per 24 hour   Intake 1300 ml   Output 400 ml   Net 900 ml     Diet Soft Texture; Chopped; Thin; Daily Fluid Restriction; Other; 1,800  ----------------------------------------------------------------------------------------------------------------------  Physical exam:  General: Comfortable,awake, alert, oriented to self, place, and time, well-developed, chronically ill-appearing,  No respiratory distress.    Skin:  Skin is warm and dry. No rash noted.  She is pale.  HENT:  Head:  Normocephalic and atraumatic.  Mouth:  Moist mucous membranes.    Eyes:  Conjunctivae and EOM are normal.   No scleral icterus.  Left eye completely opacified, right eye has cataract  Neck:  Neck supple.  No JVD present.    Pulmonary/Chest:  No respiratory distress, no wheezes, no crackles, with normal breath sounds and good air movement.  Cardiovascular:  Normal rate, regular rhythm and normal heart sounds with no murmur.  Abdominal:  Soft.  Bowel sounds are normal.  No distension and no tenderness.   Extremities:  No edema, no tenderness, and no deformity.  No red or swollen joints anywhere.  Strong pulses in all 4 extremities with no clubbing, no cyanosis, no edema.  Neurological:  Motor strength equal no obvious deficit, sensory grossly intact.   No cranial nerve deficit.  No tongue deviation.  No facial droop.  No slurred speech.     ----------------------------------------------------------------------------------------------------------------------  ----------------------------------------------------------------------------------------------------------------------      Results from last 7 days   Lab Units 04/08/19  0718 04/05/19  1031   WBC 10*3/mm3 6.72 6.11   HEMOGLOBIN g/dL 11.0* 9.7*   HEMATOCRIT % 34.0 30.7*   MCV fL 100.9* 102.3*   MCHC g/dL 32.4 31.6   PLATELETS 10*3/mm3 236 195         Results from last 7 days   Lab Units 04/10/19  0358 04/08/19  9377  04/07/19  1745 04/07/19  0743 04/07/19  0103   SODIUM mmol/L 140 139  --  141  --    POTASSIUM mmol/L 4.2 4.3  --  4.3  --    MAGNESIUM mg/dL  --  2.1 2.7*  --  1.7   CHLORIDE mmol/L 104 102  --  104  --    CO2 mmol/L 23.1 25.0  --  24.4  --    BUN mg/dL 18 19  --  22*  --    CREATININE mg/dL 0.82 0.76  --  0.84  --    EGFR IF NONAFRICN AM mL/min/1.73 72 79  --  70  --    CALCIUM mg/dL 10.1 9.5  --  9.7  --    GLUCOSE mg/dL 85 90  --  91  --    Estimated Creatinine Clearance: 57.9 mL/min (by C-G formula based on SCr of 0.82 mg/dL).    No results found for: AMMONIA                    I have personally looked at the labs and they are summarized above.  ----------------------------------------------------------------------------------------------------------------------  Imaging Results (last 24 hours)     ** No results found for the last 24 hours. 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 Tach  -Chronic anticoagulation for stroke prevention  -Acquired hypothyroidism.    No change with current medication for now patient seems to be tolerating well, continue with physical therapy, for nursing home placement.      Kaye Wilson MD  04/10/19  3:30 PM

## 2019-04-10 NOTE — PROGRESS NOTES
Palliative care will sign off.  Patient does not have a HCS at this time.  No one is accepting patient's request.  SS working diligently on the case to find safe placement. GOC discussed with patient and code status remains Full code. Do not hesitate to re-consult us if needed.     Asia Carvajal, APRN

## 2019-04-10 NOTE — PLAN OF CARE
Problem: Fall Risk (Adult)  Goal: Absence of Fall  Outcome: Ongoing (interventions implemented as appropriate)      Problem: Skin Injury Risk (Adult)  Goal: Skin Health and Integrity  Outcome: Ongoing (interventions implemented as appropriate)      Problem: Patient Care Overview  Goal: Plan of Care Review  Outcome: Ongoing (interventions implemented as appropriate)    Goal: Individualization and Mutuality  Outcome: Ongoing (interventions implemented as appropriate)    Goal: Discharge Needs Assessment  Outcome: Ongoing (interventions implemented as appropriate)    Goal: Interprofessional Rounds/Family Conf  Outcome: Ongoing (interventions implemented as appropriate)    Goal: Individualization and Mutuality  Outcome: Ongoing (interventions implemented as appropriate)    Goal: Discharge Needs Assessment  Outcome: Ongoing (interventions implemented as appropriate)    Goal: Interprofessional Rounds/Family Conf  Outcome: Ongoing (interventions implemented as appropriate)      Problem: Pain, Chronic (Adult)  Goal: Acceptable Pain/Comfort Level and Functional Ability  Outcome: Ongoing (interventions implemented as appropriate)      Problem: Mobility, Physical Impaired (Adult)  Goal: Enhanced Mobility Skills  Outcome: Ongoing (interventions implemented as appropriate)    Goal: Enhanced Functional Ability  Outcome: Ongoing (interventions implemented as appropriate)    Goal: Identify Related Risk Factors and Signs and Symptoms  Outcome: Ongoing (interventions implemented as appropriate)    Goal: Enhanced Mobility Skills  Outcome: Ongoing (interventions implemented as appropriate)    Goal: Enhanced Functional Ability  Outcome: Ongoing (interventions implemented as appropriate)    Goal: Identify Related Risk Factors and Signs and Symptoms  Outcome: Ongoing (interventions implemented as appropriate)    Goal: Enhanced Mobility Skills  Outcome: Ongoing (interventions implemented as appropriate)    Goal: Enhanced Functional  Ability  Outcome: Ongoing (interventions implemented as appropriate)      Problem: Arrhythmia/Dysrhythmia (Symptomatic) (Adult)  Goal: Signs and Symptoms of Listed Potential Problems Will be Absent, Minimized or Managed (Arrhythmia/Dysrhythmia)  Outcome: Ongoing (interventions implemented as appropriate)

## 2019-04-10 NOTE — THERAPY TREATMENT NOTE
Acute Care - Physical Therapy Treatment Note   [place]     Patient Name: Altagracia King  : 1965  MRN: [id number]  Today's Date: 4/10/2019  Onset of Illness/Injury or Date of Surgery: 19(original admit date)  Date of Referral to PT: 19  Referring Physician: Gui    Admit Date: 2019    Visit Dx:    ICD-10-CM ICD-9-CM   1. Chest pain, unspecified type R07.9 786.50   2. Acute cystitis without hematuria N30.00 595.0     Patient Active Problem List   Diagnosis   • Chest pain   • PAF (paroxysmal atrial fibrillation) (CMS/HCC)   • ETOH abuse   • Acute respiratory failure with hypoxia (CMS/HCC)       Therapy Treatment    Rehabilitation Treatment Summary     Row Name 04/10/19 1521             Treatment Time/Intention    Discipline  physical therapist  -CT      Document Type  therapy note (daily note)  -CT      Subjective Information  no complaints  -CT      Mode of Treatment  individual therapy;physical therapy  -CT      Therapy Frequency (PT Clinical Impression)  3 times/wk 3-5 times/ week per priority policy  -CT      Patient Effort  adequate  -CT      Patient Response to Treatment  Pt tolerated treatment session well with rest breaks provided as needed.   -CT      Recorded by [CT] Kyung Lr, PT 04/10/19 1523      Row Name 04/10/19 1521             Cognitive Assessment/Intervention- PT/OT    Orientation Status (Cognition)  oriented to;person  -CT      Follows Commands (Cognition)  follows one step commands;physical/tactile prompts required;repetition of directions required;verbal cues/prompting required  -CT      Recorded by [CT] Kyung Lr, PT 04/10/19 1523      Row Name 04/10/19 1521             Safety Issues, Functional Mobility    Impairments Affecting Function (Mobility)  balance;coordination;endurance/activity tolerance;strength;visual/perceptual  -CT      Recorded by [CT] Kyung Lr, PT 04/10/19 1523      Row Name 04/10/19 1521             Bed Mobility Assessment/Treatment     Bed Mobility Assessment/Treatment  bed mobility (all) activities  -CT      [place] (Bed Mobility)  minimum assist (75% patient effort)  -CT      Bed Mobility, Safety Issues  cognitive deficits limit understanding  -CT      Assistive Device (Bed Mobility)  bed rails  -CT      Recorded by [CT] Kyung Lr, PT 04/10/19 1523      Row Name 04/10/19 1521             Transfer Assessment/Treatment    Transfer Assessment/Treatment  sit-stand transfer;stand-sit transfer;stand pivot/stand step transfer  -CT      Recorded by [CT] Kyung Lr, PT 04/10/19 1523      Row Name 04/10/19 1521             Sit-Stand Transfer    Sit-Stand [place] (Transfers)  minimum assist (75% patient effort);2 person assist;verbal cues;nonverbal cues (demo/gesture)  -CT      Assistive Device (Sit-Stand Transfers)  walker, front-wheeled  -CT      Recorded by [CT] Kyung Lr, PT 04/10/19 1523      Row Name 04/10/19 1521             Stand-Sit Transfer    Stand-Sit [place] (Transfers)  minimum assist (75% patient effort);2 person assist;verbal cues;nonverbal cues (demo/gesture)  -CT      Assistive Device (Stand-Sit Transfers)  walker, front-wheeled  -CT      Recorded by [CT] Kyung Lr, PT 04/10/19 1523      Row Name 04/10/19 1521             Gait/Stairs Assessment/Training    Gait/Stairs Assessment/Training  gait/ambulation independence;gait/ambulation assistive device;distance ambulated;gait pattern;gait deviations;maintains weight-bearing status  -CT      [place] (Gait)  minimum assist (75% patient effort);2 person assist;verbal cues;nonverbal cues (demo/gesture)  -CT      Assistive Device (Gait)  walker, front-wheeled  -CT      Distance in Feet (Gait)  120  -CT      Pattern (Gait)  step-to  -CT      Deviations/Abnormal Patterns (Gait)  base of support, narrow  -CT      Bilateral Gait Deviations  forward flexed posture  -CT      Recorded by [CT] Kyung Lr, PT 04/10/19 1523      Row Name  04/10/19 1521             Positioning and Restraints    Pre-Treatment Position  in bed  -CT      Post Treatment Position  bed  -CT      In Bed  sitting EOB;call light within reach;encouraged to call for assist;notified nsg  -CT      Recorded by [CT] Kyung Lr, PT 04/10/19 1523      Row Name 04/10/19 1521             Pain Scale: FACES Pre/Post-Treatment    Pain: FACES Scale, Pretreatment  0-->no hurt  -CT      Pain: FACES Scale, Post-Treatment  0-->no hurt  -CT      Recorded by [CT] Kyung Lr, PT 04/10/19 1523      Row Name 04/10/19 1521             Coping    Observed Emotional State  anxious;attention-seeking behavior  -CT      Verbalized Emotional State  acceptance  -CT      Recorded by [CT] Kyung Lr, PT 04/10/19 1523      Row Name 04/10/19 1521             Plan of Care Review    Plan of Care Reviewed With  patient  -CT      Recorded by [CT] Kyung Lr, PT 04/10/19 1523      Row Name 04/10/19 1521             Outcome Summary/Treatment Plan (PT)    Daily Summary of Progress (PT)  progress toward functional goals is good  -CT      Anticipated Discharge Disposition (PT)  skilled nursing facility  -CT      Recorded by [CT] Kyung Lr, PT 04/10/19 1523        User Key  (r) = Recorded By, (t) = Taken By, (c) = Cosigned By    Initials Name Effective Dates Discipline    CT Be Kyung, PT 04/03/18 -  PT                   Physical Therapy Education     Title: PT OT SLP Therapies (In Progress)     Topic: Physical Therapy (In Progress)     Point: Mobility training (In Progress)     Learning Progress Summary           Patient Acceptance, E,TB, NR by CT at 4/10/2019  3:24 PM    Acceptance, E,TB, VU,NR by CT at 4/9/2019  2:08 PM    Acceptance, E, VU by AM at 4/9/2019 12:08 PM    Acceptance, E,TB, NR by CT at 4/8/2019  2:48 PM    Acceptance, E,TB, VU by GAVINO at 4/8/2019  8:21 AM    Acceptance, E, NL,VU by  at 4/8/2019  2:37 AM    Acceptance, E,TB, VU by GAVINO at 4/7/2019  8:53 AM    Acceptance, E, NL  by SM at 4/6/2019 11:18 PM    Acceptance, E, NL by SM at 4/5/2019 10:02 PM    Acceptance, E,D, NR by AG at 4/4/2019  6:23 PM    Acceptance, E, NR by KB at 4/2/2019  6:10 PM    Acceptance, E, VU by SM at 4/2/2019 12:28 AM    Acceptance, E, VU,NR by AG1 at 4/1/2019 11:20 AM    Acceptance, E, NR by KB at 3/31/2019  9:45 PM    Acceptance, E, VU by MC at 3/31/2019  2:55 AM    Acceptance, E, VU by MC at 3/31/2019  2:45 AM    Acceptance, E, VU,NR by MC at 3/30/2019  1:57 AM    Acceptance, E,TB, VU,NR by CT at 3/29/2019 10:45 AM    Acceptance, E, NR by EA at 3/28/2019 10:44 PM    Acceptance, E,TB, NR,NL by CT at 3/28/2019  2:19 PM    Acceptance, E,TB, VU by JS at 3/28/2019 12:47 AM    Acceptance, E, VU by AM at 3/27/2019  4:07 PM    Acceptance, E, NR by EA at 3/26/2019  9:47 PM    Acceptance, E, VU by LAZARO at 3/20/2019 10:09 PM    Acceptance, E, VU by AW at 3/16/2019  6:34 PM    Acceptance, E,TB, VU by ILYA at 3/14/2019 12:21 AM    Comment:  PT unable to rcve teaching at this time    Acceptance, E,TB, VU by ILYA at 3/12/2019 11:00 PM    Comment:  PT unable to rcve teaching at this time    Nonacceptance, E, NL by SB at 3/12/2019  9:17 AM    Comment:  pt intubated and sedated; no one present at the bedside    Acceptance, E, NR by EV at 3/11/2019  9:02 PM    Comment:  intubated/sedated. No family or caretaker at bedside    Nonacceptance, E, NL by WS at 3/9/2019  9:06 AM    Comment:  pt sedated and intubated    Acceptance, E, VU by WS at 3/9/2019  9:05 AM    Acceptance, E,TB, VU by ILYA at 3/8/2019  9:46 PM    Comment:  PT unable to rcve teaching at this time    Nonacceptance, E, NL by WS at 3/8/2019  4:13 PM    Acceptance, E, VU by LT at 3/7/2019 10:13 AM    Acceptance, E, VU by BC at 3/5/2019  5:55 PM    Acceptance, E,TB, VU by CL at 3/4/2019  7:53 PM    Acceptance, E,TB, VU by CL at 3/4/2019  7:52 PM    Acceptance, E,TB, VU by CT at 3/1/2019  3:23 PM    Acceptance, E,TB, VU,NR by KB1 at 2/26/2019  3:13 PM    Acceptance, E,TB, NR by  CT at 2/25/2019  3:25 PM   Family Acceptance, E, VU by AW at 3/15/2019 12:08 PM    Acceptance, E, VU by SB at 3/13/2019 11:01 AM   Significant Other Acceptance, E, VU by WS at 3/9/2019  9:05 AM                   Point: Home exercise program (In Progress)     Learning Progress Summary           Patient Acceptance, E,TB, NR by CT at 4/10/2019  3:24 PM    Acceptance, E,TB, VU,NR by CT at 4/9/2019  2:08 PM    Acceptance, E, VU by AM at 4/9/2019 12:08 PM    Acceptance, E,TB, NR by CT at 4/8/2019  2:48 PM    Acceptance, E,TB, VU by GAVINO at 4/8/2019  8:21 AM    Acceptance, E, NL,VU by SM at 4/8/2019  2:37 AM    Acceptance, E,TB, VU by GAVINO at 4/7/2019  8:53 AM    Acceptance, E, NL by SM at 4/6/2019 11:18 PM    Acceptance, E, NL by SM at 4/5/2019 10:02 PM    Acceptance, E,D, NR by AG at 4/4/2019  6:23 PM    Acceptance, E, NR by KB at 4/2/2019  6:10 PM    Acceptance, E, VU by SM at 4/2/2019 12:28 AM    Acceptance, E, VU,NR by AG1 at 4/1/2019 11:20 AM    Acceptance, E, NR by KB at 3/31/2019  9:45 PM    Acceptance, E, VU by MC at 3/31/2019  2:55 AM    Acceptance, E, VU by MC at 3/31/2019  2:45 AM    Acceptance, E, VU,NR by MC at 3/30/2019  1:57 AM    Acceptance, E,TB, VU,NR by CT at 3/29/2019 10:45 AM    Acceptance, E, NR by EA at 3/28/2019 10:44 PM    Acceptance, E,TB, NR,NL by CT at 3/28/2019  2:19 PM    Acceptance, E,TB, VU by JS at 3/28/2019 12:47 AM    Acceptance, E, VU by AM at 3/27/2019  4:07 PM    Acceptance, E, NR by EA at 3/26/2019  9:47 PM    Acceptance, E, VU by LAZARO at 3/20/2019 10:09 PM    Acceptance, E, VU by AW at 3/16/2019  6:34 PM    Acceptance, E,TB, VU by ILYA at 3/14/2019 12:21 AM    Comment:  PT unable to rcve teaching at this time    Acceptance, E,TB, VU by ILYA at 3/12/2019 11:00 PM    Comment:  PT unable to rcve teaching at this time    Nonacceptance, E, NL by SB at 3/12/2019  9:17 AM    Comment:  pt intubated and sedated; no one present at the bedside    Acceptance, E, NR by EV at 3/11/2019  9:02 PM    Comment:   intubated/sedated. No family or caretaker at bedside    Nonacceptance, E, NL by WS at 3/9/2019  9:06 AM    Comment:  pt sedated and intubated    Acceptance, E, VU by WS at 3/9/2019  9:05 AM    Acceptance, E,TB, VU by ILYA at 3/8/2019  9:46 PM    Comment:  PT unable to rcve teaching at this time    Nonacceptance, E, NL by WS at 3/8/2019  4:13 PM    Acceptance, E, VU by LT at 3/7/2019 10:13 AM    Acceptance, E, VU by BC at 3/5/2019  5:55 PM    Acceptance, E,TB, VU by CL at 3/4/2019  7:53 PM    Acceptance, E,TB, VU by CL at 3/4/2019  7:52 PM    Acceptance, E,TB, VU by CT at 3/1/2019  3:23 PM    Acceptance, E,TB, VU,NR by KB1 at 2/26/2019  3:13 PM    Acceptance, E,TB, NR by CT at 2/25/2019  3:25 PM   Family Acceptance, E, VU by AW at 3/15/2019 12:08 PM    Acceptance, E, VU by SB at 3/13/2019 11:01 AM   Significant Other Acceptance, E, VU by WS at 3/9/2019  9:05 AM                   Point: Body mechanics (In Progress)     Learning Progress Summary           Patient Acceptance, E,TB, NR by CT at 4/10/2019  3:24 PM    Acceptance, E,TB, VU,NR by CT at 4/9/2019  2:08 PM    Acceptance, E, VU by AM at 4/9/2019 12:08 PM    Acceptance, E,TB, NR by CT at 4/8/2019  2:48 PM    Acceptance, E,TB, VU by GAVINO at 4/8/2019  8:21 AM    Acceptance, E, NL,VU by SM at 4/8/2019  2:37 AM    Acceptance, E,TB, VU by GAVINO at 4/7/2019  8:53 AM    Acceptance, E, NL by SM at 4/6/2019 11:18 PM    Acceptance, E, NL by SM at 4/5/2019 10:02 PM    Acceptance, E,D, NR by AG at 4/4/2019  6:23 PM    Acceptance, E, NR by KB at 4/2/2019  6:10 PM    Acceptance, E, VU by SM at 4/2/2019 12:28 AM    Acceptance, E, VU,NR by AG1 at 4/1/2019 11:20 AM    Acceptance, E, NR by KB at 3/31/2019  9:45 PM    Acceptance, E, VU by MC at 3/31/2019  2:55 AM    Acceptance, E, VU by MC at 3/31/2019  2:45 AM    Acceptance, E, VU,NR by MC at 3/30/2019  1:57 AM    Acceptance, E,TB, VU,NR by CT at 3/29/2019 10:45 AM    Acceptance, E, NR by EA at 3/28/2019 10:44 PM    Acceptance, E,TB,  NR,NL by CT at 3/28/2019  2:19 PM    Acceptance, E,TB, VU by JS at 3/28/2019 12:47 AM    Acceptance, E, VU by AM at 3/27/2019  4:07 PM    Acceptance, E, NR by EA at 3/26/2019  9:47 PM    Acceptance, E, VU by LAZARO at 3/20/2019 10:09 PM    Acceptance, E, VU by AW at 3/16/2019  6:34 PM    Acceptance, E,TB, VU by ILYA at 3/14/2019 12:21 AM    Comment:  PT unable to rcve teaching at this time    Acceptance, E,TB, VU by ILYA at 3/12/2019 11:00 PM    Comment:  PT unable to rcve teaching at this time    Nonacceptance, E, NL by SB at 3/12/2019  9:17 AM    Comment:  pt intubated and sedated; no one present at the bedside    Acceptance, E, NR by EV at 3/11/2019  9:02 PM    Comment:  intubated/sedated. No family or caretaker at bedside    Nonacceptance, E, NL by WS at 3/9/2019  9:06 AM    Comment:  pt sedated and intubated    Acceptance, E, VU by WS at 3/9/2019  9:05 AM    Acceptance, E,TB, VU by ILYA at 3/8/2019  9:46 PM    Comment:  PT unable to rcve teaching at this time    Nonacceptance, E, NL by WS at 3/8/2019  4:13 PM    Acceptance, E, VU by LT at 3/7/2019 10:13 AM    Acceptance, E, VU by BC at 3/5/2019  5:55 PM    Acceptance, E,TB, VU by CL at 3/4/2019  7:53 PM    Acceptance, E,TB, VU by CL at 3/4/2019  7:52 PM    Acceptance, E,TB, VU by CT at 3/1/2019  3:23 PM    Acceptance, E,TB, VU,NR by KB1 at 2/26/2019  3:13 PM    Acceptance, E,TB, NR by CT at 2/25/2019  3:25 PM   Family Acceptance, E, VU by AW at 3/15/2019 12:08 PM    Acceptance, E, VU by SB at 3/13/2019 11:01 AM   Significant Other Acceptance, E, VU by WS at 3/9/2019  9:05 AM                   Point: Precautions (In Progress)     Learning Progress Summary           Patient Acceptance, E,TB, NR by CT at 4/10/2019  3:24 PM    Acceptance, E,TB, VU,NR by CT at 4/9/2019  2:08 PM    Acceptance, E, VU by AM at 4/9/2019 12:08 PM    Acceptance, E,TB, NR by CT at 4/8/2019  2:48 PM    Acceptance, E,TB, VU by GAVINO at 4/8/2019  8:21 AM    Acceptance, E, NL,VU by SM at 4/8/2019  2:37 AM     Acceptance, E,TB, VU by GAVINO at 4/7/2019  8:53 AM    Acceptance, E, NL by SM at 4/6/2019 11:18 PM    Acceptance, E, NL by SM at 4/5/2019 10:02 PM    Acceptance, E,D, NR by AG at 4/4/2019  6:23 PM    Acceptance, E, NR by KB at 4/2/2019  6:10 PM    Acceptance, E, VU by SM at 4/2/2019 12:28 AM    Acceptance, E, VU,NR by AG1 at 4/1/2019 11:20 AM    Acceptance, E, NR by KB at 3/31/2019  9:45 PM    Acceptance, E, VU by MC at 3/31/2019  2:55 AM    Acceptance, E, VU by MC at 3/31/2019  2:45 AM    Acceptance, E, VU,NR by MC at 3/30/2019  1:57 AM    Acceptance, E,TB, VU,NR by CT at 3/29/2019 10:45 AM    Acceptance, E, NR by EA at 3/28/2019 10:44 PM    Acceptance, E,TB, NR,NL by CT at 3/28/2019  2:19 PM    Acceptance, E,TB, VU by JS at 3/28/2019 12:47 AM    Acceptance, E, VU by AM at 3/27/2019  4:07 PM    Acceptance, E, NR by EA at 3/26/2019  9:47 PM    Acceptance, E, VU by LAZARO at 3/20/2019 10:09 PM    Acceptance, E, VU by AW at 3/16/2019  6:34 PM    Acceptance, E,TB, VU by ILYA at 3/14/2019 12:21 AM    Comment:  PT unable to rcve teaching at this time    Acceptance, E,TB, VU by ILYA at 3/12/2019 11:00 PM    Comment:  PT unable to rcve teaching at this time    Nonacceptance, E, NL by SB at 3/12/2019  9:17 AM    Comment:  pt intubated and sedated; no one present at the bedside    Acceptance, E, NR by EV at 3/11/2019  9:02 PM    Comment:  intubated/sedated. No family or caretaker at bedside    Nonacceptance, E, NL by WS at 3/9/2019  9:06 AM    Comment:  pt sedated and intubated    Acceptance, E, VU by WS at 3/9/2019  9:05 AM    Acceptance, E,TB, VU by ILYA at 3/8/2019  9:46 PM    Comment:  PT unable to rcve teaching at this time    Nonacceptance, E, NL by WS at 3/8/2019  4:13 PM    Acceptance, E, VU by LT at 3/7/2019 10:13 AM    Acceptance, E, VU by BC at 3/5/2019  5:55 PM    Acceptance, E,TB, VU by CL at 3/4/2019  7:53 PM    Acceptance, E,TB, VU by CL at 3/4/2019  7:52 PM    Acceptance, E,TB, VU by CT at 3/1/2019  3:23 PM     Acceptance, E,TB, VU,NR by KB1 at 2/26/2019  3:13 PM    Acceptance, E,TB, NR by CT at 2/25/2019  3:25 PM   Family Acceptance, E, VU by AW at 3/15/2019 12:08 PM    Acceptance, E, VU by SB at 3/13/2019 11:01 AM   Significant Other Acceptance, E, VU by WS at 3/9/2019  9:05 AM                               User Key     Initials Effective Dates Name Provider Type Discipline    SB 06/16/16 -  Kathy Rowell RN Registered Nurse Nurse    ILYA 06/16/16 -  Kelly, Alberto CORDERO, RN Registered Nurse Nurse    LT 06/16/16 -  Denae Carvajal, RN Registered Nurse Nurse    EA 06/16/16 -  Harvinder, Mary Whiting, RN Registered Nurse Nurse    KB 06/16/16 -  Radha, Lorena Granger, RN Registered Nurse Nurse    AW 06/16/16 -  Heather Godinez, RN Registered Nurse Nurse    WS 06/16/16 -  Keisha Messer, RN Registered Nurse Nurse    AM 06/16/16 -  Madeline Santiago, RN Registered Nurse Nurse    BC 03/14/16 -  Ekaterina Cifuentes, PT Physical Therapist PT    AG 04/03/18 -  Parisa Orellana, PT Physical Therapist PT    CT 04/03/18 -  Kyung Lr, PT Physical Therapist PT    AG1 06/16/16 -  Ayana Corbin, RN Registered Nurse Nurse    CL 07/23/18 -  Rj Godfrey, RN Registered Nurse Nurse    KB1 12/20/17 -  Ekaterina Parry, PTA Physical Therapy Assistant PT    LAZARO 05/22/18 -  Renata Mcbride, RN Registered Nurse Nurse    MC 09/06/18 -  Collett, Morgan, RN Registered Nurse Nurse    SM 09/12/18 -  Sean Kohler, RN Registered Nurse Nurse    GAVINO 10/18/18 -  Marko Cifuentes, RN Registered Nurse Nurse    EV 10/26/18 -  Corry Bagley, RN Registered Nurse Nurse    JS 11/26/18 -  Sg Smith, RN Registered Nurse Nurse                PT Recommendation and Plan  Anticipated Discharge Disposition (PT): skilled nursing facility  Planned Therapy Interventions (PT Eval): balance training, gait training, bed mobility training, home exercise program, manual therapy techniques, motor coordination training, neuromuscular  re-education, patient/family education, postural re-education, ROM (range of motion), stair training, strengthening, stretching, transfer training  Therapy Frequency (PT Clinical Impression): 3 times/wk(3-5 times/ week per priority policy)  Outcome Summary/Treatment Plan (PT)  Daily Summary of Progress (PT): progress toward functional goals is good  Anticipated Equipment Needs at Discharge (PT): front wheeled walker  Anticipated Discharge Disposition (PT): skilled nursing facility  Plan of Care Reviewed With: patient  Progress: improving  Outcome Summary: Pt met all goals at time of progress note. New goals established.   Outcome Measures     Row Name 04/10/19 1500 04/08/19 1400          How much help from another person do you currently need...    Turning from your back to your side while in flat bed without using bedrails?  4  -CT  3  -CT     Moving from lying on back to sitting on the side of a flat bed without bedrails?  3  -CT  3  -CT     Moving to and from a bed to a chair (including a wheelchair)?  3  -CT  3  -CT     Standing up from a chair using your arms (e.g., wheelchair, bedside chair)?  3  -CT  3  -CT     Climbing 3-5 steps with a railing?  2  -CT  2  -CT     To walk in hospital room?  3  -CT  3  -CT     AM-PAC 6 Clicks Score  18  -CT  17  -CT        Functional Assessment    Outcome Measure Options  AM-PAC 6 Clicks Basic Mobility (PT)  -CT  AM-PAC 6 Clicks Basic Mobility (PT)  -CT       User Key  (r) = Recorded By, (t) = Taken By, (c) = Cosigned By    Initials Name Provider Type    CT Kyung Lr, PT Physical Therapist         Time Calculation:   PT Charges     Row Name 04/10/19 1525             Time Calculation    PT Received On  04/10/19  -CT      PT - Next Appointment  04/11/19  -CT      PT Goal Re-Cert Due Date  04/23/19  -CT         Time Calculation- PT    Total Timed Code Minutes- PT  28 minute(s)  -CT         Timed Charges    81920 - Gait Training Minutes   15  -CT      52490 - PT Therapeutic  Activity Minutes  13  -CT        User Key  (r) = Recorded By, (t) = Taken By, (c) = Cosigned By    Initials Name Provider Type    CT Kyung Lr, PT Physical Therapist        Therapy Charges for Today     Code Description Service Date Service Provider Modifiers Qty    51459979767 HC GAIT TRAINING EA 15 MIN 4/9/2019 Kyung Lr, PT GP 1    59776829645 HC PT THERAPEUTIC ACT EA 15 MIN 4/9/2019 Kyung Lr, PT GP 1    42085521473 HC PT THER SUPP EA 15 MIN 4/9/2019 Kyung Lr, PT GP 2    20376937736 HC GAIT TRAINING EA 15 MIN 4/10/2019 Kyung Lr, PT GP 1    67864223270 HC PT THERAPEUTIC ACT EA 15 MIN 4/10/2019 Kyung Lr, PT GP 1    58370880427 HC PT THER SUPP EA 15 MIN 4/10/2019 Kyung Lr, PT GP 2          PT G-Codes  Outcome Measure Options: AM-PAC 6 Clicks Basic Mobility (PT)  AM-PAC 6 Clicks Score: 18  Score: 12    Kyung Lr, PT  4/10/2019

## 2019-04-10 NOTE — DISCHARGE PLACEMENT REQUEST
"Shagufta Loza (53 y.o. Female)     Date of Birth Social Security Number Address Home Phone MRN    1965  [address] [place] [address] [phone] [id number]    Faith Marital Status          Unknown        Admission Date Admission Type Admitting Provider Attending Provider Department, Room/Bed    2/22/19 Emergency Carito Leone MD Oculam, Claire Chin, MD [address], 3309/2S    Discharge Date Discharge Disposition Discharge Destination                       Attending Provider:  Kaye Wilson MD    Allergies:  No Known Allergies    Isolation:  None   Infection:  None   Code Status:  CPR    Ht:  165.1 cm (65\")   Wt:  47.3 kg (104 lb 3.2 oz)    Admission Cmt:  None   Principal Problem:  None                Active Insurance as of 2/22/2019     Primary Coverage     Payor Plan Insurance Group Employer/Plan Group    HU[employer]NA MEDICAID HU[employer] [id number]     Payor Plan Address Payor Plan Phone Number Payor Plan Fax Number Effective Dates    PO  [phone]  2/14/2019 - None Entered    [facility] [id number]       Subscriber Name Subscriber Birth Date Member ID       SHAGUFTA LOZA 1965 [id number]                 Emergency Contacts      (Rel.) Home Phone Work Phone Mobile Phone    Madeline Loza (Daughter) -- -- [phone]    Faustino Mclain (Friend) [phone] -- --    Mumtaz Bundy (Friend) [phone] -- --    Unknown, Sylwia (Sister) [phone] -- --            Emergency Contact Information     Name Relation Home Work Mobile    Madeline Loza Daughter   [phone]    Faustino Mclain Friend [phone]      Mumtaz Bundy Friend [phone]      Unknown, Sylwia Sister [phone]            Insurance Information                [employer]NA MEDICAID/[employer]NA [employer] Phone: [phone]    Subscriber: Shagufta Loza Subscriber#: [id number]    Group#: [id number] Precert#:              History & Physical      Jhon Messer PA-C at " "2019 12:23 PM                   HISTORY AND PHYSICAL        Patient Identification:  Name:  Altagracia King  Age:  53 y.o.  Sex:  female  :  1965  MRN:  [id number]   Visit Number:  [id number]  Primary Care Physician:  Provider, No Known       Subjective     Subjective     Chief complaint:     Chief Complaint   Patient presents with   • Chest Pain       History of presenting illness:     The patient is a 53 year old female for presented to the ED with complaints of substernal chest pain that began yesterday morning. She reports the pain radiates down her left arm and her arm feels somewhat numb. She also reports issues at home and needing  as she has been living in a homeless shelter where she was recently \"kicked out.\" Vital signs stable. Troponin levels negative.EKG shows sinus rhythm with premature ventricular complexes or fusion complexes and septal infarct , age undetermined.   History of alcohol abuse but normal ethanol level and she reports she has not had any alcohol for around a week. TSH elevated at 6.995. White count and CRP normal. Chest xray is unremarkable. EKG shows sinus rhythm with premature ventricular complexes or fusion complexes and septal infarct , age undetermined. Admitted to the observation unit for further evaluation and monitoring.       ---------------------------------------------------------------------------------------------------------------------     Review Of Systems:    Constitutional: no fever, chills and night sweats. No appetite change or unexpected weight change. No fatigue.  Eyes: no eye drainage, itching or redness.  HEENT: no mouth sores, dysphagia or nose bleed.  Respiratory: no for shortness of breath, cough or production of sputum.  Cardiovascular: See HPI  Gastrointestinal: no nausea, vomiting or diarrhea. No abdominal pain, hematemesis or rectal bleeding.  Genitourinary: no dysuria or polyuria.  Hematologic/lymphatic: no lymph node " abnormalities, no easy bruising or easy bleeding.  Musculoskeletal: no muscle or joint pain.  Skin: No rash and no itching.  Neurological: no loss of consciousness, no seizure, no headache.  Behavioral/Psych: no depression or suicidal ideation.  Endocrine: no hot flashes.  Immunologic: negative.    ---------------------------------------------------------------------------------------------------------------------     Past Medical History    Past Medical History:   Diagnosis Date   • Alcoholism (CMS/[facility])    • Depression    • GERD (gastroesophageal reflux disease)        Past Surgical History    Past Surgical History:   Procedure Laterality Date   • CARDIAC CATHETERIZATION     • COLONOSCOPY     • ENDOSCOPY     • TUBAL ABDOMINAL LIGATION         Family History    Family History   Problem Relation Age of Onset   • Heart disease Mother    • Depression Mother    • Heart disease Father    • Depression Sister    • Heart disease Brother    • Depression Maternal Grandmother    • Alcohol abuse Maternal Grandfather        Social History    Social History     Tobacco Use   • Smoking status: Current Every Day Smoker     Packs/day: 1.00   • Smokeless tobacco: Never Used   Substance Use Topics   • Alcohol use: Yes     Alcohol/week: 2.4 oz     Types: 4 Cans of beer per week   • Drug use: No       Allergies    Patient has no known allergies.  ---------------------------------------------------------------------------------------------------------------------     Home Medications:    Prior to Admission Medications     Prescriptions Last Dose Informant Patient Reported? Taking?    aspirin 325 MG tablet 2/22/2019 Self Yes Yes    Take 650 mg by mouth Daily.    calcium carbonate (TUMS) 500 MG chewable tablet 2/23/2019 Self Yes Yes    Chew 2 tablets As Needed for Indigestion or Heartburn.        ---------------------------------------------------------------------------------------------------------------------    Objective     Objective      Hospital Scheduled Meds:    aspirin 325 mg Oral Daily   enoxaparin 40 mg Subcutaneous Q24H   LORazepam 2 mg Oral 3 times per day   Followed by      [START ON 2/24/2019] LORazepam 1.5 mg Oral 3 times per day   Followed by      [START ON 2/25/2019] LORazepam 1 mg Oral 3 times per day   Followed by      [START ON 2/26/2019] LORazepam 0.5 mg Oral 3 times per day   sodium chloride 3 mL Intravenous Q12H   sodium chloride 3 mL Intravenous Q12H        ---------------------------------------------------------------------------------------------------------------------   Vital Signs:  Temp:  [98.2 °F (36.8 °C)-99.2 °F (37.3 °C)] 98.6 °F (37 °C)  Heart Rate:  [] 85  Resp:  [18-24] 18  BP: (104-164)/(52-98) 104/61  Mean Arterial Pressure (Non-Invasive) for the past 24 hrs (Last 3 readings):   Noninvasive MAP (mmHg)   02/23/19 1126 74   02/23/19 0716 81   02/23/19 0440 88     SpO2 Percentage    02/23/19 0440 02/23/19 0716 02/23/19 1126   SpO2: 99% 100% 100%     SpO2:  [96 %-100 %] 100 %  on   ;   Device (Oxygen Therapy): room air    Body mass index is 18.99 kg/m².  Wt Readings from Last 3 Encounters:   02/22/19 51.8 kg (114 lb 2 oz)   02/14/19 56.7 kg (125 lb)   02/14/19 56.7 kg (125 lb)     ---------------------------------------------------------------------------------------------------------------------     Physical Exam:    Constitutional:  Well-developed and well-nourished.  No respiratory distress.      HENT:  Head: Normocephalic and atraumatic.  Mouth:  Moist mucous membranes.    Eyes: Left cataract.  Conjunctivae and EOM are normal.  No scleral icterus.  Neck:  Neck supple.  No JVD present.    Cardiovascular:  Normal rate, regular rhythm and normal heart sounds with no murmur. No edema.  Pulmonary/Chest: Mild bilateral wheezes. No respiratory distress, no crackles, with normal breath sounds and good air movement.  Abdominal:  Soft.  Bowel sounds are normal.  No distension and no tenderness.   Musculoskeletal:   No edema, no tenderness, and no deformity.  No swelling or redness of joints.  Neurological:  Alert and oriented to person, place, and time.  No facial droop.  No slurred speech.   Skin:  Skin is warm and dry.  No rash noted.  No pallor.   Psychiatric:  Normal mood and affect.  Behavior is normal.    ---------------------------------------------------------------------------------------------------------------------  I have personally reviewed the EKG/Telemetry strip  ---------------------------------------------------------------------------------------------------------------------   Results from last 7 days   Lab Units 02/23/19  1134 02/23/19  0849 02/23/19  0132  02/22/19  1804   CK TOTAL U/L  --   --   --   --  41   TROPONIN I ng/mL <0.006 <0.006 <0.006   < > <0.006   MYOGLOBIN ng/mL  --   --   --   --  21.0    < > = values in this interval not displayed.           Results from last 7 days   Lab Units 02/23/19  0132 02/22/19  1944   CRP mg/dL <0.50  --    WBC 10*3/mm3 4.84 5.66   HEMOGLOBIN g/dL 11.1* 11.2*   HEMATOCRIT % 34.6* 34.4*   MCV fL 106.5* 104.2*   MCHC g/dL 32.1* 32.6*   PLATELETS 10*3/mm3 147 143     Results from last 7 days   Lab Units 02/23/19  0132 02/22/19  1804   SODIUM mmol/L 137 132*   POTASSIUM mmol/L 3.6 3.9   MAGNESIUM mg/dL 2.0 2.0   CHLORIDE mmol/L 105 102   CO2 mmol/L 25.4 24.5   BUN mg/dL 14 16   CREATININE mg/dL 0.74 0.69   EGFR IF NONAFRICN AM mL/min/1.73 81 88   CALCIUM mg/dL 9.3 9.1   GLUCOSE mg/dL 89 104   ALBUMIN g/dL 3.90 4.20   BILIRUBIN mg/dL 0.2 0.2   ALK PHOS U/L 61 69   AST (SGOT) U/L 21 23   ALT (SGPT) U/L 14 15   Estimated Creatinine Clearance: 70.2 mL/min (by C-G formula based on SCr of 0.74 mg/dL).  No results found for: AMMONIA    No results found for: HGBA1C, POCGLU  No results found for: HGBA1C  Lab Results   Component Value Date    TSH 6.995 (H) 02/22/2019                      Pain Management Panel     Pain Management Panel Latest Ref Rng & Units 2/22/2019     "AMPHETAMINES SCREEN, URINE Negative Negative    BARBITURATES SCREEN Negative Negative    BENZODIAZEPINE SCREEN, URINE Negative Negative    BUPRENORPHINE Negative Negative    COCAINE SCREEN, URINE Negative Negative    METHADONE SCREEN, URINE Negative Negative        I have personally reviewed the above laboratory results.   ---------------------------------------------------------------------------------------------------------------------  Imaging Results (last 7 days)     Procedure Component Value Units Date/Time    XR Chest 1 View [[id number]] Collected:  02/23/19 0924     Updated:  02/23/19 0927    Narrative:       EXAMINATION: XR CHEST 1 VW-      CLINICAL INDICATION:     cp     TECHNIQUE:  XR CHEST 1 VW-      COMPARISON: NONE      FINDINGS:   Coarse interstitial markings suggestive of chronic interstitial lung  disease.  Heart and mediastinum contours are unremarkable.  No pleural effusion.  No pneumothorax.   Bony and soft tissue structures are unremarkable.       Impression:       No radiographic evidence of acute cardiac or pulmonary  disease.     This report was finalized on 2/23/2019 9:24 AM by Dr. Keven Lux MD.           I have personally reviewed the above radiology results.   ---------------------------------------------------------------------------------------------------------------------      Assessment & Plan        Assessment/Plan       ASSESSMENT:    1. Chest pain    PLAN:    The patient is a 53 year old female for presented to the ED with complaints of substernal chest pain that began yesterday morning. She reports the pain radiates down her left arm and her arm feels somewhat numb. She also reports issues at home and needing  as she has been living in a homeless shelter where she was recently \"kicked out.\" Vital signs stable. Troponin levels negative.EKG shows sinus rhythm with premature ventricular complexes or fusion complexes and septal infarct , age undetermined.   History " of alcohol abuse but normal ethanol level and she reports she has not had any alcohol for around a week. TSH elevated at 6.995. White count and CRP normal. Chest xray is unremarkable. EKG shows sinus rhythm with premature ventricular complexes or fusion complexes and septal infarct , age undetermined. Admitted to the observation unit for further evaluation and monitoring.    Cardiology consulted for abnormality with new infarct on EKG with no further workup recommended. Would recommend to follow up with her cardiologist as outpatient.     Lovenox 40 mg subcutaneous q 24 hours for DVT prophylaxis.    Echo ordered. Cardiology was consulted for acute EKG changes.     D-dimer found to be elevated and CT per PE protocol ordered.     Patient's findings and recommendations were discussed with patient, nursing staff and consulting provider      Code Status:   Code Status and Medical Interventions:   Ordered at: 02/22/19 2236     Code Status:    CPR     Medical Interventions (Level of Support Prior to Arrest):    Full     Patient seen with LIANE Goddard.     Jhon Messer PA-C  02/23/19  12:23 PM      Electronically signed by Carito Leone MD at 2/23/2019  2:27 PM       Hospital Medications (active)       Dose Frequency Start End    acetaminophen (TYLENOL) tablet 650 mg 650 mg Every 4 Hours PRN 2/22/2019     Sig - Route: Take 2 tablets by mouth Every 4 (Four) Hours As Needed for Mild Pain . - Oral    Cosign for Ordering: Accepted by Carito Leone MD on 2/23/2019  9:06 AM    amiodarone (PACERONE) tablet 200 mg 200 mg Daily 3/28/2019     Sig - Route: Take 1 tablet by mouth Daily. - Oral    apixaban (ELIQUIS) tablet 5 mg 5 mg Every 12 Hours Scheduled 4/4/2019     Sig - Route: Take 1 tablet by mouth Every 12 (Twelve) Hours. - Oral    bisacodyl (DULCOLAX) suppository 10 mg 10 mg Daily PRN 3/12/2019     Sig - Route: Insert 1 suppository into the rectum Daily As Needed for Constipation (if oral meds do not help). -  "Rectal    bisacodyl (DULCOLAX) suppository 10 mg 10 mg Daily 3/29/2019     Sig - Route: Insert 1 suppository into the rectum Daily. - Rectal    calcium carbonate (TUMS) chewable tablet 500 mg (200 mg elemental) 2 tablet 2 Times Daily PRN 2/22/2019     Sig - Route: Chew 1,000 mg 2 (Two) Times a Day As Needed for Heartburn. - Oral    Cosign for Ordering: Accepted by Carito Leone MD on 2/23/2019  9:06 AM    cyanocobalamin injection 1,000 mcg 1,000 mcg Every 30 Days 4/1/2019     Sig - Route: Inject 1 mL into the appropriate muscle as directed by prescriber Every 30 (Thirty) Days. - Intramuscular    Linked Group 1:  \"Followed by\" Linked Group Details        digoxin (LANOXIN) tablet 125 mcg 125 mcg Every 48 Hours 4/5/2019     Sig - Route: Take 1 tablet by mouth Every Other Day. - Oral    folic acid (FOLVITE) tablet 1 mg 1 mg Daily 2/23/2019     Sig - Route: Take 1 tablet by mouth Daily. - Oral    Cosign for Ordering: Accepted by Alberto Connolly MD on 2/23/2019  3:57 PM    furosemide (LASIX) tablet 10 mg 10 mg Daily 4/8/2019     Sig - Route: Take 0.5 tablets by mouth Daily. - Oral    levothyroxine (SYNTHROID, LEVOTHROID) tablet 75 mcg 75 mcg Every Early Morning 4/3/2019     Sig - Route: Take 1 tablet by mouth Every Morning. - Oral    magnesium hydroxide (MILK OF MAGNESIA) suspension 2400 mg/10mL 10 mL 10 mL Daily PRN 3/12/2019     Sig - Route: Take 10 mL by mouth Daily As Needed for Constipation. - Oral    magnesium oxide (MAGOX) tablet 400 mg 400 mg Daily 4/8/2019     Sig - Route: Take 1 tablet by mouth Daily. - Oral    Magnesium Sulfate 2 gram / 50mL Infusion (GIVE X 3 BAGS TO EQUAL 6GM TOTAL DOSE) - Mg 1.1 - 1.5 mg/dl 2 g As Needed 4/7/2019     Sig - Route: Infuse 50 mL into a venous catheter As Needed (See Administration Instructions). - Intravenous    Cosign for Ordering: Accepted by Sayra Milton MD on 4/7/2019 10:04 AM    Linked Group 2:  \"Or\" Linked Group Details        Magnesium Sulfate 2 " "gram Bolus, followed by 8 gram infusion (total Mg dose 10 grams)- Mg less than or equal to 1mg/dL 2 g As Needed 4/7/2019     Sig - Route: Infuse 50 mL into a venous catheter As Needed (See Administration Instructions). - Intravenous    Cosign for Ordering: Accepted by Sayra Milton MD on 4/7/2019 10:04 AM    Linked Group 2:  \"Or\" Linked Group Details        Magnesium Sulfate 4 gram infusion- Mg 1.6-1.9 mg/dL 4 g As Needed 4/7/2019     Sig - Route: Infuse 100 mL into a venous catheter As Needed (See Administration Instructions). - Intravenous    Cosign for Ordering: Accepted by Sayra Milton MD on 4/7/2019 10:04 AM    Linked Group 2:  \"Or\" Linked Group Details        metoprolol tartrate (LOPRESSOR) tablet 75 mg 75 mg Every 12 Hours Scheduled 3/28/2019     Sig - Route: Take 75 mg by mouth Every 12 (Twelve) Hours. - Oral    mexiletine (MEXITIL) capsule 150 mg 150 mg Every 8 Hours Scheduled 3/21/2019     Sig - Route: Take 1 capsule by mouth Every 8 (Eight) Hours. - Oral    nitroglycerin (NITROSTAT) SL tablet 0.4 mg 0.4 mg Every 5 Minutes PRN 2/22/2019     Sig - Route: Place 1 tablet under the tongue Every 5 (Five) Minutes As Needed for Chest Pain (Chest Pain With Systolic Blood Pressure Greater Than 100). - Sublingual    Cosign for Ordering: Accepted by Carito Leone MD on 2/23/2019  9:06 AM    OLANZapine (zyPREXA) tablet 5 mg 5 mg Nightly 4/2/2019     Sig - Route: Take 1 tablet by mouth Every Night. - Oral    pantoprazole (PROTONIX) EC tablet 40 mg 40 mg Every Early Morning 4/7/2019     Sig - Route: Take 1 tablet by mouth Every Morning. - Oral    Cosign for Ordering: Accepted by Sayra Milton MD on 4/6/2019 12:12 PM    Pharmacy Consult  Continuous PRN 4/4/2019     Sig - Route: Continuous As Needed for Consult. - Does not apply    sennosides-docusate sodium (SENOKOT-S) 8.6-50 MG tablet 2 tablet 2 tablet Nightly 4/4/2019     Sig - Route: Take 2 tablets by mouth Every Night. - Oral    sodium " chloride 0.9 % flush 3 mL 3 mL Every 12 Hours Scheduled 2/23/2019     Sig - Route: Infuse 3 mL into a venous catheter Every 12 (Twelve) Hours. - Intravenous    Cosign for Ordering: Accepted by Carito Leone MD on 2/23/2019  9:06 AM    sodium chloride 0.9 % flush 3 mL 3 mL Every 12 Hours Scheduled 2/23/2019     Sig - Route: Infuse 3 mL into a venous catheter Every 12 (Twelve) Hours. - Intravenous    sodium chloride 0.9 % flush 3-10 mL 3-10 mL As Needed 2/22/2019     Sig - Route: Infuse 3-10 mL into a venous catheter As Needed for Line Care. - Intravenous    Cosign for Ordering: Accepted by Carito Leone MD on 2/23/2019  9:06 AM    sodium chloride 0.9 % flush 5 mL 5 mL As Needed 2/23/2019     Sig - Route: Infuse 5 mL into a venous catheter As Needed for Line Care (After Medication Administration or Blood Draw). - Intravenous    spironolactone (ALDACTONE) tablet 25 mg 25 mg Daily With Lunch 3/25/2019     Sig - Route: Take 1 tablet by mouth Daily With Lunch. - Oral    thiamine (VITAMIN B-1) tablet 100 mg 100 mg Daily 2/23/2019     Sig - Route: Take 1 tablet by mouth Daily. - Oral    Cosign for Ordering: Accepted by Alberto Connolly MD on 2/23/2019  3:57 PM            Lab Results (last 24 hours)     Procedure Component Value Units Date/Time    Basic Metabolic Panel [[id number]] Collected:  04/10/19 0358    Specimen:  Blood Updated:  04/10/19 0502     Glucose 85 mg/dL      BUN 18 mg/dL      Creatinine 0.82 mg/dL      Sodium 140 mmol/L      Potassium 4.2 mmol/L      Chloride 104 mmol/L      CO2 23.1 mmol/L      Calcium 10.1 mg/dL      eGFR Non African Amer 72 mL/min/1.73      BUN/Creatinine Ratio 22.0     Anion Gap 12.9 mmol/L     Narrative:       GFR Normal >60  Chronic Kidney Disease <60  Kidney Failure <15        Imaging Results (last 24 hours)     ** No results found for the last 24 hours. **        ECG/EMG Results (last 24 hours)     ** No results found for the last 24 hours. **            Physician Progress Notes (last 24 hours) (Notes from 2019 10:41 AM through 4/10/2019 10:41 AM)      Kaye Wilson MD at 2019 12:47 PM              [facility] PROGRESS NOTE     Patient Identification:  Name:  Altagracia King  Age:  53 y.o.  Sex:  female  :  1965  MRN:  [id number]  Visit Number:  [id number]  Primary Care Provider:  Provider, No Known    Length of stay:  45    Subjective: Patient currently out of bed eating lunch, she is very well, no complaints, no palpitation no shortness of breath nausea or vomiting.   is actively pursuing for nursing home placement so far to no avail.  Patient is very well motivated with physical therapy.    Chief Complaint: Short of breath  ----------------------------------------------------------------------------------------------------------------------  Current [facility] Meds:    amiodarone 200 mg Oral Daily   apixaban 5 mg Oral Q12H   bisacodyl 10 mg Rectal Daily   cyanocobalamin 1,000 mcg Intramuscular Q30 Days   digoxin 125 mcg Oral Q48H   folic acid 1 mg Oral Daily   furosemide 10 mg Oral Daily   levothyroxine 75 mcg Oral Q AM   magnesium oxide 400 mg Oral Daily   metoprolol tartrate 75 mg Oral Q12H   mexiletine 150 mg Oral Q8H   OLANZapine 5 mg Oral Nightly   pantoprazole 40 mg Oral Q AM   sennosides-docusate sodium 2 tablet Oral Nightly   sodium chloride 3 mL Intravenous Q12H   sodium chloride 3 mL Intravenous Q12H   spironolactone 25 mg Oral Daily With Lunch   thiamine 100 mg Oral Daily       Pharmacy Consult      ----------------------------------------------------------------------------------------------------------------------  Vital Signs:  Temp:  [97.4 °F (36.3 °C)-97.7 °F (36.5 °C)] 97.5 °F (36.4 °C)  Heart Rate:  [78-92] 84  Resp:  [18-20] 18  BP: (103-152)/(57-80) 103/57       Tele: Sinus rhythm 81 bpm      19  0354 19  0322 19  0500   Weight: 46.7 kg (102 lb 14.4 oz) 46.5 kg (102  lb 8 oz) 46.6 kg (102 lb 12.8 oz)     Body mass index is 17.11 kg/m².    Intake/Output Summary (Last 24 hours) at 4/9/2019 1247  Last data filed at 4/9/2019 0811  Gross per 24 hour   Intake 720 ml   Output 650 ml   Net 70 ml     Diet Soft Texture; Chopped; Thin; Daily Fluid Restriction; Other; 1,800  ----------------------------------------------------------------------------------------------------------------------  Physical exam:  General: Comfortable,awake, alert, oriented to self, place, and time, well-developed, chronically ill-appearing,  No respiratory distress.    Skin:  Skin is warm and dry. No rash noted.  She is pale.  HENT:  Head:  Normocephalic and atraumatic.  Mouth:  Moist mucous membranes.    Eyes:  Conjunctivae and EOM are normal.   No scleral icterus.  Left eye completely opacified, right eye has cataract  Neck:  Neck supple.  No JVD present.    Pulmonary/Chest:  No respiratory distress, no wheezes, no crackles, with normal breath sounds and good air movement.  Cardiovascular:  Normal rate, regular rhythm and normal heart sounds with no murmur.  Abdominal:  Soft.  Bowel sounds are normal.  No distension and no tenderness.   Extremities:  No edema, no tenderness, and no deformity.  No red or swollen joints anywhere.  Strong pulses in all 4 extremities with no clubbing, no cyanosis, no edema.  Neurological:  Motor strength equal no obvious deficit, sensory grossly intact.   No cranial nerve deficit.  No tongue deviation.  No facial droop.  No slurred speech.      ----------------------------------------------------------------------------------------------------------------------  ----------------------------------------------------------------------------------------------------------------------      Results from last 7 days   Lab Units 04/08/19  0718 04/05/19  1031   WBC 10*3/mm3 6.72 6.11   HEMOGLOBIN g/dL 11.0* 9.7*   HEMATOCRIT % 34.0 30.7*   MCV fL 100.9* 102.3*   MCHC g/dL 32.4 31.6    PLATELETS 10*3/mm3 236 195         Results from last 7 days   Lab Units 04/08/19  0445 04/07/19  1745 04/07/19  0743 04/07/19  0103 04/05/19  0417   SODIUM mmol/L 139  --  141  --   --    POTASSIUM mmol/L 4.3  --  4.3  --  3.9   MAGNESIUM mg/dL 2.1 2.7*  --  1.7 2.4   CHLORIDE mmol/L 102  --  104  --   --    CO2 mmol/L 25.0  --  24.4  --   --    BUN mg/dL 19  --  22*  --   --    CREATININE mg/dL 0.76  --  0.84  --   --    EGFR IF NONAFRICN AM mL/min/1.73 79  --  70  --   --    CALCIUM mg/dL 9.5  --  9.7  --   --    GLUCOSE mg/dL 90  --  91  --   --    Estimated Creatinine Clearance: 61.5 mL/min (by C-G formula based on SCr of 0.76 mg/dL).    No results found for: AMMONIA                    I have personally looked at the labs and they are summarized above.  ----------------------------------------------------------------------------------------------------------------------  Imaging Results (last 24 hours)     ** No results found for the last 24 hours. 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 Tach  -Paroxysmal atrial fibrillation, currently sinus rhythm  -Chronic with a combination    No change in current treatment at this time, continue with physical therapy, will repeat BMP in the morning, awaiting for nursing home placement.      Kaye Wilson MD  04/09/19  12:47 PM    Electronically signed by Kaye Wilson MD at 4/9/2019 12:53 PM       Medical Student Notes (last 24 hours) (Notes from 4/9/2019 10:41 AM through 4/10/2019 10:41 AM)     No notes of this type exist for this encounter.        Consult Notes (last 24 hours) (Notes from 4/9/2019 10:41 AM through 4/10/2019 10:41 AM)     No notes of this type exist for this encounter.        Nutrition Notes (last 24  hours) (Notes from 2019 10:41 AM through 4/10/2019 10:41 AM)     No notes of this type exist for this encounter.           Physical Therapy Notes (last 24 hours) (Notes from 2019 10:41 AM through 4/10/2019 10:41 AM)      Kyung Lr, PT at 2019  2:08 PM  Version 1 of 1         Problem: Patient Care Overview  Goal: Plan of Care Review  Outcome: Ongoing (interventions implemented as appropriate)   19 1407   Plan of Care Review   Progress improving   Coping/Psychosocial   Plan of Care Reviewed With patient   OTHER   Outcome Summary Pt met all goals at time of progress note. New goals established.            Electronically signed by Kyung Lr, PT at 2019  2:08 PM     Kyung Lr PT at 2019  2:10 PM  Version 1 of 1         Acute Care - Physical Therapy Progress Note  [facility]     Patient Name: Altagracia King  : 1965  MRN: [id number]  Today's Date: 2019  Onset of Illness/Injury or Date of Surgery: 19(original admit date)  Date of Referral to PT: 19  Referring Physician: Gui    Admit Date: 2019    Visit Dx:    ICD-10-CM ICD-9-CM   1. Chest pain, unspecified type R07.9 786.50   2. Acute cystitis without hematuria N30.00 595.0     Patient Active Problem List   Diagnosis   • Chest pain   • PAF (paroxysmal atrial fibrillation) (CMS/HCC)   • ETOH abuse   • Acute respiratory failure with hypoxia (CMS/[facility])       Therapy Treatment    Rehabilitation Treatment Summary     Row Name 19 1403             Treatment Time/Intention    Discipline  physical therapist  -CT      Document Type  progress note/recertification  -CT      Subjective Information  no complaints  -CT      Mode of Treatment  individual therapy;physical therapy  -CT      Therapy Frequency (PT Clinical Impression)  3 times/wk 3-5 times/ week per priority policy  -CT      Patient Effort  adequate  -CT      Patient Response to Treatment  Pt tolerated treatment session well with rest breaks  provided as needed. Pt needs Min A during ambulation to control RW.   -CT      Recorded by [CT] Kyung Lr, PT 04/09/19 1405      Row Name 04/09/19 1403             Cognitive Assessment/Intervention- PT/OT    Orientation Status (Cognition)  oriented to;person  -CT      Follows Commands (Cognition)  follows one step commands;physical/tactile prompts required;repetition of directions required;verbal cues/prompting required  -CT      Recorded by [CT] Kyung Lr, PT 04/09/19 1405      Row Name 04/09/19 1403             Safety Issues, Functional Mobility    Impairments Affecting Function (Mobility)  balance;coordination;endurance/activity tolerance;strength;visual/perceptual  -CT      Recorded by [CT] Kyung Lr, PT 04/09/19 1405      Row Name 04/09/19 1403             Transfer Assessment/Treatment    Transfer Assessment/Treatment  sit-stand transfer;stand-sit transfer;stand pivot/stand step transfer  -CT      Recorded by [CT] Kyung Lr, PT 04/09/19 1405      Row Name 04/09/19 1403             Sit-Stand Transfer    Sit-Stand [place] (Transfers)  minimum assist (75% patient effort);2 person assist;verbal cues;nonverbal cues (demo/gesture)  -CT      Assistive Device (Sit-Stand Transfers)  walker, front-wheeled  -CT      Recorded by [CT] Kyung Lr, PT 04/09/19 1405      Row Name 04/09/19 1403             Stand-Sit Transfer    Stand-Sit [place] (Transfers)  minimum assist (75% patient effort);2 person assist;verbal cues;nonverbal cues (demo/gesture)  -CT      Assistive Device (Stand-Sit Transfers)  walker, front-wheeled  -CT      Recorded by [CT] Kyung Lr, PT 04/09/19 1405      Row Name 04/09/19 1403             Gait/Stairs Assessment/Training    Gait/Stairs Assessment/Training  gait/ambulation independence;gait/ambulation assistive device;distance ambulated;gait pattern;gait deviations;maintains weight-bearing status  -CT      [place] Level (Gait)  minimum assist (75% patient  effort);2 person assist;verbal cues;nonverbal cues (demo/gesture)  -CT      Assistive Device (Gait)  walker, front-wheeled  -CT      Distance in Feet (Gait)  120  -CT      Pattern (Gait)  step-to  -CT      Deviations/Abnormal Patterns (Gait)  base of support, narrow  -CT      Bilateral Gait Deviations  forward flexed posture  -CT      Recorded by [CT] Kyung Lr, PT 04/09/19 1405      Row Name 04/09/19 1403             Pain Scale: FACES Pre/Post-Treatment    Pain: FACES Scale, Pretreatment  0-->no hurt  -CT      Pain: FACES Scale, Post-Treatment  0-->no hurt  -CT      Recorded by [CT] Kyung Lr, PT 04/09/19 1405      Row Name 04/09/19 1403             Coping    Observed Emotional State  anxious;attention-seeking behavior  -CT      Verbalized Emotional State  acceptance  -CT      Recorded by [CT] Kyung Lr, PT 04/09/19 1405      Row Name 04/09/19 1403             Plan of Care Review    Plan of Care Reviewed With  patient  -CT      Recorded by [CT] Kyung Lr, PT 04/09/19 1405      Row Name 04/09/19 1403             Outcome Summary/Treatment Plan (PT)    Daily Summary of Progress (PT)  progress toward functional goals is good  -CT      Anticipated Discharge Disposition (PT)  skilled nursing facility  -CT      Recorded by [CT] Kyung Lr, PT 04/09/19 1405        User Key  (r) = Recorded By, (t) = Taken By, (c) = Cosigned By    Initials Name Effective Dates Discipline    CT Kyung Lr, PT 04/03/18 -  PT               Rehab Goal Summary     Row Name 04/09/19 1400             Bed Mobility Goal 1 (PT)    Progress/Outcomes (Bed Mobility Goal 1, PT)  goal met  -CT         Bed Mobility Goal 2 (PT)    Activity/Assistive Device (Bed Mobility Goal 2, PT)  bed mobility activities, all  -CT      [place] Level/Cues Needed (Bed Mobility Goal 2, PT)  contact guard assist;minimum assist (75% or more patient effort)  -CT      Time Frame (Bed Mobility Goal 2, PT)  by discharge  -CT         Transfer  Goal 1 (PT)    Progress/Outcome (Transfer Goal 1, PT)  goal met  -CT         Transfer Goal 2 (PT)    Activity/Assistive Device (Transfer Goal 2, PT)  sit-to-stand/stand-to-sit;bed-to-chair/chair-to-bed  -CT      [place] Level/Cues Needed (Transfer Goal 2, PT)  contact guard assist;minimum assist (75% or more patient effort)  -CT      Time Frame (Transfer Goal 2, PT)  by discharge  -CT         Gait Training Goal 1 (PT)    Progress/Outcome (Gait Training Goal 1, PT)  goal met  -CT         Gait Training Goal 2 (PT)    Activity/Assistive Device (Gait Training Goal 2, PT)  gait (walking locomotion);assistive device use  -CT      [place] Level (Gait Training Goal 2, PT)  contact guard assist;minimum assist (75% or more patient effort)  -CT      Time Frame (Gait Training Goal 2, PT)  by discharge  -CT        User Key  (r) = Recorded By, (t) = Taken By, (c) = Cosigned By    Initials Name Provider Type Discipline    CT Kyung Lr, PT Physical Therapist PT          Physical Therapy Education     Title: PT OT SLP Therapies (Done)     Topic: Physical Therapy (Done)     Point: Mobility training (Done)     Learning Progress Summary           Patient Acceptance, E,TB, VU,NR by CT at 4/9/2019  2:08 PM    Acceptance, E, VU by AM at 4/9/2019 12:08 PM    Acceptance, E,TB, NR by CT at 4/8/2019  2:48 PM    Acceptance, E,TB, VU by GAVINO at 4/8/2019  8:21 AM    Acceptance, E, NL,VU by SM at 4/8/2019  2:37 AM    Acceptance, E,TB, VU by GAVINO at 4/7/2019  8:53 AM    Acceptance, E, NL by SM at 4/6/2019 11:18 PM    Acceptance, E, NL by SM at 4/5/2019 10:02 PM    Acceptance, E,D, NR by AG at 4/4/2019  6:23 PM    Acceptance, E, NR by KB at 4/2/2019  6:10 PM    Acceptance, E, VU by SM at 4/2/2019 12:28 AM    Acceptance, E, VU,NR by AG1 at 4/1/2019 11:20 AM    Acceptance, E, NR by KB at 3/31/2019  9:45 PM    Acceptance, E, VU by CIERA at 3/31/2019  2:55 AM    AcceptanceJONNATHAN VU by CIERA at 3/31/2019  2:45 AM    AcceptanceJONNATHAN VU, NR by  at  3/30/2019  1:57 AM    Acceptance, E,TB, VU,NR by CT at 3/29/2019 10:45 AM    Acceptance, E, NR by EA at 3/28/2019 10:44 PM    Acceptance, E,TB, NR,NL by CT at 3/28/2019  2:19 PM    Acceptance, E,TB, VU by JS at 3/28/2019 12:47 AM    Acceptance, E, VU by AM at 3/27/2019  4:07 PM    Acceptance, E, NR by EA at 3/26/2019  9:47 PM    Acceptance, E, VU by LAZARO at 3/20/2019 10:09 PM    Acceptance, E, VU by AW at 3/16/2019  6:34 PM    Acceptance, E,TB, VU by ILYA at 3/14/2019 12:21 AM    Comment:  PT unable to rcve teaching at this time    Acceptance, E,TB, VU by ILYA at 3/12/2019 11:00 PM    Comment:  PT unable to rcve teaching at this time    Nonacceptance, E, NL by SB at 3/12/2019  9:17 AM    Comment:  pt intubated and sedated; no one present at the bedside    Acceptance, E, NR by EV at 3/11/2019  9:02 PM    Comment:  intubated/sedated. No family or caretaker at bedside    Nonacceptance, E, NL by WS at 3/9/2019  9:06 AM    Comment:  pt sedated and intubated    Acceptance, E, VU by WS at 3/9/2019  9:05 AM    Acceptance, E,TB, VU by ILYA at 3/8/2019  9:46 PM    Comment:  PT unable to rcve teaching at this time    Nonacceptance, E, NL by WS at 3/8/2019  4:13 PM    Acceptance, E, VU by LT at 3/7/2019 10:13 AM    Acceptance, E, VU by BC at 3/5/2019  5:55 PM    Acceptance, E,TB, VU by CL at 3/4/2019  7:53 PM    Acceptance, E,TB, VU by CL at 3/4/2019  7:52 PM    Acceptance, E,TB, VU by CT at 3/1/2019  3:23 PM    Acceptance, E,TB, VU,NR by KB1 at 2/26/2019  3:13 PM    Acceptance, E,TB, NR by CT at 2/25/2019  3:25 PM   Family Acceptance, E, VU by AW at 3/15/2019 12:08 PM    Acceptance, E, VU by SB at 3/13/2019 11:01 AM   Significant Other Acceptance, E, VU by WS at 3/9/2019  9:05 AM                   Point: Home exercise program (Done)     Learning Progress Summary           Patient Acceptance, E,TB, VU,NR by CT at 4/9/2019  2:08 PM    Acceptance, E, VU by AM at 4/9/2019 12:08 PM    Acceptance, E,TB, NR by CT at 4/8/2019  2:48 PM     Acceptance, E,TB, VU by GAVINO at 4/8/2019  8:21 AM    Acceptance, E, NL,VU by SM at 4/8/2019  2:37 AM    Acceptance, E,TB, VU by GAVINO at 4/7/2019  8:53 AM    Acceptance, E, NL by SM at 4/6/2019 11:18 PM    Acceptance, E, NL by SM at 4/5/2019 10:02 PM    Acceptance, E,D, NR by AG at 4/4/2019  6:23 PM    Acceptance, E, NR by KB at 4/2/2019  6:10 PM    Acceptance, E, VU by SM at 4/2/2019 12:28 AM    Acceptance, E, VU,NR by AG1 at 4/1/2019 11:20 AM    Acceptance, E, NR by KB at 3/31/2019  9:45 PM    Acceptance, E, VU by MC at 3/31/2019  2:55 AM    Acceptance, E, VU by MC at 3/31/2019  2:45 AM    Acceptance, E, VU,NR by MC at 3/30/2019  1:57 AM    Acceptance, E,TB, VU,NR by CT at 3/29/2019 10:45 AM    Acceptance, E, NR by EA at 3/28/2019 10:44 PM    Acceptance, E,TB, NR,NL by CT at 3/28/2019  2:19 PM    Acceptance, E,TB, VU by JS at 3/28/2019 12:47 AM    Acceptance, E, VU by AM at 3/27/2019  4:07 PM    Acceptance, E, NR by EA at 3/26/2019  9:47 PM    Acceptance, E, VU by LAZARO at 3/20/2019 10:09 PM    Acceptance, E, VU by AW at 3/16/2019  6:34 PM    Acceptance, E,TB, VU by ILYA at 3/14/2019 12:21 AM    Comment:  PT unable to rcve teaching at this time    Acceptance, E,TB, VU by ILYA at 3/12/2019 11:00 PM    Comment:  PT unable to rcve teaching at this time    Nonacceptance, E, NL by SB at 3/12/2019  9:17 AM    Comment:  pt intubated and sedated; no one present at the bedside    Acceptance, E, NR by EV at 3/11/2019  9:02 PM    Comment:  intubated/sedated. No family or caretaker at bedside    Nonacceptance, E, NL by WS at 3/9/2019  9:06 AM    Comment:  pt sedated and intubated    Acceptance, E, VU by WS at 3/9/2019  9:05 AM    Acceptance, E,TB, VU by ILYA at 3/8/2019  9:46 PM    Comment:  PT unable to rcve teaching at this time    Nonacceptance, E, NL by WS at 3/8/2019  4:13 PM    Acceptance, E, VU by LT at 3/7/2019 10:13 AM    Acceptance, E, VU by BC at 3/5/2019  5:55 PM    Acceptance, E,TB, VU by CL at 3/4/2019  7:53 PM    Acceptance,  E,TB, VU by CL at 3/4/2019  7:52 PM    Acceptance, E,TB, VU by CT at 3/1/2019  3:23 PM    Acceptance, E,TB, VU,NR by KB1 at 2/26/2019  3:13 PM    Acceptance, E,TB, NR by CT at 2/25/2019  3:25 PM   Family Acceptance, E, VU by AW at 3/15/2019 12:08 PM    Acceptance, E, VU by SB at 3/13/2019 11:01 AM   Significant Other Acceptance, E, VU by WS at 3/9/2019  9:05 AM                   Point: Body mechanics (Done)     Learning Progress Summary           Patient Acceptance, E,TB, VU,NR by CT at 4/9/2019  2:08 PM    Acceptance, E, VU by AM at 4/9/2019 12:08 PM    Acceptance, E,TB, NR by CT at 4/8/2019  2:48 PM    Acceptance, E,TB, VU by GAVINO at 4/8/2019  8:21 AM    Acceptance, E, NL,VU by SM at 4/8/2019  2:37 AM    Acceptance, E,TB, VU by GAVINO at 4/7/2019  8:53 AM    Acceptance, E, NL by SM at 4/6/2019 11:18 PM    Acceptance, E, NL by SM at 4/5/2019 10:02 PM    Acceptance, E,D, NR by AG at 4/4/2019  6:23 PM    Acceptance, E, NR by KB at 4/2/2019  6:10 PM    Acceptance, E, VU by SM at 4/2/2019 12:28 AM    Acceptance, E, VU,NR by AG1 at 4/1/2019 11:20 AM    Acceptance, E, NR by KB at 3/31/2019  9:45 PM    Acceptance, E, VU by MC at 3/31/2019  2:55 AM    Acceptance, E, VU by MC at 3/31/2019  2:45 AM    Acceptance, E, VU,NR by MC at 3/30/2019  1:57 AM    Acceptance, E,TB, VU,NR by CT at 3/29/2019 10:45 AM    Acceptance, E, NR by EA at 3/28/2019 10:44 PM    Acceptance, E,TB, NR,NL by CT at 3/28/2019  2:19 PM    Acceptance, E,TB, VU by JS at 3/28/2019 12:47 AM    Acceptance, E, VU by AM at 3/27/2019  4:07 PM    Acceptance, E, NR by EA at 3/26/2019  9:47 PM    Acceptance, E, VU by LAZARO at 3/20/2019 10:09 PM    Acceptance, E, VU by AW at 3/16/2019  6:34 PM    Acceptance, E,TB, VU by ILYA at 3/14/2019 12:21 AM    Comment:  PT unable to rcve teaching at this time    Acceptance, E,TB, VU by ILYA at 3/12/2019 11:00 PM    Comment:  PT unable to rcve teaching at this time    Nonacceptance, E, NL by SB at 3/12/2019  9:17 AM    Comment:  pt intubated  and sedated; no one present at the bedside    Acceptance, E, NR by EV at 3/11/2019  9:02 PM    Comment:  intubated/sedated. No family or caretaker at bedside    Nonacceptance, E, NL by WS at 3/9/2019  9:06 AM    Comment:  pt sedated and intubated    Acceptance, E, VU by WS at 3/9/2019  9:05 AM    Acceptance, E,TB, VU by ILYA at 3/8/2019  9:46 PM    Comment:  PT unable to rcve teaching at this time    Nonacceptance, E, NL by WS at 3/8/2019  4:13 PM    Acceptance, E, VU by LT at 3/7/2019 10:13 AM    Acceptance, E, VU by BC at 3/5/2019  5:55 PM    Acceptance, E,TB, VU by CL at 3/4/2019  7:53 PM    Acceptance, E,TB, VU by CL at 3/4/2019  7:52 PM    Acceptance, E,TB, VU by CT at 3/1/2019  3:23 PM    Acceptance, E,TB, VU,NR by KB1 at 2/26/2019  3:13 PM    Acceptance, E,TB, NR by CT at 2/25/2019  3:25 PM   Family Acceptance, E, VU by AW at 3/15/2019 12:08 PM    Acceptance, E, VU by SB at 3/13/2019 11:01 AM   Significant Other Acceptance, E, VU by WS at 3/9/2019  9:05 AM                   Point: Precautions (Done)     Learning Progress Summary           Patient Acceptance, E,TB, VU,NR by CT at 4/9/2019  2:08 PM    Acceptance, E, VU by AM at 4/9/2019 12:08 PM    Acceptance, E,TB, NR by CT at 4/8/2019  2:48 PM    Acceptance, E,TB, VU by GAVINO at 4/8/2019  8:21 AM    Acceptance, E, NL,VU by SM at 4/8/2019  2:37 AM    Acceptance, E,TB, VU by GAVINO at 4/7/2019  8:53 AM    Acceptance, E, NL by SM at 4/6/2019 11:18 PM    Acceptance, E, NL by SM at 4/5/2019 10:02 PM    Acceptance, E,D, NR by AG at 4/4/2019  6:23 PM    Acceptance, E, NR by KB at 4/2/2019  6:10 PM    Acceptance, E, VU by SM at 4/2/2019 12:28 AM    Acceptance, E, VU,NR by AG1 at 4/1/2019 11:20 AM    Acceptance, E, NR by KB at 3/31/2019  9:45 PM    Acceptance, E, VU by MC at 3/31/2019  2:55 AM    Acceptance, E, VU by MC at 3/31/2019  2:45 AM    Acceptance, E, VU,NR by MC at 3/30/2019  1:57 AM    Acceptance, E,TB, VU,NR by CT at 3/29/2019 10:45 AM    Acceptance, E, NR by EA at  3/28/2019 10:44 PM    Acceptance, E,TB, NR,NL by CT at 3/28/2019  2:19 PM    Acceptance, E,TB, VU by JS at 3/28/2019 12:47 AM    Acceptance, E, VU by AM at 3/27/2019  4:07 PM    Acceptance, E, NR by EA at 3/26/2019  9:47 PM    Acceptance, E, VU by LAZARO at 3/20/2019 10:09 PM    Acceptance, E, VU by AW at 3/16/2019  6:34 PM    Acceptance, E,TB, VU by ILYA at 3/14/2019 12:21 AM    Comment:  PT unable to rcve teaching at this time    Acceptance, E,TB, VU by ILYA at 3/12/2019 11:00 PM    Comment:  PT unable to rcve teaching at this time    Nonacceptance, E, NL by SB at 3/12/2019  9:17 AM    Comment:  pt intubated and sedated; no one present at the bedside    Acceptance, E, NR by EV at 3/11/2019  9:02 PM    Comment:  intubated/sedated. No family or caretaker at bedside    Nonacceptance, E, NL by WS at 3/9/2019  9:06 AM    Comment:  pt sedated and intubated    Acceptance, E, VU by WS at 3/9/2019  9:05 AM    Acceptance, E,TB, VU by ILYA at 3/8/2019  9:46 PM    Comment:  PT unable to rcve teaching at this time    Nonacceptance, E, NL by WS at 3/8/2019  4:13 PM    Acceptance, E, VU by LT at 3/7/2019 10:13 AM    Acceptance, E, VU by BC at 3/5/2019  5:55 PM    Acceptance, E,TB, VU by CL at 3/4/2019  7:53 PM    Acceptance, E,TB, VU by CL at 3/4/2019  7:52 PM    Acceptance, E,TB, VU by CT at 3/1/2019  3:23 PM    Acceptance, E,TB, VU,NR by KB1 at 2/26/2019  3:13 PM    Acceptance, E,TB, NR by CT at 2/25/2019  3:25 PM   Family Acceptance, E, VU by AW at 3/15/2019 12:08 PM    Acceptance, E, VU by SB at 3/13/2019 11:01 AM   Significant Other Acceptance, E, VU by WS at 3/9/2019  9:05 AM                               User Key     Initials Effective Dates Name Provider Type Discipline    SB 06/16/16 -  Kathy Rowell, RN Registered Nurse Nurse    ILYA 06/16/16 -  Alberto Mendoza, RN Registered Nurse Nurse    LT 06/16/16 -  Denae Carvajal, RN Registered Nurse Nurse    EA 06/16/16 -  Mary Blanton, RN Registered Nurse Nurse     KB 06/16/16 -  Lorena Garcia, RN Registered Nurse Nurse    AW 06/16/16 -  Heather Godinez, RN Registered Nurse Nurse    WS 06/16/16 -  Keisha Messer, RN Registered Nurse Nurse    AM 06/16/16 -  Madeline Santiago, RN Registered Nurse Nurse    BC 03/14/16 -  Ekaterina Cifuentes, PT Physical Therapist PT    AG 04/03/18 -  Parisa Orellana, PT Physical Therapist PT    CT 04/03/18 -  Kyung Lr, PT Physical Therapist PT    AG1 06/16/16 -  Ayana Corbin, RN Registered Nurse Nurse    CL 07/23/18 -  Rj Godfrey, RN Registered Nurse Nurse    KB1 12/20/17 -  Ekaterina Parry, PTA Physical Therapy Assistant PT    LAZARO 05/22/18 -  Renata Mcbride, RN Registered Nurse Nurse    MC 09/06/18 -  Collett, Morgan, RN Registered Nurse Nurse    SM 09/12/18 -  Sean Kohler RN Registered Nurse Nurse    GAVINO 10/18/18 -  Marko Cifuentes, RN Registered Nurse Nurse    EV 10/26/18 -  Corry Bagley RN Registered Nurse Nurse    JS 11/26/18 -  Sg Smith, RN Registered Nurse Nurse                PT Recommendation and Plan  Anticipated Discharge Disposition (PT): skilled nursing facility  Planned Therapy Interventions (PT Eval): balance training, gait training, bed mobility training, home exercise program, manual therapy techniques, motor coordination training, neuromuscular re-education, patient/family education, postural re-education, ROM (range of motion), stair training, strengthening, stretching, transfer training  Therapy Frequency (PT Clinical Impression): 3 times/wk(3-5 times/ week per priority policy)  Outcome Summary/Treatment Plan (PT)  Daily Summary of Progress (PT): progress toward functional goals is good  Anticipated Equipment Needs at Discharge (PT): front wheeled walker  Anticipated Discharge Disposition (PT): skilled nursing facility  Plan of Care Reviewed With: patient  Progress: improving  Outcome Summary: Pt met all goals at time of progress note. New goals established.    Outcome Measures     Row Name 04/08/19 1400             How much help from another person do you currently need...    Turning from your back to your side while in flat bed without using bedrails?  3  -CT      Moving from lying on back to sitting on the side of a flat bed without bedrails?  3  -CT      Moving to and from a bed to a chair (including a wheelchair)?  3  -CT      Standing up from a chair using your arms (e.g., wheelchair, bedside chair)?  3  -CT      Climbing 3-5 steps with a railing?  2  -CT      To walk in hospital room?  3  -CT      AM-PAC 6 Clicks Score  17  -CT         Functional Assessment    Outcome Measure Options  AM-PAC 6 Clicks Basic Mobility (PT)  -CT        User Key  (r) = Recorded By, (t) = Taken By, (c) = Cosigned By    Initials Name Provider Type    CT Kyung Lr, PT Physical Therapist         Time Calculation:   PT Charges     Row Name 04/09/19 1408             Time Calculation    PT Received On  04/09/19  -CT      PT - Next Appointment  04/10/19  -CT      PT Goal Re-Cert Due Date  04/23/19  -CT         Time Calculation- PT    Total Timed Code Minutes- PT  32 minute(s)  -CT      TCU Minutes- PT  --  -CT         Timed Charges    74670 - Gait Training Minutes   17  -CT      03045 - PT Therapeutic Activity Minutes  15  -CT        User Key  (r) = Recorded By, (t) = Taken By, (c) = Cosigned By    Initials Name Provider Type    CT Kyung Lr, PT Physical Therapist        Therapy Charges for Today     Code Description Service Date Service Provider Modifiers Qty    37323586297 HC GAIT TRAINING EA 15 MIN 4/8/2019 Kyung Lr, PT GP 1    35511060772 HC PT THERAPEUTIC ACT EA 15 MIN 4/8/2019 Kyung Lr, PT GP 1    38398192860 HC PT THER SUPP EA 15 MIN 4/8/2019 Kyung Lr, PT GP 2    83418012696 HC GAIT TRAINING EA 15 MIN 4/9/2019 Kyung Lr, PT GP 1    66560923048 HC PT THERAPEUTIC ACT EA 15 MIN 4/9/2019 Kyung Lr, PT GP 1    50289240872 HC PT THER SUPP EA 15 MIN  4/9/2019 Kyung Lr, PT GP 2          PT G-Codes  Outcome Measure Options: AM-PAC 6 Clicks Basic Mobility (PT)  AM-PAC 6 Clicks Score: 17  Score: 12    Kyung Lr PT  4/9/2019         Electronically signed by Kyung Lr, PT at 4/9/2019  2:11 PM

## 2019-04-10 NOTE — PROGRESS NOTES
Case Management/Social Work    Patient Name:  Altagracia King  YOB: 1965  MRN: [id number]  Admit Date:  2/22/2019        SS spoke with [facility] who states no female beds available.     SS spoke with Janessa Briones and left message on this date.     SS spoke with [facility] and faxed referral. SS spoke with [facility] and faxed referral.     SS spoke with [facility], who states they would not be able to accept pt until her insurance is switched to a Medicaid that had benefits. Pt will not be able to be switched until she is approved to Disability.     SS attempted to contact pt's daughter on this date to help with getting pt's photo ID. SS contacted pt's sister and left message.     SS contacted [facility], who states they would not be able to send a picture ID. [facility] states that the pt would have to come in to the office to get a new ID made.     SS has followed up with [facility], who states they are meeting on this date to discuss the pt.     SS spoke with [facility] per Marissa, who states they looked pt's referral and they do not have a beds available.         Electronically signed by:  Jazmyn Mendoza  04/10/19 11:13 AM

## 2019-04-10 NOTE — PLAN OF CARE
Problem: Patient Care Overview  Goal: Plan of Care Review  Outcome: Ongoing (interventions implemented as appropriate)   04/10/19 2321   Plan of Care Review   Progress improving   Coping/Psychosocial   Plan of Care Reviewed With patient

## 2019-04-11 LAB
BASOPHILS # BLD AUTO: 0.03 10*3/MM3 (ref 0–0.2)
BASOPHILS NFR BLD AUTO: 0.4 % (ref 0–1.5)
DEPRECATED RDW RBC AUTO: 50.3 FL (ref 37–54)
EOSINOPHIL # BLD AUTO: 0.25 10*3/MM3 (ref 0–0.4)
EOSINOPHIL NFR BLD AUTO: 3.5 % (ref 0.3–6.2)
ERYTHROCYTE [DISTWIDTH] IN BLOOD BY AUTOMATED COUNT: 14 % (ref 12.3–15.4)
HCT VFR BLD AUTO: 32.2 % (ref 34–46.6)
HGB BLD-MCNC: 10.3 G/DL (ref 12–15.9)
IMM GRANULOCYTES # BLD AUTO: 0.02 10*3/MM3 (ref 0–0.05)
IMM GRANULOCYTES NFR BLD AUTO: 0.3 % (ref 0–0.5)
LYMPHOCYTES # BLD AUTO: 1.29 10*3/MM3 (ref 0.7–3.1)
LYMPHOCYTES NFR BLD AUTO: 17.8 % (ref 19.6–45.3)
MCH RBC QN AUTO: 32.6 PG (ref 26.6–33)
MCHC RBC AUTO-ENTMCNC: 32 G/DL (ref 31.5–35.7)
MCV RBC AUTO: 101.9 FL (ref 79–97)
MONOCYTES # BLD AUTO: 1.1 10*3/MM3 (ref 0.1–0.9)
MONOCYTES NFR BLD AUTO: 15.2 % (ref 5–12)
NEUTROPHILS # BLD AUTO: 4.54 10*3/MM3 (ref 1.4–7)
NEUTROPHILS NFR BLD AUTO: 62.8 % (ref 42.7–76)
PLATELET # BLD AUTO: 207 10*3/MM3 (ref 140–450)
PMV BLD AUTO: 10.4 FL (ref 6–12)
RBC # BLD AUTO: 3.16 10*6/MM3 (ref 3.77–5.28)
WBC NRBC COR # BLD: 7.23 10*3/MM3 (ref 3.4–10.8)

## 2019-04-11 PROCEDURE — 99231 SBSQ HOSP IP/OBS SF/LOW 25: CPT | Performed by: HOSPITALIST

## 2019-04-11 PROCEDURE — 94799 UNLISTED PULMONARY SVC/PX: CPT

## 2019-04-11 PROCEDURE — 85025 COMPLETE CBC W/AUTO DIFF WBC: CPT | Performed by: INTERNAL MEDICINE

## 2019-04-11 RX ADMIN — SPIRONOLACTONE 25 MG: 25 TABLET ORAL at 12:05

## 2019-04-11 RX ADMIN — MAGNESIUM GLUCONATE 500 MG ORAL TABLET 400 MG: 500 TABLET ORAL at 08:37

## 2019-04-11 RX ADMIN — MEXILETINE HYDROCHLORIDE 150 MG: 150 CAPSULE ORAL at 20:42

## 2019-04-11 RX ADMIN — FUROSEMIDE 10 MG: 20 TABLET ORAL at 08:41

## 2019-04-11 RX ADMIN — MEXILETINE HYDROCHLORIDE 150 MG: 150 CAPSULE ORAL at 05:49

## 2019-04-11 RX ADMIN — SODIUM CHLORIDE, PRESERVATIVE FREE 3 ML: 5 INJECTION INTRAVENOUS at 08:42

## 2019-04-11 RX ADMIN — MEXILETINE HYDROCHLORIDE 150 MG: 150 CAPSULE ORAL at 15:02

## 2019-04-11 RX ADMIN — APIXABAN 5 MG: 5 TABLET, FILM COATED ORAL at 20:41

## 2019-04-11 RX ADMIN — AMIODARONE HYDROCHLORIDE 200 MG: 200 TABLET ORAL at 08:39

## 2019-04-11 RX ADMIN — FOLIC ACID 1 MG: 1 TABLET ORAL at 08:37

## 2019-04-11 RX ADMIN — Medication 100 MG: at 08:37

## 2019-04-11 RX ADMIN — OLANZAPINE 5 MG: 5 TABLET, FILM COATED ORAL at 20:42

## 2019-04-11 RX ADMIN — SENNOSIDES AND DOCUSATE SODIUM 2 TABLET: 8.6; 5 TABLET ORAL at 20:41

## 2019-04-11 RX ADMIN — DIGOXIN 125 MCG: 125 TABLET ORAL at 08:39

## 2019-04-11 RX ADMIN — METOPROLOL TARTRATE 75 MG: 50 TABLET, FILM COATED ORAL at 08:37

## 2019-04-11 RX ADMIN — METOPROLOL TARTRATE 75 MG: 50 TABLET, FILM COATED ORAL at 20:41

## 2019-04-11 RX ADMIN — ACETAMINOPHEN 650 MG: 325 TABLET, FILM COATED ORAL at 08:39

## 2019-04-11 RX ADMIN — LEVOTHYROXINE SODIUM 75 MCG: 75 TABLET ORAL at 05:48

## 2019-04-11 RX ADMIN — PANTOPRAZOLE SODIUM 40 MG: 40 TABLET, DELAYED RELEASE ORAL at 05:48

## 2019-04-11 RX ADMIN — APIXABAN 5 MG: 5 TABLET, FILM COATED ORAL at 08:37

## 2019-04-11 NOTE — PLAN OF CARE
Problem: Fall Risk (Adult)  Goal: Absence of Fall  Outcome: Ongoing (interventions implemented as appropriate)      Problem: Skin Injury Risk (Adult)  Goal: Skin Health and Integrity  Outcome: Ongoing (interventions implemented as appropriate)      Problem: Patient Care Overview  Goal: Individualization and Mutuality  Outcome: Ongoing (interventions implemented as appropriate)    Goal: Discharge Needs Assessment  Outcome: Ongoing (interventions implemented as appropriate)    Goal: Interprofessional Rounds/Family Conf  Outcome: Ongoing (interventions implemented as appropriate)    Goal: Individualization and Mutuality  Outcome: Ongoing (interventions implemented as appropriate)    Goal: Discharge Needs Assessment  Outcome: Ongoing (interventions implemented as appropriate)    Goal: Interprofessional Rounds/Family Conf  Outcome: Ongoing (interventions implemented as appropriate)      Problem: Pain, Chronic (Adult)  Goal: Acceptable Pain/Comfort Level and Functional Ability  Outcome: Ongoing (interventions implemented as appropriate)      Problem: Mobility, Physical Impaired (Adult)  Goal: Enhanced Mobility Skills  Outcome: Ongoing (interventions implemented as appropriate)    Goal: Enhanced Functional Ability  Outcome: Ongoing (interventions implemented as appropriate)    Goal: Identify Related Risk Factors and Signs and Symptoms  Outcome: Ongoing (interventions implemented as appropriate)    Goal: Enhanced Mobility Skills  Outcome: Ongoing (interventions implemented as appropriate)    Goal: Enhanced Functional Ability  Outcome: Ongoing (interventions implemented as appropriate)    Goal: Identify Related Risk Factors and Signs and Symptoms  Outcome: Ongoing (interventions implemented as appropriate)    Goal: Enhanced Mobility Skills  Outcome: Ongoing (interventions implemented as appropriate)

## 2019-04-11 NOTE — SIGNIFICANT NOTE
04/11/19 1441   Rehab Treatment   Reason Treatment Not Performed patient/family declined treatment, not feeling well  (Pt declined PT today d/t not feeling well. Will continue to follow. )

## 2019-04-11 NOTE — PROGRESS NOTES
[facility]     Patient Identification:  Name:  Altagracia King  Age:  53 y.o.  Sex:  female  :  1965  MRN:  [id number]  Visit Number:  [id number]  Primary Care Provider:  Provider, No Known    Length of stay:  47    Subjective: Patient was sleepy yesterday, she also reported she felt palpitation for an hour yesterday..  On examination she has normal rhythm.  Telemetry did not reveal any arrhythmia.  She is up and awake today she is not confused, she is participating rehab.  No issues reported other than being sleepy last night.    Chief Complaint: Palpitation  ----------------------------------------------------------------------------------------------------------------------  Current Hospital Meds:    amiodarone 200 mg Oral Daily   apixaban 5 mg Oral Q12H   bisacodyl 10 mg Rectal Daily   cyanocobalamin 1,000 mcg Intramuscular Q30 Days   digoxin 125 mcg Oral Q48H   folic acid 1 mg Oral Daily   furosemide 10 mg Oral Daily   levothyroxine 75 mcg Oral Q AM   magnesium oxide 400 mg Oral Daily   metoprolol tartrate 75 mg Oral Q12H   mexiletine 150 mg Oral Q8H   OLANZapine 5 mg Oral Nightly   pantoprazole 40 mg Oral Q AM   sennosides-docusate sodium 2 tablet Oral Nightly   sodium chloride 3 mL Intravenous Q12H   sodium chloride 3 mL Intravenous Q12H   spironolactone 25 mg Oral Daily With Lunch   thiamine 100 mg Oral Daily       Pharmacy Consult      ----------------------------------------------------------------------------------------------------------------------  Vital Signs:  Temp:  [97.7 °F (36.5 °C)-98.4 °F (36.9 °C)] 97.8 °F (36.6 °C)  Heart Rate:  [74-87] 76  Resp:  [18] 18  BP: (106-127)/(54-65) 118/63       Tele: Sinus rhythm 73 bpm      19  0500 04/10/19  0319 19  0300   Weight: 46.6 kg (102 lb 12.8 oz) 47.3 kg (104 lb 3.2 oz) 47.3 kg (104 lb 3.2 oz)     Body mass index is 17.34 kg/m².    Intake/Output Summary (Last 24 hours) at 2019  1443  Last data filed at 4/11/2019 1300  Gross per 24 hour   Intake 1180 ml   Output 400 ml   Net 780 ml     Diet Soft Texture; Chopped; Thin; Daily Fluid Restriction; Other; 1,800  ----------------------------------------------------------------------------------------------------------------------  Physical exam:  General: Comfortable,awake, alert, oriented to self, place, and time, well-developed, chronically ill-appearing,  No respiratory distress.    Skin:  Skin is warm and dry. No rash noted.  She is pale.  HENT:  Head:  Normocephalic and atraumatic.  Mouth:  Moist mucous membranes.    Eyes:  Conjunctivae and EOM are normal.   No scleral icterus.  Left eye completely opacified, right eye has cataract  Neck:  Neck supple.  No JVD present.    Pulmonary/Chest:  No respiratory distress, no wheezes, no crackles, with normal breath sounds and good air movement.  Cardiovascular:  Normal rate, regular rhythm and normal heart sounds with no murmur.  Abdominal:  Soft.  Bowel sounds are normal.  No distension and no tenderness.   Extremities:  No edema, no tenderness, and no deformity.  No red or swollen joints anywhere.  Strong pulses in all 4 extremities with no clubbing, no cyanosis, no edema.  Neurological:  Motor strength equal no obvious deficit, sensory grossly intact.   No cranial nerve deficit.  No tongue deviation.  No facial droop.  No slurred speech.     ----------------------------------------------------------------------------------------------------------------------  ----------------------------------------------------------------------------------------------------------------------      Results from last 7 days   Lab Units 04/11/19  1007 04/08/19  0718 04/05/19  1031   WBC 10*3/mm3 7.23 6.72 6.11   HEMOGLOBIN g/dL 10.3* 11.0* 9.7*   HEMATOCRIT % 32.2* 34.0 30.7*   MCV fL 101.9* 100.9* 102.3*   MCHC g/dL 32.0 32.4 31.6   PLATELETS 10*3/mm3 207 236 195         Results from last 7 days   Lab Units  04/10/19  0358 04/08/19  0445 04/07/19  1745 04/07/19  0743 04/07/19  0103   SODIUM mmol/L 140 139  --  141  --    POTASSIUM mmol/L 4.2 4.3  --  4.3  --    MAGNESIUM mg/dL  --  2.1 2.7*  --  1.7   CHLORIDE mmol/L 104 102  --  104  --    CO2 mmol/L 23.1 25.0  --  24.4  --    BUN mg/dL 18 19  --  22*  --    CREATININE mg/dL 0.82 0.76  --  0.84  --    EGFR IF NONAFRICN AM mL/min/1.73 72 79  --  70  --    CALCIUM mg/dL 10.1 9.5  --  9.7  --    GLUCOSE mg/dL 85 90  --  91  --    Estimated Creatinine Clearance: 57.9 mL/min (by C-G formula based on SCr of 0.82 mg/dL).    No results found for: AMMONIA                    I have personally looked at the labs and they are summarized above.  ----------------------------------------------------------------------------------------------------------------------  Imaging Results (last 24 hours)     ** No results found for the last 24 hours. **        ----------------------------------------------------------------------------------------------------------------------  Assessment and Plan:  -Functional decline with physical deconditioning currently on physical therapy,  -Paroxysmal atrial fibrillation  -History of episodes of nonsustained V. Tach  -Acquired hypothyroidism  -Diastolic dysfunction compensated    Continue with physical therapy, no changes on medication, awaiting for nursing home placement.      Kaye Wilson MD  04/11/19  2:43 PM

## 2019-04-11 NOTE — PLAN OF CARE
Problem: Fall Risk (Adult)  Goal: Absence of Fall  Outcome: Ongoing (interventions implemented as appropriate)      Problem: Skin Injury Risk (Adult)  Goal: Skin Health and Integrity  Outcome: Ongoing (interventions implemented as appropriate)      Problem: Patient Care Overview  Goal: Plan of Care Review  Outcome: Ongoing (interventions implemented as appropriate)      Problem: Pain, Chronic (Adult)  Goal: Acceptable Pain/Comfort Level and Functional Ability  Outcome: Ongoing (interventions implemented as appropriate)      Problem: Mobility, Physical Impaired (Adult)  Goal: Enhanced Mobility Skills  Outcome: Ongoing (interventions implemented as appropriate)      Problem: Arrhythmia/Dysrhythmia (Symptomatic) (Adult)  Goal: Signs and Symptoms of Listed Potential Problems Will be Absent, Minimized or Managed (Arrhythmia/Dysrhythmia)  Outcome: Ongoing (interventions implemented as appropriate)

## 2019-04-12 ENCOUNTER — APPOINTMENT (OUTPATIENT)
Dept: CT IMAGING | Facility: HOSPITAL | Age: 54
End: 2019-04-12

## 2019-04-12 PROCEDURE — 97530 THERAPEUTIC ACTIVITIES: CPT

## 2019-04-12 PROCEDURE — 25010000002 HALOPERIDOL LACTATE PER 5 MG: Performed by: HOSPITALIST

## 2019-04-12 PROCEDURE — 71250 CT THORAX DX C-: CPT

## 2019-04-12 PROCEDURE — 97116 GAIT TRAINING THERAPY: CPT

## 2019-04-12 PROCEDURE — 99232 SBSQ HOSP IP/OBS MODERATE 35: CPT | Performed by: HOSPITALIST

## 2019-04-12 PROCEDURE — 71250 CT THORAX DX C-: CPT | Performed by: RADIOLOGY

## 2019-04-12 RX ORDER — HALOPERIDOL 5 MG/ML
5 INJECTION INTRAMUSCULAR ONCE
Status: COMPLETED | OUTPATIENT
Start: 2019-04-12 | End: 2019-04-12

## 2019-04-12 RX ADMIN — HALOPERIDOL LACTATE 5 MG: 5 INJECTION, SOLUTION INTRAMUSCULAR at 11:34

## 2019-04-12 RX ADMIN — APIXABAN 5 MG: 5 TABLET, FILM COATED ORAL at 20:50

## 2019-04-12 RX ADMIN — SENNOSIDES AND DOCUSATE SODIUM 2 TABLET: 8.6; 5 TABLET ORAL at 20:50

## 2019-04-12 RX ADMIN — FOLIC ACID 1 MG: 1 TABLET ORAL at 08:32

## 2019-04-12 RX ADMIN — SPIRONOLACTONE 25 MG: 25 TABLET ORAL at 11:27

## 2019-04-12 RX ADMIN — SODIUM CHLORIDE, PRESERVATIVE FREE 3 ML: 5 INJECTION INTRAVENOUS at 08:33

## 2019-04-12 RX ADMIN — APIXABAN 5 MG: 5 TABLET, FILM COATED ORAL at 08:31

## 2019-04-12 RX ADMIN — MEXILETINE HYDROCHLORIDE 150 MG: 150 CAPSULE ORAL at 20:50

## 2019-04-12 RX ADMIN — ACETAMINOPHEN 650 MG: 325 TABLET, FILM COATED ORAL at 06:19

## 2019-04-12 RX ADMIN — FUROSEMIDE 10 MG: 20 TABLET ORAL at 08:31

## 2019-04-12 RX ADMIN — MAGNESIUM GLUCONATE 500 MG ORAL TABLET 400 MG: 500 TABLET ORAL at 08:30

## 2019-04-12 RX ADMIN — AMIODARONE HYDROCHLORIDE 200 MG: 200 TABLET ORAL at 08:30

## 2019-04-12 RX ADMIN — MEXILETINE HYDROCHLORIDE 150 MG: 150 CAPSULE ORAL at 15:06

## 2019-04-12 RX ADMIN — OLANZAPINE 5 MG: 5 TABLET, FILM COATED ORAL at 20:50

## 2019-04-12 RX ADMIN — LEVOTHYROXINE SODIUM 75 MCG: 75 TABLET ORAL at 06:19

## 2019-04-12 RX ADMIN — SODIUM CHLORIDE, PRESERVATIVE FREE 3 ML: 5 INJECTION INTRAVENOUS at 20:50

## 2019-04-12 RX ADMIN — METOPROLOL TARTRATE 75 MG: 50 TABLET, FILM COATED ORAL at 08:30

## 2019-04-12 RX ADMIN — Medication 100 MG: at 08:30

## 2019-04-12 RX ADMIN — METOPROLOL TARTRATE 75 MG: 50 TABLET, FILM COATED ORAL at 20:50

## 2019-04-12 RX ADMIN — MEXILETINE HYDROCHLORIDE 150 MG: 150 CAPSULE ORAL at 06:19

## 2019-04-12 RX ADMIN — PANTOPRAZOLE SODIUM 40 MG: 40 TABLET, DELAYED RELEASE ORAL at 06:19

## 2019-04-12 NOTE — DISCHARGE PLACEMENT REQUEST
"Shagufta Loza (53 y.o. Female)     Date of Birth Social Security Number Address Home Phone MRN    1965  [address] [place] [address] [phone] [id number]    Gnosticism Marital Status          Unknown        Admission Date Admission Type Admitting Provider Attending Provider Department, Room/Bed    2/22/19 Emergency Carito Leone MD Oculam, Claire Chin, MD [address], 3309/2S    Discharge Date Discharge Disposition Discharge Destination                       Attending Provider:  Kaye Wilson MD    Allergies:  No Known Allergies    Isolation:  None   Infection:  None   Code Status:  CPR    Ht:  165.1 cm (65\")   Wt:  47.2 kg (104 lb)    Admission Cmt:  None   Principal Problem:  None                Active Insurance as of 2/22/2019     Primary Coverage     Payor Plan Insurance Group Employer/Plan Group    HU[employer]NA MEDICAID HU[employer] [id number]     Payor Plan Address Payor Plan Phone Number Payor Plan Fax Number Effective Dates    PO  [phone]  2/14/2019 - None Entered    [facility] [id number]       Subscriber Name Subscriber Birth Date Member ID       SHAGUFTA LOZA 1965 [id number]                 Emergency Contacts      (Rel.) Home Phone Work Phone Mobile Phone    Madeline Loza (Daughter) -- -- [phone]    Faustino Mclain (Friend) [phone] -- --    Mumtaz Bundy (Friend) [phone] -- --    Unknown, Sylwia (Sister) [phone] -- --            Emergency Contact Information     Name Relation Home Work Mobile    Madeline Loza Daughter   [phone]    Faustino Mclain Friend [phone]      Mumtaz Bundy Friend [phone]      Unknown, Sylwia Sister [phone]            Insurance Information                [employer]NA MEDICAID/HU[employer]NA [employer] Phone: [phone]    Subscriber: Shagufta Loza Subscriber#: [id number]    Group#: [id number] Precert#:              History & Physical      Jhon Messer PA-C at 2/23/2019 12:23 " "PM                   HISTORY AND PHYSICAL        Patient Identification:  Name:  Altagracia King  Age:  53 y.o.  Sex:  female  :  1965  MRN:  [id number]   Visit Number:  [id number]  Primary Care Physician:  Provider, No Known       Subjective     Subjective     Chief complaint:     Chief Complaint   Patient presents with   • Chest Pain       History of presenting illness:     The patient is a 53 year old female for presented to the ED with complaints of substernal chest pain that began yesterday morning. She reports the pain radiates down her left arm and her arm feels somewhat numb. She also reports issues at home and needing  as she has been living in a homeless shelter where she was recently \"kicked out.\" Vital signs stable. Troponin levels negative.EKG shows sinus rhythm with premature ventricular complexes or fusion complexes and septal infarct , age undetermined.   History of alcohol abuse but normal ethanol level and she reports she has not had any alcohol for around a week. TSH elevated at 6.995. White count and CRP normal. Chest xray is unremarkable. EKG shows sinus rhythm with premature ventricular complexes or fusion complexes and septal infarct , age undetermined. Admitted to the observation unit for further evaluation and monitoring.       ---------------------------------------------------------------------------------------------------------------------     Review Of Systems:    Constitutional: no fever, chills and night sweats. No appetite change or unexpected weight change. No fatigue.  Eyes: no eye drainage, itching or redness.  HEENT: no mouth sores, dysphagia or nose bleed.  Respiratory: no for shortness of breath, cough or production of sputum.  Cardiovascular: See HPI  Gastrointestinal: no nausea, vomiting or diarrhea. No abdominal pain, hematemesis or rectal bleeding.  Genitourinary: no dysuria or polyuria.  Hematologic/lymphatic: no lymph node abnormalities, no easy " bruising or easy bleeding.  Musculoskeletal: no muscle or joint pain.  Skin: No rash and no itching.  Neurological: no loss of consciousness, no seizure, no headache.  Behavioral/Psych: no depression or suicidal ideation.  Endocrine: no hot flashes.  Immunologic: negative.    ---------------------------------------------------------------------------------------------------------------------     Past Medical History    Past Medical History:   Diagnosis Date   • Alcoholism (CMS/[facility])    • Depression    • GERD (gastroesophageal reflux disease)        Past Surgical History    Past Surgical History:   Procedure Laterality Date   • CARDIAC CATHETERIZATION     • COLONOSCOPY     • ENDOSCOPY     • TUBAL ABDOMINAL LIGATION         Family History    Family History   Problem Relation Age of Onset   • Heart disease Mother    • Depression Mother    • Heart disease Father    • Depression Sister    • Heart disease Brother    • Depression Maternal Grandmother    • Alcohol abuse Maternal Grandfather        Social History    Social History     Tobacco Use   • Smoking status: Current Every Day Smoker     Packs/day: 1.00   • Smokeless tobacco: Never Used   Substance Use Topics   • Alcohol use: Yes     Alcohol/week: 2.4 oz     Types: 4 Cans of beer per week   • Drug use: No       Allergies    Patient has no known allergies.  ---------------------------------------------------------------------------------------------------------------------     Home Medications:    Prior to Admission Medications     Prescriptions Last Dose Informant Patient Reported? Taking?    aspirin 325 MG tablet 2/22/2019 Self Yes Yes    Take 650 mg by mouth Daily.    calcium carbonate (TUMS) 500 MG chewable tablet 2/23/2019 Self Yes Yes    Chew 2 tablets As Needed for Indigestion or Heartburn.        ---------------------------------------------------------------------------------------------------------------------    Objective     Objective     Hospital Scheduled  Meds:    aspirin 325 mg Oral Daily   enoxaparin 40 mg Subcutaneous Q24H   LORazepam 2 mg Oral 3 times per day   Followed by      [START ON 2/24/2019] LORazepam 1.5 mg Oral 3 times per day   Followed by      [START ON 2/25/2019] LORazepam 1 mg Oral 3 times per day   Followed by      [START ON 2/26/2019] LORazepam 0.5 mg Oral 3 times per day   sodium chloride 3 mL Intravenous Q12H   sodium chloride 3 mL Intravenous Q12H        ---------------------------------------------------------------------------------------------------------------------   Vital Signs:  Temp:  [98.2 °F (36.8 °C)-99.2 °F (37.3 °C)] 98.6 °F (37 °C)  Heart Rate:  [] 85  Resp:  [18-24] 18  BP: (104-164)/(52-98) 104/61  Mean Arterial Pressure (Non-Invasive) for the past 24 hrs (Last 3 readings):   Noninvasive MAP (mmHg)   02/23/19 1126 74   02/23/19 0716 81   02/23/19 0440 88     SpO2 Percentage    02/23/19 0440 02/23/19 0716 02/23/19 1126   SpO2: 99% 100% 100%     SpO2:  [96 %-100 %] 100 %  on   ;   Device (Oxygen Therapy): room air    Body mass index is 18.99 kg/m².  Wt Readings from Last 3 Encounters:   02/22/19 51.8 kg (114 lb 2 oz)   02/14/19 56.7 kg (125 lb)   02/14/19 56.7 kg (125 lb)     ---------------------------------------------------------------------------------------------------------------------     Physical Exam:    Constitutional:  Well-developed and well-nourished.  No respiratory distress.      HENT:  Head: Normocephalic and atraumatic.  Mouth:  Moist mucous membranes.    Eyes: Left cataract.  Conjunctivae and EOM are normal.  No scleral icterus.  Neck:  Neck supple.  No JVD present.    Cardiovascular:  Normal rate, regular rhythm and normal heart sounds with no murmur. No edema.  Pulmonary/Chest: Mild bilateral wheezes. No respiratory distress, no crackles, with normal breath sounds and good air movement.  Abdominal:  Soft.  Bowel sounds are normal.  No distension and no tenderness.   Musculoskeletal:  No edema, no  tenderness, and no deformity.  No swelling or redness of joints.  Neurological:  Alert and oriented to person, place, and time.  No facial droop.  No slurred speech.   Skin:  Skin is warm and dry.  No rash noted.  No pallor.   Psychiatric:  Normal mood and affect.  Behavior is normal.    ---------------------------------------------------------------------------------------------------------------------  I have personally reviewed the EKG/Telemetry strip  ---------------------------------------------------------------------------------------------------------------------   Results from last 7 days   Lab Units 02/23/19  1134 02/23/19  0849 02/23/19  0132  02/22/19  1804   CK TOTAL U/L  --   --   --   --  41   TROPONIN I ng/mL <0.006 <0.006 <0.006   < > <0.006   MYOGLOBIN ng/mL  --   --   --   --  21.0    < > = values in this interval not displayed.           Results from last 7 days   Lab Units 02/23/19  0132 02/22/19  1944   CRP mg/dL <0.50  --    WBC 10*3/mm3 4.84 5.66   HEMOGLOBIN g/dL 11.1* 11.2*   HEMATOCRIT % 34.6* 34.4*   MCV fL 106.5* 104.2*   MCHC g/dL 32.1* 32.6*   PLATELETS 10*3/mm3 147 143     Results from last 7 days   Lab Units 02/23/19  0132 02/22/19  1804   SODIUM mmol/L 137 132*   POTASSIUM mmol/L 3.6 3.9   MAGNESIUM mg/dL 2.0 2.0   CHLORIDE mmol/L 105 102   CO2 mmol/L 25.4 24.5   BUN mg/dL 14 16   CREATININE mg/dL 0.74 0.69   EGFR IF NONAFRICN AM mL/min/1.73 81 88   CALCIUM mg/dL 9.3 9.1   GLUCOSE mg/dL 89 104   ALBUMIN g/dL 3.90 4.20   BILIRUBIN mg/dL 0.2 0.2   ALK PHOS U/L 61 69   AST (SGOT) U/L 21 23   ALT (SGPT) U/L 14 15   Estimated Creatinine Clearance: 70.2 mL/min (by C-G formula based on SCr of 0.74 mg/dL).  No results found for: AMMONIA    No results found for: HGBA1C, POCGLU  No results found for: HGBA1C  Lab Results   Component Value Date    TSH 6.995 (H) 02/22/2019                      Pain Management Panel     Pain Management Panel Latest Ref Rng & Units 2/22/2019    AMPHETAMINES  "SCREEN, URINE Negative Negative    BARBITURATES SCREEN Negative Negative    BENZODIAZEPINE SCREEN, URINE Negative Negative    BUPRENORPHINE Negative Negative    COCAINE SCREEN, URINE Negative Negative    METHADONE SCREEN, URINE Negative Negative        I have personally reviewed the above laboratory results.   ---------------------------------------------------------------------------------------------------------------------  Imaging Results (last 7 days)     Procedure Component Value Units Date/Time    XR Chest 1 View [[id number]] Collected:  02/23/19 0924     Updated:  02/23/19 0927    Narrative:       EXAMINATION: XR CHEST 1 VW-      CLINICAL INDICATION:     cp     TECHNIQUE:  XR CHEST 1 VW-      COMPARISON: NONE      FINDINGS:   Coarse interstitial markings suggestive of chronic interstitial lung  disease.  Heart and mediastinum contours are unremarkable.  No pleural effusion.  No pneumothorax.   Bony and soft tissue structures are unremarkable.       Impression:       No radiographic evidence of acute cardiac or pulmonary  disease.     This report was finalized on 2/23/2019 9:24 AM by Dr. Keven Lux MD.           I have personally reviewed the above radiology results.   ---------------------------------------------------------------------------------------------------------------------      Assessment & Plan        Assessment/Plan       ASSESSMENT:    1. Chest pain    PLAN:    The patient is a 53 year old female for presented to the ED with complaints of substernal chest pain that began yesterday morning. She reports the pain radiates down her left arm and her arm feels somewhat numb. She also reports issues at home and needing  as she has been living in a homeless shelter where she was recently \"kicked out.\" Vital signs stable. Troponin levels negative.EKG shows sinus rhythm with premature ventricular complexes or fusion complexes and septal infarct , age undetermined.   History of alcohol " abuse but normal ethanol level and she reports she has not had any alcohol for around a week. TSH elevated at 6.995. White count and CRP normal. Chest xray is unremarkable. EKG shows sinus rhythm with premature ventricular complexes or fusion complexes and septal infarct , age undetermined. Admitted to the observation unit for further evaluation and monitoring.    Cardiology consulted for abnormality with new infarct on EKG with no further workup recommended. Would recommend to follow up with her cardiologist as outpatient.     Lovenox 40 mg subcutaneous q 24 hours for DVT prophylaxis.    Echo ordered. Cardiology was consulted for acute EKG changes.     D-dimer found to be elevated and CT per PE protocol ordered.     Patient's findings and recommendations were discussed with patient, nursing staff and consulting provider      Code Status:   Code Status and Medical Interventions:   Ordered at: 02/22/19 2236     Code Status:    CPR     Medical Interventions (Level of Support Prior to Arrest):    Full     Patient seen with LIANE Goddard.     Jhon Messer PA-C  02/23/19  12:23 PM      Electronically signed by Carito Leone MD at 2/23/2019  2:27 PM       Hospital Medications (active)       Dose Frequency Start End    acetaminophen (TYLENOL) tablet 650 mg 650 mg Every 4 Hours PRN 2/22/2019     Sig - Route: Take 2 tablets by mouth Every 4 (Four) Hours As Needed for Mild Pain . - Oral    Cosign for Ordering: Accepted by Carito Leone MD on 2/23/2019  9:06 AM    amiodarone (PACERONE) tablet 200 mg 200 mg Daily 3/28/2019     Sig - Route: Take 1 tablet by mouth Daily. - Oral    apixaban (ELIQUIS) tablet 5 mg 5 mg Every 12 Hours Scheduled 4/4/2019     Sig - Route: Take 1 tablet by mouth Every 12 (Twelve) Hours. - Oral    bisacodyl (DULCOLAX) suppository 10 mg 10 mg Daily PRN 3/12/2019     Sig - Route: Insert 1 suppository into the rectum Daily As Needed for Constipation (if oral meds do not help). - Rectal     "bisacodyl (DULCOLAX) suppository 10 mg 10 mg Daily 3/29/2019     Sig - Route: Insert 1 suppository into the rectum Daily. - Rectal    calcium carbonate (TUMS) chewable tablet 500 mg (200 mg elemental) 2 tablet 2 Times Daily PRN 2/22/2019     Sig - Route: Chew 1,000 mg 2 (Two) Times a Day As Needed for Heartburn. - Oral    Cosign for Ordering: Accepted by Carito Leone MD on 2/23/2019  9:06 AM    cyanocobalamin injection 1,000 mcg 1,000 mcg Every 30 Days 4/1/2019     Sig - Route: Inject 1 mL into the appropriate muscle as directed by prescriber Every 30 (Thirty) Days. - Intramuscular    Linked Group 1:  \"Followed by\" Linked Group Details        digoxin (LANOXIN) tablet 125 mcg 125 mcg Every 48 Hours 4/5/2019     Sig - Route: Take 1 tablet by mouth Every Other Day. - Oral    folic acid (FOLVITE) tablet 1 mg 1 mg Daily 2/23/2019     Sig - Route: Take 1 tablet by mouth Daily. - Oral    Cosign for Ordering: Accepted by Alberto Connolly MD on 2/23/2019  3:57 PM    furosemide (LASIX) tablet 10 mg 10 mg Daily 4/8/2019     Sig - Route: Take 0.5 tablets by mouth Daily. - Oral    haloperidol lactate (HALDOL) injection 5 mg 5 mg Once 4/12/2019 4/12/2019    Sig - Route: Inject 1 mL into the appropriate muscle as directed by prescriber 1 (One) Time. - Intramuscular    levothyroxine (SYNTHROID, LEVOTHROID) tablet 75 mcg 75 mcg Every Early Morning 4/3/2019     Sig - Route: Take 1 tablet by mouth Every Morning. - Oral    magnesium hydroxide (MILK OF MAGNESIA) suspension 2400 mg/10mL 10 mL 10 mL Daily PRN 3/12/2019     Sig - Route: Take 10 mL by mouth Daily As Needed for Constipation. - Oral    magnesium oxide (MAGOX) tablet 400 mg 400 mg Daily 4/8/2019     Sig - Route: Take 1 tablet by mouth Daily. - Oral    Magnesium Sulfate 2 gram / 50mL Infusion (GIVE X 3 BAGS TO EQUAL 6GM TOTAL DOSE) - Mg 1.1 - 1.5 mg/dl 2 g As Needed 4/7/2019     Sig - Route: Infuse 50 mL into a venous catheter As Needed (See Administration " "Instructions). - Intravenous    Cosign for Ordering: Accepted by Sayra Milton MD on 4/7/2019 10:04 AM    Linked Group 2:  \"Or\" Linked Group Details        Magnesium Sulfate 2 gram Bolus, followed by 8 gram infusion (total Mg dose 10 grams)- Mg less than or equal to 1mg/dL 2 g As Needed 4/7/2019     Sig - Route: Infuse 50 mL into a venous catheter As Needed (See Administration Instructions). - Intravenous    Cosign for Ordering: Accepted by Sayra Milton MD on 4/7/2019 10:04 AM    Linked Group 2:  \"Or\" Linked Group Details        Magnesium Sulfate 4 gram infusion- Mg 1.6-1.9 mg/dL 4 g As Needed 4/7/2019     Sig - Route: Infuse 100 mL into a venous catheter As Needed (See Administration Instructions). - Intravenous    Cosign for Ordering: Accepted by Sayra Milton MD on 4/7/2019 10:04 AM    Linked Group 2:  \"Or\" Linked Group Details        metoprolol tartrate (LOPRESSOR) tablet 75 mg 75 mg Every 12 Hours Scheduled 3/28/2019     Sig - Route: Take 75 mg by mouth Every 12 (Twelve) Hours. - Oral    mexiletine (MEXITIL) capsule 150 mg 150 mg Every 8 Hours Scheduled 3/21/2019     Sig - Route: Take 1 capsule by mouth Every 8 (Eight) Hours. - Oral    nitroglycerin (NITROSTAT) SL tablet 0.4 mg 0.4 mg Every 5 Minutes PRN 2/22/2019     Sig - Route: Place 1 tablet under the tongue Every 5 (Five) Minutes As Needed for Chest Pain (Chest Pain With Systolic Blood Pressure Greater Than 100). - Sublingual    Cosign for Ordering: Accepted by Carito Leone MD on 2/23/2019  9:06 AM    OLANZapine (zyPREXA) tablet 5 mg 5 mg Nightly 4/2/2019     Sig - Route: Take 1 tablet by mouth Every Night. - Oral    pantoprazole (PROTONIX) EC tablet 40 mg 40 mg Every Early Morning 4/7/2019     Sig - Route: Take 1 tablet by mouth Every Morning. - Oral    Cosign for Ordering: Accepted by Sayra Milton MD on 4/6/2019 12:12 PM    Pharmacy Consult  Continuous PRN 4/4/2019     Sig - Route: Continuous As Needed for Consult. - " Does not apply    sennosides-docusate sodium (SENOKOT-S) 8.6-50 MG tablet 2 tablet 2 tablet Nightly 4/4/2019     Sig - Route: Take 2 tablets by mouth Every Night. - Oral    sodium chloride 0.9 % flush 3 mL 3 mL Every 12 Hours Scheduled 2/23/2019     Sig - Route: Infuse 3 mL into a venous catheter Every 12 (Twelve) Hours. - Intravenous    Cosign for Ordering: Accepted by Carito Leone MD on 2/23/2019  9:06 AM    sodium chloride 0.9 % flush 3 mL 3 mL Every 12 Hours Scheduled 2/23/2019     Sig - Route: Infuse 3 mL into a venous catheter Every 12 (Twelve) Hours. - Intravenous    sodium chloride 0.9 % flush 3-10 mL 3-10 mL As Needed 2/22/2019     Sig - Route: Infuse 3-10 mL into a venous catheter As Needed for Line Care. - Intravenous    Cosign for Ordering: Accepted by Carito Leone MD on 2/23/2019  9:06 AM    sodium chloride 0.9 % flush 5 mL 5 mL As Needed 2/23/2019     Sig - Route: Infuse 5 mL into a venous catheter As Needed for Line Care (After Medication Administration or Blood Draw). - Intravenous    spironolactone (ALDACTONE) tablet 25 mg 25 mg Daily With Lunch 3/25/2019     Sig - Route: Take 1 tablet by mouth Daily With Lunch. - Oral    thiamine (VITAMIN B-1) tablet 100 mg 100 mg Daily 2/23/2019     Sig - Route: Take 1 tablet by mouth Daily. - Oral    Cosign for Ordering: Accepted by Alberto Connolly MD on 2/23/2019  3:57 PM            Lab Results (last 24 hours)     ** No results found for the last 24 hours. **        Imaging Results (last 24 hours)     Procedure Component Value Units Date/Time    CT Chest Without Contrast [[id number]] Collected:  04/12/19 1344     Updated:  04/12/19 1347    Narrative:       EXAMINATION: CT CHEST WO CONTRAST-      CLINICAL INDICATION: altered mental status; R07.9-Chest pain,  unspecified; N30.00-Acute cystitis without hematuria        DOSE: 371.95 mGy.cm  COMPARISON: 03/08/2019     Radiation dose reduction techniques were utilized per OLAMIDE  protocol.  Automated exposure control was initiated through either or Floop or  Digital [employer] software packages by  protocol.           PROCEDURE: Axial images were acquired from the thoracic inlet through  the upper abdomen without any IV contrast. Reformatted images were  created.     FINDINGS: Today's study shows no parenchymal soft tissue nodules or  masses.     No pericardial or pleural effusions     Moderate coronary artery calcifications are seen.       Impression:       Moderate coronary artery calcifications      This report was finalized on 2019 1:45 PM by Dr. Herbie Bob MD.           ECG/EMG Results (last 24 hours)     ** No results found for the last 24 hours. **           Physician Progress Notes (last 24 hours) (Notes from 2019  2:26 PM through 2019  2:26 PM)      Kaye Wilson MD at 2019  2:40 PM              [facility]     Patient Identification:  Name:  Altagracia King  Age:  53 y.o.  Sex:  female  :  1965  MRN:  [id number]  Visit Number:  [id number]  Primary Care Provider:  Provider, No Known    Length of stay:  47    Subjective: Patient was sleepy yesterday, she also reported she felt palpitation for an hour yesterday..  On examination she has normal rhythm.  Telemetry did not reveal any arrhythmia.  She is up and awake today she is not confused, she is participating rehab.  No issues reported other than being sleepy last night.    Chief Complaint: Palpitation  ----------------------------------------------------------------------------------------------------------------------  Current Hospital Meds:    amiodarone 200 mg Oral Daily   apixaban 5 mg Oral Q12H   bisacodyl 10 mg Rectal Daily   cyanocobalamin 1,000 mcg Intramuscular Q30 Days   digoxin 125 mcg Oral Q48H   folic acid 1 mg Oral Daily   furosemide 10 mg Oral Daily   levothyroxine 75 mcg Oral Q AM   magnesium oxide 400 mg Oral Daily   metoprolol tartrate  75 mg Oral Q12H   mexiletine 150 mg Oral Q8H   OLANZapine 5 mg Oral Nightly   pantoprazole 40 mg Oral Q AM   sennosides-docusate sodium 2 tablet Oral Nightly   sodium chloride 3 mL Intravenous Q12H   sodium chloride 3 mL Intravenous Q12H   spironolactone 25 mg Oral Daily With Lunch   thiamine 100 mg Oral Daily       Pharmacy Consult      ----------------------------------------------------------------------------------------------------------------------  Vital Signs:  Temp:  [97.7 °F (36.5 °C)-98.4 °F (36.9 °C)] 97.8 °F (36.6 °C)  Heart Rate:  [74-87] 76  Resp:  [18] 18  BP: (106-127)/(54-65) 118/63       Tele: Sinus rhythm 73 bpm      04/09/19  0500 04/10/19  0319 04/11/19  0300   Weight: 46.6 kg (102 lb 12.8 oz) 47.3 kg (104 lb 3.2 oz) 47.3 kg (104 lb 3.2 oz)     Body mass index is 17.34 kg/m².    Intake/Output Summary (Last 24 hours) at 4/11/2019 1443  Last data filed at 4/11/2019 1300  Gross per 24 hour   Intake 1180 ml   Output 400 ml   Net 780 ml     Diet Soft Texture; Chopped; Thin; Daily Fluid Restriction; Other; 1,800  ----------------------------------------------------------------------------------------------------------------------  Physical exam:  General: Comfortable,awake, alert, oriented to self, place, and time, well-developed, chronically ill-appearing,  No respiratory distress.    Skin:  Skin is warm and dry. No rash noted.  She is pale.  HENT:  Head:  Normocephalic and atraumatic.  Mouth:  Moist mucous membranes.    Eyes:  Conjunctivae and EOM are normal.   No scleral icterus.  Left eye completely opacified, right eye has cataract  Neck:  Neck supple.  No JVD present.    Pulmonary/Chest:  No respiratory distress, no wheezes, no crackles, with normal breath sounds and good air movement.  Cardiovascular:  Normal rate, regular rhythm and normal heart sounds with no murmur.  Abdominal:  Soft.  Bowel sounds are normal.  No distension and no tenderness.   Extremities:  No edema, no tenderness, and no  deformity.  No red or swollen joints anywhere.  Strong pulses in all 4 extremities with no clubbing, no cyanosis, no edema.  Neurological:  Motor strength equal no obvious deficit, sensory grossly intact.   No cranial nerve deficit.  No tongue deviation.  No facial droop.  No slurred speech.     ----------------------------------------------------------------------------------------------------------------------  ----------------------------------------------------------------------------------------------------------------------      Results from last 7 days   Lab Units 04/11/19  1007 04/08/19  0718 04/05/19  1031   WBC 10*3/mm3 7.23 6.72 6.11   HEMOGLOBIN g/dL 10.3* 11.0* 9.7*   HEMATOCRIT % 32.2* 34.0 30.7*   MCV fL 101.9* 100.9* 102.3*   MCHC g/dL 32.0 32.4 31.6   PLATELETS 10*3/mm3 207 236 195         Results from last 7 days   Lab Units 04/10/19  0358 04/08/19  0445 04/07/19  1745 04/07/19  0743 04/07/19  0103   SODIUM mmol/L 140 139  --  141  --    POTASSIUM mmol/L 4.2 4.3  --  4.3  --    MAGNESIUM mg/dL  --  2.1 2.7*  --  1.7   CHLORIDE mmol/L 104 102  --  104  --    CO2 mmol/L 23.1 25.0  --  24.4  --    BUN mg/dL 18 19  --  22*  --    CREATININE mg/dL 0.82 0.76  --  0.84  --    EGFR IF NONAFRICN AM mL/min/1.73 72 79  --  70  --    CALCIUM mg/dL 10.1 9.5  --  9.7  --    GLUCOSE mg/dL 85 90  --  91  --    Estimated Creatinine Clearance: 57.9 mL/min (by C-G formula based on SCr of 0.82 mg/dL).    No results found for: AMMONIA                    I have personally looked at the labs and they are summarized above.  ----------------------------------------------------------------------------------------------------------------------  Imaging Results (last 24 hours)     ** No results found for the last 24 hours. **        ----------------------------------------------------------------------------------------------------------------------  Assessment and Plan:  -Functional decline with physical deconditioning  currently on physical therapy,  -Paroxysmal atrial fibrillation  -History of episodes of nonsustained V. Tach  -Acquired hypothyroidism  -Diastolic dysfunction compensated    Continue with physical therapy, no changes on medication, awaiting for nursing home placement.      Kaye Wilson MD  19  2:43 PM    Electronically signed by Kaye Wilson MD at 2019  2:48 PM       Medical Student Notes (last 24 hours) (Notes from 2019  2:26 PM through 2019  2:26 PM)     No notes of this type exist for this encounter.        Consult Notes (last 24 hours) (Notes from 2019  2:27 PM through 2019  2:27 PM)     No notes of this type exist for this encounter.        Nutrition Notes (last 24 hours) (Notes from 2019  2:27 PM through 2019  2:27 PM)     No notes of this type exist for this encounter.           Physical Therapy Notes (last 24 hours) (Notes from 2019  2:27 PM through 2019  2:27 PM)      Kyung Lr, PT at 2019  2:42 PM  Version 1 of 1            19 1441   Rehab Treatment   Reason Treatment Not Performed patient/family declined treatment, not feeling well  (Pt declined PT today d/t not feeling well. Will continue to follow. )       Electronically signed by Kyung Lr PT at 2019  2:42 PM     Kyung Lr, PT at 2019 11:13 AM  Version 1 of 1         Problem: Patient Care Overview  Goal: Plan of Care Review  Outcome: Ongoing (interventions implemented as appropriate)   19 1113   Plan of Care Review   Progress improving   Coping/Psychosocial   Plan of Care Reviewed With patient           Electronically signed by Kyung Lr PT at 2019 11:13 AM     Kyung Lr PT at 2019 11:14 AM  Version 1 of 1         Acute Care - Physical Therapy Treatment Note  [facility] Saravia     Patient Name: Altagracia King  : 1965  MRN: [id number]  Today's Date: 2019  Onset of Illness/Injury or Date of Surgery:  02/22/19(original admit date)  Date of Referral to PT: 03/20/19  Referring Physician: Gui    Admit Date: 2/22/2019    Visit Dx:    ICD-10-CM ICD-9-CM   1. Chest pain, unspecified type R07.9 786.50   2. Acute cystitis without hematuria N30.00 595.0     Patient Active Problem List   Diagnosis   • Chest pain   • PAF (paroxysmal atrial fibrillation) (CMS/HCC)   • ETOH abuse   • Acute respiratory failure with hypoxia (CMS/HCC)       Therapy Treatment    Rehabilitation Treatment Summary     Row Name 04/12/19 1107             Treatment Time/Intention    Discipline  physical therapist  -CT      Document Type  therapy note (daily note)  -CT      Subjective Information  no complaints  -CT      Mode of Treatment  individual therapy;physical therapy  -CT      Therapy Frequency (PT Clinical Impression)  3 times/wk 3-5 times/ week per priority policy  -CT      Patient Effort  adequate  -CT      Patient Response to Treatment  Pt tolerated treatment session well with rest breaks provided as needed.  -CT      Recorded by [CT] Kyung Lr, PT 04/12/19 1112      Row Name 04/12/19 1107             Cognitive Assessment/Intervention- PT/OT    Orientation Status (Cognition)  oriented to;person  -CT      Follows Commands (Cognition)  follows one step commands;physical/tactile prompts required;repetition of directions required;verbal cues/prompting required  -CT      Recorded by [CT] Kyung Lr, PT 04/12/19 1112      Row Name 04/12/19 1107             Safety Issues, Functional Mobility    Impairments Affecting Function (Mobility)  balance;coordination;endurance/activity tolerance;strength;visual/perceptual  -CT      Recorded by [CT] Kyung Lr, PT 04/12/19 1112      Row Name 04/12/19 1107             Bed Mobility Assessment/Treatment    Bed Mobility Assessment/Treatment  bed mobility (all) activities  -CT      Mobile Level (Bed Mobility)  minimum assist (75% patient effort)  -CT      Bed Mobility, Safety Issues   cognitive deficits limit understanding  -CT      Assistive Device (Bed Mobility)  bed rails  -CT      Recorded by [CT] Kyung Lr, PT 04/12/19 1112      Row Name 04/12/19 1107             Transfer Assessment/Treatment    Transfer Assessment/Treatment  sit-stand transfer;stand-sit transfer;stand pivot/stand step transfer  -CT      Recorded by [CT] Kyung Lr, PT 04/12/19 1112      Row Name 04/12/19 1107             Sit-Stand Transfer    Sit-Stand [place] (Transfers)  minimum assist (75% patient effort);verbal cues;nonverbal cues (demo/gesture)  -CT      Assistive Device (Sit-Stand Transfers)  walker, front-wheeled  -CT      Recorded by [CT] Kyung Lr, PT 04/12/19 1112      Row Name 04/12/19 1107             Stand-Sit Transfer    Stand-Sit [place] (Transfers)  minimum assist (75% patient effort);verbal cues;nonverbal cues (demo/gesture)  -CT      Assistive Device (Stand-Sit Transfers)  walker, front-wheeled  -CT      Recorded by [CT] Kyung Lr, PT 04/12/19 1112      Row Name 04/12/19 1107             Gait/Stairs Assessment/Training    Gait/Stairs Assessment/Training  gait/ambulation independence;gait/ambulation assistive device;distance ambulated;gait pattern;gait deviations;maintains weight-bearing status  -CT      [place] Level (Gait)  minimum assist (75% patient effort);2 person assist;verbal cues;nonverbal cues (demo/gesture)  -CT      Assistive Device (Gait)  walker, front-wheeled  -CT      Distance in Feet (Gait)  130  -CT      Pattern (Gait)  step-to  -CT      Deviations/Abnormal Patterns (Gait)  base of support, narrow  -CT      Bilateral Gait Deviations  forward flexed posture  -CT      Comment (Gait/Stairs)  Pt needs assist with use of RW d/t visual impairment  -CT      Recorded by [CT] Kyung Lr, PT 04/12/19 1112      Row Name 04/12/19 1107             Positioning and Restraints    Pre-Treatment Position  in bed  -CT      Post Treatment Position  bed  -CT      In  Bed  sitting EOB;call light within reach;encouraged to call for assist;with nsg;side rails up x3;notified nsg  -CT      Recorded by [CT] Kyung Lr, PT 04/12/19 1112      Row Name 04/12/19 1107             Pain Scale: FACES Pre/Post-Treatment    Pain: FACES Scale, Pretreatment  0-->no hurt  -CT      Pain: FACES Scale, Post-Treatment  0-->no hurt  -CT      Recorded by [CT] Kyung Lr, PT 04/12/19 1112      Row Name 04/12/19 1107             Coping    Observed Emotional State  anxious;attention-seeking behavior  -CT      Verbalized Emotional State  acceptance  -CT      Recorded by [CT] Kyung Lr, PT 04/12/19 1112      Row Name 04/12/19 1107             Plan of Care Review    Plan of Care Reviewed With  patient  -CT      Recorded by [CT] Kyung Lr, PT 04/12/19 1112      Row Name 04/12/19 1107             Outcome Summary/Treatment Plan (PT)    Daily Summary of Progress (PT)  progress toward functional goals is good  -CT      Anticipated Discharge Disposition (PT)  skilled nursing facility  -CT      Recorded by [CT] Kyung Lr, PT 04/12/19 1112        User Key  (r) = Recorded By, (t) = Taken By, (c) = Cosigned By    Initials Name Effective Dates Discipline    CT Kyung Lr, PT 04/03/18 -  PT                   Physical Therapy Education     Title: PT OT SLP Therapies (In Progress)     Topic: Physical Therapy (In Progress)     Point: Mobility training (In Progress)     Learning Progress Summary           Patient Acceptance, E,TB, NR by CT at 4/12/2019 11:13 AM    Acceptance, E,TB, NR by CT at 4/10/2019  3:24 PM    Acceptance, E,TB, VU,NR by CT at 4/9/2019  2:08 PM    Acceptance, E, VU by AM at 4/9/2019 12:08 PM    Acceptance, E,TB, NR by CT at 4/8/2019  2:48 PM    Acceptance, E,TB, VU by GAVINO at 4/8/2019  8:21 AM    Acceptance, E, NL,VU by  at 4/8/2019  2:37 AM    Acceptance, E,TB, VU by GAVINO at 4/7/2019  8:53 AM    Acceptance, E, NL by  at 4/6/2019 11:18 PM    Acceptance, ZANA DE SANTIAGO by JACOB at  4/5/2019 10:02 PM    Acceptance, E,D, NR by AG at 4/4/2019  6:23 PM    Acceptance, E, NR by KB at 4/2/2019  6:10 PM    Acceptance, E, VU by SM at 4/2/2019 12:28 AM    Acceptance, E, VU,NR by AG1 at 4/1/2019 11:20 AM    Acceptance, E, NR by KB at 3/31/2019  9:45 PM    Acceptance, E, VU by MC at 3/31/2019  2:55 AM    Acceptance, E, VU by MC at 3/31/2019  2:45 AM    Acceptance, E, VU,NR by MC at 3/30/2019  1:57 AM    Acceptance, E,TB, VU,NR by CT at 3/29/2019 10:45 AM    Acceptance, E, NR by EA at 3/28/2019 10:44 PM    Acceptance, E,TB, NR,NL by CT at 3/28/2019  2:19 PM    Acceptance, E,TB, VU by JS at 3/28/2019 12:47 AM    Acceptance, E, VU by AM at 3/27/2019  4:07 PM    Acceptance, E, NR by EA at 3/26/2019  9:47 PM    Acceptance, E, VU by LAZARO at 3/20/2019 10:09 PM    Acceptance, E, VU by AW at 3/16/2019  6:34 PM    Acceptance, E,TB, VU by ILYA at 3/14/2019 12:21 AM    Comment:  PT unable to rcve teaching at this time    Acceptance, E,TB, VU by ILYA at 3/12/2019 11:00 PM    Comment:  PT unable to rcve teaching at this time    Nonacceptance, E, NL by SB at 3/12/2019  9:17 AM    Comment:  pt intubated and sedated; no one present at the bedside    Acceptance, E, NR by EV at 3/11/2019  9:02 PM    Comment:  intubated/sedated. No family or caretaker at bedside    Nonacceptance, E, NL by WS at 3/9/2019  9:06 AM    Comment:  pt sedated and intubated    Acceptance, E, VU by WS at 3/9/2019  9:05 AM    Acceptance, E,TB, VU by ILYA at 3/8/2019  9:46 PM    Comment:  PT unable to rcve teaching at this time    Nonacceptance, E, NL by WS at 3/8/2019  4:13 PM    Acceptance, E, VU by LT at 3/7/2019 10:13 AM    Acceptance, E, VU by BC at 3/5/2019  5:55 PM    Acceptance, E,TB, VU by CL at 3/4/2019  7:53 PM    Acceptance, E,TB, VU by CL at 3/4/2019  7:52 PM    Acceptance, E,TB, VU by CT at 3/1/2019  3:23 PM    Acceptance, E,TB, VU,NR by KB1 at 2/26/2019  3:13 PM    Acceptance, E,TB, NR by CT at 2/25/2019  3:25 PM   Family Acceptance, E, VU by AW  at 3/15/2019 12:08 PM    Acceptance, E, VU by SB at 3/13/2019 11:01 AM   Significant Other Acceptance, E, VU by WS at 3/9/2019  9:05 AM                   Point: Home exercise program (In Progress)     Learning Progress Summary           Patient Acceptance, E,TB, NR by CT at 4/12/2019 11:13 AM    Acceptance, E,TB, NR by CT at 4/10/2019  3:24 PM    Acceptance, E,TB, VU,NR by CT at 4/9/2019  2:08 PM    Acceptance, E, VU by AM at 4/9/2019 12:08 PM    Acceptance, E,TB, NR by CT at 4/8/2019  2:48 PM    Acceptance, E,TB, VU by GAVINO at 4/8/2019  8:21 AM    Acceptance, E, NL,VU by SM at 4/8/2019  2:37 AM    Acceptance, E,TB, VU by GAVINO at 4/7/2019  8:53 AM    Acceptance, E, NL by SM at 4/6/2019 11:18 PM    Acceptance, E, NL by SM at 4/5/2019 10:02 PM    Acceptance, E,D, NR by AG at 4/4/2019  6:23 PM    Acceptance, E, NR by KB at 4/2/2019  6:10 PM    Acceptance, E, VU by SM at 4/2/2019 12:28 AM    Acceptance, E, VU,NR by AG1 at 4/1/2019 11:20 AM    Acceptance, E, NR by KB at 3/31/2019  9:45 PM    Acceptance, E, VU by MC at 3/31/2019  2:55 AM    Acceptance, E, VU by MC at 3/31/2019  2:45 AM    Acceptance, E, VU,NR by MC at 3/30/2019  1:57 AM    Acceptance, E,TB, VU,NR by CT at 3/29/2019 10:45 AM    Acceptance, E, NR by EA at 3/28/2019 10:44 PM    Acceptance, E,TB, NR,NL by CT at 3/28/2019  2:19 PM    Acceptance, E,TB, VU by JS at 3/28/2019 12:47 AM    Acceptance, E, VU by AM at 3/27/2019  4:07 PM    Acceptance, E, NR by EA at 3/26/2019  9:47 PM    Acceptance, E, VU by LAZARO at 3/20/2019 10:09 PM    Acceptance, E, VU by AW at 3/16/2019  6:34 PM    Acceptance, E,TB, VU by ILYA at 3/14/2019 12:21 AM    Comment:  PT unable to rcve teaching at this time    Acceptance, E,TB, VU by ILYA at 3/12/2019 11:00 PM    Comment:  PT unable to rcve teaching at this time    Nonacceptance, E, NL by SB at 3/12/2019  9:17 AM    Comment:  pt intubated and sedated; no one present at the bedside    Acceptance, E, NR by EV at 3/11/2019  9:02 PM    Comment:   intubated/sedated. No family or caretaker at bedside    Nonacceptance, E, NL by WS at 3/9/2019  9:06 AM    Comment:  pt sedated and intubated    Acceptance, E, VU by WS at 3/9/2019  9:05 AM    Acceptance, E,TB, VU by ILYA at 3/8/2019  9:46 PM    Comment:  PT unable to rcve teaching at this time    Nonacceptance, E, NL by WS at 3/8/2019  4:13 PM    Acceptance, E, VU by LT at 3/7/2019 10:13 AM    Acceptance, E, VU by BC at 3/5/2019  5:55 PM    Acceptance, E,TB, VU by CL at 3/4/2019  7:53 PM    Acceptance, E,TB, VU by CL at 3/4/2019  7:52 PM    Acceptance, E,TB, VU by CT at 3/1/2019  3:23 PM    Acceptance, E,TB, VU,NR by KB1 at 2/26/2019  3:13 PM    Acceptance, E,TB, NR by CT at 2/25/2019  3:25 PM   Family Acceptance, E, VU by AW at 3/15/2019 12:08 PM    Acceptance, E, VU by SB at 3/13/2019 11:01 AM   Significant Other Acceptance, E, VU by WS at 3/9/2019  9:05 AM                   Point: Body mechanics (In Progress)     Learning Progress Summary           Patient Acceptance, E,TB, NR by CT at 4/12/2019 11:13 AM    Acceptance, E,TB, NR by CT at 4/10/2019  3:24 PM    Acceptance, E,TB, VU,NR by CT at 4/9/2019  2:08 PM    Acceptance, E, VU by AM at 4/9/2019 12:08 PM    Acceptance, E,TB, NR by CT at 4/8/2019  2:48 PM    Acceptance, E,TB, VU by GAVINO at 4/8/2019  8:21 AM    Acceptance, E, NL,VU by SM at 4/8/2019  2:37 AM    Acceptance, E,TB, VU by GAVINO at 4/7/2019  8:53 AM    Acceptance, E, NL by SM at 4/6/2019 11:18 PM    Acceptance, E, NL by SM at 4/5/2019 10:02 PM    Acceptance, E,D, NR by AG at 4/4/2019  6:23 PM    Acceptance, E, NR by KB at 4/2/2019  6:10 PM    Acceptance, E, VU by SM at 4/2/2019 12:28 AM    Acceptance, E, VU,NR by AG1 at 4/1/2019 11:20 AM    Acceptance, E, NR by KB at 3/31/2019  9:45 PM    Acceptance, E, VU by MC at 3/31/2019  2:55 AM    Acceptance, E, VU by  at 3/31/2019  2:45 AM    Acceptance, E, VU,NR by MC at 3/30/2019  1:57 AM    Acceptance, E,TB, VU,NR by CT at 3/29/2019 10:45 AM    Acceptance, E, NR  by EA at 3/28/2019 10:44 PM    Acceptance, E,TB, NR,NL by CT at 3/28/2019  2:19 PM    Acceptance, E,TB, VU by JS at 3/28/2019 12:47 AM    Acceptance, E, VU by AM at 3/27/2019  4:07 PM    Acceptance, E, NR by EA at 3/26/2019  9:47 PM    Acceptance, E, VU by LAZARO at 3/20/2019 10:09 PM    Acceptance, E, VU by AW at 3/16/2019  6:34 PM    Acceptance, E,TB, VU by ILYA at 3/14/2019 12:21 AM    Comment:  PT unable to rcve teaching at this time    Acceptance, E,TB, VU by ILYA at 3/12/2019 11:00 PM    Comment:  PT unable to rcve teaching at this time    Nonacceptance, E, NL by SB at 3/12/2019  9:17 AM    Comment:  pt intubated and sedated; no one present at the bedside    Acceptance, E, NR by EV at 3/11/2019  9:02 PM    Comment:  intubated/sedated. No family or caretaker at bedside    Nonacceptance, E, NL by WS at 3/9/2019  9:06 AM    Comment:  pt sedated and intubated    Acceptance, E, VU by WS at 3/9/2019  9:05 AM    Acceptance, E,TB, VU by ILYA at 3/8/2019  9:46 PM    Comment:  PT unable to rcve teaching at this time    Nonacceptance, E, NL by WS at 3/8/2019  4:13 PM    Acceptance, E, VU by LT at 3/7/2019 10:13 AM    Acceptance, E, VU by BC at 3/5/2019  5:55 PM    Acceptance, E,TB, VU by CL at 3/4/2019  7:53 PM    Acceptance, E,TB, VU by CL at 3/4/2019  7:52 PM    Acceptance, E,TB, VU by CT at 3/1/2019  3:23 PM    Acceptance, E,TB, VU,NR by KB1 at 2/26/2019  3:13 PM    Acceptance, E,TB, NR by CT at 2/25/2019  3:25 PM   Family Acceptance, E, VU by AW at 3/15/2019 12:08 PM    Acceptance, E, VU by SB at 3/13/2019 11:01 AM   Significant Other Acceptance, E, VU by WS at 3/9/2019  9:05 AM                   Point: Precautions (In Progress)     Learning Progress Summary           Patient Acceptance, E,TB, NR by CT at 4/12/2019 11:13 AM    Acceptance, E,TB, NR by CT at 4/10/2019  3:24 PM    Acceptance, E,TB, VU,NR by CT at 4/9/2019  2:08 PM    Acceptance, E, VU by AM at 4/9/2019 12:08 PM    Acceptance, E,TB, NR by CT at 4/8/2019  2:48 PM     Acceptance, E,TB, VU by GAVINO at 4/8/2019  8:21 AM    Acceptance, E, NL,VU by SM at 4/8/2019  2:37 AM    Acceptance, E,TB, VU by GAVINO at 4/7/2019  8:53 AM    Acceptance, E, NL by SM at 4/6/2019 11:18 PM    Acceptance, E, NL by SM at 4/5/2019 10:02 PM    Acceptance, E,D, NR by AG at 4/4/2019  6:23 PM    Acceptance, E, NR by KB at 4/2/2019  6:10 PM    Acceptance, E, VU by SM at 4/2/2019 12:28 AM    Acceptance, E, VU,NR by AG1 at 4/1/2019 11:20 AM    Acceptance, E, NR by KB at 3/31/2019  9:45 PM    Acceptance, E, VU by MC at 3/31/2019  2:55 AM    Acceptance, E, VU by MC at 3/31/2019  2:45 AM    Acceptance, E, VU,NR by MC at 3/30/2019  1:57 AM    Acceptance, E,TB, VU,NR by CT at 3/29/2019 10:45 AM    Acceptance, E, NR by EA at 3/28/2019 10:44 PM    Acceptance, E,TB, NR,NL by CT at 3/28/2019  2:19 PM    Acceptance, E,TB, VU by JS at 3/28/2019 12:47 AM    Acceptance, E, VU by AM at 3/27/2019  4:07 PM    Acceptance, E, NR by EA at 3/26/2019  9:47 PM    Acceptance, E, VU by LAZARO at 3/20/2019 10:09 PM    Acceptance, E, VU by AW at 3/16/2019  6:34 PM    Acceptance, E,TB, VU by ILYA at 3/14/2019 12:21 AM    Comment:  PT unable to rcve teaching at this time    Acceptance, E,TB, VU by ILYA at 3/12/2019 11:00 PM    Comment:  PT unable to rcve teaching at this time    Nonacceptance, E, NL by SB at 3/12/2019  9:17 AM    Comment:  pt intubated and sedated; no one present at the bedside    Acceptance, E, NR by EV at 3/11/2019  9:02 PM    Comment:  intubated/sedated. No family or caretaker at bedside    Nonacceptance, E, NL by WS at 3/9/2019  9:06 AM    Comment:  pt sedated and intubated    Acceptance, E, VU by WS at 3/9/2019  9:05 AM    Acceptance, E,TB, VU by ILYA at 3/8/2019  9:46 PM    Comment:  PT unable to rcve teaching at this time    Nonacceptance, E, NL by WS at 3/8/2019  4:13 PM    Acceptance, E, VU by LT at 3/7/2019 10:13 AM    Acceptance, E, VU by BC at 3/5/2019  5:55 PM    Acceptance, E,TB, VU by CL at 3/4/2019  7:53 PM     Acceptance, E,TB, VU by CL at 3/4/2019  7:52 PM    Acceptance, E,TB, VU by CT at 3/1/2019  3:23 PM    Acceptance, E,TB, VU,NR by KB1 at 2/26/2019  3:13 PM    Acceptance, E,TB, NR by CT at 2/25/2019  3:25 PM   Family Acceptance, E, VU by AW at 3/15/2019 12:08 PM    Acceptance, E, VU by SB at 3/13/2019 11:01 AM   Significant Other Acceptance, E, VU by WS at 3/9/2019  9:05 AM                               User Key     Initials Effective Dates Name Provider Type Discipline    SB 06/16/16 -  Kathy Rowell RN Registered Nurse Nurse    ILYA 06/16/16 -  Alberto Mendoza, RN Registered Nurse Nurse    LT 06/16/16 -  Denae Carvajal, RN Registered Nurse Nurse    EA 06/16/16 -  Mary Blanton, RN Registered Nurse Nurse    KB 06/16/16 -  Lorena Garcia, RN Registered Nurse Nurse    AW 06/16/16 -  Heather Godinez, RN Registered Nurse Nurse    WS 06/16/16 -  Keisha Messer, RN Registered Nurse Nurse    AM 06/16/16 -  Madeline Santiago, RN Registered Nurse Nurse    BC 03/14/16 -  Ekaterina Cifuentes, PT Physical Therapist PT    AG 04/03/18 -  Parisa Orellana, PT Physical Therapist PT    CT 04/03/18 -  Kyung Lr, PT Physical Therapist PT    AG1 06/16/16 -  Ayana Corbin RN Registered Nurse Nurse    CL 07/23/18 -  Rj Godfrey, RN Registered Nurse Nurse    KB1 12/20/17 -  Ekaterina Parry, PTA Physical Therapy Assistant PT    LAZARO 05/22/18 -  Renata Mcbride, RN Registered Nurse Nurse    MC 09/06/18 -  Collett, Morgan, RN Registered Nurse Nurse    SM 09/12/18 -  Sean Kohler, RN Registered Nurse Nurse    GAVINO 10/18/18 -  Marko Cifuentes, RN Registered Nurse Nurse    EV 10/26/18 -  Corry Bagley, RN Registered Nurse Nurse    JS 11/26/18 -  Sg Smith, RN Registered Nurse Nurse                PT Recommendation and Plan  Anticipated Discharge Disposition (PT): skilled nursing facility  Planned Therapy Interventions (PT Eval): balance training, gait training, bed mobility  training, home exercise program, manual therapy techniques, motor coordination training, neuromuscular re-education, patient/family education, postural re-education, ROM (range of motion), stair training, strengthening, stretching, transfer training  Therapy Frequency (PT Clinical Impression): 3 times/wk(3-5 times/ week per priority policy)  Outcome Summary/Treatment Plan (PT)  Daily Summary of Progress (PT): progress toward functional goals is good  Anticipated Equipment Needs at Discharge (PT): front wheeled walker  Anticipated Discharge Disposition (PT): skilled nursing facility  Plan of Care Reviewed With: patient  Progress: improving  Outcome Summary: Pt met all goals at time of progress note. New goals established.   Outcome Measures     Row Name 04/12/19 1100 04/10/19 1500          How much help from another person do you currently need...    Turning from your back to your side while in flat bed without using bedrails?  4  -CT  4  -CT     Moving from lying on back to sitting on the side of a flat bed without bedrails?  3  -CT  3  -CT     Moving to and from a bed to a chair (including a wheelchair)?  3  -CT  3  -CT     Standing up from a chair using your arms (e.g., wheelchair, bedside chair)?  3  -CT  3  -CT     Climbing 3-5 steps with a railing?  2  -CT  2  -CT     To walk in hospital room?  3  -CT  3  -CT     AM-PAC 6 Clicks Score  18  -CT  18  -CT        Functional Assessment    Outcome Measure Options  AM-PAC 6 Clicks Basic Mobility (PT)  -CT  AM-PAC 6 Clicks Basic Mobility (PT)  -CT       User Key  (r) = Recorded By, (t) = Taken By, (c) = Cosigned By    Initials Name Provider Type    CT Kyung Lr, PT Physical Therapist         Time Calculation:   PT Charges     Row Name 04/12/19 1113             Time Calculation    PT Received On  04/12/19  -CT      PT - Next Appointment  04/15/19  -CT      PT Goal Re-Cert Due Date  04/23/19  -CT         Time Calculation- PT    Total Timed Code Minutes- PT  28  minute(s)  -CT         Timed Charges    30266 - Gait Training Minutes   18  -CT      03940 - PT Therapeutic Activity Minutes  10  -CT        User Key  (r) = Recorded By, (t) = Taken By, (c) = Cosigned By    Initials Name Provider Type    CT Kyung Lr PT Physical Therapist        Therapy Charges for Today     Code Description Service Date Service Provider Modifiers Qty    10471240811 HC GAIT TRAINING EA 15 MIN 4/12/2019 Kyung Lr, PT GP 1    52280860767 HC PT THERAPEUTIC ACT EA 15 MIN 4/12/2019 Kyung Lr, PT GP 1    [id number]  PT THER SUPP EA 15 MIN 4/12/2019 Kyung Lr, PT GP 2          PT G-Codes  Outcome Measure Options: AM-PAC 6 Clicks Basic Mobility (PT)  AM-PAC 6 Clicks Score: 18  Score: 12    Kyung Lr PT  4/12/2019         Electronically signed by Kyung Lr PT at 4/12/2019 11:15 AM       Occupational Therapy Notes (last 24 hours) (Notes from 4/11/2019  2:27 PM through 4/12/2019  2:27 PM)     No notes of this type exist for this encounter.        Speech Language Pathology Notes (last 24 hours) (Notes from 4/11/2019  2:27 PM through 4/12/2019  2:27 PM)     No notes of this type exist for this encounter.        Respiratory Therapy Notes (last 24 hours) (Notes from 4/11/2019  2:27 PM through 4/12/2019  2:27 PM)     No notes of this type exist for this encounter.

## 2019-04-12 NOTE — PLAN OF CARE
Problem: Patient Care Overview  Goal: Plan of Care Review  Outcome: Ongoing (interventions implemented as appropriate)   04/12/19 1113   Plan of Care Review   Progress improving   Coping/Psychosocial   Plan of Care Reviewed With patient

## 2019-04-12 NOTE — PROGRESS NOTES
Discharge Planning Assessment  [facility]     Patient Name: Altagracia King  MRN: [id number]  Today's Date: 4/12/2019    Admit Date: 2/22/2019      Discharge Plan     Row Name 04/12/19 1427       Plan    Plan  SS spoke with [facility] on this date and faxed updated information. SS will follow.     Patient/Family in Agreement with Plan  yes        Destination      Service Provider Request Status Selected Services Address Phone Number Fax Number    [facility] Pending - Request Sent N/A [address] , [facility] [address] [phone]    [facility] Pending - Request Sent N/A [address] [facility] [address] [phone]    [facility] - Request Sent N/A [address] [facility] [address] [phone]    [facility] Pending - Request Sent N/A [address] [facility] [address] [phone]    [facility] Declined N/A [address] [facility] [address] [phone]    [facility] Declined N/A [address] [address] [phone]     Expected Discharge Date and Time     Expected Discharge Date Expected Discharge Time    Apr 10, 2019             Jazmyn Mendoza

## 2019-04-12 NOTE — PROGRESS NOTES
[facility] HOSPITALIST PROGRESS NOTE     Patient Identification:  Name:  Altagracia King  Age:  53 y.o.  Sex:  female  :  1965  MRN:  [id number]  Visit Number:  [id number]  Primary Care Provider:  Provider, No Known    Length of stay:  48    Subjective: Patient is awake alert oriented to self place year and month, but noted to confabulate the time she talks appropriately but all of a sudden would mention something that has not occurred including her ex-boyfriend is going to buy some gas for her car to drive.  Nursing staff reported that she has been like this for weeks.  She had a fall on , she is on anticoagulation, I would get a CT scan of the brain to make sure she does not have any bleeding.  If negative she may have symptoms element of Korsakoff syndrome especially with history of EtOH abuse.    Chief Complaint: Confusion  ----------------------------------------------------------------------------------------------------------------------  Current Hospital Meds:    amiodarone 200 mg Oral Daily   apixaban 5 mg Oral Q12H   bisacodyl 10 mg Rectal Daily   cyanocobalamin 1,000 mcg Intramuscular Q30 Days   digoxin 125 mcg Oral Q48H   folic acid 1 mg Oral Daily   furosemide 10 mg Oral Daily   levothyroxine 75 mcg Oral Q AM   magnesium oxide 400 mg Oral Daily   metoprolol tartrate 75 mg Oral Q12H   mexiletine 150 mg Oral Q8H   OLANZapine 5 mg Oral Nightly   pantoprazole 40 mg Oral Q AM   sennosides-docusate sodium 2 tablet Oral Nightly   sodium chloride 3 mL Intravenous Q12H   sodium chloride 3 mL Intravenous Q12H   spironolactone 25 mg Oral Daily With Lunch   thiamine 100 mg Oral Daily       Pharmacy Consult      ----------------------------------------------------------------------------------------------------------------------  Vital Signs:  Temp:  [97.8 °F (36.6 °C)-98.2 °F (36.8 °C)] 98.2 °F (36.8 °C)  Heart Rate:  [74-85] 76  Resp:  [18] 18  BP: (116-133)/(49-76) 133/76        Tele: Sinus rhythm 73 bpm      04/10/19  0319 04/11/19  0300 04/12/19  0247   Weight: 47.3 kg (104 lb 3.2 oz) 47.3 kg (104 lb 3.2 oz) 47.2 kg (104 lb)     Body mass index is 17.31 kg/m².    Intake/Output Summary (Last 24 hours) at 4/12/2019 1035  Last data filed at 4/12/2019 0917  Gross per 24 hour   Intake 720 ml   Output 500 ml   Net 220 ml     Diet Soft Texture; Chopped; Thin; Daily Fluid Restriction; Other; 1,800  ----------------------------------------------------------------------------------------------------------------------  Physical exam:  General: Comfortable,awake, alert, oriented to self, place, and time, well-developed, chronically ill-appearing,  No respiratory distress.    Skin:  Skin is warm and dry. No rash noted.  She is pale.  HENT:  Head:  Normocephalic and atraumatic.  Mouth:  Moist mucous membranes.    Eyes:  Conjunctivae and EOM are normal.   No scleral icterus.  Left eye completely opacified, right eye has cataract  Neck:  Neck supple.  No JVD present.    Pulmonary/Chest:  No respiratory distress, no wheezes, no crackles, with normal breath sounds and good air movement.  Cardiovascular:  Normal rate, regular rhythm and normal heart sounds with no murmur.  Abdominal:  Soft.  Bowel sounds are normal.  No distension and no tenderness.   Extremities:  No edema, no tenderness, and no deformity.  No red or swollen joints anywhere.  Strong pulses in all 4 extremities with no clubbing, no cyanosis, no edema.  Neurological:  Motor strength equal no obvious deficit, sensory grossly intact.   No cranial nerve deficit.  No tongue deviation.  No facial droop.  No slurred speech.     ----------------------------------------------------------------------------------------------------------------------  ----------------------------------------------------------------------------------------------------------------------      Results from last 7 days   Lab Units 04/11/19  1007 04/08/19  0718   WBC  10*3/mm3 7.23 6.72   HEMOGLOBIN g/dL 10.3* 11.0*   HEMATOCRIT % 32.2* 34.0   MCV fL 101.9* 100.9*   MCHC g/dL 32.0 32.4   PLATELETS 10*3/mm3 207 236         Results from last 7 days   Lab Units 04/10/19  0358 04/08/19  0445 04/07/19  1745 04/07/19  0743 04/07/19  0103   SODIUM mmol/L 140 139  --  141  --    POTASSIUM mmol/L 4.2 4.3  --  4.3  --    MAGNESIUM mg/dL  --  2.1 2.7*  --  1.7   CHLORIDE mmol/L 104 102  --  104  --    CO2 mmol/L 23.1 25.0  --  24.4  --    BUN mg/dL 18 19  --  22*  --    CREATININE mg/dL 0.82 0.76  --  0.84  --    EGFR IF NONAFRICN AM mL/min/1.73 72 79  --  70  --    CALCIUM mg/dL 10.1 9.5  --  9.7  --    GLUCOSE mg/dL 85 90  --  91  --    Estimated Creatinine Clearance: 57.8 mL/min (by C-G formula based on SCr of 0.82 mg/dL).    No results found for: AMMONIA                    I have personally looked at the labs and they are summarized above.  ----------------------------------------------------------------------------------------------------------------------  Imaging Results (last 24 hours)     ** No results found for the last 24 hours. **        ----------------------------------------------------------------------------------------------------------------------  Assessment and Plan:  -Functional decline with generalized weakness and physical deconditioning  -Occasional confusion with confabulation rule out Korsakoff syndrome  -History of nonsustained V. Tach  -Paroxysmal atrial fibrillation  -Acquired hypothyroidism  -Diastolic dysfunction  -Chronic anticoagulation    Continue physical therapy, fall precaution, the scan of the brain without contrast to make sure there is no bleed, patient is already on thiamine B12 and folate.  She has no obvious medication that can cause confusion except for low-dose olanzapine at night.      Kaye Wilson MD  04/12/19  10:35 AM

## 2019-04-12 NOTE — THERAPY TREATMENT NOTE
Acute Care - Physical Therapy Treatment Note   [place]     Patient Name: Altagracia King  : 1965  MRN: [id number]  Today's Date: 2019  Onset of Illness/Injury or Date of Surgery: 19(original admit date)  Date of Referral to PT: 19  Referring Physician: Gui    Admit Date: 2019    Visit Dx:    ICD-10-CM ICD-9-CM   1. Chest pain, unspecified type R07.9 786.50   2. Acute cystitis without hematuria N30.00 595.0     Patient Active Problem List   Diagnosis   • Chest pain   • PAF (paroxysmal atrial fibrillation) (CMS/HCC)   • ETOH abuse   • Acute respiratory failure with hypoxia (CMS/HCC)       Therapy Treatment    Rehabilitation Treatment Summary     Row Name 19 110             Treatment Time/Intention    Discipline  physical therapist  -CT      Document Type  therapy note (daily note)  -CT      Subjective Information  no complaints  -CT      Mode of Treatment  individual therapy;physical therapy  -CT      Therapy Frequency (PT Clinical Impression)  3 times/wk 3-5 times/ week per priority policy  -CT      Patient Effort  adequate  -CT      Patient Response to Treatment  Pt tolerated treatment session well with rest breaks provided as needed.  -CT      Recorded by [CT] Kyung Lr, PT 19 1112      Row Name 19 110             Cognitive Assessment/Intervention- PT/OT    Orientation Status (Cognition)  oriented to;person  -CT      Follows Commands (Cognition)  follows one step commands;physical/tactile prompts required;repetition of directions required;verbal cues/prompting required  -CT      Recorded by [CT] Kyung Lr, PT 19 1112      Row Name 19 110             Safety Issues, Functional Mobility    Impairments Affecting Function (Mobility)  balance;coordination;endurance/activity tolerance;strength;visual/perceptual  -CT      Recorded by [CT] Kyung Lr, PT 19 1112      Row Name 19 1107             Bed Mobility Assessment/Treatment     Bed Mobility Assessment/Treatment  bed mobility (all) activities  -CT      Donna Level (Bed Mobility)  minimum assist (75% patient effort)  -CT      Bed Mobility, Safety Issues  cognitive deficits limit understanding  -CT      Assistive Device (Bed Mobility)  bed rails  -CT      Recorded by [CT] Kyung Lr, PT 04/12/19 1112      Row Name 04/12/19 1107             Transfer Assessment/Treatment    Transfer Assessment/Treatment  sit-stand transfer;stand-sit transfer;stand pivot/stand step transfer  -CT      Recorded by [CT] Kyung Lr, PT 04/12/19 1112      Row Name 04/12/19 1107             Sit-Stand Transfer    Sit-Stand Donna (Transfers)  minimum assist (75% patient effort);verbal cues;nonverbal cues (demo/gesture)  -CT      Assistive Device (Sit-Stand Transfers)  walker, front-wheeled  -CT      Recorded by [CT] Kyung Lr, PT 04/12/19 1112      Row Name 04/12/19 1107             Stand-Sit Transfer    Stand-Sit Donna (Transfers)  minimum assist (75% patient effort);verbal cues;nonverbal cues (demo/gesture)  -CT      Assistive Device (Stand-Sit Transfers)  walker, front-wheeled  -CT      Recorded by [CT] Kyung Lr, PT 04/12/19 1112      Row Name 04/12/19 1107             Gait/Stairs Assessment/Training    Gait/Stairs Assessment/Training  gait/ambulation independence;gait/ambulation assistive device;distance ambulated;gait pattern;gait deviations;maintains weight-bearing status  -CT      Donna Level (Gait)  minimum assist (75% patient effort);2 person assist;verbal cues;nonverbal cues (demo/gesture)  -CT      Assistive Device (Gait)  walker, front-wheeled  -CT      Distance in Feet (Gait)  130  -CT      Pattern (Gait)  step-to  -CT      Deviations/Abnormal Patterns (Gait)  base of support, narrow  -CT      Bilateral Gait Deviations  forward flexed posture  -CT      Comment (Gait/Stairs)  Pt needs assist with use of RW d/t visual impairment  -CT      Recorded by [CT]  BeJaycob meridamen, PT 04/12/19 1112      Row Name 04/12/19 1107             Positioning and Restraints    Pre-Treatment Position  in bed  -CT      Post Treatment Position  bed  -CT      In Bed  sitting EOB;call light within reach;encouraged to call for assist;with nsg;side rails up x3;notified nsg  -CT      Recorded by [CT] Be Kyung, PT 04/12/19 1112      Row Name 04/12/19 1107             Pain Scale: FACES Pre/Post-Treatment    Pain: FACES Scale, Pretreatment  0-->no hurt  -CT      Pain: FACES Scale, Post-Treatment  0-->no hurt  -CT      Recorded by [CT] Kyung Lr, PT 04/12/19 1112      Row Name 04/12/19 1107             Coping    Observed Emotional State  anxious;attention-seeking behavior  -CT      Verbalized Emotional State  acceptance  -CT      Recorded by [CT] Kyung Lr, PT 04/12/19 1112      Row Name 04/12/19 1107             Plan of Care Review    Plan of Care Reviewed With  patient  -CT      Recorded by [CT] Kyung Lr, PT 04/12/19 1112      Row Name 04/12/19 1107             Outcome Summary/Treatment Plan (PT)    Daily Summary of Progress (PT)  progress toward functional goals is good  -CT      Anticipated Discharge Disposition (PT)  skilled nursing facility  -CT      Recorded by [CT] Kyung Lr, PT 04/12/19 1112        User Key  (r) = Recorded By, (t) = Taken By, (c) = Cosigned By    Initials Name Effective Dates Discipline    CT Kyung Lr, PT 04/03/18 -  PT                   Physical Therapy Education     Title: PT OT SLP Therapies (In Progress)     Topic: Physical Therapy (In Progress)     Point: Mobility training (In Progress)     Learning Progress Summary           Patient Acceptance, E,TB, NR by CT at 4/12/2019 11:13 AM    Acceptance, E,TB, NR by CT at 4/10/2019  3:24 PM    Acceptance, E,TB, VU,NR by CT at 4/9/2019  2:08 PM    Acceptance, E, VU by AM at 4/9/2019 12:08 PM    Acceptance, E,TB, NR by CT at 4/8/2019  2:48 PM    Acceptance, E,TB, VU by GAVINO at 4/8/2019  8:21  AM    Acceptance, E, NL,VU by SM at 4/8/2019  2:37 AM    Acceptance, E,TB, VU by GAVINO at 4/7/2019  8:53 AM    Acceptance, E, NL by SM at 4/6/2019 11:18 PM    Acceptance, E, NL by SM at 4/5/2019 10:02 PM    Acceptance, E,D, NR by AG at 4/4/2019  6:23 PM    Acceptance, E, NR by KB at 4/2/2019  6:10 PM    Acceptance, E, VU by SM at 4/2/2019 12:28 AM    Acceptance, E, VU,NR by AG1 at 4/1/2019 11:20 AM    Acceptance, E, NR by KB at 3/31/2019  9:45 PM    Acceptance, E, VU by MC at 3/31/2019  2:55 AM    Acceptance, E, VU by MC at 3/31/2019  2:45 AM    Acceptance, E, VU,NR by MC at 3/30/2019  1:57 AM    Acceptance, E,TB, VU,NR by CT at 3/29/2019 10:45 AM    Acceptance, E, NR by EA at 3/28/2019 10:44 PM    Acceptance, E,TB, NR,NL by CT at 3/28/2019  2:19 PM    Acceptance, E,TB, VU by JS at 3/28/2019 12:47 AM    Acceptance, E, VU by AM at 3/27/2019  4:07 PM    Acceptance, E, NR by EA at 3/26/2019  9:47 PM    Acceptance, E, VU by LAZARO at 3/20/2019 10:09 PM    Acceptance, E, VU by AW at 3/16/2019  6:34 PM    Acceptance, E,TB, VU by ILYA at 3/14/2019 12:21 AM    Comment:  PT unable to rcve teaching at this time    Acceptance, E,TB, VU by ILYA at 3/12/2019 11:00 PM    Comment:  PT unable to rcve teaching at this time    Nonacceptance, E, NL by SB at 3/12/2019  9:17 AM    Comment:  pt intubated and sedated; no one present at the bedside    Acceptance, E, NR by EV at 3/11/2019  9:02 PM    Comment:  intubated/sedated. No family or caretaker at bedside    Nonacceptance, E, NL by WS at 3/9/2019  9:06 AM    Comment:  pt sedated and intubated    Acceptance, E, VU by WS at 3/9/2019  9:05 AM    Acceptance, E,TB, VU by ILYA at 3/8/2019  9:46 PM    Comment:  PT unable to rcve teaching at this time    Nonacceptance, E, NL by WS at 3/8/2019  4:13 PM    Acceptance, E, VU by LT at 3/7/2019 10:13 AM    Acceptance, E, VU by BC at 3/5/2019  5:55 PM    Acceptance, E,TB, VU by CL at 3/4/2019  7:53 PM    Acceptance, E,TB, VU by CL at 3/4/2019  7:52 PM     Acceptance, E,TB, VU by CT at 3/1/2019  3:23 PM    Acceptance, E,TB, VU,NR by KB1 at 2/26/2019  3:13 PM    Acceptance, E,TB, NR by CT at 2/25/2019  3:25 PM   Family Acceptance, E, VU by AW at 3/15/2019 12:08 PM    Acceptance, E, VU by SB at 3/13/2019 11:01 AM   Significant Other Acceptance, E, VU by WS at 3/9/2019  9:05 AM                   Point: Home exercise program (In Progress)     Learning Progress Summary           Patient Acceptance, E,TB, NR by CT at 4/12/2019 11:13 AM    Acceptance, E,TB, NR by CT at 4/10/2019  3:24 PM    Acceptance, E,TB, VU,NR by CT at 4/9/2019  2:08 PM    Acceptance, E, VU by AM at 4/9/2019 12:08 PM    Acceptance, E,TB, NR by CT at 4/8/2019  2:48 PM    Acceptance, E,TB, VU by GAVINO at 4/8/2019  8:21 AM    Acceptance, E, NL,VU by SM at 4/8/2019  2:37 AM    Acceptance, E,TB, VU by GAVINO at 4/7/2019  8:53 AM    Acceptance, E, NL by SM at 4/6/2019 11:18 PM    Acceptance, E, NL by SM at 4/5/2019 10:02 PM    Acceptance, E,D, NR by AG at 4/4/2019  6:23 PM    Acceptance, E, NR by KB at 4/2/2019  6:10 PM    Acceptance, E, VU by SM at 4/2/2019 12:28 AM    Acceptance, E, VU,NR by AG1 at 4/1/2019 11:20 AM    Acceptance, E, NR by KB at 3/31/2019  9:45 PM    Acceptance, E, VU by MC at 3/31/2019  2:55 AM    Acceptance, E, VU by MC at 3/31/2019  2:45 AM    Acceptance, E, VU,NR by MC at 3/30/2019  1:57 AM    Acceptance, E,TB, VU,NR by CT at 3/29/2019 10:45 AM    Acceptance, E, NR by EA at 3/28/2019 10:44 PM    Acceptance, E,TB, NR,NL by CT at 3/28/2019  2:19 PM    Acceptance, E,TB, VU by JS at 3/28/2019 12:47 AM    Acceptance, E, VU by AM at 3/27/2019  4:07 PM    Acceptance, E, NR by EA at 3/26/2019  9:47 PM    Acceptance, E, VU by LAZARO at 3/20/2019 10:09 PM    Acceptance, E, VU by AW at 3/16/2019  6:34 PM    Acceptance, E,TB, VU by ILYA at 3/14/2019 12:21 AM    Comment:  PT unable to rcve teaching at this time    Acceptance, E,TB, VU by ILYA at 3/12/2019 11:00 PM    Comment:  PT unable to rcve teaching at this  time    Nonacceptance, E, NL by SB at 3/12/2019  9:17 AM    Comment:  pt intubated and sedated; no one present at the bedside    Acceptance, E, NR by EV at 3/11/2019  9:02 PM    Comment:  intubated/sedated. No family or caretaker at bedside    Nonacceptance, E, NL by WS at 3/9/2019  9:06 AM    Comment:  pt sedated and intubated    Acceptance, E, VU by WS at 3/9/2019  9:05 AM    Acceptance, E,TB, VU by ILYA at 3/8/2019  9:46 PM    Comment:  PT unable to rcve teaching at this time    Nonacceptance, E, NL by WS at 3/8/2019  4:13 PM    Acceptance, E, VU by LT at 3/7/2019 10:13 AM    Acceptance, E, VU by BC at 3/5/2019  5:55 PM    Acceptance, E,TB, VU by CL at 3/4/2019  7:53 PM    Acceptance, E,TB, VU by CL at 3/4/2019  7:52 PM    Acceptance, E,TB, VU by CT at 3/1/2019  3:23 PM    Acceptance, E,TB, VU,NR by KB1 at 2/26/2019  3:13 PM    Acceptance, E,TB, NR by CT at 2/25/2019  3:25 PM   Family Acceptance, E, VU by AW at 3/15/2019 12:08 PM    Acceptance, E, VU by SB at 3/13/2019 11:01 AM   Significant Other Acceptance, E, VU by WS at 3/9/2019  9:05 AM                   Point: Body mechanics (In Progress)     Learning Progress Summary           Patient Acceptance, E,TB, NR by CT at 4/12/2019 11:13 AM    Acceptance, E,TB, NR by CT at 4/10/2019  3:24 PM    Acceptance, E,TB, VU,NR by CT at 4/9/2019  2:08 PM    Acceptance, E, VU by AM at 4/9/2019 12:08 PM    Acceptance, E,TB, NR by CT at 4/8/2019  2:48 PM    Acceptance, E,TB, VU by GAVINO at 4/8/2019  8:21 AM    Acceptance, E, NL,VU by SM at 4/8/2019  2:37 AM    Acceptance, E,TB, VU by GAVINO at 4/7/2019  8:53 AM    Acceptance, E, NL by SM at 4/6/2019 11:18 PM    Acceptance, E, NL by SM at 4/5/2019 10:02 PM    Acceptance, E,D, NR by AG at 4/4/2019  6:23 PM    Acceptance, E, NR by KB at 4/2/2019  6:10 PM    Acceptance, E, VU by SM at 4/2/2019 12:28 AM    Acceptance, E, VU,NR by AG1 at 4/1/2019 11:20 AM    Acceptance, E, NR by KB at 3/31/2019  9:45 PM    Acceptance, E, VU by MC at  3/31/2019  2:55 AM    Acceptance, E, VU by MC at 3/31/2019  2:45 AM    Acceptance, E, VU,NR by MC at 3/30/2019  1:57 AM    Acceptance, E,TB, VU,NR by CT at 3/29/2019 10:45 AM    Acceptance, E, NR by EA at 3/28/2019 10:44 PM    Acceptance, E,TB, NR,NL by CT at 3/28/2019  2:19 PM    Acceptance, E,TB, VU by JS at 3/28/2019 12:47 AM    Acceptance, E, VU by AM at 3/27/2019  4:07 PM    Acceptance, E, NR by EA at 3/26/2019  9:47 PM    Acceptance, E, VU by LAZARO at 3/20/2019 10:09 PM    Acceptance, E, VU by AW at 3/16/2019  6:34 PM    Acceptance, E,TB, VU by ILYA at 3/14/2019 12:21 AM    Comment:  PT unable to rcve teaching at this time    Acceptance, E,TB, VU by ILYA at 3/12/2019 11:00 PM    Comment:  PT unable to rcve teaching at this time    Nonacceptance, E, NL by SB at 3/12/2019  9:17 AM    Comment:  pt intubated and sedated; no one present at the bedside    Acceptance, E, NR by EV at 3/11/2019  9:02 PM    Comment:  intubated/sedated. No family or caretaker at bedside    Nonacceptance, E, NL by WS at 3/9/2019  9:06 AM    Comment:  pt sedated and intubated    Acceptance, E, VU by WS at 3/9/2019  9:05 AM    Acceptance, E,TB, VU by ILYA at 3/8/2019  9:46 PM    Comment:  PT unable to rcve teaching at this time    Nonacceptance, E, NL by WS at 3/8/2019  4:13 PM    Acceptance, E, VU by LT at 3/7/2019 10:13 AM    Acceptance, E, VU by BC at 3/5/2019  5:55 PM    Acceptance, E,TB, VU by CL at 3/4/2019  7:53 PM    Acceptance, E,TB, VU by CL at 3/4/2019  7:52 PM    Acceptance, E,TB, VU by CT at 3/1/2019  3:23 PM    Acceptance, E,TB, VU,NR by KB1 at 2/26/2019  3:13 PM    Acceptance, E,TB, NR by CT at 2/25/2019  3:25 PM   Family Acceptance, E, VU by AW at 3/15/2019 12:08 PM    Acceptance, E, VU by SB at 3/13/2019 11:01 AM   Significant Other Acceptance, E, VU by WS at 3/9/2019  9:05 AM                   Point: Precautions (In Progress)     Learning Progress Summary           Patient Acceptance, E,TB, NR by CT at 4/12/2019 11:13 AM     Acceptance, E,TB, NR by CT at 4/10/2019  3:24 PM    Acceptance, E,TB, VU,NR by CT at 4/9/2019  2:08 PM    Acceptance, E, VU by AM at 4/9/2019 12:08 PM    Acceptance, E,TB, NR by CT at 4/8/2019  2:48 PM    Acceptance, E,TB, VU by GAVINO at 4/8/2019  8:21 AM    Acceptance, E, NL,VU by SM at 4/8/2019  2:37 AM    Acceptance, E,TB, VU by GAVINO at 4/7/2019  8:53 AM    Acceptance, E, NL by SM at 4/6/2019 11:18 PM    Acceptance, E, NL by SM at 4/5/2019 10:02 PM    Acceptance, E,D, NR by AG at 4/4/2019  6:23 PM    Acceptance, E, NR by KB at 4/2/2019  6:10 PM    Acceptance, E, VU by SM at 4/2/2019 12:28 AM    Acceptance, E, VU,NR by AG1 at 4/1/2019 11:20 AM    Acceptance, E, NR by KB at 3/31/2019  9:45 PM    Acceptance, E, VU by MC at 3/31/2019  2:55 AM    Acceptance, E, VU by MC at 3/31/2019  2:45 AM    Acceptance, E, VU,NR by MC at 3/30/2019  1:57 AM    Acceptance, E,TB, VU,NR by CT at 3/29/2019 10:45 AM    Acceptance, E, NR by EA at 3/28/2019 10:44 PM    Acceptance, E,TB, NR,NL by CT at 3/28/2019  2:19 PM    Acceptance, E,TB, VU by JS at 3/28/2019 12:47 AM    Acceptance, E, VU by AM at 3/27/2019  4:07 PM    Acceptance, E, NR by EA at 3/26/2019  9:47 PM    Acceptance, E, VU by LAZARO at 3/20/2019 10:09 PM    Acceptance, E, VU by AW at 3/16/2019  6:34 PM    Acceptance, E,TB, VU by ILYA at 3/14/2019 12:21 AM    Comment:  PT unable to rcve teaching at this time    Acceptance, E,TB, VU by ILYA at 3/12/2019 11:00 PM    Comment:  PT unable to rcve teaching at this time    Nonacceptance, E, NL by SB at 3/12/2019  9:17 AM    Comment:  pt intubated and sedated; no one present at the bedside    Acceptance, E, NR by EV at 3/11/2019  9:02 PM    Comment:  intubated/sedated. No family or caretaker at bedside    Nonacceptance, E, NL by WS at 3/9/2019  9:06 AM    Comment:  pt sedated and intubated    Acceptance, E, VU by WS at 3/9/2019  9:05 AM    Acceptance, E,TB, VU by ILYA at 3/8/2019  9:46 PM    Comment:  PT unable to rcve teaching at this time     Nonacceptance, E, NL by WS at 3/8/2019  4:13 PM    Acceptance, E, VU by LT at 3/7/2019 10:13 AM    Acceptance, E, VU by BC at 3/5/2019  5:55 PM    Acceptance, E,TB, VU by CL at 3/4/2019  7:53 PM    Acceptance, E,TB, VU by CL at 3/4/2019  7:52 PM    Acceptance, E,TB, VU by CT at 3/1/2019  3:23 PM    Acceptance, E,TB, VU,NR by KB1 at 2/26/2019  3:13 PM    Acceptance, E,TB, NR by CT at 2/25/2019  3:25 PM   Family Acceptance, E, VU by AW at 3/15/2019 12:08 PM    Acceptance, E, VU by SB at 3/13/2019 11:01 AM   Significant Other Acceptance, E, VU by WS at 3/9/2019  9:05 AM                               User Key     Initials Effective Dates Name Provider Type Discipline    SB 06/16/16 -  Kathy Rowell RN Registered Nurse Nurse    ILYA 06/16/16 -  Alberto Mendoza, RN Registered Nurse Nurse    LT 06/16/16 -  Denae Carvajal, RN Registered Nurse Nurse    EA 06/16/16 -  Mary Blanton, RN Registered Nurse Nurse    KB 06/16/16 -  Lorena Garcia, RN Registered Nurse Nurse    AW 06/16/16 -  Heather Godinez, RN Registered Nurse Nurse    WS 06/16/16 -  Keisha Messer, RN Registered Nurse Nurse    AM 06/16/16 -  Madeline Santiago, RN Registered Nurse Nurse    BC 03/14/16 -  Ekaterina Cifuentes, PT Physical Therapist PT    AG 04/03/18 -  Parisa Orellana, PT Physical Therapist PT    CT 04/03/18 -  Kyung Lr, PT Physical Therapist PT    AG1 06/16/16 -  Ayana Corbin, RN Registered Nurse Nurse    CL 07/23/18 -  Rj Godfrey, RN Registered Nurse Nurse    KB1 12/20/17 -  Ekaterina Parry, PTA Physical Therapy Assistant PT    LAZARO 05/22/18 -  Renata Mcbride, RN Registered Nurse Nurse    MC 09/06/18 -  Collett, Morgan, RN Registered Nurse Nurse    SM 09/12/18 -  Sean Kohler, RN Registered Nurse Nurse    GAVINO 10/18/18 -  Marko Cifuentes, RN Registered Nurse Nurse    EV 10/26/18 -  Corry Bagley, RN Registered Nurse Nurse    JS 11/26/18 -  Sg Smith, RN Registered Nurse Nurse                 PT Recommendation and Plan  Anticipated Discharge Disposition (PT): skilled nursing facility  Planned Therapy Interventions (PT Eval): balance training, gait training, bed mobility training, home exercise program, manual therapy techniques, motor coordination training, neuromuscular re-education, patient/family education, postural re-education, ROM (range of motion), stair training, strengthening, stretching, transfer training  Therapy Frequency (PT Clinical Impression): 3 times/wk(3-5 times/ week per priority policy)  Outcome Summary/Treatment Plan (PT)  Daily Summary of Progress (PT): progress toward functional goals is good  Anticipated Equipment Needs at Discharge (PT): front wheeled walker  Anticipated Discharge Disposition (PT): skilled nursing facility  Plan of Care Reviewed With: patient  Progress: improving  Outcome Summary: Pt met all goals at time of progress note. New goals established.   Outcome Measures     Row Name 04/12/19 1100 04/10/19 1500          How much help from another person do you currently need...    Turning from your back to your side while in flat bed without using bedrails?  4  -CT  4  -CT     Moving from lying on back to sitting on the side of a flat bed without bedrails?  3  -CT  3  -CT     Moving to and from a bed to a chair (including a wheelchair)?  3  -CT  3  -CT     Standing up from a chair using your arms (e.g., wheelchair, bedside chair)?  3  -CT  3  -CT     Climbing 3-5 steps with a railing?  2  -CT  2  -CT     To walk in hospital room?  3  -CT  3  -CT     AM-PAC 6 Clicks Score  18  -CT  18  -CT        Functional Assessment    Outcome Measure Options  AM-PAC 6 Clicks Basic Mobility (PT)  -CT  AM-PAC 6 Clicks Basic Mobility (PT)  -CT       User Key  (r) = Recorded By, (t) = Taken By, (c) = Cosigned By    Initials Name Provider Type    CT Kyung Lr, PT Physical Therapist         Time Calculation:   PT Charges     Row Name 04/12/19 1113             Time  Calculation    PT Received On  04/12/19  -CT      PT - Next Appointment  04/15/19  -CT      PT Goal Re-Cert Due Date  04/23/19  -CT         Time Calculation- PT    Total Timed Code Minutes- PT  28 minute(s)  -CT         Timed Charges    04821 - Gait Training Minutes   18  -CT      09654 - PT Therapeutic Activity Minutes  10  -CT        User Key  (r) = Recorded By, (t) = Taken By, (c) = Cosigned By    Initials Name Provider Type    CT Kyung Lr, PT Physical Therapist        Therapy Charges for Today     Code Description Service Date Service Provider Modifiers Qty    75325850743 HC GAIT TRAINING EA 15 MIN 4/12/2019 Kyung Lr, PT GP 1    96075212630 HC PT THERAPEUTIC ACT EA 15 MIN 4/12/2019 Kyung Lr, PT GP 1    41292561236 HC PT THER SUPP EA 15 MIN 4/12/2019 Kyung Lr, PT GP 2          PT G-Codes  Outcome Measure Options: AM-PAC 6 Clicks Basic Mobility (PT)  AM-PAC 6 Clicks Score: 18  Score: 12    Kyung Lr PT  4/12/2019

## 2019-04-13 PROCEDURE — 99231 SBSQ HOSP IP/OBS SF/LOW 25: CPT | Performed by: HOSPITALIST

## 2019-04-13 PROCEDURE — 94799 UNLISTED PULMONARY SVC/PX: CPT

## 2019-04-13 RX ADMIN — DIGOXIN 125 MCG: 125 TABLET ORAL at 08:17

## 2019-04-13 RX ADMIN — METOPROLOL TARTRATE 75 MG: 50 TABLET, FILM COATED ORAL at 20:17

## 2019-04-13 RX ADMIN — FOLIC ACID 1 MG: 1 TABLET ORAL at 08:17

## 2019-04-13 RX ADMIN — AMIODARONE HYDROCHLORIDE 200 MG: 200 TABLET ORAL at 08:17

## 2019-04-13 RX ADMIN — MEXILETINE HYDROCHLORIDE 150 MG: 150 CAPSULE ORAL at 14:19

## 2019-04-13 RX ADMIN — MAGNESIUM GLUCONATE 500 MG ORAL TABLET 400 MG: 500 TABLET ORAL at 08:17

## 2019-04-13 RX ADMIN — SENNOSIDES AND DOCUSATE SODIUM 2 TABLET: 8.6; 5 TABLET ORAL at 20:18

## 2019-04-13 RX ADMIN — APIXABAN 5 MG: 5 TABLET, FILM COATED ORAL at 20:18

## 2019-04-13 RX ADMIN — MEXILETINE HYDROCHLORIDE 150 MG: 150 CAPSULE ORAL at 20:18

## 2019-04-13 RX ADMIN — APIXABAN 5 MG: 5 TABLET, FILM COATED ORAL at 08:17

## 2019-04-13 RX ADMIN — ACETAMINOPHEN 650 MG: 325 TABLET, FILM COATED ORAL at 14:18

## 2019-04-13 RX ADMIN — SODIUM CHLORIDE, PRESERVATIVE FREE 3 ML: 5 INJECTION INTRAVENOUS at 20:17

## 2019-04-13 RX ADMIN — SPIRONOLACTONE 25 MG: 25 TABLET ORAL at 12:01

## 2019-04-13 RX ADMIN — METOPROLOL TARTRATE 75 MG: 50 TABLET, FILM COATED ORAL at 08:17

## 2019-04-13 RX ADMIN — PANTOPRAZOLE SODIUM 40 MG: 40 TABLET, DELAYED RELEASE ORAL at 04:41

## 2019-04-13 RX ADMIN — Medication 100 MG: at 08:16

## 2019-04-13 RX ADMIN — MEXILETINE HYDROCHLORIDE 150 MG: 150 CAPSULE ORAL at 04:41

## 2019-04-13 RX ADMIN — SODIUM CHLORIDE, PRESERVATIVE FREE 3 ML: 5 INJECTION INTRAVENOUS at 08:19

## 2019-04-13 RX ADMIN — SODIUM CHLORIDE, PRESERVATIVE FREE 3 ML: 5 INJECTION INTRAVENOUS at 08:18

## 2019-04-13 RX ADMIN — FUROSEMIDE 10 MG: 20 TABLET ORAL at 08:18

## 2019-04-13 RX ADMIN — LEVOTHYROXINE SODIUM 75 MCG: 75 TABLET ORAL at 04:41

## 2019-04-13 RX ADMIN — OLANZAPINE 5 MG: 5 TABLET, FILM COATED ORAL at 20:17

## 2019-04-13 RX ADMIN — SODIUM CHLORIDE, PRESERVATIVE FREE 3 ML: 5 INJECTION INTRAVENOUS at 20:16

## 2019-04-13 NOTE — PLAN OF CARE
Problem: Fall Risk (Adult)  Goal: Absence of Fall  Outcome: Unable to achieve outcome(s) by discharge Date Met: 04/13/19  Pt had a fall today.

## 2019-04-13 NOTE — PROGRESS NOTES
[facility] HOSPITALIST PROGRESS NOTE     Patient Identification:  Name:  Altagracia King  Age:  53 y.o.  Sex:  female  :  1965  MRN:  [id number]  Visit Number:  [id number]  Primary Care Provider:  Provider, No Known    Length of stay:  49    Subjective: Patient was found in the floor this morning denies hurting  herself, claims she slid from the bed, bed alarm did not go off.  She has no bruises no pain.  Patient is alert oriented to self place and time, she has very limited vision due to significant eye cataract and left eye blindness.  CT scan of brain was ordered yesterday unfortunately, CT of the chest was ordered mistakenly.    Chief Complaint: Occasional confusion.  ----------------------------------------------------------------------------------------------------------------------  Current Hospital Meds:    amiodarone 200 mg Oral Daily   apixaban 5 mg Oral Q12H   bisacodyl 10 mg Rectal Daily   cyanocobalamin 1,000 mcg Intramuscular Q30 Days   digoxin 125 mcg Oral Q48H   folic acid 1 mg Oral Daily   furosemide 10 mg Oral Daily   levothyroxine 75 mcg Oral Q AM   magnesium oxide 400 mg Oral Daily   metoprolol tartrate 75 mg Oral Q12H   mexiletine 150 mg Oral Q8H   OLANZapine 5 mg Oral Nightly   pantoprazole 40 mg Oral Q AM   sennosides-docusate sodium 2 tablet Oral Nightly   sodium chloride 3 mL Intravenous Q12H   sodium chloride 3 mL Intravenous Q12H   spironolactone 25 mg Oral Daily With Lunch   thiamine 100 mg Oral Daily       Pharmacy Consult      ----------------------------------------------------------------------------------------------------------------------  Vital Signs:  Temp:  [97.4 °F (36.3 °C)-98.4 °F (36.9 °C)] 97.8 °F (36.6 °C)  Heart Rate:  [76-89] 89  Resp:  [18] 18  BP: (103-141)/(47-74) 103/74       Tele: Sinus rhythm 82 bpm      19  0300 19  0247 19  0315   Weight: 47.3 kg (104 lb 3.2 oz) 47.2 kg (104 lb) 47.8 kg (105 lb 4.8 oz)     Body mass index  is 17.52 kg/m².    Intake/Output Summary (Last 24 hours) at 4/13/2019 1402  Last data filed at 4/13/2019 0734  Gross per 24 hour   Intake 720 ml   Output 150 ml   Net 570 ml     Diet Soft Texture; Chopped; Thin; Daily Fluid Restriction; Other; 1,800  ----------------------------------------------------------------------------------------------------------------------  Physical exam:  General: Comfortable,awake, alert, oriented to self, place, and time, well-developed, chronically ill-appearing,  No respiratory distress.    Skin:  Skin is warm and dry. No rash noted.  She is pale.  No skin break  HENT:  Head:  Normocephalic and atraumatic.  Mouth:  Moist mucous membranes.    Eyes:  Conjunctivae and EOM are normal.   No scleral icterus.  Left eye completely opacified, right eye has cataract  Neck:  Neck supple.  No JVD present.    Pulmonary/Chest:  No respiratory distress, no wheezes, no crackles, with normal breath sounds and good air movement.  Cardiovascular:  Normal rate, regular rhythm and normal heart sounds with no murmur.  Abdominal:  Soft.  Bowel sounds are normal.  No distension and no tenderness.   Extremities:  No edema, no tenderness, and no deformity.  No red or swollen joints anywhere.  Strong pulses in all 4 extremities with no clubbing, no cyanosis, no edema.  Neurological:  Motor strength equal no obvious deficit, sensory grossly intact.   No cranial nerve deficit.  No tongue deviation.  No facial droop.  No slurred speech.     ----------------------------------------------------------------------------------------------------------------------  ----------------------------------------------------------------------------------------------------------------------      Results from last 7 days   Lab Units 04/11/19  1007 04/08/19  0718   WBC 10*3/mm3 7.23 6.72   HEMOGLOBIN g/dL 10.3* 11.0*   HEMATOCRIT % 32.2* 34.0   MCV fL 101.9* 100.9*   MCHC g/dL 32.0 32.4   PLATELETS 10*3/mm3 207 236         Results  from last 7 days   Lab Units 04/10/19  0358 04/08/19  0445 04/07/19  1745 04/07/19  0743 04/07/19  0103   SODIUM mmol/L 140 139  --  141  --    POTASSIUM mmol/L 4.2 4.3  --  4.3  --    MAGNESIUM mg/dL  --  2.1 2.7*  --  1.7   CHLORIDE mmol/L 104 102  --  104  --    CO2 mmol/L 23.1 25.0  --  24.4  --    BUN mg/dL 18 19  --  22*  --    CREATININE mg/dL 0.82 0.76  --  0.84  --    EGFR IF NONAFRICN AM mL/min/1.73 72 79  --  70  --    CALCIUM mg/dL 10.1 9.5  --  9.7  --    GLUCOSE mg/dL 85 90  --  91  --    Estimated Creatinine Clearance: 58.5 mL/min (by C-G formula based on SCr of 0.82 mg/dL).    No results found for: AMMONIA                    I have personally looked at the labs and they are summarized above.  ----------------------------------------------------------------------------------------------------------------------  Imaging Results (last 24 hours)     ** No results found for the last 24 hours. **        ----------------------------------------------------------------------------------------------------------------------  Assessment and Plan:  -Functional decline with generalized weakness and physical deconditioning  -Occasional confusion, suspect multifactorial due to sensory deprivation from very poor visual acuity, history of alcohol abuse.  -Acquired hypothyroidism  -Chronic anticoagulation  -Compensated diastolic dysfunction  -Paroxysmal atrial fibrillation    Patient will be transferred to another bed closer to the station where she can be watch, continue physical therapy, monitor electrolytes.  Awaiting additional placement.      Kaye Wilson MD  04/13/19  2:02 PM

## 2019-04-13 NOTE — NURSING NOTE
Pt found in floor. Skin assessment performed, vitals obtained, MD made aware and Pt was transferred to a room closer to nursing station. Attempt was made to make daughter Madeline King at [phone] aware of fall and transfer to another room. Attempted to call sister Sylwia De La Cruz at [phone]. Message was left for a call back.

## 2019-04-13 NOTE — PLAN OF CARE
Problem: Fall Risk (Adult)  Goal: Absence of Fall  Outcome: Ongoing (interventions implemented as appropriate)      Problem: Skin Injury Risk (Adult)  Goal: Skin Health and Integrity  Outcome: Ongoing (interventions implemented as appropriate)      Problem: Patient Care Overview  Goal: Individualization and Mutuality  Outcome: Ongoing (interventions implemented as appropriate)    Goal: Discharge Needs Assessment  Outcome: Ongoing (interventions implemented as appropriate)    Goal: Interprofessional Rounds/Family Conf  Outcome: Ongoing (interventions implemented as appropriate)    Goal: Individualization and Mutuality  Outcome: Ongoing (interventions implemented as appropriate)    Goal: Discharge Needs Assessment  Outcome: Ongoing (interventions implemented as appropriate)    Goal: Interprofessional Rounds/Family Conf  Outcome: Ongoing (interventions implemented as appropriate)      Problem: Pain, Chronic (Adult)  Goal: Acceptable Pain/Comfort Level and Functional Ability  Outcome: Ongoing (interventions implemented as appropriate)      Problem: Mobility, Physical Impaired (Adult)  Goal: Identify Related Risk Factors and Signs and Symptoms  Outcome: Ongoing (interventions implemented as appropriate)    Goal: Enhanced Mobility Skills  Outcome: Ongoing (interventions implemented as appropriate)    Goal: Enhanced Functional Ability  Outcome: Ongoing (interventions implemented as appropriate)    Goal: Identify Related Risk Factors and Signs and Symptoms  Outcome: Ongoing (interventions implemented as appropriate)    Goal: Enhanced Mobility Skills  Outcome: Ongoing (interventions implemented as appropriate)    Goal: Enhanced Functional Ability  Outcome: Ongoing (interventions implemented as appropriate)      Problem: Arrhythmia/Dysrhythmia (Symptomatic) (Adult)  Goal: Signs and Symptoms of Listed Potential Problems Will be Absent, Minimized or Managed (Arrhythmia/Dysrhythmia)  Outcome: Ongoing (interventions implemented  as appropriate)

## 2019-04-14 LAB
BASOPHILS # BLD AUTO: 0.02 10*3/MM3 (ref 0–0.2)
BASOPHILS NFR BLD AUTO: 0.4 % (ref 0–1.5)
DEPRECATED RDW RBC AUTO: 51.2 FL (ref 37–54)
EOSINOPHIL # BLD AUTO: 0.18 10*3/MM3 (ref 0–0.4)
EOSINOPHIL NFR BLD AUTO: 3.2 % (ref 0.3–6.2)
ERYTHROCYTE [DISTWIDTH] IN BLOOD BY AUTOMATED COUNT: 14 % (ref 12.3–15.4)
HCT VFR BLD AUTO: 29.4 % (ref 34–46.6)
HGB BLD-MCNC: 9.4 G/DL (ref 12–15.9)
IMM GRANULOCYTES # BLD AUTO: 0.05 10*3/MM3 (ref 0–0.05)
IMM GRANULOCYTES NFR BLD AUTO: 0.9 % (ref 0–0.5)
LYMPHOCYTES # BLD AUTO: 1.29 10*3/MM3 (ref 0.7–3.1)
LYMPHOCYTES NFR BLD AUTO: 22.8 % (ref 19.6–45.3)
MCH RBC QN AUTO: 33 PG (ref 26.6–33)
MCHC RBC AUTO-ENTMCNC: 32 G/DL (ref 31.5–35.7)
MCV RBC AUTO: 103.2 FL (ref 79–97)
MONOCYTES # BLD AUTO: 0.8 10*3/MM3 (ref 0.1–0.9)
MONOCYTES NFR BLD AUTO: 14.1 % (ref 5–12)
NEUTROPHILS # BLD AUTO: 3.32 10*3/MM3 (ref 1.4–7)
NEUTROPHILS NFR BLD AUTO: 58.6 % (ref 42.7–76)
PLATELET # BLD AUTO: 168 10*3/MM3 (ref 140–450)
PMV BLD AUTO: 10.3 FL (ref 6–12)
RBC # BLD AUTO: 2.85 10*6/MM3 (ref 3.77–5.28)
WBC NRBC COR # BLD: 5.66 10*3/MM3 (ref 3.4–10.8)

## 2019-04-14 PROCEDURE — 94799 UNLISTED PULMONARY SVC/PX: CPT

## 2019-04-14 PROCEDURE — 85025 COMPLETE CBC W/AUTO DIFF WBC: CPT | Performed by: INTERNAL MEDICINE

## 2019-04-14 PROCEDURE — 99231 SBSQ HOSP IP/OBS SF/LOW 25: CPT | Performed by: HOSPITALIST

## 2019-04-14 RX ADMIN — SENNOSIDES AND DOCUSATE SODIUM 2 TABLET: 8.6; 5 TABLET ORAL at 21:24

## 2019-04-14 RX ADMIN — AMIODARONE HYDROCHLORIDE 200 MG: 200 TABLET ORAL at 07:51

## 2019-04-14 RX ADMIN — LEVOTHYROXINE SODIUM 75 MCG: 75 TABLET ORAL at 05:06

## 2019-04-14 RX ADMIN — FOLIC ACID 1 MG: 1 TABLET ORAL at 07:51

## 2019-04-14 RX ADMIN — PANTOPRAZOLE SODIUM 40 MG: 40 TABLET, DELAYED RELEASE ORAL at 05:05

## 2019-04-14 RX ADMIN — MEXILETINE HYDROCHLORIDE 150 MG: 150 CAPSULE ORAL at 12:30

## 2019-04-14 RX ADMIN — METOPROLOL TARTRATE 75 MG: 50 TABLET, FILM COATED ORAL at 21:24

## 2019-04-14 RX ADMIN — APIXABAN 5 MG: 5 TABLET, FILM COATED ORAL at 07:51

## 2019-04-14 RX ADMIN — MAGNESIUM GLUCONATE 500 MG ORAL TABLET 400 MG: 500 TABLET ORAL at 07:51

## 2019-04-14 RX ADMIN — OLANZAPINE 5 MG: 5 TABLET, FILM COATED ORAL at 21:24

## 2019-04-14 RX ADMIN — SPIRONOLACTONE 25 MG: 25 TABLET ORAL at 11:39

## 2019-04-14 RX ADMIN — SODIUM CHLORIDE, PRESERVATIVE FREE 3 ML: 5 INJECTION INTRAVENOUS at 21:24

## 2019-04-14 RX ADMIN — SODIUM CHLORIDE, PRESERVATIVE FREE 3 ML: 5 INJECTION INTRAVENOUS at 07:52

## 2019-04-14 RX ADMIN — Medication 100 MG: at 07:51

## 2019-04-14 RX ADMIN — MEXILETINE HYDROCHLORIDE 150 MG: 150 CAPSULE ORAL at 21:24

## 2019-04-14 RX ADMIN — SODIUM CHLORIDE, PRESERVATIVE FREE 3 ML: 5 INJECTION INTRAVENOUS at 21:25

## 2019-04-14 RX ADMIN — APIXABAN 5 MG: 5 TABLET, FILM COATED ORAL at 21:24

## 2019-04-14 RX ADMIN — SODIUM CHLORIDE, PRESERVATIVE FREE 3 ML: 5 INJECTION INTRAVENOUS at 07:50

## 2019-04-14 RX ADMIN — ACETAMINOPHEN 650 MG: 325 TABLET, FILM COATED ORAL at 16:06

## 2019-04-14 NOTE — PLAN OF CARE
Problem: Skin Injury Risk (Adult)  Goal: Skin Health and Integrity   04/13/19 0749   Skin Injury Risk (Adult)   Skin Health and Integrity making progress toward outcome       Problem: Patient Care Overview  Goal: Plan of Care Review   04/09/19 1407 04/12/19 1113 04/13/19 2020   Plan of Care Review   Progress --  improving --    Coping/Psychosocial   Plan of Care Reviewed With --  --  patient   OTHER   Outcome Summary Pt met all goals at time of progress note. New goals established.  --  --      Goal: Individualization and Mutuality  Outcome: Ongoing (interventions implemented as appropriate)    Goal: Discharge Needs Assessment   02/22/19 2344 03/28/19 2238   Discharge Needs Assessment   Concerns to be Addressed --  discharge planning   Patient/Family Anticipates Transition to --  long term care facility   Patient/Family Anticipated Services at Transition ;rehabilitation services --    Transportation Concerns public transportation, does not know how to access;rides, unreliable from others --    Transportation Anticipated health plan transportation --    Disability   Equipment Currently Used at Home walker, standard --      Goal: Interprofessional Rounds/Family Conf   04/03/19 1001   Interdisciplinary Rounds/Family Conf   Participants nursing;patient     Goal: Individualization and Mutuality  Outcome: Ongoing (interventions implemented as appropriate)    Goal: Discharge Needs Assessment   02/22/19 2344 03/28/19 2238   Discharge Needs Assessment   Concerns to be Addressed --  discharge planning   Patient/Family Anticipates Transition to --  long term care facility   Patient/Family Anticipated Services at Transition ;rehabilitation services --    Transportation Concerns public transportation, does not know how to access;rides, unreliable from others --    Transportation Anticipated health plan transportation --    Disability   Equipment Currently Used at Home walker, standard --      Goal:  Interprofessional Rounds/Family Conf   04/03/19 1001   Interdisciplinary Rounds/Family Conf   Participants nursing;patient       Problem: Pain, Chronic (Adult)  Goal: Acceptable Pain/Comfort Level and Functional Ability   04/13/19 0749   Pain, Chronic (Adult)   Acceptable Pain/Comfort Level and Functional Ability making progress toward outcome       Problem: Mobility, Physical Impaired (Adult)  Goal: Enhanced Mobility Skills   04/13/19 0749   Mobility, Physical Impaired (Adult)   Enhanced Mobility Skills making progress toward outcome     Goal: Enhanced Functional Ability   04/13/19 0749   Mobility, Physical Impaired (Adult)   Enhanced Functional Ability making progress toward outcome     Goal: Identify Related Risk Factors and Signs and Symptoms   03/28/19 2238 04/06/19 0917   Mobility, Physical Impaired (Adult)   Related Risk Factors (Physical Mobility, Impaired) --  disease process;cognitive impairment   Signs and Symptoms (Physical Mobility Impaired) fear/anxiety related to mobility --      Goal: Enhanced Mobility Skills   04/13/19 0749   Mobility, Physical Impaired (Adult)   Enhanced Mobility Skills making progress toward outcome     Goal: Enhanced Functional Ability   04/13/19 0749   Mobility, Physical Impaired (Adult)   Enhanced Functional Ability making progress toward outcome     Goal: Identify Related Risk Factors and Signs and Symptoms   03/28/19 2238 04/06/19 0917   Mobility, Physical Impaired (Adult)   Related Risk Factors (Physical Mobility, Impaired) --  disease process;cognitive impairment   Signs and Symptoms (Physical Mobility Impaired) fear/anxiety related to mobility --      Goal: Enhanced Mobility Skills   04/13/19 0749   Mobility, Physical Impaired (Adult)   Enhanced Mobility Skills making progress toward outcome     Goal: Enhanced Functional Ability   04/13/19 0749   Mobility, Physical Impaired (Adult)   Enhanced Functional Ability making progress toward outcome       Problem:  Arrhythmia/Dysrhythmia (Symptomatic) (Adult)  Goal: Signs and Symptoms of Listed Potential Problems Will be Absent, Minimized or Managed (Arrhythmia/Dysrhythmia)   04/11/19 1945   Goal/Outcome Evaluation   Problems Assessed (Arrhythmia/Dysrhythmia) all   Problems Present (Dysrhythmia) none

## 2019-04-14 NOTE — PROGRESS NOTES
[facility]     Patient Identification:  Name:  Altagracia King  Age:  53 y.o.  Sex:  female  :  1965  MRN:  [id number]  Visit Number:  [id number]  Primary Care Provider:  Provider, No Known    Length of stay:  50    Subjective: Patient did well yesterday after she was placed in a wheelchair and was at the nursing station.  She was transferred to a different room due to high risk of fall.  No recurrence of arrhythmia, electrolytes is normal, patient is participating physical therapy.  Awaiting nursing home placement.    Chief Complaint: Generalized weakness  ----------------------------------------------------------------------------------------------------------------------  Current Hospital Meds:    amiodarone 200 mg Oral Daily   apixaban 5 mg Oral Q12H   bisacodyl 10 mg Rectal Daily   cyanocobalamin 1,000 mcg Intramuscular Q30 Days   digoxin 125 mcg Oral Q48H   folic acid 1 mg Oral Daily   furosemide 10 mg Oral Daily   levothyroxine 75 mcg Oral Q AM   magnesium oxide 400 mg Oral Daily   metoprolol tartrate 75 mg Oral Q12H   mexiletine 150 mg Oral Q8H   OLANZapine 5 mg Oral Nightly   pantoprazole 40 mg Oral Q AM   sennosides-docusate sodium 2 tablet Oral Nightly   sodium chloride 3 mL Intravenous Q12H   sodium chloride 3 mL Intravenous Q12H   spironolactone 25 mg Oral Daily With Lunch   thiamine 100 mg Oral Daily       Pharmacy Consult      ----------------------------------------------------------------------------------------------------------------------  Vital Signs:  Temp:  [97.6 °F (36.4 °C)-98.2 °F (36.8 °C)] 97.7 °F (36.5 °C)  Heart Rate:  [80-88] 88  Resp:  [18] 18  BP: ()/(53-78) 130/75       Tele:       19  0247 19  0315 19  0224   Weight: 47.2 kg (104 lb) 47.8 kg (105 lb 4.8 oz) 50.8 kg (112 lb)     Body mass index is 18.64 kg/m².    Intake/Output Summary (Last 24 hours) at 2019 1213  Last data filed at 2019 0924  Gross  per 24 hour   Intake 1080 ml   Output 650 ml   Net 430 ml     Diet Soft Texture; Chopped; Thin; Daily Fluid Restriction; Other; 1,800  ----------------------------------------------------------------------------------------------------------------------  Physical exam:  General: Comfortable,awake, alert, oriented to self, place, and time, well-developed, chronically ill-appearing,  No respiratory distress.    Skin:  Skin is warm and dry. No rash noted.  She is pale.  No skin break  HENT:  Head:  Normocephalic and atraumatic.  Mouth:  Moist mucous membranes.    Eyes:  Conjunctivae and EOM are normal.   No scleral icterus.  Left eye completely opacified, right eye has cataract  Neck:  Neck supple.  No JVD present.    Pulmonary/Chest:  No respiratory distress, no wheezes, no crackles, with normal breath sounds and good air movement.  Cardiovascular:  Normal rate, regular rhythm and normal heart sounds with no murmur.  Abdominal:  Soft.  Bowel sounds are normal.  No distension and no tenderness.   Extremities:  No edema, no tenderness, and no deformity.  No red or swollen joints anywhere.  Strong pulses in all 4 extremities with no clubbing, no cyanosis, no edema.  Neurological:  Motor strength equal no obvious deficit, sensory grossly intact.   No cranial nerve deficit.  No tongue deviation.  No facial droop.  No slurred speech.   ----------------------------------------------------------------------------------------------------------------------  ----------------------------------------------------------------------------------------------------------------------      Results from last 7 days   Lab Units 04/14/19  1045 04/11/19  1007 04/08/19  0718   WBC 10*3/mm3 5.66 7.23 6.72   HEMOGLOBIN g/dL 9.4* 10.3* 11.0*   HEMATOCRIT % 29.4* 32.2* 34.0   MCV fL 103.2* 101.9* 100.9*   MCHC g/dL 32.0 32.0 32.4   PLATELETS 10*3/mm3 168 207 236         Results from last 7 days   Lab Units 04/10/19  0358 04/08/19  0445  04/07/19  1745   SODIUM mmol/L 140 139  --    POTASSIUM mmol/L 4.2 4.3  --    MAGNESIUM mg/dL  --  2.1 2.7*   CHLORIDE mmol/L 104 102  --    CO2 mmol/L 23.1 25.0  --    BUN mg/dL 18 19  --    CREATININE mg/dL 0.82 0.76  --    EGFR IF NONAFRICN AM mL/min/1.73 72 79  --    CALCIUM mg/dL 10.1 9.5  --    GLUCOSE mg/dL 85 90  --    Estimated Creatinine Clearance: 62.2 mL/min (by C-G formula based on SCr of 0.82 mg/dL).    No results found for: AMMONIA                    I have personally looked at the labs and they are summarized above.  ----------------------------------------------------------------------------------------------------------------------  Imaging Results (last 24 hours)     ** No results found for the last 24 hours. **        ----------------------------------------------------------------------------------------------------------------------  Assessment and Plan:  -Functional decline with physical deconditioning  -Paroxysmal atrial fibrillation  -History of nonsustained V. Tach  -Diastolic dysfunction currently compensated  -Acquired hypothyroidism  -Right eye cataract with complete left eye blindness  -Episodes of confabulation most likely multifactorial    Continue physical therapy, for nursing home placement.  Fall precaution.      Kaye Wilson MD  04/14/19  12:13 PM

## 2019-04-14 NOTE — NURSING NOTE
Altagracia King was seen/examined with LIANE Powell, at shift change. Pt is AxOx3 and refused their head-to-toe skin assessment at this time. Pt denies any skin issues or changes since being admitted.

## 2019-04-14 NOTE — PLAN OF CARE
Problem: Skin Injury Risk (Adult)  Goal: Skin Health and Integrity  Outcome: Ongoing (interventions implemented as appropriate)      Problem: Patient Care Overview  Goal: Plan of Care Review  Outcome: Ongoing (interventions implemented as appropriate)    Goal: Individualization and Mutuality  Outcome: Ongoing (interventions implemented as appropriate)    Goal: Discharge Needs Assessment  Outcome: Ongoing (interventions implemented as appropriate)    Goal: Interprofessional Rounds/Family Conf  Outcome: Ongoing (interventions implemented as appropriate)    Goal: Individualization and Mutuality  Outcome: Ongoing (interventions implemented as appropriate)    Goal: Discharge Needs Assessment  Outcome: Ongoing (interventions implemented as appropriate)    Goal: Interprofessional Rounds/Family Conf  Outcome: Ongoing (interventions implemented as appropriate)      Problem: Pain, Chronic (Adult)  Goal: Acceptable Pain/Comfort Level and Functional Ability  Outcome: Ongoing (interventions implemented as appropriate)      Problem: Mobility, Physical Impaired (Adult)  Goal: Enhanced Mobility Skills  Outcome: Ongoing (interventions implemented as appropriate)    Goal: Enhanced Functional Ability  Outcome: Ongoing (interventions implemented as appropriate)    Goal: Identify Related Risk Factors and Signs and Symptoms  Outcome: Ongoing (interventions implemented as appropriate)    Goal: Enhanced Mobility Skills  Outcome: Ongoing (interventions implemented as appropriate)    Goal: Enhanced Functional Ability  Outcome: Ongoing (interventions implemented as appropriate)    Goal: Identify Related Risk Factors and Signs and Symptoms  Outcome: Ongoing (interventions implemented as appropriate)    Goal: Enhanced Mobility Skills  Outcome: Ongoing (interventions implemented as appropriate)    Goal: Enhanced Functional Ability  Outcome: Ongoing (interventions implemented as appropriate)      Problem: Arrhythmia/Dysrhythmia (Symptomatic)  (Adult)  Goal: Signs and Symptoms of Listed Potential Problems Will be Absent, Minimized or Managed (Arrhythmia/Dysrhythmia)  Outcome: Ongoing (interventions implemented as appropriate)

## 2019-04-15 PROCEDURE — 97530 THERAPEUTIC ACTIVITIES: CPT

## 2019-04-15 PROCEDURE — 97116 GAIT TRAINING THERAPY: CPT

## 2019-04-15 PROCEDURE — 94799 UNLISTED PULMONARY SVC/PX: CPT

## 2019-04-15 PROCEDURE — 99231 SBSQ HOSP IP/OBS SF/LOW 25: CPT | Performed by: INTERNAL MEDICINE

## 2019-04-15 RX ADMIN — SPIRONOLACTONE 25 MG: 25 TABLET ORAL at 11:39

## 2019-04-15 RX ADMIN — FOLIC ACID 1 MG: 1 TABLET ORAL at 08:25

## 2019-04-15 RX ADMIN — MAGNESIUM GLUCONATE 500 MG ORAL TABLET 400 MG: 500 TABLET ORAL at 08:25

## 2019-04-15 RX ADMIN — DIGOXIN 125 MCG: 125 TABLET ORAL at 08:29

## 2019-04-15 RX ADMIN — SODIUM CHLORIDE, PRESERVATIVE FREE 3 ML: 5 INJECTION INTRAVENOUS at 08:26

## 2019-04-15 RX ADMIN — PANTOPRAZOLE SODIUM 40 MG: 40 TABLET, DELAYED RELEASE ORAL at 05:52

## 2019-04-15 RX ADMIN — Medication 100 MG: at 08:25

## 2019-04-15 RX ADMIN — OLANZAPINE 5 MG: 5 TABLET, FILM COATED ORAL at 20:49

## 2019-04-15 RX ADMIN — LEVOTHYROXINE SODIUM 75 MCG: 75 TABLET ORAL at 05:52

## 2019-04-15 RX ADMIN — APIXABAN 5 MG: 5 TABLET, FILM COATED ORAL at 08:26

## 2019-04-15 RX ADMIN — MEXILETINE HYDROCHLORIDE 150 MG: 150 CAPSULE ORAL at 11:52

## 2019-04-15 RX ADMIN — SODIUM CHLORIDE, PRESERVATIVE FREE 3 ML: 5 INJECTION INTRAVENOUS at 20:49

## 2019-04-15 RX ADMIN — APIXABAN 5 MG: 5 TABLET, FILM COATED ORAL at 20:49

## 2019-04-15 RX ADMIN — FUROSEMIDE 10 MG: 20 TABLET ORAL at 08:26

## 2019-04-15 RX ADMIN — MEXILETINE HYDROCHLORIDE 150 MG: 150 CAPSULE ORAL at 05:52

## 2019-04-15 RX ADMIN — AMIODARONE HYDROCHLORIDE 200 MG: 200 TABLET ORAL at 08:25

## 2019-04-15 RX ADMIN — SENNOSIDES AND DOCUSATE SODIUM 2 TABLET: 8.6; 5 TABLET ORAL at 20:49

## 2019-04-15 RX ADMIN — METOPROLOL TARTRATE 75 MG: 50 TABLET, FILM COATED ORAL at 08:26

## 2019-04-15 NOTE — PROGRESS NOTES
[facility]     Patient Identification:  Name:  Altagracia King  Age:  53 y.o.  Sex:  female  :  1965  MRN:  [id number]  Visit Number:  [id number]  Primary Care Provider:  Provider, No Known    Length of stay:  51    Subjective:   Patient seen and examined reviewed her condition discussed with the bedside nurse and with the case management, the patient is with no insurance unfortunately, blind, lives alone, working on placement still.  The patient has no acute cardia pulmonary complaints, was eating her lunch, pleasant to talk to    Chief Complaint: Generalized weakness  ----------------------------------------------------------------------------------------------------------------------  Current Hospital Meds:    amiodarone 200 mg Oral Daily   apixaban 5 mg Oral Q12H   bisacodyl 10 mg Rectal Daily   cyanocobalamin 1,000 mcg Intramuscular Q30 Days   digoxin 125 mcg Oral Q48H   folic acid 1 mg Oral Daily   furosemide 10 mg Oral Daily   levothyroxine 75 mcg Oral Q AM   magnesium oxide 400 mg Oral Daily   metoprolol tartrate 75 mg Oral Q12H   mexiletine 150 mg Oral Q8H   OLANZapine 5 mg Oral Nightly   pantoprazole 40 mg Oral Q AM   sennosides-docusate sodium 2 tablet Oral Nightly   sodium chloride 3 mL Intravenous Q12H   sodium chloride 3 mL Intravenous Q12H   spironolactone 25 mg Oral Daily With Lunch   thiamine 100 mg Oral Daily       Pharmacy Consult      ----------------------------------------------------------------------------------------------------------------------  Vital Signs:  Temp:  [97.7 °F (36.5 °C)-98.7 °F (37.1 °C)] 97.7 °F (36.5 °C)  Heart Rate:  [] 76  Resp:  [18] 18  BP: (111-166)/(50-73) 122/58       Tele:       19  0315 19  0224 04/15/19  0146   Weight: 47.8 kg (105 lb 4.8 oz) 50.8 kg (112 lb) 51.3 kg (113 lb 2 oz)     Body mass index is 18.82 kg/m².    Intake/Output Summary (Last 24 hours) at 4/15/2019 7785  Last data filed at  4/14/2019 2300  Gross per 24 hour   Intake 480 ml   Output 1200 ml   Net -720 ml     Diet Soft Texture; Chopped; Thin; Daily Fluid Restriction; Other; 1,800  ----------------------------------------------------------------------------------------------------------------------    Physical exam:  General: Comfortable,awake, alert, oriented to self, place, and time, well-developed, chronically ill-appearing,  No respiratory distress.    Skin:  Skin is warm and dry. No rash noted.  She is pale.  No skin break  HENT:  Head:  Normocephalic and atraumatic.  Mouth:  Moist mucous membranes.    Eyes:  Conjunctivae and EOM are normal.   No scleral icterus.  Left eye completely opacified, right eye has cataract  Neck:  Neck supple.  No JVD present.    Pulmonary/Chest:  No respiratory distress, no wheezes, no crackles, with normal breath sounds and good air movement.  Cardiovascular:  Normal rate, regular rhythm and normal heart sounds with no murmur.  Abdominal:  Soft.  Bowel sounds are normal.  No distension and no tenderness.   Extremities:  No edema, no tenderness, and no deformity.  No red or swollen joints anywhere.  Strong pulses in all 4 extremities with no clubbing, no cyanosis, no edema.  Neurological:  Motor strength equal no obvious deficit, sensory grossly intact.   No cranial nerve deficit.  No tongue deviation.  No facial droop.  No slurred speech.           Results from last 7 days   Lab Units 04/14/19  1045 04/11/19  1007   WBC 10*3/mm3 5.66 7.23   HEMOGLOBIN g/dL 9.4* 10.3*   HEMATOCRIT % 29.4* 32.2*   MCV fL 103.2* 101.9*   MCHC g/dL 32.0 32.0   PLATELETS 10*3/mm3 168 207         Results from last 7 days   Lab Units 04/10/19  0358   SODIUM mmol/L 140   POTASSIUM mmol/L 4.2   CHLORIDE mmol/L 104   CO2 mmol/L 23.1   BUN mg/dL 18   CREATININE mg/dL 0.82   EGFR IF NONAFRICN AM mL/min/1.73 72   CALCIUM mg/dL 10.1   GLUCOSE mg/dL 85   Estimated Creatinine Clearance: 62.8 mL/min (by C-G formula based on SCr of 0.82  mg/dL).    No results found for: AMMONIA         ----------------------------------------------------------------------------------------------------------------------    Assessment and Plan:  -Functional decline with physical deconditioning, unsafe discharge, patient is blind and homeless  -Paroxysmal atrial fibrillation, relatively stable  -History of nonsustained V. Tach  -Diastolic dysfunction currently compensated  -Acquired hypothyroidism  -Right eye cataract with complete left eye blindness  -Episodes of confabulation most likely multifactorial    --Awaiting nursing home placement, discussed with the     Beronica Celaya MD  04/15/19  12:25 PM

## 2019-04-15 NOTE — PLAN OF CARE
Problem: Patient Care Overview  Goal: Plan of Care Review  Outcome: Ongoing (interventions implemented as appropriate)   04/15/19 0426   Plan of Care Review   Progress improving   Coping/Psychosocial   Plan of Care Reviewed With patient

## 2019-04-15 NOTE — THERAPY TREATMENT NOTE
Acute Care - Physical Therapy Treatment Note   [place]     Patient Name: Altagracia King  : 1965  MRN: [id number]  Today's Date: 4/15/2019  Onset of Illness/Injury or Date of Surgery: 19(original admit date)  Date of Referral to PT: 19  Referring Physician: Gui    Admit Date: 2019    Visit Dx:    ICD-10-CM ICD-9-CM   1. Chest pain, unspecified type R07.9 786.50   2. Acute cystitis without hematuria N30.00 595.0     Patient Active Problem List   Diagnosis   • Chest pain   • PAF (paroxysmal atrial fibrillation) (CMS/HCC)   • ETOH abuse   • Acute respiratory failure with hypoxia (CMS/HCC)       Therapy Treatment    Rehabilitation Treatment Summary     Row Name 04/15/19 1352             Treatment Time/Intention    Discipline  physical therapist  -CT      Document Type  therapy note (daily note)  -CT      Subjective Information  no complaints  -CT      Mode of Treatment  individual therapy;physical therapy  -CT      Therapy Frequency (PT Clinical Impression)  3 times/wk 3-5 times/ week per priority policy  -CT      Patient Effort  adequate  -CT      Patient Response to Treatment  Pt tolerated treatment session well with rest breaks provided as needed.   -CT      Recorded by [CT] Kyung Lr, PT 04/15/19 1351      Row Name 04/15/19 1354             Cognitive Assessment/Intervention- PT/OT    Orientation Status (Cognition)  oriented to;person  -CT      Follows Commands (Cognition)  follows one step commands;physical/tactile prompts required;repetition of directions required;verbal cues/prompting required  -CT      Recorded by [CT] Kyung Lr, PT 04/15/19 135      Row Name 04/15/19 1351             Safety Issues, Functional Mobility    Impairments Affecting Function (Mobility)  balance;coordination;endurance/activity tolerance;strength;visual/perceptual  -CT      Recorded by [CT] Kyung Lr, PT 04/15/19 135      Row Name 04/15/19 1359             Bed Mobility Assessment/Treatment     Bed Mobility Assessment/Treatment  bed mobility (all) activities  -CT      [place] Level (Bed Mobility)  minimum assist (75% patient effort)  -CT      Bed Mobility, Safety Issues  cognitive deficits limit understanding  -CT      Assistive Device (Bed Mobility)  bed rails  -CT      Recorded by [CT] Kyung Lr, PT 04/15/19 1358      Row Name 04/15/19 135             Transfer Assessment/Treatment    Transfer Assessment/Treatment  sit-stand transfer;stand-sit transfer;stand pivot/stand step transfer  -CT      Recorded by [CT] Kyung Lr, PT 04/15/19 1358      Row Name 04/15/19 1354             Sit-Stand Transfer    Sit-Stand [place] (Transfers)  minimum assist (75% patient effort);verbal cues;nonverbal cues (demo/gesture)  -CT      Assistive Device (Sit-Stand Transfers)  walker, front-wheeled  -CT      Recorded by [CT] Kyung Lr, PT 04/15/19 1358      Row Name 04/15/19 9896             Stand-Sit Transfer    Stand-Sit [place] (Transfers)  minimum assist (75% patient effort);verbal cues;nonverbal cues (demo/gesture)  -CT      Assistive Device (Stand-Sit Transfers)  walker, front-wheeled  -CT      Recorded by [CT] Kyung Lr, PT 04/15/19 1358      Row Name 04/15/19 8818             Gait/Stairs Assessment/Training    Gait/Stairs Assessment/Training  gait/ambulation independence;gait/ambulation assistive device;distance ambulated;gait pattern;gait deviations;maintains weight-bearing status  -CT      [place] Level (Gait)  minimum assist (75% patient effort);2 person assist;verbal cues;nonverbal cues (demo/gesture)  -CT      Assistive Device (Gait)  walker, front-wheeled  -CT      Distance in Feet (Gait)  150  -CT      Pattern (Gait)  step-to  -CT      Deviations/Abnormal Patterns (Gait)  base of support, narrow  -CT      Bilateral Gait Deviations  forward flexed posture  -CT      Recorded by [CT] Kyung Lr, PT 04/15/19 1358      Row Name 04/15/19 1359             Positioning  and Restraints    Pre-Treatment Position  in bed  -CT      Post Treatment Position  bed  -CT      In Bed  sitting EOB;call light within reach;encouraged to call for assist;with family/caregiver;side rails up x3;exit alarm on  -CT      Recorded by [CT] Jaycob Lrmen, PT 04/15/19 1358      Row Name 04/15/19 1359             Pain Scale: FACES Pre/Post-Treatment    Pain: FACES Scale, Pretreatment  0-->no hurt  -CT      Pain: FACES Scale, Post-Treatment  0-->no hurt  -CT      Recorded by [CT] Be Kyung, PT 04/15/19 1358      Row Name 04/15/19 1354             Coping    Observed Emotional State  anxious;attention-seeking behavior  -CT      Verbalized Emotional State  acceptance  -CT      Recorded by [CT] Kyung Lr, PT 04/15/19 1358      Row Name 04/15/19 1350             Plan of Care Review    Plan of Care Reviewed With  patient  -CT      Recorded by [CT] Kyung Lr, PT 04/15/19 1358      Row Name 04/15/19 1352             Outcome Summary/Treatment Plan (PT)    Daily Summary of Progress (PT)  progress toward functional goals is good  -CT      Anticipated Discharge Disposition (PT)  skilled nursing facility  -CT      Recorded by [CT] Kyung Lr, PT 04/15/19 5458        User Key  (r) = Recorded By, (t) = Taken By, (c) = Cosigned By    Initials Name Effective Dates Discipline    CT Kyung Lr, PT 04/03/18 -  PT                   Physical Therapy Education     Title: PT OT SLP Therapies (Done)     Topic: Physical Therapy (Done)     Point: Mobility training (Done)     Learning Progress Summary           Patient Acceptance, E,TB, VU,NR by CT at 4/15/2019  2:01 PM    Acceptance, E,TB, VU by GAVINO at 4/15/2019  9:02 AM    Acceptance, E,TB, VU by GAVINO at 4/14/2019  8:06 AM    Acceptance, E,TB, NR by CT at 4/12/2019 11:13 AM    Acceptance, E,TB, NR by CT at 4/10/2019  3:24 PM    Acceptance, E,TB, VU,NR by CT at 4/9/2019  2:08 PM    Acceptance, E, VU by AM at 4/9/2019 12:08 PM    Acceptance, E,TB, NR by CT at  4/8/2019  2:48 PM    Acceptance, E,TB, VU by GAVINO at 4/8/2019  8:21 AM    Acceptance, E, NL,VU by SM at 4/8/2019  2:37 AM    Acceptance, E,TB, VU by GAVINO at 4/7/2019  8:53 AM    Acceptance, E, NL by SM at 4/6/2019 11:18 PM    Acceptance, E, NL by SM at 4/5/2019 10:02 PM    Acceptance, E,D, NR by AG at 4/4/2019  6:23 PM    Acceptance, E, NR by KB at 4/2/2019  6:10 PM    Acceptance, E, VU by SM at 4/2/2019 12:28 AM    Acceptance, E, VU,NR by AG1 at 4/1/2019 11:20 AM    Acceptance, E, NR by KB at 3/31/2019  9:45 PM    Acceptance, E, VU by MC at 3/31/2019  2:55 AM    Acceptance, E, VU by MC at 3/31/2019  2:45 AM    Acceptance, E, VU,NR by MC at 3/30/2019  1:57 AM    Acceptance, E,TB, VU,NR by CT at 3/29/2019 10:45 AM    Acceptance, E, NR by EA at 3/28/2019 10:44 PM    Acceptance, E,TB, NR,NL by CT at 3/28/2019  2:19 PM    Acceptance, E,TB, VU by JS at 3/28/2019 12:47 AM    Acceptance, E, VU by AM at 3/27/2019  4:07 PM    Acceptance, E, NR by EA at 3/26/2019  9:47 PM    Acceptance, E, VU by LAZARO at 3/20/2019 10:09 PM    Acceptance, E, VU by AW at 3/16/2019  6:34 PM    Acceptance, E,TB, VU by ILYA at 3/14/2019 12:21 AM    Comment:  PT unable to rcve teaching at this time    Acceptance, E,TB, VU by ILYA at 3/12/2019 11:00 PM    Comment:  PT unable to rcve teaching at this time    Nonacceptance, E, NL by SB at 3/12/2019  9:17 AM    Comment:  pt intubated and sedated; no one present at the bedside    Acceptance, E, NR by EV at 3/11/2019  9:02 PM    Comment:  intubated/sedated. No family or caretaker at bedside    Nonacceptance, E, NL by WS at 3/9/2019  9:06 AM    Comment:  pt sedated and intubated    Acceptance, E, VU by WS at 3/9/2019  9:05 AM    Acceptance, E,TB, VU by ILYA at 3/8/2019  9:46 PM    Comment:  PT unable to rcve teaching at this time    Nonacceptance, E, NL by WS at 3/8/2019  4:13 PM    Acceptance, E, VU by LT at 3/7/2019 10:13 AM    Acceptance, E, VU by BC at 3/5/2019  5:55 PM    Acceptance, E,TB, VU by CL at 3/4/2019   7:53 PM    Acceptance, E,TB, VU by CL at 3/4/2019  7:52 PM    Acceptance, E,TB, VU by CT at 3/1/2019  3:23 PM    Acceptance, E,TB, VU,NR by KB1 at 2/26/2019  3:13 PM    Acceptance, E,TB, NR by CT at 2/25/2019  3:25 PM   Family Acceptance, E, VU by AW at 3/15/2019 12:08 PM    Acceptance, E, VU by SB at 3/13/2019 11:01 AM   Significant Other Acceptance, E, VU by WS at 3/9/2019  9:05 AM                   Point: Home exercise program (Done)     Learning Progress Summary           Patient Acceptance, E,TB, VU,NR by CT at 4/15/2019  2:01 PM    Acceptance, E,TB, VU by GAVINO at 4/15/2019  9:02 AM    Acceptance, E,TB, VU by GAVINO at 4/14/2019  8:06 AM    Acceptance, E,TB, NR by CT at 4/12/2019 11:13 AM    Acceptance, E,TB, NR by CT at 4/10/2019  3:24 PM    Acceptance, E,TB, VU,NR by CT at 4/9/2019  2:08 PM    Acceptance, E, VU by AM at 4/9/2019 12:08 PM    Acceptance, E,TB, NR by CT at 4/8/2019  2:48 PM    Acceptance, E,TB, VU by GAVINO at 4/8/2019  8:21 AM    Acceptance, E, NL,VU by SM at 4/8/2019  2:37 AM    Acceptance, E,TB, VU by GAVINO at 4/7/2019  8:53 AM    Acceptance, E, NL by SM at 4/6/2019 11:18 PM    Acceptance, E, NL by SM at 4/5/2019 10:02 PM    Acceptance, E,D, NR by AG at 4/4/2019  6:23 PM    Acceptance, E, NR by KB at 4/2/2019  6:10 PM    Acceptance, E, VU by SM at 4/2/2019 12:28 AM    Acceptance, E, VU,NR by AG1 at 4/1/2019 11:20 AM    Acceptance, E, NR by KB at 3/31/2019  9:45 PM    Acceptance, E, VU by MC at 3/31/2019  2:55 AM    Acceptance, E, VU by MC at 3/31/2019  2:45 AM    Acceptance, E, VU,NR by MC at 3/30/2019  1:57 AM    Acceptance, E,TB, VU,NR by CT at 3/29/2019 10:45 AM    Acceptance, E, NR by EA at 3/28/2019 10:44 PM    Acceptance, E,TB, NR,NL by CT at 3/28/2019  2:19 PM    Acceptance, E,TB, VU by JS at 3/28/2019 12:47 AM    Acceptance, E, VU by AM at 3/27/2019  4:07 PM    Acceptance, E, NR by EA at 3/26/2019  9:47 PM    Acceptance, E, VU by LAZARO at 3/20/2019 10:09 PM    Acceptance, E, VU by AW at 3/16/2019   6:34 PM    Acceptance, E,TB, VU by ILYA at 3/14/2019 12:21 AM    Comment:  PT unable to rcve teaching at this time    Acceptance, E,TB, VU by ILYA at 3/12/2019 11:00 PM    Comment:  PT unable to rcve teaching at this time    Nonacceptance, E, NL by SB at 3/12/2019  9:17 AM    Comment:  pt intubated and sedated; no one present at the bedside    Acceptance, E, NR by EV at 3/11/2019  9:02 PM    Comment:  intubated/sedated. No family or caretaker at bedside    Nonacceptance, E, NL by WS at 3/9/2019  9:06 AM    Comment:  pt sedated and intubated    Acceptance, E, VU by WS at 3/9/2019  9:05 AM    Acceptance, E,TB, VU by ILYA at 3/8/2019  9:46 PM    Comment:  PT unable to rcve teaching at this time    Nonacceptance, E, NL by WS at 3/8/2019  4:13 PM    Acceptance, E, VU by LT at 3/7/2019 10:13 AM    Acceptance, E, VU by BC at 3/5/2019  5:55 PM    Acceptance, E,TB, VU by CL at 3/4/2019  7:53 PM    Acceptance, E,TB, VU by CL at 3/4/2019  7:52 PM    Acceptance, E,TB, VU by CT at 3/1/2019  3:23 PM    Acceptance, E,TB, VU,NR by KB1 at 2/26/2019  3:13 PM    Acceptance, E,TB, NR by CT at 2/25/2019  3:25 PM   Family Acceptance, E, VU by AW at 3/15/2019 12:08 PM    Acceptance, E, VU by SB at 3/13/2019 11:01 AM   Significant Other Acceptance, E, VU by WS at 3/9/2019  9:05 AM                   Point: Body mechanics (Done)     Learning Progress Summary           Patient Acceptance, E,TB, VU,NR by CT at 4/15/2019  2:01 PM    Acceptance, E,TB, VU by GAVINO at 4/15/2019  9:02 AM    Acceptance, E,TB, VU by GAVINO at 4/14/2019  8:06 AM    Acceptance, E,TB, NR by CT at 4/12/2019 11:13 AM    Acceptance, E,TB, NR by CT at 4/10/2019  3:24 PM    Acceptance, E,TB, VU,NR by CT at 4/9/2019  2:08 PM    Acceptance, E, VU by AM at 4/9/2019 12:08 PM    Acceptance, E,TB, NR by CT at 4/8/2019  2:48 PM    Acceptance, E,TB, VU by GAVINO at 4/8/2019  8:21 AM    Acceptance, E, NL,VU by  at 4/8/2019  2:37 AM    Acceptance, E,TB, VU by GAVINO at 4/7/2019  8:53 AM    Acceptance,  E, NL by SM at 4/6/2019 11:18 PM    Acceptance, E, NL by SM at 4/5/2019 10:02 PM    Acceptance, E,D, NR by AG at 4/4/2019  6:23 PM    Acceptance, E, NR by KB at 4/2/2019  6:10 PM    Acceptance, E, VU by SM at 4/2/2019 12:28 AM    Acceptance, E, VU,NR by AG1 at 4/1/2019 11:20 AM    Acceptance, E, NR by KB at 3/31/2019  9:45 PM    Acceptance, E, VU by MC at 3/31/2019  2:55 AM    Acceptance, E, VU by MC at 3/31/2019  2:45 AM    Acceptance, E, VU,NR by MC at 3/30/2019  1:57 AM    Acceptance, E,TB, VU,NR by CT at 3/29/2019 10:45 AM    Acceptance, E, NR by EA at 3/28/2019 10:44 PM    Acceptance, E,TB, NR,NL by CT at 3/28/2019  2:19 PM    Acceptance, E,TB, VU by JS at 3/28/2019 12:47 AM    Acceptance, E, VU by AM at 3/27/2019  4:07 PM    Acceptance, E, NR by EA at 3/26/2019  9:47 PM    Acceptance, E, VU by LAZARO at 3/20/2019 10:09 PM    Acceptance, E, VU by AW at 3/16/2019  6:34 PM    Acceptance, E,TB, VU by ILYA at 3/14/2019 12:21 AM    Comment:  PT unable to rcve teaching at this time    Acceptance, E,TB, VU by ILYA at 3/12/2019 11:00 PM    Comment:  PT unable to rcve teaching at this time    Nonacceptance, E, NL by SB at 3/12/2019  9:17 AM    Comment:  pt intubated and sedated; no one present at the bedside    Acceptance, E, NR by EV at 3/11/2019  9:02 PM    Comment:  intubated/sedated. No family or caretaker at bedside    Nonacceptance, E, NL by WS at 3/9/2019  9:06 AM    Comment:  pt sedated and intubated    Acceptance, E, VU by WS at 3/9/2019  9:05 AM    Acceptance, E,TB, VU by ILYA at 3/8/2019  9:46 PM    Comment:  PT unable to rcve teaching at this time    Nonacceptance, E, NL by WS at 3/8/2019  4:13 PM    Acceptance, E, VU by LT at 3/7/2019 10:13 AM    Acceptance, E, VU by BC at 3/5/2019  5:55 PM    Acceptance, E,TB, VU by CL at 3/4/2019  7:53 PM    Acceptance, E,TB, VU by CL at 3/4/2019  7:52 PM    Acceptance, E,TB, VU by CT at 3/1/2019  3:23 PM    Acceptance, E,TB, VU,NR by KB1 at 2/26/2019  3:13 PM    Acceptance, E,TB,  NR by CT at 2/25/2019  3:25 PM   Family Acceptance, E, VU by AW at 3/15/2019 12:08 PM    Acceptance, E, VU by SB at 3/13/2019 11:01 AM   Significant Other Acceptance, E, VU by WS at 3/9/2019  9:05 AM                   Point: Precautions (Done)     Learning Progress Summary           Patient Acceptance, E,TB, VU,NR by CT at 4/15/2019  2:01 PM    Acceptance, E,TB, VU by GAVINO at 4/15/2019  9:02 AM    Acceptance, E,TB, VU by GAVINO at 4/14/2019  8:06 AM    Acceptance, E,TB, NR by CT at 4/12/2019 11:13 AM    Acceptance, E,TB, NR by CT at 4/10/2019  3:24 PM    Acceptance, E,TB, VU,NR by CT at 4/9/2019  2:08 PM    Acceptance, E, VU by AM at 4/9/2019 12:08 PM    Acceptance, E,TB, NR by CT at 4/8/2019  2:48 PM    Acceptance, E,TB, VU by GAVINO at 4/8/2019  8:21 AM    Acceptance, E, NL,VU by SM at 4/8/2019  2:37 AM    Acceptance, E,TB, VU by GAVINO at 4/7/2019  8:53 AM    Acceptance, E, NL by SM at 4/6/2019 11:18 PM    Acceptance, E, NL by SM at 4/5/2019 10:02 PM    Acceptance, E,D, NR by AG at 4/4/2019  6:23 PM    Acceptance, E, NR by KB at 4/2/2019  6:10 PM    Acceptance, E, VU by SM at 4/2/2019 12:28 AM    Acceptance, E, VU,NR by AG1 at 4/1/2019 11:20 AM    Acceptance, E, NR by KB at 3/31/2019  9:45 PM    Acceptance, E, VU by MC at 3/31/2019  2:55 AM    Acceptance, E, VU by MC at 3/31/2019  2:45 AM    Acceptance, E, VU,NR by MC at 3/30/2019  1:57 AM    Acceptance, E,TB, VU,NR by CT at 3/29/2019 10:45 AM    Acceptance, E, NR by EA at 3/28/2019 10:44 PM    Acceptance, E,TB, NR,NL by CT at 3/28/2019  2:19 PM    Acceptance, E,TB, VU by JS at 3/28/2019 12:47 AM    Acceptance, E, VU by AM at 3/27/2019  4:07 PM    Acceptance, E, NR by EA at 3/26/2019  9:47 PM    Acceptance, E, VU by LAZARO at 3/20/2019 10:09 PM    Acceptance, E, VU by AW at 3/16/2019  6:34 PM    Acceptance, E,TB, VU by ILYA at 3/14/2019 12:21 AM    Comment:  PT unable to rcve teaching at this time    Acceptance, E,TB, VU by ILYA at 3/12/2019 11:00 PM    Comment:  PT unable to rcve  teaching at this time    Nonacceptance, E, NL by SB at 3/12/2019  9:17 AM    Comment:  pt intubated and sedated; no one present at the bedside    Acceptance, E, NR by EV at 3/11/2019  9:02 PM    Comment:  intubated/sedated. No family or caretaker at bedside    Nonacceptance, E, NL by WS at 3/9/2019  9:06 AM    Comment:  pt sedated and intubated    Acceptance, E, VU by WS at 3/9/2019  9:05 AM    Acceptance, E,TB, VU by ILYA at 3/8/2019  9:46 PM    Comment:  PT unable to rcve teaching at this time    Nonacceptance, E, NL by WS at 3/8/2019  4:13 PM    Acceptance, E, VU by LT at 3/7/2019 10:13 AM    Acceptance, E, VU by BC at 3/5/2019  5:55 PM    Acceptance, E,TB, VU by CL at 3/4/2019  7:53 PM    Acceptance, E,TB, VU by CL at 3/4/2019  7:52 PM    Acceptance, E,TB, VU by CT at 3/1/2019  3:23 PM    Acceptance, E,TB, VU,NR by KB1 at 2/26/2019  3:13 PM    Acceptance, E,TB, NR by CT at 2/25/2019  3:25 PM   Family Acceptance, E, VU by AW at 3/15/2019 12:08 PM    Acceptance, E, VU by SB at 3/13/2019 11:01 AM   Significant Other Acceptance, E, VU by WS at 3/9/2019  9:05 AM                               User Key     Initials Effective Dates Name Provider Type Discipline    SB 06/16/16 -  Kathy Rowell, RN Registered Nurse Nurse    ILYA 06/16/16 -  Alberto Mendoza, RN Registered Nurse Nurse    LT 06/16/16 -  Denae Carvajal, RN Registered Nurse Nurse    EA 06/16/16 -  Mary Blanton, RN Registered Nurse Nurse    KB 06/16/16 -  Lorena Garcia, RN Registered Nurse Nurse    AW 06/16/16 -  Heather Godinez, RN Registered Nurse Nurse    WS 06/16/16 -  Keisha Messer, RN Registered Nurse Nurse    AM 06/16/16 -  Madeline Santiago, RN Registered Nurse Nurse    BC 03/14/16 -  Ekaterina Cifuentes, PT Physical Therapist PT    AG 04/03/18 -  Parisa Orellana, PT Physical Therapist PT    CT 04/03/18 -  Kyung Lr, PT Physical Therapist PT    AG1 06/16/16 -  Ayana Corbin, LIANE Registered  Nurse Nurse    CL 07/23/18 -  Rj Godfrey, RN Registered Nurse Nurse    KB1 12/20/17 -  Ekaterina Parry PTA Physical Therapy Assistant PT    LAZARO 05/22/18 -  Renata Mcbride, RN Registered Nurse Nurse    MC 09/06/18 -  Collett, Morgan, RN Registered Nurse Nurse    SM 09/12/18 -  Sean Kohler RN Registered Nurse Nurse    GAVINO 10/18/18 -  Marko Cifuentes, RN Registered Nurse Nurse    EV 10/26/18 -  Corry Bagley RN Registered Nurse Nurse    JS 11/26/18 -  Sg Smith RN Registered Nurse Nurse                PT Recommendation and Plan  Anticipated Discharge Disposition (PT): skilled nursing facility  Planned Therapy Interventions (PT Eval): balance training, gait training, bed mobility training, home exercise program, manual therapy techniques, motor coordination training, neuromuscular re-education, patient/family education, postural re-education, ROM (range of motion), stair training, strengthening, stretching, transfer training  Therapy Frequency (PT Clinical Impression): 3 times/wk(3-5 times/ week per priority policy)  Outcome Summary/Treatment Plan (PT)  Daily Summary of Progress (PT): progress toward functional goals is good  Anticipated Equipment Needs at Discharge (PT): front wheeled walker  Anticipated Discharge Disposition (PT): skilled nursing facility  Plan of Care Reviewed With: patient  Progress: improving  Outcome Summary: Pt met all goals at time of progress note. New goals established.   Outcome Measures     Row Name 04/15/19 1400             How much help from another person do you currently need...    Turning from your back to your side while in flat bed without using bedrails?  4  -CT      Moving from lying on back to sitting on the side of a flat bed without bedrails?  4  -CT      Moving to and from a bed to a chair (including a wheelchair)?  3  -CT      Standing up from a chair using your arms (e.g., wheelchair, bedside chair)?  3  -CT      Climbing 3-5 steps with a railing?  3  -CT       To walk in hospital room?  3  -CT      AM-PAC 6 Clicks Score  20  -CT         Functional Assessment    Outcome Measure Options  AM-PAC 6 Clicks Basic Mobility (PT)  -CT        User Key  (r) = Recorded By, (t) = Taken By, (c) = Cosigned By    Initials Name Provider Type    CT Kyung Lr, PT Physical Therapist         Time Calculation:   PT Charges     Row Name 04/15/19 1401             Time Calculation    PT Received On  04/15/19  -CT      PT - Next Appointment  04/16/19  -CT      PT Goal Re-Cert Due Date  04/23/19  -CT         Time Calculation- PT    Total Timed Code Minutes- PT  33 minute(s)  -CT         Timed Charges    80246 - Gait Training Minutes   18  -CT      63405 - PT Therapeutic Activity Minutes  15  -CT        User Key  (r) = Recorded By, (t) = Taken By, (c) = Cosigned By    Initials Name Provider Type    CT Kyung Lr, PT Physical Therapist        Therapy Charges for Today     Code Description Service Date Service Provider Modifiers Qty    39322809196 HC GAIT TRAINING EA 15 MIN 4/15/2019 Kyung Lr, PT GP 1    20963341772 HC PT THERAPEUTIC ACT EA 15 MIN 4/15/2019 Kyung Lr, PT GP 1    14171467225 HC PT THER SUPP EA 15 MIN 4/15/2019 Kyung Lr, PT GP 2          PT G-Codes  Outcome Measure Options: AM-PAC 6 Clicks Basic Mobility (PT)  AM-PAC 6 Clicks Score: 20  Score: 12    Kyung Lr, NAVDEEP  4/15/2019

## 2019-04-15 NOTE — PLAN OF CARE
Problem: Skin Injury Risk (Adult)  Goal: Skin Health and Integrity   04/14/19 0805   Skin Injury Risk (Adult)   Skin Health and Integrity making progress toward outcome       Problem: Patient Care Overview  Goal: Plan of Care Review   04/09/19 1407 04/12/19 1113 04/14/19 2130   Plan of Care Review   Progress --  improving --    Coping/Psychosocial   Plan of Care Reviewed With --  --  patient   OTHER   Outcome Summary Pt met all goals at time of progress note. New goals established.  --  --      Goal: Individualization and Mutuality  Outcome: Ongoing (interventions implemented as appropriate)    Goal: Discharge Needs Assessment   02/22/19 2344 03/28/19 2238   Discharge Needs Assessment   Concerns to be Addressed --  discharge planning   Patient/Family Anticipates Transition to --  long term care facility   Patient/Family Anticipated Services at Transition ;rehabilitation services --    Transportation Concerns public transportation, does not know how to access;rides, unreliable from others --    Transportation Anticipated health plan transportation --    Disability   Equipment Currently Used at Home walker, standard --      Goal: Interprofessional Rounds/Family Conf   04/03/19 1001   Interdisciplinary Rounds/Family Conf   Participants nursing;patient     Goal: Individualization and Mutuality  Outcome: Ongoing (interventions implemented as appropriate)    Goal: Discharge Needs Assessment   02/22/19 2344 03/28/19 2238   Discharge Needs Assessment   Concerns to be Addressed --  discharge planning   Patient/Family Anticipates Transition to --  long term care facility   Patient/Family Anticipated Services at Transition ;rehabilitation services --    Transportation Concerns public transportation, does not know how to access;rides, unreliable from others --    Transportation Anticipated health plan transportation --    Disability   Equipment Currently Used at Home walker, standard --      Goal:  Interprofessional Rounds/Family Conf   04/03/19 1001   Interdisciplinary Rounds/Family Conf   Participants nursing;patient       Problem: Pain, Chronic (Adult)  Goal: Acceptable Pain/Comfort Level and Functional Ability   04/14/19 0805   Pain, Chronic (Adult)   Acceptable Pain/Comfort Level and Functional Ability making progress toward outcome       Problem: Arrhythmia/Dysrhythmia (Symptomatic) (Adult)  Goal: Signs and Symptoms of Listed Potential Problems Will be Absent, Minimized or Managed (Arrhythmia/Dysrhythmia)  Outcome: Ongoing (interventions implemented as appropriate)   04/11/19 1945   Goal/Outcome Evaluation   Problems Assessed (Arrhythmia/Dysrhythmia) all   Problems Present (Dysrhythmia) none

## 2019-04-15 NOTE — PROGRESS NOTES
Discharge Planning Assessment  [facility]     Patient Name: Altagracia King  MRN: [id number]  Today's Date: 4/15/2019    Admit Date: 2/22/2019      Discharge Plan     Row Name 04/15/19 1455       Plan    Plan  SS spoke with [facility] on this date, who has sent SS the information to submit for [place] [employer]. SS completed the paper work on this date and faxed to [phone] on this date. SS will follow.     Patient/Family in Agreement with Plan  yes        Destination      Service Provider Request Status Selected Services Address Phone Number Fax Number    [facility] Pending - Request Sent N/A [address] [place] [phone]    [facility] Pending - Request Sent N/A [address] [place] [phone]    [facility] - Request Sent N/A [address] [place] [phone]    [facility] Pending - Request Sent N/A [address] [facility] [address] [phone]    [facility] Declined N/A [address] [facility] [address] [phone]    [facility] Declined N/A [address] [address] [phone]        Expected Discharge Date and Time     Expected Discharge Date Expected Discharge Time    Apr 17, 2019             Jazmyn Mendoza

## 2019-04-16 PROCEDURE — 99231 SBSQ HOSP IP/OBS SF/LOW 25: CPT | Performed by: INTERNAL MEDICINE

## 2019-04-16 RX ADMIN — SODIUM CHLORIDE, PRESERVATIVE FREE 3 ML: 5 INJECTION INTRAVENOUS at 11:23

## 2019-04-16 RX ADMIN — OLANZAPINE 5 MG: 5 TABLET, FILM COATED ORAL at 20:53

## 2019-04-16 RX ADMIN — PANTOPRAZOLE SODIUM 40 MG: 40 TABLET, DELAYED RELEASE ORAL at 05:30

## 2019-04-16 RX ADMIN — MEXILETINE HYDROCHLORIDE 150 MG: 150 CAPSULE ORAL at 20:53

## 2019-04-16 RX ADMIN — MEXILETINE HYDROCHLORIDE 150 MG: 150 CAPSULE ORAL at 11:21

## 2019-04-16 RX ADMIN — APIXABAN 5 MG: 5 TABLET, FILM COATED ORAL at 20:53

## 2019-04-16 RX ADMIN — MAGNESIUM GLUCONATE 500 MG ORAL TABLET 400 MG: 500 TABLET ORAL at 11:21

## 2019-04-16 RX ADMIN — METOPROLOL TARTRATE 75 MG: 50 TABLET, FILM COATED ORAL at 20:54

## 2019-04-16 RX ADMIN — Medication 100 MG: at 11:22

## 2019-04-16 RX ADMIN — FOLIC ACID 1 MG: 1 TABLET ORAL at 11:21

## 2019-04-16 RX ADMIN — SODIUM CHLORIDE, PRESERVATIVE FREE 3 ML: 5 INJECTION INTRAVENOUS at 11:22

## 2019-04-16 RX ADMIN — SPIRONOLACTONE 25 MG: 25 TABLET ORAL at 11:21

## 2019-04-16 RX ADMIN — SODIUM CHLORIDE, PRESERVATIVE FREE 3 ML: 5 INJECTION INTRAVENOUS at 20:54

## 2019-04-16 RX ADMIN — METOPROLOL TARTRATE 75 MG: 50 TABLET, FILM COATED ORAL at 11:22

## 2019-04-16 RX ADMIN — LEVOTHYROXINE SODIUM 75 MCG: 75 TABLET ORAL at 05:30

## 2019-04-16 RX ADMIN — SENNOSIDES AND DOCUSATE SODIUM 2 TABLET: 8.6; 5 TABLET ORAL at 20:53

## 2019-04-16 RX ADMIN — AMIODARONE HYDROCHLORIDE 200 MG: 200 TABLET ORAL at 11:22

## 2019-04-16 RX ADMIN — ACETAMINOPHEN 650 MG: 325 TABLET, FILM COATED ORAL at 15:25

## 2019-04-16 RX ADMIN — MEXILETINE HYDROCHLORIDE 150 MG: 150 CAPSULE ORAL at 05:30

## 2019-04-16 NOTE — PLAN OF CARE
Problem: Skin Injury Risk (Adult)  Goal: Skin Health and Integrity   04/15/19 0901   Skin Injury Risk (Adult)   Skin Health and Integrity making progress toward outcome       Problem: Patient Care Overview  Goal: Plan of Care Review   04/09/19 1407 04/15/19 1358 04/15/19 2050   Plan of Care Review   Progress --  improving --    Coping/Psychosocial   Plan of Care Reviewed With --  --  patient   OTHER   Outcome Summary Pt met all goals at time of progress note. New goals established.  --  --      Goal: Individualization and Mutuality  Outcome: Ongoing (interventions implemented as appropriate)    Goal: Discharge Needs Assessment   02/22/19 2344 03/28/19 2238   Discharge Needs Assessment   Concerns to be Addressed --  discharge planning   Patient/Family Anticipates Transition to --  long term care facility   Patient/Family Anticipated Services at Transition ;rehabilitation services --    Transportation Concerns public transportation, does not know how to access;rides, unreliable from others --    Transportation Anticipated health plan transportation --    Disability   Equipment Currently Used at Home walker, standard --      Goal: Interprofessional Rounds/Family Conf   04/03/19 1001   Interdisciplinary Rounds/Family Conf   Participants nursing;patient     Goal: Individualization and Mutuality  Outcome: Ongoing (interventions implemented as appropriate)    Goal: Discharge Needs Assessment   02/22/19 2344 03/28/19 2238   Discharge Needs Assessment   Concerns to be Addressed --  discharge planning   Patient/Family Anticipates Transition to --  long term care facility   Patient/Family Anticipated Services at Transition ;rehabilitation services --    Transportation Concerns public transportation, does not know how to access;rides, unreliable from others --    Transportation Anticipated health plan transportation --    Disability   Equipment Currently Used at Home walker, standard --      Goal:  Interprofessional Rounds/Family Conf   04/03/19 1001   Interdisciplinary Rounds/Family Conf   Participants nursing;patient       Problem: Pain, Chronic (Adult)  Goal: Acceptable Pain/Comfort Level and Functional Ability   04/15/19 0901   Pain, Chronic (Adult)   Acceptable Pain/Comfort Level and Functional Ability making progress toward outcome       Problem: Mobility, Physical Impaired (Adult)  Goal: Enhanced Mobility Skills   04/16/19 0119   Mobility, Physical Impaired (Adult)   Enhanced Mobility Skills making progress toward outcome     Goal: Identify Related Risk Factors and Signs and Symptoms   03/28/19 2238 04/06/19 0917   Mobility, Physical Impaired (Adult)   Related Risk Factors (Physical Mobility, Impaired) --  disease process;cognitive impairment   Signs and Symptoms (Physical Mobility Impaired) fear/anxiety related to mobility --       03/28/19 2238 04/06/19 0917   Mobility, Physical Impaired (Adult)   Related Risk Factors (Physical Mobility, Impaired) --  disease process;cognitive impairment   Signs and Symptoms (Physical Mobility Impaired) fear/anxiety related to mobility --      Goal: Enhanced Mobility Skills   04/16/19 0119   Mobility, Physical Impaired (Adult)   Enhanced Mobility Skills making progress toward outcome     Goal: Enhanced Functional Ability   04/16/19 0119   Mobility, Physical Impaired (Adult)   Enhanced Functional Ability making progress toward outcome     Goal: Identify Related Risk Factors and Signs and Symptoms   03/28/19 2238 04/06/19 0917   Mobility, Physical Impaired (Adult)   Related Risk Factors (Physical Mobility, Impaired) --  disease process;cognitive impairment   Signs and Symptoms (Physical Mobility Impaired) fear/anxiety related to mobility --      Goal: Enhanced Mobility Skills   04/16/19 0119   Mobility, Physical Impaired (Adult)   Enhanced Mobility Skills making progress toward outcome     Goal: Enhanced Functional Ability   04/16/19 0119   Mobility, Physical Impaired  (Adult)   Enhanced Functional Ability making progress toward outcome       Problem: Arrhythmia/Dysrhythmia (Symptomatic) (Adult)  Goal: Signs and Symptoms of Listed Potential Problems Will be Absent, Minimized or Managed (Arrhythmia/Dysrhythmia)   04/11/19 1945   Goal/Outcome Evaluation   Problems Assessed (Arrhythmia/Dysrhythmia) all   Problems Present (Dysrhythmia) none

## 2019-04-16 NOTE — PROGRESS NOTES
[facility]     Patient Identification:  Name:  Altagracia King  Age:  53 y.o.  Sex:  female  :  1965  MRN:  [id number]  Visit Number:  [id number]  Primary Care Provider:  Provider, No Known    Length of stay:  52    Subjective:   Patient seen and examined reviewed her condition, no new events, ate too much ice cream and she is feeling nauseous per her description.  Still awaiting bed placement      Chief Complaint: Generalized weakness  ----------------------------------------------------------------------------------------------------------------------  Current Hospital Meds:    amiodarone 200 mg Oral Daily   apixaban 5 mg Oral Q12H   bisacodyl 10 mg Rectal Daily   cyanocobalamin 1,000 mcg Intramuscular Q30 Days   digoxin 125 mcg Oral Q48H   folic acid 1 mg Oral Daily   furosemide 10 mg Oral Daily   levothyroxine 75 mcg Oral Q AM   magnesium oxide 400 mg Oral Daily   metoprolol tartrate 75 mg Oral Q12H   mexiletine 150 mg Oral Q8H   OLANZapine 5 mg Oral Nightly   pantoprazole 40 mg Oral Q AM   sennosides-docusate sodium 2 tablet Oral Nightly   sodium chloride 3 mL Intravenous Q12H   sodium chloride 3 mL Intravenous Q12H   spironolactone 25 mg Oral Daily With Lunch   thiamine 100 mg Oral Daily       Pharmacy Consult      ----------------------------------------------------------------------------------------------------------------------  Vital Signs:  Temp:  [97.4 °F (36.3 °C)-98.5 °F (36.9 °C)] 97.5 °F (36.4 °C)  Heart Rate:  [] 90  Resp:  [18] 18  BP: ()/(54-89) 129/71       Tele:       19  0224 04/15/19  0146 19  0248   Weight: 50.8 kg (112 lb) 51.3 kg (113 lb 2 oz) 50.9 kg (112 lb 3 oz)     Body mass index is 18.67 kg/m².    Intake/Output Summary (Last 24 hours) at 2019 1134  Last data filed at 2019 0248  Gross per 24 hour   Intake 480 ml   Output 500 ml   Net -20 ml     Diet Soft Texture; Chopped; Thin; Daily Fluid Restriction;  Other; 1,800  ----------------------------------------------------------------------------------------------------------------------    Physical exam:  General: Comfortable,awake, alert, not in distress.    Skin:  Skin is warm and dry. No rash noted.  She is pale.  No skin break  HENT:  Head:  Normocephalic and atraumatic.  Mouth:  Moist mucous membranes.    Eyes:  Conjunctivae and EOM are normal.   No scleral icterus.  Left eye completely opacified, right eye has cataract  Neck:  Neck supple.  No JVD present.    Pulmonary/Chest:  No respiratory distress, no wheezes, no crackles, with normal breath sounds and good air movement.  Cardiovascular:  Normal rate, regular rhythm and normal heart sounds with no murmur.  Abdominal:  Soft.  Bowel sounds are normal.  No distension and no tenderness.   Extremities:  No edema, no tenderness, and no deformity.  No red or swollen joints anywhere.  Strong pulses in all 4 extremities with no clubbing, no cyanosis, no edema.  Neurological: No lateralization          Results from last 7 days   Lab Units 04/14/19  1045 04/11/19  1007   WBC 10*3/mm3 5.66 7.23   HEMOGLOBIN g/dL 9.4* 10.3*   HEMATOCRIT % 29.4* 32.2*   MCV fL 103.2* 101.9*   MCHC g/dL 32.0 32.0   PLATELETS 10*3/mm3 168 207         Results from last 7 days   Lab Units 04/10/19  0358   SODIUM mmol/L 140   POTASSIUM mmol/L 4.2   CHLORIDE mmol/L 104   CO2 mmol/L 23.1   BUN mg/dL 18   CREATININE mg/dL 0.82   EGFR IF NONAFRICN AM mL/min/1.73 72   CALCIUM mg/dL 10.1   GLUCOSE mg/dL 85   Estimated Creatinine Clearance: 62.3 mL/min (by C-G formula based on SCr of 0.82 mg/dL).    No results found for: AMMONIA         ----------------------------------------------------------------------------------------------------------------------    Assessment and Plan:  -Functional decline with physical deconditioning, unsafe discharge, patient is blind and homeless  -Paroxysmal atrial fibrillation, relatively stable  -History of nonsustained  V. Tach  -Diastolic dysfunction currently compensated  -Acquired hypothyroidism  -Right eye cataract with complete left eye blindness  -Episodes of confabulation most likely multifactorial    --Awaiting nursing home placement still, again discussed with the , pending insurance / placement    Beronica Celaya MD  04/16/19  11:34 AM

## 2019-04-17 PROCEDURE — 97116 GAIT TRAINING THERAPY: CPT

## 2019-04-17 PROCEDURE — 94799 UNLISTED PULMONARY SVC/PX: CPT

## 2019-04-17 PROCEDURE — 97530 THERAPEUTIC ACTIVITIES: CPT

## 2019-04-17 PROCEDURE — 99231 SBSQ HOSP IP/OBS SF/LOW 25: CPT | Performed by: INTERNAL MEDICINE

## 2019-04-17 RX ADMIN — METOPROLOL TARTRATE 75 MG: 50 TABLET, FILM COATED ORAL at 09:49

## 2019-04-17 RX ADMIN — Medication 100 MG: at 09:49

## 2019-04-17 RX ADMIN — MEXILETINE HYDROCHLORIDE 150 MG: 150 CAPSULE ORAL at 20:30

## 2019-04-17 RX ADMIN — SODIUM CHLORIDE, PRESERVATIVE FREE 3 ML: 5 INJECTION INTRAVENOUS at 20:31

## 2019-04-17 RX ADMIN — Medication 10 MG: at 09:49

## 2019-04-17 RX ADMIN — FUROSEMIDE 10 MG: 20 TABLET ORAL at 09:49

## 2019-04-17 RX ADMIN — METOPROLOL TARTRATE 75 MG: 50 TABLET, FILM COATED ORAL at 20:31

## 2019-04-17 RX ADMIN — MAGNESIUM GLUCONATE 500 MG ORAL TABLET 400 MG: 500 TABLET ORAL at 09:49

## 2019-04-17 RX ADMIN — DIGOXIN 125 MCG: 125 TABLET ORAL at 09:49

## 2019-04-17 RX ADMIN — SENNOSIDES AND DOCUSATE SODIUM 2 TABLET: 8.6; 5 TABLET ORAL at 20:31

## 2019-04-17 RX ADMIN — OLANZAPINE 5 MG: 5 TABLET, FILM COATED ORAL at 20:31

## 2019-04-17 RX ADMIN — MEXILETINE HYDROCHLORIDE 150 MG: 150 CAPSULE ORAL at 11:16

## 2019-04-17 RX ADMIN — SODIUM CHLORIDE, PRESERVATIVE FREE 3 ML: 5 INJECTION INTRAVENOUS at 09:51

## 2019-04-17 RX ADMIN — AMIODARONE HYDROCHLORIDE 200 MG: 200 TABLET ORAL at 09:49

## 2019-04-17 RX ADMIN — APIXABAN 5 MG: 5 TABLET, FILM COATED ORAL at 20:30

## 2019-04-17 RX ADMIN — PANTOPRAZOLE SODIUM 40 MG: 40 TABLET, DELAYED RELEASE ORAL at 05:32

## 2019-04-17 RX ADMIN — APIXABAN 5 MG: 5 TABLET, FILM COATED ORAL at 09:49

## 2019-04-17 RX ADMIN — FOLIC ACID 1 MG: 1 TABLET ORAL at 09:51

## 2019-04-17 RX ADMIN — MEXILETINE HYDROCHLORIDE 150 MG: 150 CAPSULE ORAL at 16:45

## 2019-04-17 RX ADMIN — SPIRONOLACTONE 25 MG: 25 TABLET ORAL at 11:16

## 2019-04-17 RX ADMIN — MEXILETINE HYDROCHLORIDE 150 MG: 150 CAPSULE ORAL at 05:32

## 2019-04-17 RX ADMIN — LEVOTHYROXINE SODIUM 75 MCG: 75 TABLET ORAL at 05:32

## 2019-04-17 NOTE — PROGRESS NOTES
Discharge Planning Assessment  [facility]     Patient Name: Altagracia King  MRN: [id number]  Today's Date: 4/17/2019    Admit Date: 2/22/2019      Discharge Plan     Row Name 04/17/19 1158       Plan    Plan  SS received a call on this date from the [facility], who states that the pt has been approved for the [facility]. SS received a fax, verifying this information. SS spoke with [facility], who states they want to plan for the pt to be transferred to them on Friday. [facility] states that they are working with their staff, on having their transportation vehicle come to [facility] to provide transportation for the pt back to [place]. SS will follow.    Patient/Family in Agreement with Plan  yes        Destination      Service Provider Request Status Selected Services Address Phone Number Fax Number    [facility] - Request Sent N/A [address] [place] [address] [phone]    [facility] Pending - Request Sent N/A [address] [place] [phone]    [facility] [place] - Request Sent N/A [address] [place] [phone]    [facility] Pending - Request Sent N/A [address] [facility] [address] [phone]    [facility] Declined N/A [address] [facility] [address] [phone]    [facility] Declined N/A [address] [address] [phone]        Expected Discharge Date and Time     Expected Discharge Date Expected Discharge Time    Apr 17, 2019           Jazmyn Mendoza

## 2019-04-17 NOTE — PLAN OF CARE
Problem: Skin Injury Risk (Adult)  Goal: Skin Health and Integrity  Outcome: Ongoing (interventions implemented as appropriate)      Problem: Patient Care Overview  Goal: Plan of Care Review  Outcome: Ongoing (interventions implemented as appropriate)    Goal: Individualization and Mutuality  Outcome: Ongoing (interventions implemented as appropriate)    Goal: Discharge Needs Assessment  Outcome: Ongoing (interventions implemented as appropriate)    Goal: Interprofessional Rounds/Family Conf  Outcome: Ongoing (interventions implemented as appropriate)    Goal: Individualization and Mutuality  Outcome: Ongoing (interventions implemented as appropriate)    Goal: Discharge Needs Assessment  Outcome: Ongoing (interventions implemented as appropriate)    Goal: Interprofessional Rounds/Family Conf  Outcome: Ongoing (interventions implemented as appropriate)      Problem: Pain, Chronic (Adult)  Goal: Acceptable Pain/Comfort Level and Functional Ability  Outcome: Ongoing (interventions implemented as appropriate)      Problem: Mobility, Physical Impaired (Adult)  Goal: Enhanced Mobility Skills  Outcome: Ongoing (interventions implemented as appropriate)    Goal: Enhanced Functional Ability  Outcome: Ongoing (interventions implemented as appropriate)    Goal: Identify Related Risk Factors and Signs and Symptoms  Outcome: Ongoing (interventions implemented as appropriate)    Goal: Enhanced Mobility Skills  Outcome: Ongoing (interventions implemented as appropriate)    Goal: Enhanced Functional Ability  Outcome: Ongoing (interventions implemented as appropriate)    Goal: Identify Related Risk Factors and Signs and Symptoms  Outcome: Ongoing (interventions implemented as appropriate)    Goal: Enhanced Mobility Skills  Outcome: Ongoing (interventions implemented as appropriate)    Goal: Enhanced Functional Ability  Outcome: Ongoing (interventions implemented as appropriate)

## 2019-04-17 NOTE — PLAN OF CARE
Problem: Patient Care Overview  Goal: Plan of Care Review  Outcome: Ongoing (interventions implemented as appropriate)   04/17/19 1111   Plan of Care Review   Progress improving   Coping/Psychosocial   Plan of Care Reviewed With patient

## 2019-04-17 NOTE — PLAN OF CARE
Problem: Skin Injury Risk (Adult)  Goal: Skin Health and Integrity  Outcome: Ongoing (interventions implemented as appropriate)      Problem: Patient Care Overview  Goal: Plan of Care Review  Outcome: Ongoing (interventions implemented as appropriate)      Problem: Pain, Chronic (Adult)  Goal: Acceptable Pain/Comfort Level and Functional Ability  Outcome: Ongoing (interventions implemented as appropriate)

## 2019-04-17 NOTE — THERAPY TREATMENT NOTE
Acute Care - Physical Therapy Treatment Note   [place]     Patient Name: Altagracia King  : 1965  MRN: [id number]  Today's Date: 2019  Onset of Illness/Injury or Date of Surgery: 19(original admit date)  Date of Referral to PT: 19  Referring Physician: Gui    Admit Date: 2019    Visit Dx:    ICD-10-CM ICD-9-CM   1. Chest pain, unspecified type R07.9 786.50   2. Acute cystitis without hematuria N30.00 595.0     Patient Active Problem List   Diagnosis   • Chest pain   • PAF (paroxysmal atrial fibrillation) (CMS/HCC)   • ETOH abuse   • Acute respiratory failure with hypoxia (CMS/HCC)       Therapy Treatment    Rehabilitation Treatment Summary     Row Name 19 110             Treatment Time/Intention    Discipline  physical therapist  -CT      Document Type  therapy note (daily note)  -CT      Subjective Information  no complaints  -CT      Mode of Treatment  individual therapy;physical therapy  -CT      Therapy Frequency (PT Clinical Impression)  3 times/wk 3-5 times/ week per priority policy  -CT      Patient Effort  adequate  -CT      Patient Response to Treatment  Pt tolerated treatment session well with rest breaks provided as needed. Pt with increased gait distance today.   -CT      Recorded by [CT] Kyung Lr, PT 19 1110      Row Name 19 110             Cognitive Assessment/Intervention- PT/OT    Orientation Status (Cognition)  oriented to;person  -CT      Follows Commands (Cognition)  follows one step commands;physical/tactile prompts required;repetition of directions required;verbal cues/prompting required  -CT      Recorded by [CT] Kyung Lr, PT 19 1110      Row Name 19 110             Safety Issues, Functional Mobility    Impairments Affecting Function (Mobility)  balance;coordination;endurance/activity tolerance;strength;visual/perceptual  -CT      Recorded by [CT] Kyung Lr, PT 19 1110      Row Name 19 110              Bed Mobility Assessment/Treatment    Bed Mobility Assessment/Treatment  bed mobility (all) activities  -CT      Elk Level (Bed Mobility)  contact guard assist  -CT      Bed Mobility, Safety Issues  cognitive deficits limit understanding  -CT      Assistive Device (Bed Mobility)  bed rails  -CT      Recorded by [CT] Kyung Lr, PT 04/17/19 1110      Row Name 04/17/19 1107             Transfer Assessment/Treatment    Transfer Assessment/Treatment  sit-stand transfer;stand-sit transfer;stand pivot/stand step transfer  -CT      Recorded by [CT] Kyung Lr, PT 04/17/19 1110      Row Name 04/17/19 1107             Sit-Stand Transfer    Sit-Stand [place] (Transfers)  contact guard;verbal cues;nonverbal cues (demo/gesture)  -CT      Assistive Device (Sit-Stand Transfers)  walker, front-wheeled  -CT      Recorded by [CT] Kyung Lr, PT 04/17/19 1110      Row Name 04/17/19 1107             Stand-Sit Transfer    Stand-Sit Elk (Transfers)  contact guard;verbal cues;nonverbal cues (demo/gesture)  -CT      Assistive Device (Stand-Sit Transfers)  walker, front-wheeled  -CT      Recorded by [CT] Kyung Lr, PT 04/17/19 1110      Row Name 04/17/19 1107             Gait/Stairs Assessment/Training    Gait/Stairs Assessment/Training  gait/ambulation independence;gait/ambulation assistive device;distance ambulated;gait pattern;gait deviations;maintains weight-bearing status  -CT      Elk Level (Gait)  minimum assist (75% patient effort);2 person assist;verbal cues;nonverbal cues (demo/gesture) x 2 for use of RW  -CT      Assistive Device (Gait)  walker, front-wheeled  -CT      Distance in Feet (Gait)  200  -CT      Pattern (Gait)  step-to  -CT      Deviations/Abnormal Patterns (Gait)  base of support, narrow  -CT      Bilateral Gait Deviations  forward flexed posture  -CT      Recorded by [CT] Kyung Lr, PT 04/17/19 1110      Row Name 04/17/19 1107             Positioning  and Restraints    Pre-Treatment Position  in bed  -CT      Post Treatment Position  bed  -CT      In Bed  sitting EOB;call light within reach;encouraged to call for assist;notified nsg  -CT      Recorded by [CT] Kyung Lr, PT 04/17/19 1110      Row Name 04/17/19 1107             Pain Scale: FACES Pre/Post-Treatment    Pain: FACES Scale, Pretreatment  0-->no hurt  -CT      Pain: FACES Scale, Post-Treatment  0-->no hurt  -CT      Recorded by [CT] Kyung Lr, PT 04/17/19 1110      Row Name 04/17/19 1107             Coping    Observed Emotional State  attention-seeking behavior  -CT      Verbalized Emotional State  acceptance  -CT      Recorded by [CT] Kyung Lr, PT 04/17/19 1110      Row Name 04/17/19 1107             Plan of Care Review    Plan of Care Reviewed With  patient  -CT      Recorded by [CT] Kyung Lr, PT 04/17/19 1110      Row Name 04/17/19 1107             Outcome Summary/Treatment Plan (PT)    Daily Summary of Progress (PT)  progress toward functional goals is good  -CT      Anticipated Discharge Disposition (PT)  skilled nursing facility  -CT      Recorded by [CT] Kyung Lr, PT 04/17/19 1110        User Key  (r) = Recorded By, (t) = Taken By, (c) = Cosigned By    Initials Name Effective Dates Discipline    CT Kyung Lr, PT 04/03/18 -  PT                   Physical Therapy Education     Title: PT OT SLP Therapies (Done)     Topic: Physical Therapy (Done)     Point: Mobility training (Done)     Learning Progress Summary           Patient Acceptance, E,TB, VU by CT at 4/17/2019 11:11 AM    Acceptance, E, VU by EB at 4/17/2019  9:55 AM    Acceptance, E,TB, VU by GAVINO at 4/16/2019  7:53 AM    Acceptance, E,TB, VU,NR by CT at 4/15/2019  2:01 PM    Acceptance, E,TB, VU by GAVINO at 4/15/2019  9:02 AM    Acceptance, E,TB, VU by GAVINO at 4/14/2019  8:06 AM    Acceptance, E,TB, NR by CT at 4/12/2019 11:13 AM    Acceptance, E,TB, NR by CT at 4/10/2019  3:24 PM    Acceptance, E,TB, VU,NR by  CT at 4/9/2019  2:08 PM    Acceptance, E, VU by AM at 4/9/2019 12:08 PM    Acceptance, E,TB, NR by CT at 4/8/2019  2:48 PM    Acceptance, E,TB, VU by GAVINO at 4/8/2019  8:21 AM    Acceptance, E, NL,VU by SM at 4/8/2019  2:37 AM    Acceptance, E,TB, VU by GAVINO at 4/7/2019  8:53 AM    Acceptance, E, NL by SM at 4/6/2019 11:18 PM    Acceptance, E, NL by SM at 4/5/2019 10:02 PM    Acceptance, E,D, NR by AG at 4/4/2019  6:23 PM    Acceptance, E, NR by KB at 4/2/2019  6:10 PM    Acceptance, E, VU by SM at 4/2/2019 12:28 AM    Acceptance, E, VU,NR by AG1 at 4/1/2019 11:20 AM    Acceptance, E, NR by KB at 3/31/2019  9:45 PM    Acceptance, E, VU by MC at 3/31/2019  2:55 AM    Acceptance, E, VU by MC at 3/31/2019  2:45 AM    Acceptance, E, VU,NR by MC at 3/30/2019  1:57 AM    Acceptance, E,TB, VU,NR by CT at 3/29/2019 10:45 AM    Acceptance, E, NR by EA at 3/28/2019 10:44 PM    Acceptance, E,TB, NR,NL by CT at 3/28/2019  2:19 PM    Acceptance, E,TB, VU by JS at 3/28/2019 12:47 AM    Acceptance, E, VU by AM at 3/27/2019  4:07 PM    Acceptance, E, NR by EA at 3/26/2019  9:47 PM    Acceptance, E, VU by LAZARO at 3/20/2019 10:09 PM    Acceptance, E, VU by AW at 3/16/2019  6:34 PM    Acceptance, E,TB, VU by ILYA at 3/14/2019 12:21 AM    Comment:  PT unable to rcve teaching at this time    Acceptance, E,TB, VU by ILYA at 3/12/2019 11:00 PM    Comment:  PT unable to rcve teaching at this time    Nonacceptance, E, NL by SB at 3/12/2019  9:17 AM    Comment:  pt intubated and sedated; no one present at the bedside    Acceptance, E, NR by EV at 3/11/2019  9:02 PM    Comment:  intubated/sedated. No family or caretaker at bedside    Nonacceptance, E, NL by WS at 3/9/2019  9:06 AM    Comment:  pt sedated and intubated    Acceptance, E, VU by WS at 3/9/2019  9:05 AM    Acceptance, E,TB, VU by ILYA at 3/8/2019  9:46 PM    Comment:  PT unable to rcve teaching at this time    Nonacceptance, E, NL by WS at 3/8/2019  4:13 PM    Acceptance, E, VU by LT at  3/7/2019 10:13 AM    Acceptance, E, VU by BC at 3/5/2019  5:55 PM    Acceptance, E,TB, VU by CL at 3/4/2019  7:53 PM    Acceptance, E,TB, VU by CL at 3/4/2019  7:52 PM    Acceptance, E,TB, VU by CT at 3/1/2019  3:23 PM    Acceptance, E,TB, VU,NR by KB1 at 2/26/2019  3:13 PM    Acceptance, E,TB, NR by CT at 2/25/2019  3:25 PM   Family Acceptance, E, VU by AW at 3/15/2019 12:08 PM    Acceptance, E, VU by SB at 3/13/2019 11:01 AM   Significant Other Acceptance, E, VU by WS at 3/9/2019  9:05 AM                   Point: Home exercise program (Done)     Learning Progress Summary           Patient Acceptance, E,TB, VU by CT at 4/17/2019 11:11 AM    Acceptance, E, VU by EB at 4/17/2019  9:55 AM    Acceptance, E,TB, VU by GAVINO at 4/16/2019  7:53 AM    Acceptance, E,TB, VU,NR by CT at 4/15/2019  2:01 PM    Acceptance, E,TB, VU by GAVINO at 4/15/2019  9:02 AM    Acceptance, E,TB, VU by GAVINO at 4/14/2019  8:06 AM    Acceptance, E,TB, NR by CT at 4/12/2019 11:13 AM    Acceptance, E,TB, NR by CT at 4/10/2019  3:24 PM    Acceptance, E,TB, VU,NR by CT at 4/9/2019  2:08 PM    Acceptance, E, VU by AM at 4/9/2019 12:08 PM    Acceptance, E,TB, NR by CT at 4/8/2019  2:48 PM    Acceptance, E,TB, VU by GAVINO at 4/8/2019  8:21 AM    Acceptance, E, NL,VU by SM at 4/8/2019  2:37 AM    Acceptance, E,TB, VU by GAVINO at 4/7/2019  8:53 AM    Acceptance, E, NL by SM at 4/6/2019 11:18 PM    Acceptance, E, NL by SM at 4/5/2019 10:02 PM    Acceptance, E,D, NR by AG at 4/4/2019  6:23 PM    Acceptance, E, NR by KB at 4/2/2019  6:10 PM    Acceptance, E, VU by SM at 4/2/2019 12:28 AM    Acceptance, E, VU,NR by AG1 at 4/1/2019 11:20 AM    Acceptance, E, NR by KB at 3/31/2019  9:45 PM    Acceptance, E, VU by MC at 3/31/2019  2:55 AM    Acceptance, E, VU by MC at 3/31/2019  2:45 AM    Acceptance, E, VU,NR by MC at 3/30/2019  1:57 AM    Acceptance, E,TB, VU,NR by CT at 3/29/2019 10:45 AM    Acceptance, E, NR by EA at 3/28/2019 10:44 PM    Acceptance, E,TB, NR,NL by CT at  3/28/2019  2:19 PM    Acceptance, E,TB, VU by JS at 3/28/2019 12:47 AM    Acceptance, E, VU by AM at 3/27/2019  4:07 PM    Acceptance, E, NR by EA at 3/26/2019  9:47 PM    Acceptance, E, VU by LAZARO at 3/20/2019 10:09 PM    Acceptance, E, VU by AW at 3/16/2019  6:34 PM    Acceptance, E,TB, VU by ILYA at 3/14/2019 12:21 AM    Comment:  PT unable to rcve teaching at this time    Acceptance, E,TB, VU by ILYA at 3/12/2019 11:00 PM    Comment:  PT unable to rcve teaching at this time    Nonacceptance, E, NL by SB at 3/12/2019  9:17 AM    Comment:  pt intubated and sedated; no one present at the bedside    Acceptance, E, NR by EV at 3/11/2019  9:02 PM    Comment:  intubated/sedated. No family or caretaker at bedside    Nonacceptance, E, NL by WS at 3/9/2019  9:06 AM    Comment:  pt sedated and intubated    Acceptance, E, VU by WS at 3/9/2019  9:05 AM    Acceptance, E,TB, VU by ILYA at 3/8/2019  9:46 PM    Comment:  PT unable to rcve teaching at this time    Nonacceptance, E, NL by WS at 3/8/2019  4:13 PM    Acceptance, E, VU by LT at 3/7/2019 10:13 AM    Acceptance, E, VU by BC at 3/5/2019  5:55 PM    Acceptance, E,TB, VU by CL at 3/4/2019  7:53 PM    Acceptance, E,TB, VU by CL at 3/4/2019  7:52 PM    Acceptance, E,TB, VU by CT at 3/1/2019  3:23 PM    Acceptance, E,TB, VU,NR by KB1 at 2/26/2019  3:13 PM    Acceptance, E,TB, NR by CT at 2/25/2019  3:25 PM   Family Acceptance, E, VU by AW at 3/15/2019 12:08 PM    Acceptance, E, VU by SB at 3/13/2019 11:01 AM   Significant Other Acceptance, E, VU by WS at 3/9/2019  9:05 AM                   Point: Body mechanics (Done)     Learning Progress Summary           Patient Acceptance, E,TB, VU by CT at 4/17/2019 11:11 AM    Acceptance, E, VU by EB at 4/17/2019  9:55 AM    Acceptance, E,TB, VU by GAVINO at 4/16/2019  7:53 AM    Acceptance, E,TB, VU,NR by CT at 4/15/2019  2:01 PM    Acceptance, E,TB, VU by GAVINO at 4/15/2019  9:02 AM    Acceptance, E,TB, VU by GAVINO at 4/14/2019  8:06 AM    Acceptance,  E,TB, NR by CT at 4/12/2019 11:13 AM    Acceptance, E,TB, NR by CT at 4/10/2019  3:24 PM    Acceptance, E,TB, VU,NR by CT at 4/9/2019  2:08 PM    Acceptance, E, VU by AM at 4/9/2019 12:08 PM    Acceptance, E,TB, NR by CT at 4/8/2019  2:48 PM    Acceptance, E,TB, VU by GAVINO at 4/8/2019  8:21 AM    Acceptance, E, NL,VU by SM at 4/8/2019  2:37 AM    Acceptance, E,TB, VU by GAVINO at 4/7/2019  8:53 AM    Acceptance, E, NL by SM at 4/6/2019 11:18 PM    Acceptance, E, NL by SM at 4/5/2019 10:02 PM    Acceptance, E,D, NR by AG at 4/4/2019  6:23 PM    Acceptance, E, NR by KB at 4/2/2019  6:10 PM    Acceptance, E, VU by SM at 4/2/2019 12:28 AM    Acceptance, E, VU,NR by AG1 at 4/1/2019 11:20 AM    Acceptance, E, NR by KB at 3/31/2019  9:45 PM    Acceptance, E, VU by MC at 3/31/2019  2:55 AM    Acceptance, E, VU by MC at 3/31/2019  2:45 AM    Acceptance, E, VU,NR by MC at 3/30/2019  1:57 AM    Acceptance, E,TB, VU,NR by CT at 3/29/2019 10:45 AM    Acceptance, E, NR by EA at 3/28/2019 10:44 PM    Acceptance, E,TB, NR,NL by CT at 3/28/2019  2:19 PM    Acceptance, E,TB, VU by JS at 3/28/2019 12:47 AM    Acceptance, E, VU by AM at 3/27/2019  4:07 PM    Acceptance, E, NR by EA at 3/26/2019  9:47 PM    Acceptance, E, VU by LAZARO at 3/20/2019 10:09 PM    Acceptance, E, VU by AW at 3/16/2019  6:34 PM    Acceptance, E,TB, VU by ILYA at 3/14/2019 12:21 AM    Comment:  PT unable to rcve teaching at this time    Acceptance, E,TB, VU by ILYA at 3/12/2019 11:00 PM    Comment:  PT unable to rcve teaching at this time    Nonacceptance, E, NL by SB at 3/12/2019  9:17 AM    Comment:  pt intubated and sedated; no one present at the bedside    Acceptance, E, NR by EV at 3/11/2019  9:02 PM    Comment:  intubated/sedated. No family or caretaker at bedside    Nonacceptance, E, NL by WS at 3/9/2019  9:06 AM    Comment:  pt sedated and intubated    Acceptance, E, VU by WS at 3/9/2019  9:05 AM    Acceptance, E,TB, VU by ILYA at 3/8/2019  9:46 PM    Comment:  PT  unable to rcve teaching at this time    Nonacceptance, E, NL by WS at 3/8/2019  4:13 PM    Acceptance, E, VU by LT at 3/7/2019 10:13 AM    Acceptance, E, VU by BC at 3/5/2019  5:55 PM    Acceptance, E,TB, VU by CL at 3/4/2019  7:53 PM    Acceptance, E,TB, VU by CL at 3/4/2019  7:52 PM    Acceptance, E,TB, VU by CT at 3/1/2019  3:23 PM    Acceptance, E,TB, VU,NR by KB1 at 2/26/2019  3:13 PM    Acceptance, E,TB, NR by CT at 2/25/2019  3:25 PM   Family Acceptance, E, VU by AW at 3/15/2019 12:08 PM    Acceptance, E, VU by SB at 3/13/2019 11:01 AM   Significant Other Acceptance, E, VU by WS at 3/9/2019  9:05 AM                   Point: Precautions (Done)     Learning Progress Summary           Patient Acceptance, E,TB, VU by CT at 4/17/2019 11:11 AM    Acceptance, E, VU by EB at 4/17/2019  9:55 AM    Acceptance, E,TB, VU by GAVINO at 4/16/2019  7:53 AM    Acceptance, E,TB, VU,NR by CT at 4/15/2019  2:01 PM    Acceptance, E,TB, VU by GAVINO at 4/15/2019  9:02 AM    Acceptance, E,TB, VU by AGVINO at 4/14/2019  8:06 AM    Acceptance, E,TB, NR by CT at 4/12/2019 11:13 AM    Acceptance, E,TB, NR by CT at 4/10/2019  3:24 PM    Acceptance, E,TB, VU,NR by CT at 4/9/2019  2:08 PM    Acceptance, E, VU by AM at 4/9/2019 12:08 PM    Acceptance, E,TB, NR by CT at 4/8/2019  2:48 PM    Acceptance, E,TB, VU by GAVINO at 4/8/2019  8:21 AM    Acceptance, E, NL,VU by SM at 4/8/2019  2:37 AM    Acceptance, E,TB, VU by GAVINO at 4/7/2019  8:53 AM    Acceptance, E, NL by SM at 4/6/2019 11:18 PM    Acceptance, E, NL by SM at 4/5/2019 10:02 PM    Acceptance, E,D, NR by AG at 4/4/2019  6:23 PM    Acceptance, E, NR by KB at 4/2/2019  6:10 PM    Acceptance, E, VU by  at 4/2/2019 12:28 AM    Acceptance, E, VU,NR by 1 at 4/1/2019 11:20 AM    Acceptance, E, NR by DAGO at 3/31/2019  9:45 PM    Acceptance, E, VU by  at 3/31/2019  2:55 AM    Acceptance, E, VU by  at 3/31/2019  2:45 AM    Acceptance, E, VU,NR by  at 3/30/2019  1:57 AM    Acceptance, E,TB, VU,NR by  CT at 3/29/2019 10:45 AM    Acceptance, E, NR by EA at 3/28/2019 10:44 PM    Acceptance, E,TB, NR,NL by CT at 3/28/2019  2:19 PM    Acceptance, E,TB, VU by JS at 3/28/2019 12:47 AM    Acceptance, E, VU by AM at 3/27/2019  4:07 PM    Acceptance, E, NR by EA at 3/26/2019  9:47 PM    Acceptance, E, VU by LAZARO at 3/20/2019 10:09 PM    Acceptance, E, VU by AW at 3/16/2019  6:34 PM    Acceptance, E,TB, VU by ILYA at 3/14/2019 12:21 AM    Comment:  PT unable to rcve teaching at this time    Acceptance, E,TB, VU by ILYA at 3/12/2019 11:00 PM    Comment:  PT unable to rcve teaching at this time    Nonacceptance, E, NL by SB at 3/12/2019  9:17 AM    Comment:  pt intubated and sedated; no one present at the bedside    Acceptance, E, NR by EV at 3/11/2019  9:02 PM    Comment:  intubated/sedated. No family or caretaker at bedside    Nonacceptance, E, NL by WS at 3/9/2019  9:06 AM    Comment:  pt sedated and intubated    Acceptance, E, VU by WS at 3/9/2019  9:05 AM    Acceptance, E,TB, VU by ILYA at 3/8/2019  9:46 PM    Comment:  PT unable to rcve teaching at this time    Nonacceptance, E, NL by WS at 3/8/2019  4:13 PM    Acceptance, E, VU by LT at 3/7/2019 10:13 AM    Acceptance, E, VU by BC at 3/5/2019  5:55 PM    Acceptance, E,TB, VU by CL at 3/4/2019  7:53 PM    Acceptance, E,TB, VU by CL at 3/4/2019  7:52 PM    Acceptance, E,TB, VU by CT at 3/1/2019  3:23 PM    Acceptance, E,TB, VU,NR by KB1 at 2/26/2019  3:13 PM    Acceptance, E,TB, NR by CT at 2/25/2019  3:25 PM   Family Acceptance, E, VU by AW at 3/15/2019 12:08 PM    Acceptance, E, VU by SB at 3/13/2019 11:01 AM   Significant Other Acceptance, E, VU by WS at 3/9/2019  9:05 AM                               User Key     Initials Effective Dates Name Provider Type Discipline    SB 06/16/16 -  Kathy Rowell, RN Registered Nurse Nurse    ILYA 06/16/16 -  Alberto Mendoza, RN Registered Nurse Nurse     06/16/16 -  Denae Carvajal, RN Registered Nurse Nurse    EA 06/16/16 -   Mary Blanton, RN Registered Nurse Nurse    KB 06/16/16 -  Radha, Lorena Granger, RN Registered Nurse Nurse    AW 06/16/16 -  Heather Godinez, RN Registered Nurse Nurse    WS 06/16/16 -  Keisha Messer, RN Registered Nurse Nurse    EB 06/16/16 -  Piedad Holder, RN Registered Nurse Nurse    AM 06/16/16 -  Jack, Madeline Stevenson, RN Registered Nurse Nurse    BC 03/14/16 -  Ekaterina Cifuentes, PT Physical Therapist PT    AG 04/03/18 -  Parisa Orellana, PT Physical Therapist PT    CT 04/03/18 -  Kyung Lr, PT Physical Therapist PT    AG1 06/16/16 -  Ayana Corbni, RN Registered Nurse Nurse    CL 07/23/18 -  Rj Godfrey, RN Registered Nurse Nurse    KB1 12/20/17 -  Ekaterina Parry, PTA Physical Therapy Assistant PT    LAZARO 05/22/18 -  Renata Mcbride, RN Registered Nurse Nurse    MC 09/06/18 -  Collett, Morgan, RN Registered Nurse Nurse    SM 09/12/18 -  Sean Kohler, RN Registered Nurse Nurse    GAVINO 10/18/18 -  Marko Cifuentes, RN Registered Nurse Nurse    EV 10/26/18 -  Corry Bagley, RN Registered Nurse Nurse    JS 11/26/18 -  Sg Smith, RN Registered Nurse Nurse                PT Recommendation and Plan  Anticipated Discharge Disposition (PT): skilled nursing facility  Planned Therapy Interventions (PT Eval): balance training, gait training, bed mobility training, home exercise program, manual therapy techniques, motor coordination training, neuromuscular re-education, patient/family education, postural re-education, ROM (range of motion), stair training, strengthening, stretching, transfer training  Therapy Frequency (PT Clinical Impression): 3 times/wk(3-5 times/ week per priority policy)  Outcome Summary/Treatment Plan (PT)  Daily Summary of Progress (PT): progress toward functional goals is good  Anticipated Equipment Needs at Discharge (PT): front wheeled walker  Anticipated Discharge Disposition (PT): skilled nursing facility  Plan of Care Reviewed  With: patient  Progress: improving  Outcome Summary: Pt met all goals at time of progress note. New goals established.   Outcome Measures     Row Name 04/15/19 1400             How much help from another person do you currently need...    Turning from your back to your side while in flat bed without using bedrails?  4  -CT      Moving from lying on back to sitting on the side of a flat bed without bedrails?  4  -CT      Moving to and from a bed to a chair (including a wheelchair)?  3  -CT      Standing up from a chair using your arms (e.g., wheelchair, bedside chair)?  3  -CT      Climbing 3-5 steps with a railing?  3  -CT      To walk in hospital room?  3  -CT      AM-PAC 6 Clicks Score  20  -CT         Functional Assessment    Outcome Measure Options  AM-PAC 6 Clicks Basic Mobility (PT)  -CT        User Key  (r) = Recorded By, (t) = Taken By, (c) = Cosigned By    Initials Name Provider Type    CT Kyung Lr, NAVDEEP Physical Therapist         Time Calculation:   PT Charges     Row Name 04/17/19 1111             Time Calculation    PT Received On  04/17/19  -CT      PT - Next Appointment  04/18/19  -CT      PT Goal Re-Cert Due Date  04/23/19  -CT         Time Calculation- PT    Total Timed Code Minutes- PT  28 minute(s)  -CT         Timed Charges    74463 - Gait Training Minutes   20  -CT      26184 - PT Therapeutic Activity Minutes  8  -CT        User Key  (r) = Recorded By, (t) = Taken By, (c) = Cosigned By    Initials Name Provider Type    CT Kyung Lr PT Physical Therapist        Therapy Charges for Today     Code Description Service Date Service Provider Modifiers Qty    92902590185 HC GAIT TRAINING EA 15 MIN 4/17/2019 Kyung Lr, PT GP 1    20787318339 HC PT THERAPEUTIC ACT EA 15 MIN 4/17/2019 Kyung Lr, PT GP 1    45264430865 HC PT THER SUPP EA 15 MIN 4/17/2019 Kyung Lr, PT GP 2          PT G-Codes  Outcome Measure Options: AM-PAC 6 Clicks Basic Mobility (PT)  AM-PAC 6 Clicks Score:  20  Score: 12    Kyung Lr, PT  4/17/2019

## 2019-04-17 NOTE — PROGRESS NOTES
[facility]     Patient Identification:  Name:  Altagracia King  Age:  53 y.o.  Sex:  female  :  1965  MRN:  [id number]  Visit Number:  [id number]  Primary Care Provider:  Provider, No Known    Length of stay:  53    Subjective:   Patient seen and examined, no new events she is feeling better compared to yesterday no nausea or vomiting, awaiting on placement still discussed with the       Chief Complaint: Generalized weakness  ----------------------------------------------------------------------------------------------------------------------  Current Hospital Meds:    amiodarone 200 mg Oral Daily   apixaban 5 mg Oral Q12H   bisacodyl 10 mg Rectal Daily   cyanocobalamin 1,000 mcg Intramuscular Q30 Days   digoxin 125 mcg Oral Q48H   folic acid 1 mg Oral Daily   furosemide 10 mg Oral Daily   levothyroxine 75 mcg Oral Q AM   magnesium oxide 400 mg Oral Daily   metoprolol tartrate 75 mg Oral Q12H   mexiletine 150 mg Oral Q8H   OLANZapine 5 mg Oral Nightly   pantoprazole 40 mg Oral Q AM   sennosides-docusate sodium 2 tablet Oral Nightly   sodium chloride 3 mL Intravenous Q12H   sodium chloride 3 mL Intravenous Q12H   spironolactone 25 mg Oral Daily With Lunch   thiamine 100 mg Oral Daily       Pharmacy Consult      ----------------------------------------------------------------------------------------------------------------------  Vital Signs:  Temp:  [97.9 °F (36.6 °C)-99.6 °F (37.6 °C)] 98 °F (36.7 °C)  Heart Rate:  [88-92] 90  Resp:  [18] 18  BP: (101-125)/(54-64) 101/54       Tele:       04/15/19  0146 19  0248 19  0405   Weight: 51.3 kg (113 lb 2 oz) 50.9 kg (112 lb 3 oz) 50.9 kg (112 lb 3 oz)     Body mass index is 18.67 kg/m².    Intake/Output Summary (Last 24 hours) at 2019 1145  Last data filed at 2019 0405  Gross per 24 hour   Intake 600 ml   Output 600 ml   Net 0 ml     Diet Soft Texture; Chopped; Thin; Daily Fluid  Restriction; Other; 1,800  ----------------------------------------------------------------------------------------------------------------------    Physical exam:  General: Comfortable,awake, alert, not in distress.    Skin:  Skin is warm and dry. No rash noted.  She is pale.  No skin break  HENT:  Head:  Normocephalic and atraumatic.  Mouth:  Moist mucous membranes.    Eyes:  Conjunctivae and EOM are normal.   No scleral icterus.  Left eye completely opacified, right eye has cataract  Neck:  Neck supple.  No JVD present.    Pulmonary/Chest:  No respiratory distress, no wheezes, no crackles, with normal breath sounds and good air movement.  Cardiovascular:  Normal rate, regular rhythm and normal heart sounds with no murmur.  Abdominal:  Soft.  Bowel sounds are normal.  No distension and no tenderness.   Extremities:  No edema, no tenderness, and no deformity.  No red or swollen joints anywhere.  Strong pulses in all 4 extremities with no clubbing, no cyanosis, no edema.  Neurological: No lateralization          Results from last 7 days   Lab Units 04/14/19  1045 04/11/19  1007   WBC 10*3/mm3 5.66 7.23   HEMOGLOBIN g/dL 9.4* 10.3*   HEMATOCRIT % 29.4* 32.2*   MCV fL 103.2* 101.9*   MCHC g/dL 32.0 32.0   PLATELETS 10*3/mm3 168 207               Invalid input(s): PROTEstimated Creatinine Clearance: 62.3 mL/min (by C-G formula based on SCr of 0.82 mg/dL).    No results found for: AMMONIA         ----------------------------------------------------------------------------------------------------------------------    Assessment and Plan:  -Functional decline with physical deconditioning, unsafe discharge, patient is blind and homeless  -Paroxysmal atrial fibrillation, relatively stable  -History of nonsustained V. Tach  -Diastolic dysfunction currently compensated  -Acquired hypothyroidism  -Right eye cataract with complete left eye blindness  -Episodes of confabulation most likely multifactorial    --Awaiting nursing  home placement still, hoping to be soon hopefully, will continue other therapies for now    Mhd Lis Celaya MD  04/17/19  11:45 AM

## 2019-04-18 VITALS
WEIGHT: 111.38 LBS | DIASTOLIC BLOOD PRESSURE: 72 MMHG | SYSTOLIC BLOOD PRESSURE: 125 MMHG | HEIGHT: 65 IN | BODY MASS INDEX: 18.56 KG/M2 | RESPIRATION RATE: 18 BRPM | HEART RATE: 78 BPM | OXYGEN SATURATION: 100 % | TEMPERATURE: 98 F

## 2019-04-18 PROCEDURE — 99239 HOSP IP/OBS DSCHRG MGMT >30: CPT | Performed by: INTERNAL MEDICINE

## 2019-04-18 RX ORDER — AMIODARONE HYDROCHLORIDE 200 MG/1
200 TABLET ORAL DAILY
Start: 2019-04-19

## 2019-04-18 RX ORDER — METOPROLOL TARTRATE 75 MG/1
75 TABLET, FILM COATED ORAL EVERY 12 HOURS SCHEDULED
Start: 2019-04-18 | End: 2019-05-18

## 2019-04-18 RX ORDER — PANTOPRAZOLE SODIUM 40 MG/1
40 TABLET, DELAYED RELEASE ORAL DAILY
Start: 2019-04-18

## 2019-04-18 RX ORDER — MEXILETINE HYDROCHLORIDE 150 MG/1
150 CAPSULE ORAL EVERY 8 HOURS SCHEDULED
Start: 2019-04-18

## 2019-04-18 RX ORDER — LEVOTHYROXINE SODIUM 0.07 MG/1
75 TABLET ORAL DAILY
Start: 2019-04-18

## 2019-04-18 RX ORDER — SPIRONOLACTONE 25 MG/1
25 TABLET ORAL
Start: 2019-04-18

## 2019-04-18 RX ORDER — FOLIC ACID 1 MG/1
1 TABLET ORAL DAILY
Start: 2019-04-19

## 2019-04-18 RX ORDER — OLANZAPINE 5 MG/1
5 TABLET ORAL NIGHTLY
Start: 2019-04-18

## 2019-04-18 RX ORDER — LANOLIN ALCOHOL/MO/W.PET/CERES
100 CREAM (GRAM) TOPICAL DAILY
Start: 2019-04-19

## 2019-04-18 RX ORDER — DIGOXIN 125 MCG
125 TABLET ORAL
Start: 2019-04-19

## 2019-04-18 RX ORDER — FUROSEMIDE 20 MG/1
10 TABLET ORAL DAILY
Start: 2019-04-19

## 2019-04-18 RX ORDER — CYANOCOBALAMIN 1000 UG/ML
1000 INJECTION, SOLUTION INTRAMUSCULAR; SUBCUTANEOUS
Start: 2019-05-01

## 2019-04-18 RX ADMIN — PANTOPRAZOLE SODIUM 40 MG: 40 TABLET, DELAYED RELEASE ORAL at 05:59

## 2019-04-18 RX ADMIN — AMIODARONE HYDROCHLORIDE 200 MG: 200 TABLET ORAL at 09:07

## 2019-04-18 RX ADMIN — MEXILETINE HYDROCHLORIDE 150 MG: 150 CAPSULE ORAL at 05:59

## 2019-04-18 RX ADMIN — SODIUM CHLORIDE, PRESERVATIVE FREE 3 ML: 5 INJECTION INTRAVENOUS at 09:08

## 2019-04-18 RX ADMIN — FUROSEMIDE 10 MG: 20 TABLET ORAL at 09:07

## 2019-04-18 RX ADMIN — Medication 10 MG: at 09:07

## 2019-04-18 RX ADMIN — METOPROLOL TARTRATE 75 MG: 50 TABLET, FILM COATED ORAL at 09:07

## 2019-04-18 RX ADMIN — APIXABAN 5 MG: 5 TABLET, FILM COATED ORAL at 09:07

## 2019-04-18 RX ADMIN — LEVOTHYROXINE SODIUM 75 MCG: 75 TABLET ORAL at 05:59

## 2019-04-18 RX ADMIN — Medication 100 MG: at 09:07

## 2019-04-18 RX ADMIN — FOLIC ACID 1 MG: 1 TABLET ORAL at 09:07

## 2019-04-18 RX ADMIN — MAGNESIUM GLUCONATE 500 MG ORAL TABLET 400 MG: 500 TABLET ORAL at 09:07

## 2019-04-18 NOTE — DISCHARGE PLACEMENT REQUEST
"Shagufta Loza (53 y.o. Female)     Date of Birth Social Security Number Address Home Phone MRN    1965  [address] [place] [address] [phone] [id number]    [employer] Marital Status          Unknown        Admission Date Admission Type Admitting Provider Attending Provider Department, Room/Bed    2/22/19 Emergency Oculam, MD Belia Spencer Mhd Motaz, MD [address], 3306/1S    Discharge Date Discharge Disposition Discharge Destination         Skilled Nursing Facility (DC - External)              Attending Provider:  Beronica Celaya MD    Allergies:  No Known Allergies    Isolation:  None   Infection:  None   Code Status:  CPR    Ht:  165.1 cm (65\")   Wt:  50.5 kg (111 lb 6 oz)    Admission Cmt:  None   Principal Problem:  None                Active Insurance as of 2/22/2019     Primary Coverage     Payor Plan Insurance Group Employer/Plan Group    HU[employer]NA MEDICAID [employer] [id number]     Payor Plan Address Payor Plan Phone Number Payor Plan Fax Number Effective Dates    PO  [phone]  2/14/2019 - None Entered    [facility] [id number]       Subscriber Name Subscriber Birth Date Member ID       SHAGUFTA LOZA 1965 [id number]                 Emergency Contacts      (Rel.) Home Phone Work Phone Mobile Phone    Madeline Loza (Daughter) -- -- [phone]    Faustino Mclain (Friend) [phone] -- --    Mumtaz Bundy (Friend) [phone] -- --    Unknown, Sylwia (Sister) [phone] -- --            Emergency Contact Information     Name Relation Home Work Mobile    Madeline Loza Daughter   [phone]    Faustino Mclain Friend [phone]      Mumtaz Bundy Friend [phone]      Unknown, Sylwia Sister [phone]            Insurance Information                [employer]NA MEDICAID/[employer] Phone: [phone]    Subscriber: Shagufta Loza Subscriber#: [id number]    Group#: [id number] Precert#:              History & Physical    " "Jhon Messer PA-C at 2019 12:23 PM                   HISTORY AND PHYSICAL        Patient Identification:  Name:  Altagracia King  Age:  53 y.o.  Sex:  female  :  1965  MRN:  [id number]   Visit Number:  [id number]  Primary Care Physician:  Provider, No Known       Subjective     Subjective     Chief complaint:     Chief Complaint   Patient presents with   • Chest Pain       History of presenting illness:     The patient is a 53 year old female for presented to the ED with complaints of substernal chest pain that began yesterday morning. She reports the pain radiates down her left arm and her arm feels somewhat numb. She also reports issues at home and needing  as she has been living in a homeless shelter where she was recently \"kicked out.\" Vital signs stable. Troponin levels negative.EKG shows sinus rhythm with premature ventricular complexes or fusion complexes and septal infarct , age undetermined.   History of alcohol abuse but normal ethanol level and she reports she has not had any alcohol for around a week. TSH elevated at 6.995. White count and CRP normal. Chest xray is unremarkable. EKG shows sinus rhythm with premature ventricular complexes or fusion complexes and septal infarct , age undetermined. Admitted to the observation unit for further evaluation and monitoring.       ---------------------------------------------------------------------------------------------------------------------     Review Of Systems:    Constitutional: no fever, chills and night sweats. No appetite change or unexpected weight change. No fatigue.  Eyes: no eye drainage, itching or redness.  HEENT: no mouth sores, dysphagia or nose bleed.  Respiratory: no for shortness of breath, cough or production of sputum.  Cardiovascular: See HPI  Gastrointestinal: no nausea, vomiting or diarrhea. No abdominal pain, hematemesis or rectal bleeding.  Genitourinary: no dysuria or " polyuria.  Hematologic/lymphatic: no lymph node abnormalities, no easy bruising or easy bleeding.  Musculoskeletal: no muscle or joint pain.  Skin: No rash and no itching.  Neurological: no loss of consciousness, no seizure, no headache.  Behavioral/Psych: no depression or suicidal ideation.  Endocrine: no hot flashes.  Immunologic: negative.    ---------------------------------------------------------------------------------------------------------------------     Past Medical History    Past Medical History:   Diagnosis Date   • Alcoholism (CMS/[facility])    • Depression    • GERD (gastroesophageal reflux disease)        Past Surgical History    Past Surgical History:   Procedure Laterality Date   • CARDIAC CATHETERIZATION     • COLONOSCOPY     • ENDOSCOPY     • TUBAL ABDOMINAL LIGATION         Family History    Family History   Problem Relation Age of Onset   • Heart disease Mother    • Depression Mother    • Heart disease Father    • Depression Sister    • Heart disease Brother    • Depression Maternal Grandmother    • Alcohol abuse Maternal Grandfather        Social History    Social History     Tobacco Use   • Smoking status: Current Every Day Smoker     Packs/day: 1.00   • Smokeless tobacco: Never Used   Substance Use Topics   • Alcohol use: Yes     Alcohol/week: 2.4 oz     Types: 4 Cans of beer per week   • Drug use: No       Allergies    Patient has no known allergies.  ---------------------------------------------------------------------------------------------------------------------     Home Medications:    Prior to Admission Medications     Prescriptions Last Dose Informant Patient Reported? Taking?    aspirin 325 MG tablet 2/22/2019 Self Yes Yes    Take 650 mg by mouth Daily.    calcium carbonate (TUMS) 500 MG chewable tablet 2/23/2019 Self Yes Yes    Chew 2 tablets As Needed for Indigestion or Heartburn.         ---------------------------------------------------------------------------------------------------------------------    Objective     Objective     Hospital Scheduled Meds:    aspirin 325 mg Oral Daily   enoxaparin 40 mg Subcutaneous Q24H   LORazepam 2 mg Oral 3 times per day   Followed by      [START ON 2/24/2019] LORazepam 1.5 mg Oral 3 times per day   Followed by      [START ON 2/25/2019] LORazepam 1 mg Oral 3 times per day   Followed by      [START ON 2/26/2019] LORazepam 0.5 mg Oral 3 times per day   sodium chloride 3 mL Intravenous Q12H   sodium chloride 3 mL Intravenous Q12H        ---------------------------------------------------------------------------------------------------------------------   Vital Signs:  Temp:  [98.2 °F (36.8 °C)-99.2 °F (37.3 °C)] 98.6 °F (37 °C)  Heart Rate:  [] 85  Resp:  [18-24] 18  BP: (104-164)/(52-98) 104/61  Mean Arterial Pressure (Non-Invasive) for the past 24 hrs (Last 3 readings):   Noninvasive MAP (mmHg)   02/23/19 1126 74   02/23/19 0716 81   02/23/19 0440 88     SpO2 Percentage    02/23/19 0440 02/23/19 0716 02/23/19 1126   SpO2: 99% 100% 100%     SpO2:  [96 %-100 %] 100 %  on   ;   Device (Oxygen Therapy): room air    Body mass index is 18.99 kg/m².  Wt Readings from Last 3 Encounters:   02/22/19 51.8 kg (114 lb 2 oz)   02/14/19 56.7 kg (125 lb)   02/14/19 56.7 kg (125 lb)     ---------------------------------------------------------------------------------------------------------------------     Physical Exam:    Constitutional:  Well-developed and well-nourished.  No respiratory distress.      HENT:  Head: Normocephalic and atraumatic.  Mouth:  Moist mucous membranes.    Eyes: Left cataract.  Conjunctivae and EOM are normal.  No scleral icterus.  Neck:  Neck supple.  No JVD present.    Cardiovascular:  Normal rate, regular rhythm and normal heart sounds with no murmur. No edema.  Pulmonary/Chest: Mild bilateral wheezes. No respiratory distress, no  crackles, with normal breath sounds and good air movement.  Abdominal:  Soft.  Bowel sounds are normal.  No distension and no tenderness.   Musculoskeletal:  No edema, no tenderness, and no deformity.  No swelling or redness of joints.  Neurological:  Alert and oriented to person, place, and time.  No facial droop.  No slurred speech.   Skin:  Skin is warm and dry.  No rash noted.  No pallor.   Psychiatric:  Normal mood and affect.  Behavior is normal.    ---------------------------------------------------------------------------------------------------------------------  I have personally reviewed the EKG/Telemetry strip  ---------------------------------------------------------------------------------------------------------------------   Results from last 7 days   Lab Units 02/23/19  1134 02/23/19  0849 02/23/19  0132 02/22/19  1804   CK TOTAL U/L  --   --   --   --  41   TROPONIN I ng/mL <0.006 <0.006 <0.006   < > <0.006   MYOGLOBIN ng/mL  --   --   --   --  21.0    < > = values in this interval not displayed.           Results from last 7 days   Lab Units 02/23/19  0132 02/22/19  1944   CRP mg/dL <0.50  --    WBC 10*3/mm3 4.84 5.66   HEMOGLOBIN g/dL 11.1* 11.2*   HEMATOCRIT % 34.6* 34.4*   MCV fL 106.5* 104.2*   MCHC g/dL 32.1* 32.6*   PLATELETS 10*3/mm3 147 143     Results from last 7 days   Lab Units 02/23/19  0132 02/22/19  1804   SODIUM mmol/L 137 132*   POTASSIUM mmol/L 3.6 3.9   MAGNESIUM mg/dL 2.0 2.0   CHLORIDE mmol/L 105 102   CO2 mmol/L 25.4 24.5   BUN mg/dL 14 16   CREATININE mg/dL 0.74 0.69   EGFR IF NONAFRICN AM mL/min/1.73 81 88   CALCIUM mg/dL 9.3 9.1   GLUCOSE mg/dL 89 104   ALBUMIN g/dL 3.90 4.20   BILIRUBIN mg/dL 0.2 0.2   ALK PHOS U/L 61 69   AST (SGOT) U/L 21 23   ALT (SGPT) U/L 14 15   Estimated Creatinine Clearance: 70.2 mL/min (by C-G formula based on SCr of 0.74 mg/dL).  No results found for: AMMONIA    No results found for: HGBA1C, POCGLU  No results found for: HGBA1C  Lab Results  "  Component Value Date    TSH 6.995 (H) 02/22/2019                      Pain Management Panel     Pain Management Panel Latest Ref Rng & Units 2/22/2019    AMPHETAMINES SCREEN, URINE Negative Negative    BARBITURATES SCREEN Negative Negative    BENZODIAZEPINE SCREEN, URINE Negative Negative    BUPRENORPHINE Negative Negative    COCAINE SCREEN, URINE Negative Negative    METHADONE SCREEN, URINE Negative Negative        I have personally reviewed the above laboratory results.   ---------------------------------------------------------------------------------------------------------------------  Imaging Results (last 7 days)     Procedure Component Value Units Date/Time    XR Chest 1 View [[id number]] Collected:  02/23/19 0924     Updated:  02/23/19 0927    Narrative:       EXAMINATION: XR CHEST 1 VW-      CLINICAL INDICATION:     cp     TECHNIQUE:  XR CHEST 1 VW-      COMPARISON: NONE      FINDINGS:   Coarse interstitial markings suggestive of chronic interstitial lung  disease.  Heart and mediastinum contours are unremarkable.  No pleural effusion.  No pneumothorax.   Bony and soft tissue structures are unremarkable.       Impression:       No radiographic evidence of acute cardiac or pulmonary  disease.     This report was finalized on 2/23/2019 9:24 AM by Dr. Keven Lux MD.           I have personally reviewed the above radiology results.   ---------------------------------------------------------------------------------------------------------------------      Assessment & Plan        Assessment/Plan       ASSESSMENT:    1. Chest pain    PLAN:    The patient is a 53 year old female for presented to the ED with complaints of substernal chest pain that began yesterday morning. She reports the pain radiates down her left arm and her arm feels somewhat numb. She also reports issues at home and needing  as she has been living in a homeless shelter where she was recently \"kicked out.\" Vital signs " stable. Troponin levels negative.EKG shows sinus rhythm with premature ventricular complexes or fusion complexes and septal infarct , age undetermined.   History of alcohol abuse but normal ethanol level and she reports she has not had any alcohol for around a week. TSH elevated at 6.995. White count and CRP normal. Chest xray is unremarkable. EKG shows sinus rhythm with premature ventricular complexes or fusion complexes and septal infarct , age undetermined. Admitted to the observation unit for further evaluation and monitoring.    Cardiology consulted for abnormality with new infarct on EKG with no further workup recommended. Would recommend to follow up with her cardiologist as outpatient.     Lovenox 40 mg subcutaneous q 24 hours for DVT prophylaxis.    Echo ordered. Cardiology was consulted for acute EKG changes.     D-dimer found to be elevated and CT per PE protocol ordered.     Patient's findings and recommendations were discussed with patient, nursing staff and consulting provider      Code Status:   Code Status and Medical Interventions:   Ordered at: 02/22/19 2236     Code Status:    CPR     Medical Interventions (Level of Support Prior to Arrest):    Full     Patient seen with LIANE Goddard.     Jhon Messer PA-C  02/23/19  12:23 PM      Electronically signed by Carito Leone MD at 2/23/2019  2:27 PM       Hospital Medications (active)       Dose Frequency Start End    acetaminophen (TYLENOL) tablet 650 mg 650 mg Every 4 Hours PRN 2/22/2019     Sig - Route: Take 2 tablets by mouth Every 4 (Four) Hours As Needed for Mild Pain . - Oral    Cosign for Ordering: Accepted by Carito Leone MD on 2/23/2019  9:06 AM    amiodarone (PACERONE) tablet 200 mg 200 mg Daily 3/28/2019     Sig - Route: Take 1 tablet by mouth Daily. - Oral    apixaban (ELIQUIS) tablet 5 mg 5 mg Every 12 Hours Scheduled 4/4/2019     Sig - Route: Take 1 tablet by mouth Every 12 (Twelve) Hours. - Oral    bisacodyl (DULCOLAX)  "suppository 10 mg 10 mg Daily PRN 3/12/2019     Sig - Route: Insert 1 suppository into the rectum Daily As Needed for Constipation (if oral meds do not help). - Rectal    bisacodyl (DULCOLAX) suppository 10 mg 10 mg Daily 3/29/2019     Sig - Route: Insert 1 suppository into the rectum Daily. - Rectal    calcium carbonate (TUMS) chewable tablet 500 mg (200 mg elemental) 2 tablet 2 Times Daily PRN 2/22/2019     Sig - Route: Chew 1,000 mg 2 (Two) Times a Day As Needed for Heartburn. - Oral    Cosign for Ordering: Accepted by Carito Leone MD on 2/23/2019  9:06 AM    cyanocobalamin injection 1,000 mcg 1,000 mcg Every 30 Days 4/1/2019     Sig - Route: Inject 1 mL into the appropriate muscle as directed by prescriber Every 30 (Thirty) Days. - Intramuscular    Linked Group 1:  \"Followed by\" Linked Group Details        digoxin (LANOXIN) tablet 125 mcg 125 mcg Every 48 Hours 4/5/2019     Sig - Route: Take 1 tablet by mouth Every Other Day. - Oral    folic acid (FOLVITE) tablet 1 mg 1 mg Daily 2/23/2019     Sig - Route: Take 1 tablet by mouth Daily. - Oral    Cosign for Ordering: Accepted by Alberto Connolly MD on 2/23/2019  3:57 PM    furosemide (LASIX) tablet 10 mg 10 mg Daily 4/8/2019     Sig - Route: Take 0.5 tablets by mouth Daily. - Oral    levothyroxine (SYNTHROID, LEVOTHROID) tablet 75 mcg 75 mcg Every Early Morning 4/3/2019     Sig - Route: Take 1 tablet by mouth Every Morning. - Oral    magnesium hydroxide (MILK OF MAGNESIA) suspension 2400 mg/10mL 10 mL 10 mL Daily PRN 3/12/2019     Sig - Route: Take 10 mL by mouth Daily As Needed for Constipation. - Oral    magnesium oxide (MAGOX) tablet 400 mg 400 mg Daily 4/8/2019     Sig - Route: Take 1 tablet by mouth Daily. - Oral    Magnesium Sulfate 2 gram / 50mL Infusion (GIVE X 3 BAGS TO EQUAL 6GM TOTAL DOSE) - Mg 1.1 - 1.5 mg/dl 2 g As Needed 4/7/2019     Sig - Route: Infuse 50 mL into a venous catheter As Needed (See Administration Instructions). - " "Intravenous    Cosign for Ordering: Accepted by Sayra Milton MD on 4/7/2019 10:04 AM    Linked Group 2:  \"Or\" Linked Group Details        Magnesium Sulfate 2 gram Bolus, followed by 8 gram infusion (total Mg dose 10 grams)- Mg less than or equal to 1mg/dL 2 g As Needed 4/7/2019     Sig - Route: Infuse 50 mL into a venous catheter As Needed (See Administration Instructions). - Intravenous    Cosign for Ordering: Accepted by Sayra Milton MD on 4/7/2019 10:04 AM    Linked Group 2:  \"Or\" Linked Group Details        Magnesium Sulfate 4 gram infusion- Mg 1.6-1.9 mg/dL 4 g As Needed 4/7/2019     Sig - Route: Infuse 100 mL into a venous catheter As Needed (See Administration Instructions). - Intravenous    Cosign for Ordering: Accepted by Sayra Milton MD on 4/7/2019 10:04 AM    Linked Group 2:  \"Or\" Linked Group Details        metoprolol tartrate (LOPRESSOR) tablet 75 mg 75 mg Every 12 Hours Scheduled 3/28/2019     Sig - Route: Take 75 mg by mouth Every 12 (Twelve) Hours. - Oral    mexiletine (MEXITIL) capsule 150 mg 150 mg Every 8 Hours Scheduled 3/21/2019     Sig - Route: Take 1 capsule by mouth Every 8 (Eight) Hours. - Oral    nitroglycerin (NITROSTAT) SL tablet 0.4 mg 0.4 mg Every 5 Minutes PRN 2/22/2019     Sig - Route: Place 1 tablet under the tongue Every 5 (Five) Minutes As Needed for Chest Pain (Chest Pain With Systolic Blood Pressure Greater Than 100). - Sublingual    Cosign for Ordering: Accepted by Carito Loene MD on 2/23/2019  9:06 AM    OLANZapine (zyPREXA) tablet 5 mg 5 mg Nightly 4/2/2019     Sig - Route: Take 1 tablet by mouth Every Night. - Oral    pantoprazole (PROTONIX) EC tablet 40 mg 40 mg Every Early Morning 4/7/2019     Sig - Route: Take 1 tablet by mouth Every Morning. - Oral    Cosign for Ordering: Accepted by Sayra Milton MD on 4/6/2019 12:12 PM    Pharmacy Consult  Continuous PRN 4/4/2019     Sig - Route: Continuous As Needed for Consult. - Does not apply    " sennosides-docusate sodium (SENOKOT-S) 8.6-50 MG tablet 2 tablet 2 tablet Nightly 2019     Sig - Route: Take 2 tablets by mouth Every Night. - Oral    sodium chloride 0.9 % flush 3 mL 3 mL Every 12 Hours Scheduled 2019     Sig - Route: Infuse 3 mL into a venous catheter Every 12 (Twelve) Hours. - Intravenous    Cosign for Ordering: Accepted by Carito Leone MD on 2019  9:06 AM    sodium chloride 0.9 % flush 3 mL 3 mL Every 12 Hours Scheduled 2019     Sig - Route: Infuse 3 mL into a venous catheter Every 12 (Twelve) Hours. - Intravenous    sodium chloride 0.9 % flush 3-10 mL 3-10 mL As Needed 2019     Sig - Route: Infuse 3-10 mL into a venous catheter As Needed for Line Care. - Intravenous    Cosign for Ordering: Accepted by Carito Leone MD on 2019  9:06 AM    sodium chloride 0.9 % flush 5 mL 5 mL As Needed 2019     Sig - Route: Infuse 5 mL into a venous catheter As Needed for Line Care (After Medication Administration or Blood Draw). - Intravenous    spironolactone (ALDACTONE) tablet 25 mg 25 mg Daily With Lunch 3/25/2019     Sig - Route: Take 1 tablet by mouth Daily With Lunch. - Oral    thiamine (VITAMIN B-1) tablet 100 mg 100 mg Daily 2019     Sig - Route: Take 1 tablet by mouth Daily. - Oral    Cosign for Ordering: Accepted by Alberto Connolly MD on 2019  3:57 PM            Lab Results (last 24 hours)     ** No results found for the last 24 hours. **        Imaging Results (last 24 hours)     ** No results found for the last 24 hours. **        ECG/EMG Results (last 24 hours)     ** No results found for the last 24 hours. **           Physician Progress Notes (last 24 hours) (Notes from 2019 10:15 AM through 2019 10:15 AM)      Beronica Celaya MD at 2019 11:45 AM              [facility]     Patient Identification:  Name:  Altagracia Christine  Age:  53 y.o.  Sex:  female  :  1965  MRN:   [id number]  Visit Number:  [id number]  Primary Care Provider:  Provider, No Known    Length of stay:  53    Subjective:   Patient seen and examined, no new events she is feeling better compared to yesterday no nausea or vomiting, awaiting on placement still discussed with the       Chief Complaint: Generalized weakness  ----------------------------------------------------------------------------------------------------------------------  Current Hospital Meds:    amiodarone 200 mg Oral Daily   apixaban 5 mg Oral Q12H   bisacodyl 10 mg Rectal Daily   cyanocobalamin 1,000 mcg Intramuscular Q30 Days   digoxin 125 mcg Oral Q48H   folic acid 1 mg Oral Daily   furosemide 10 mg Oral Daily   levothyroxine 75 mcg Oral Q AM   magnesium oxide 400 mg Oral Daily   metoprolol tartrate 75 mg Oral Q12H   mexiletine 150 mg Oral Q8H   OLANZapine 5 mg Oral Nightly   pantoprazole 40 mg Oral Q AM   sennosides-docusate sodium 2 tablet Oral Nightly   sodium chloride 3 mL Intravenous Q12H   sodium chloride 3 mL Intravenous Q12H   spironolactone 25 mg Oral Daily With Lunch   thiamine 100 mg Oral Daily       Pharmacy Consult      ----------------------------------------------------------------------------------------------------------------------  Vital Signs:  Temp:  [97.9 °F (36.6 °C)-99.6 °F (37.6 °C)] 98 °F (36.7 °C)  Heart Rate:  [88-92] 90  Resp:  [18] 18  BP: (101-125)/(54-64) 101/54       Tele:       04/15/19  0146 04/16/19  0248 04/17/19  0405   Weight: 51.3 kg (113 lb 2 oz) 50.9 kg (112 lb 3 oz) 50.9 kg (112 lb 3 oz)     Body mass index is 18.67 kg/m².    Intake/Output Summary (Last 24 hours) at 4/17/2019 1145  Last data filed at 4/17/2019 0405  Gross per 24 hour   Intake 600 ml   Output 600 ml   Net 0 ml     Diet Soft Texture; Chopped; Thin; Daily Fluid Restriction; Other; 1,800  ----------------------------------------------------------------------------------------------------------------------    Physical  exam:  General: Comfortable,awake, alert, not in distress.    Skin:  Skin is warm and dry. No rash noted.  She is pale.  No skin break  HENT:  Head:  Normocephalic and atraumatic.  Mouth:  Moist mucous membranes.    Eyes:  Conjunctivae and EOM are normal.   No scleral icterus.  Left eye completely opacified, right eye has cataract  Neck:  Neck supple.  No JVD present.    Pulmonary/Chest:  No respiratory distress, no wheezes, no crackles, with normal breath sounds and good air movement.  Cardiovascular:  Normal rate, regular rhythm and normal heart sounds with no murmur.  Abdominal:  Soft.  Bowel sounds are normal.  No distension and no tenderness.   Extremities:  No edema, no tenderness, and no deformity.  No red or swollen joints anywhere.  Strong pulses in all 4 extremities with no clubbing, no cyanosis, no edema.  Neurological: No lateralization          Results from last 7 days   Lab Units 04/14/19  1045 04/11/19  1007   WBC 10*3/mm3 5.66 7.23   HEMOGLOBIN g/dL 9.4* 10.3*   HEMATOCRIT % 29.4* 32.2*   MCV fL 103.2* 101.9*   MCHC g/dL 32.0 32.0   PLATELETS 10*3/mm3 168 207               Invalid input(s): PROTEstimated Creatinine Clearance: 62.3 mL/min (by C-G formula based on SCr of 0.82 mg/dL).    No results found for: AMMONIA         ----------------------------------------------------------------------------------------------------------------------    Assessment and Plan:  -Functional decline with physical deconditioning, unsafe discharge, patient is blind and homeless  -Paroxysmal atrial fibrillation, relatively stable  -History of nonsustained V. Tach  -Diastolic dysfunction currently compensated  -Acquired hypothyroidism  -Right eye cataract with complete left eye blindness  -Episodes of confabulation most likely multifactorial    --Awaiting nursing home placement still, hoping to be soon hopefully, will continue other therapies for now    Mhd Lis Celaya MD  04/17/19  11:45 AM    Electronically signed  by Beronica Celaya MD at 2019 11:46 AM       Medical Student Notes (last 24 hours) (Notes from 2019 10:15 AM through 2019 10:15 AM)     No notes of this type exist for this encounter.        Consult Notes (last 24 hours) (Notes from 2019 10:15 AM through 2019 10:15 AM)     No notes of this type exist for this encounter.        Nutrition Notes (last 24 hours) (Notes from 2019 10:15 AM through 2019 10:15 AM)     No notes of this type exist for this encounter.           Physical Therapy Notes (last 24 hours) (Notes from 2019 10:15 AM through 2019 10:15 AM)      Kyung Lr PT at 2019 11:11 AM  Version 1 of 1         Problem: Patient Care Overview  Goal: Plan of Care Review  Outcome: Ongoing (interventions implemented as appropriate)   19 1111   Plan of Care Review   Progress improving   Coping/Psychosocial   Plan of Care Reviewed With patient           Electronically signed by Kyung Lr PT at 2019 11:11 AM     Kyung Lr PT at 2019 11:12 AM  Version 1 of 1         Acute Care - Physical Therapy Treatment Note  VLADIMIR Saravia     Patient Name: Altagracia King  : 1965  MRN: [id number]  Today's Date: 2019  Onset of Illness/Injury or Date of Surgery: 19(original admit date)  Date of Referral to PT: 19  Referring Physician: Gui    Admit Date: 2019    Visit Dx:    ICD-10-CM ICD-9-CM   1. Chest pain, unspecified type R07.9 786.50   2. Acute cystitis without hematuria N30.00 595.0     Patient Active Problem List   Diagnosis   • Chest pain   • PAF (paroxysmal atrial fibrillation) (CMS/HCC)   • ETOH abuse   • Acute respiratory failure with hypoxia (CMS/HCC)       Therapy Treatment    Rehabilitation Treatment Summary     Row Name 19 1107             Treatment Time/Intention    Discipline  physical therapist  -CT      Document Type  therapy note (daily note)  -CT      Subjective Information  no complaints  -CT       Mode of Treatment  individual therapy;physical therapy  -CT      Therapy Frequency (PT Clinical Impression)  3 times/wk 3-5 times/ week per priority policy  -CT      Patient Effort  adequate  -CT      Patient Response to Treatment  Pt tolerated treatment session well with rest breaks provided as needed. Pt with increased gait distance today.   -CT      Recorded by [CT] Kyung Lr, PT 04/17/19 1110      Row Name 04/17/19 1107             Cognitive Assessment/Intervention- PT/OT    Orientation Status (Cognition)  oriented to;person  -CT      Follows Commands (Cognition)  follows one step commands;physical/tactile prompts required;repetition of directions required;verbal cues/prompting required  -CT      Recorded by [CT] Kyung Lr, PT 04/17/19 1110      Row Name 04/17/19 1107             Safety Issues, Functional Mobility    Impairments Affecting Function (Mobility)  balance;coordination;endurance/activity tolerance;strength;visual/perceptual  -CT      Recorded by [CT] Kyung Lr, PT 04/17/19 1110      Row Name 04/17/19 1107             Bed Mobility Assessment/Treatment    Bed Mobility Assessment/Treatment  bed mobility (all) activities  -CT      [place] Level (Bed Mobility)  contact guard assist  -CT      Bed Mobility, Safety Issues  cognitive deficits limit understanding  -CT      Assistive Device (Bed Mobility)  bed rails  -CT      Recorded by [CT] Kyung Lr, PT 04/17/19 1110      Row Name 04/17/19 1107             Transfer Assessment/Treatment    Transfer Assessment/Treatment  sit-stand transfer;stand-sit transfer;stand pivot/stand step transfer  -CT      Recorded by [CT] Kyung Lr, PT 04/17/19 1110      Row Name 04/17/19 1107             Sit-Stand Transfer    Sit-Stand [place] (Transfers)  contact guard;verbal cues;nonverbal cues (demo/gesture)  -CT      Assistive Device (Sit-Stand Transfers)  walker, front-wheeled  -CT      Recorded by [CT] Kyung Lr, PT 04/17/19  1110      Row Name 04/17/19 1107             Stand-Sit Transfer    Stand-Sit [place] (Transfers)  contact guard;verbal cues;nonverbal cues (demo/gesture)  -CT      Assistive Device (Stand-Sit Transfers)  walker, front-wheeled  -CT      Recorded by [CT] Kyung Lr, PT 04/17/19 1110      Row Name 04/17/19 1107             Gait/Stairs Assessment/Training    Gait/Stairs Assessment/Training  gait/ambulation independence;gait/ambulation assistive device;distance ambulated;gait pattern;gait deviations;maintains weight-bearing status  -CT      [place] Level (Gait)  minimum assist (75% patient effort);2 person assist;verbal cues;nonverbal cues (demo/gesture) x 2 for use of RW  -CT      Assistive Device (Gait)  walker, front-wheeled  -CT      Distance in Feet (Gait)  200  -CT      Pattern (Gait)  step-to  -CT      Deviations/Abnormal Patterns (Gait)  base of support, narrow  -CT      Bilateral Gait Deviations  forward flexed posture  -CT      Recorded by [CT] Kyung Lr, PT 04/17/19 1110      Row Name 04/17/19 1107             Positioning and Restraints    Pre-Treatment Position  in bed  -CT      Post Treatment Position  bed  -CT      In Bed  sitting EOB;call light within reach;encouraged to call for assist;notified nsg  -CT      Recorded by [CT] Kyung Lr, PT 04/17/19 1110      Row Name 04/17/19 1107             Pain Scale: FACES Pre/Post-Treatment    Pain: FACES Scale, Pretreatment  0-->no hurt  -CT      Pain: FACES Scale, Post-Treatment  0-->no hurt  -CT      Recorded by [CT] Kyung Lr, PT 04/17/19 1110      Row Name 04/17/19 1107             Coping    Observed Emotional State  attention-seeking behavior  -CT      Verbalized Emotional State  acceptance  -CT      Recorded by [CT] Kyung Lr, PT 04/17/19 1110      Row Name 04/17/19 1107             Plan of Care Review    Plan of Care Reviewed With  patient  -CT      Recorded by [CT] Kyung Lr, PT 04/17/19 1110      Row Name 04/17/19  1107             Outcome Summary/Treatment Plan (PT)    Daily Summary of Progress (PT)  progress toward functional goals is good  -CT      Anticipated Discharge Disposition (PT)  skilled nursing facility  -CT      Recorded by [CT] Kyung Lr, PT 04/17/19 1110        User Key  (r) = Recorded By, (t) = Taken By, (c) = Cosigned By    Initials Name Effective Dates Discipline    CT Kyung Lr, PT 04/03/18 -  PT                   Physical Therapy Education     Title: PT OT SLP Therapies (Done)     Topic: Physical Therapy (Done)     Point: Mobility training (Done)     Learning Progress Summary           Patient Acceptance, E,TB, VU by CT at 4/17/2019 11:11 AM    Acceptance, E, VU by EB at 4/17/2019  9:55 AM    Acceptance, E,TB, VU by GAVINO at 4/16/2019  7:53 AM    Acceptance, E,TB, VU,NR by CT at 4/15/2019  2:01 PM    Acceptance, E,TB, VU by GAVINO at 4/15/2019  9:02 AM    Acceptance, E,TB, VU by GAVINO at 4/14/2019  8:06 AM    Acceptance, E,TB, NR by CT at 4/12/2019 11:13 AM    Acceptance, E,TB, NR by CT at 4/10/2019  3:24 PM    Acceptance, E,TB, VU,NR by CT at 4/9/2019  2:08 PM    Acceptance, E, VU by AM at 4/9/2019 12:08 PM    Acceptance, E,TB, NR by CT at 4/8/2019  2:48 PM    Acceptance, E,TB, VU by GAVINO at 4/8/2019  8:21 AM    Acceptance, E, NL,VU by SM at 4/8/2019  2:37 AM    Acceptance, E,TB, VU by GAVINO at 4/7/2019  8:53 AM    Acceptance, E, NL by SM at 4/6/2019 11:18 PM    Acceptance, E, NL by SM at 4/5/2019 10:02 PM    Acceptance, E,D, NR by AG at 4/4/2019  6:23 PM    Acceptance, E, NR by KB at 4/2/2019  6:10 PM    Acceptance, E, VU by SM at 4/2/2019 12:28 AM    Acceptance, E, VU,NR by AG1 at 4/1/2019 11:20 AM    Acceptance, E, NR by KB at 3/31/2019  9:45 PM    Acceptance, E, VU by MC at 3/31/2019  2:55 AM    Acceptance, E, VU by MC at 3/31/2019  2:45 AM    Acceptance, E, VU,NR by MC at 3/30/2019  1:57 AM    Acceptance, E,TB, VU,NR by CT at 3/29/2019 10:45 AM    Acceptance, E, NR by EA at 3/28/2019 10:44 PM     Acceptance, E,TB, NR,NL by CT at 3/28/2019  2:19 PM    Acceptance, E,TB, VU by JS at 3/28/2019 12:47 AM    Acceptance, E, VU by AM at 3/27/2019  4:07 PM    Acceptance, E, NR by EA at 3/26/2019  9:47 PM    Acceptance, E, VU by LAZARO at 3/20/2019 10:09 PM    Acceptance, E, VU by AW at 3/16/2019  6:34 PM    Acceptance, E,TB, VU by ILYA at 3/14/2019 12:21 AM    Comment:  PT unable to rcve teaching at this time    Acceptance, E,TB, VU by ILYA at 3/12/2019 11:00 PM    Comment:  PT unable to rcve teaching at this time    Nonacceptance, E, NL by SB at 3/12/2019  9:17 AM    Comment:  pt intubated and sedated; no one present at the bedside    Acceptance, E, NR by EV at 3/11/2019  9:02 PM    Comment:  intubated/sedated. No family or caretaker at bedside    Nonacceptance, E, NL by WS at 3/9/2019  9:06 AM    Comment:  pt sedated and intubated    Acceptance, E, VU by WS at 3/9/2019  9:05 AM    Acceptance, E,TB, VU by ILYA at 3/8/2019  9:46 PM    Comment:  PT unable to rcve teaching at this time    Nonacceptance, E, NL by WS at 3/8/2019  4:13 PM    Acceptance, E, VU by LT at 3/7/2019 10:13 AM    Acceptance, E, VU by BC at 3/5/2019  5:55 PM    Acceptance, E,TB, VU by CL at 3/4/2019  7:53 PM    Acceptance, E,TB, VU by CL at 3/4/2019  7:52 PM    Acceptance, E,TB, VU by CT at 3/1/2019  3:23 PM    Acceptance, E,TB, VU,NR by KB1 at 2/26/2019  3:13 PM    Acceptance, E,TB, NR by CT at 2/25/2019  3:25 PM   Family Acceptance, E, VU by AW at 3/15/2019 12:08 PM    Acceptance, E, VU by SB at 3/13/2019 11:01 AM   Significant Other Acceptance, E, VU by WS at 3/9/2019  9:05 AM                   Point: Home exercise program (Done)     Learning Progress Summary           Patient Acceptance, E,TB, VU by CT at 4/17/2019 11:11 AM    Acceptance, E, VU by EB at 4/17/2019  9:55 AM    Acceptance, E,TB, VU by GAVINO at 4/16/2019  7:53 AM    Acceptance, E,TB, VU,NR by CT at 4/15/2019  2:01 PM    Acceptance, E,TB, VU by GAVINO at 4/15/2019  9:02 AM    Acceptance, E,TB, VU by  GAVINO at 4/14/2019  8:06 AM    Acceptance, E,TB, NR by CT at 4/12/2019 11:13 AM    Acceptance, E,TB, NR by CT at 4/10/2019  3:24 PM    Acceptance, E,TB, VU,NR by CT at 4/9/2019  2:08 PM    Acceptance, E, VU by AM at 4/9/2019 12:08 PM    Acceptance, E,TB, NR by CT at 4/8/2019  2:48 PM    Acceptance, E,TB, VU by GAVINO at 4/8/2019  8:21 AM    Acceptance, E, NL,VU by SM at 4/8/2019  2:37 AM    Acceptance, E,TB, VU by GAVINO at 4/7/2019  8:53 AM    Acceptance, E, NL by SM at 4/6/2019 11:18 PM    Acceptance, E, NL by SM at 4/5/2019 10:02 PM    Acceptance, E,D, NR by AG at 4/4/2019  6:23 PM    Acceptance, E, NR by KB at 4/2/2019  6:10 PM    Acceptance, E, VU by SM at 4/2/2019 12:28 AM    Acceptance, E, VU,NR by AG1 at 4/1/2019 11:20 AM    Acceptance, E, NR by KB at 3/31/2019  9:45 PM    Acceptance, E, VU by MC at 3/31/2019  2:55 AM    Acceptance, E, VU by MC at 3/31/2019  2:45 AM    Acceptance, E, VU,NR by MC at 3/30/2019  1:57 AM    Acceptance, E,TB, VU,NR by CT at 3/29/2019 10:45 AM    Acceptance, E, NR by EA at 3/28/2019 10:44 PM    Acceptance, E,TB, NR,NL by CT at 3/28/2019  2:19 PM    Acceptance, E,TB, VU by JS at 3/28/2019 12:47 AM    Acceptance, E, VU by AM at 3/27/2019  4:07 PM    Acceptance, E, NR by EA at 3/26/2019  9:47 PM    Acceptance, E, VU by LAZARO at 3/20/2019 10:09 PM    Acceptance, E, VU by AW at 3/16/2019  6:34 PM    Acceptance, E,TB, VU by ILYA at 3/14/2019 12:21 AM    Comment:  PT unable to rcve teaching at this time    Acceptance, E,TB, VU by ILYA at 3/12/2019 11:00 PM    Comment:  PT unable to rcve teaching at this time    Nonacceptance, E, NL by SB at 3/12/2019  9:17 AM    Comment:  pt intubated and sedated; no one present at the bedside    Acceptance, E, NR by EV at 3/11/2019  9:02 PM    Comment:  intubated/sedated. No family or caretaker at bedside    Nonacceptance, E, NL by WS at 3/9/2019  9:06 AM    Comment:  pt sedated and intubated    Acceptance, E, VU by WS at 3/9/2019  9:05 AM    Acceptance, E,TB, VU by ILYA  at 3/8/2019  9:46 PM    Comment:  PT unable to rcve teaching at this time    Nonacceptance, E, NL by WS at 3/8/2019  4:13 PM    Acceptance, E, VU by LT at 3/7/2019 10:13 AM    Acceptance, E, VU by BC at 3/5/2019  5:55 PM    Acceptance, E,TB, VU by CL at 3/4/2019  7:53 PM    Acceptance, E,TB, VU by CL at 3/4/2019  7:52 PM    Acceptance, E,TB, VU by CT at 3/1/2019  3:23 PM    Acceptance, E,TB, VU,NR by KB1 at 2/26/2019  3:13 PM    Acceptance, E,TB, NR by CT at 2/25/2019  3:25 PM   Family Acceptance, E, VU by AW at 3/15/2019 12:08 PM    Acceptance, E, VU by SB at 3/13/2019 11:01 AM   Significant Other Acceptance, E, VU by WS at 3/9/2019  9:05 AM                   Point: Body mechanics (Done)     Learning Progress Summary           Patient Acceptance, E,TB, VU by CT at 4/17/2019 11:11 AM    Acceptance, E, VU by EB at 4/17/2019  9:55 AM    Acceptance, E,TB, VU by GAVINO at 4/16/2019  7:53 AM    Acceptance, E,TB, VU,NR by CT at 4/15/2019  2:01 PM    Acceptance, E,TB, VU by GAVINO at 4/15/2019  9:02 AM    Acceptance, E,TB, VU by GAVINO at 4/14/2019  8:06 AM    Acceptance, E,TB, NR by CT at 4/12/2019 11:13 AM    Acceptance, E,TB, NR by CT at 4/10/2019  3:24 PM    Acceptance, E,TB, VU,NR by CT at 4/9/2019  2:08 PM    Acceptance, E, VU by AM at 4/9/2019 12:08 PM    Acceptance, E,TB, NR by CT at 4/8/2019  2:48 PM    Acceptance, E,TB, VU by GAVINO at 4/8/2019  8:21 AM    Acceptance, E, NL,VU by SM at 4/8/2019  2:37 AM    Acceptance, E,TB, VU by GAVINO at 4/7/2019  8:53 AM    Acceptance, E, NL by SM at 4/6/2019 11:18 PM    Acceptance, E, NL by SM at 4/5/2019 10:02 PM    Acceptance, E,D, NR by AG at 4/4/2019  6:23 PM    Acceptance, E, NR by KB at 4/2/2019  6:10 PM    Acceptance, E, VU by SM at 4/2/2019 12:28 AM    Acceptance, E, VU,NR by CATALINO1 at 4/1/2019 11:20 AM    Acceptance, E, NR by DAGO at 3/31/2019  9:45 PM    Acceptance, E, VU by  at 3/31/2019  2:55 AM    Acceptance, E, VU by  at 3/31/2019  2:45 AM    Acceptance, E, VU,NR by  at 3/30/2019   1:57 AM    Acceptance, E,TB, VU,NR by CT at 3/29/2019 10:45 AM    Acceptance, E, NR by EA at 3/28/2019 10:44 PM    Acceptance, E,TB, NR,NL by CT at 3/28/2019  2:19 PM    Acceptance, E,TB, VU by JS at 3/28/2019 12:47 AM    Acceptance, E, VU by AM at 3/27/2019  4:07 PM    Acceptance, E, NR by EA at 3/26/2019  9:47 PM    Acceptance, E, VU by LAZARO at 3/20/2019 10:09 PM    Acceptance, E, VU by AW at 3/16/2019  6:34 PM    Acceptance, E,TB, VU by ILYA at 3/14/2019 12:21 AM    Comment:  PT unable to rcve teaching at this time    Acceptance, E,TB, VU by ILYA at 3/12/2019 11:00 PM    Comment:  PT unable to rcve teaching at this time    Nonacceptance, E, NL by SB at 3/12/2019  9:17 AM    Comment:  pt intubated and sedated; no one present at the bedside    Acceptance, E, NR by EV at 3/11/2019  9:02 PM    Comment:  intubated/sedated. No family or caretaker at bedside    Nonacceptance, E, NL by WS at 3/9/2019  9:06 AM    Comment:  pt sedated and intubated    Acceptance, E, VU by WS at 3/9/2019  9:05 AM    Acceptance, E,TB, VU by ILYA at 3/8/2019  9:46 PM    Comment:  PT unable to rcve teaching at this time    Nonacceptance, E, NL by WS at 3/8/2019  4:13 PM    Acceptance, E, VU by LT at 3/7/2019 10:13 AM    Acceptance, E, VU by BC at 3/5/2019  5:55 PM    Acceptance, E,TB, VU by CL at 3/4/2019  7:53 PM    Acceptance, E,TB, VU by CL at 3/4/2019  7:52 PM    Acceptance, E,TB, VU by CT at 3/1/2019  3:23 PM    Acceptance, E,TB, VU,NR by KB1 at 2/26/2019  3:13 PM    Acceptance, E,TB, NR by CT at 2/25/2019  3:25 PM   Family Acceptance, E, VU by AW at 3/15/2019 12:08 PM    Acceptance, E, VU by SB at 3/13/2019 11:01 AM   Significant Other Acceptance, E, VU by WS at 3/9/2019  9:05 AM                   Point: Precautions (Done)     Learning Progress Summary           Patient Acceptance, E,TB, VU by CT at 4/17/2019 11:11 AM    Acceptance, E, VU by EB at 4/17/2019  9:55 AM    Acceptance, E,TB, VU by GAVINO at 4/16/2019  7:53 AM    Acceptance, E,TB, VU,NR  by CT at 4/15/2019  2:01 PM    Acceptance, E,TB, VU by GAVINO at 4/15/2019  9:02 AM    Acceptance, E,TB, VU by GAVINO at 4/14/2019  8:06 AM    Acceptance, E,TB, NR by CT at 4/12/2019 11:13 AM    Acceptance, E,TB, NR by CT at 4/10/2019  3:24 PM    Acceptance, E,TB, VU,NR by CT at 4/9/2019  2:08 PM    Acceptance, E, VU by AM at 4/9/2019 12:08 PM    Acceptance, E,TB, NR by CT at 4/8/2019  2:48 PM    Acceptance, E,TB, VU by GAVINO at 4/8/2019  8:21 AM    Acceptance, E, NL,VU by SM at 4/8/2019  2:37 AM    Acceptance, E,TB, VU by GAVINO at 4/7/2019  8:53 AM    Acceptance, E, NL by SM at 4/6/2019 11:18 PM    Acceptance, E, NL by SM at 4/5/2019 10:02 PM    Acceptance, E,D, NR by AG at 4/4/2019  6:23 PM    Acceptance, E, NR by KB at 4/2/2019  6:10 PM    Acceptance, E, VU by SM at 4/2/2019 12:28 AM    Acceptance, E, VU,NR by AG1 at 4/1/2019 11:20 AM    Acceptance, E, NR by KB at 3/31/2019  9:45 PM    Acceptance, E, VU by MC at 3/31/2019  2:55 AM    Acceptance, E, VU by MC at 3/31/2019  2:45 AM    Acceptance, E, VU,NR by MC at 3/30/2019  1:57 AM    Acceptance, E,TB, VU,NR by CT at 3/29/2019 10:45 AM    Acceptance, E, NR by EA at 3/28/2019 10:44 PM    Acceptance, E,TB, NR,NL by CT at 3/28/2019  2:19 PM    Acceptance, E,TB, VU by JS at 3/28/2019 12:47 AM    Acceptance, E, VU by AM at 3/27/2019  4:07 PM    Acceptance, E, NR by EA at 3/26/2019  9:47 PM    Acceptance, E, VU by LAZARO at 3/20/2019 10:09 PM    Acceptance, E, VU by AW at 3/16/2019  6:34 PM    Acceptance, E,TB, VU by ILYA at 3/14/2019 12:21 AM    Comment:  PT unable to rcve teaching at this time    Acceptance, E,TB, VU by ILYA at 3/12/2019 11:00 PM    Comment:  PT unable to rcve teaching at this time    Nonacceptance, E, NL by SB at 3/12/2019  9:17 AM    Comment:  pt intubated and sedated; no one present at the bedside    Acceptance, E, NR by EV at 3/11/2019  9:02 PM    Comment:  intubated/sedated. No family or caretaker at bedside    Nonacceptance, E, NL by WS at 3/9/2019  9:06 AM     Comment:  pt sedated and intubated    Acceptance, E, VU by WS at 3/9/2019  9:05 AM    Acceptance, E,TB, VU by ILYA at 3/8/2019  9:46 PM    Comment:  PT unable to rcve teaching at this time    Nonacceptance, E, NL by WS at 3/8/2019  4:13 PM    Acceptance, E, VU by LT at 3/7/2019 10:13 AM    Acceptance, E, VU by BC at 3/5/2019  5:55 PM    Acceptance, E,TB, VU by CL at 3/4/2019  7:53 PM    Acceptance, E,TB, VU by CL at 3/4/2019  7:52 PM    Acceptance, E,TB, VU by CT at 3/1/2019  3:23 PM    Acceptance, E,TB, VU,NR by KB1 at 2/26/2019  3:13 PM    Acceptance, E,TB, NR by CT at 2/25/2019  3:25 PM   Family Acceptance, E, VU by AW at 3/15/2019 12:08 PM    Acceptance, E, VU by SB at 3/13/2019 11:01 AM   Significant Other Acceptance, E, VU by WS at 3/9/2019  9:05 AM                               User Key     Initials Effective Dates Name Provider Type Discipline    SB 06/16/16 -  Kathy Rowell RN Registered Nurse Nurse    ILYA 06/16/16 -  Alberto Mendoza, RN Registered Nurse Nurse    LT 06/16/16 -  Denae Carvajal, RN Registered Nurse Nurse    EA 06/16/16 -  Mary Blatnon, RN Registered Nurse Nurse    KB 06/16/16 -  Lorena Garcia, RN Registered Nurse Nurse    AW 06/16/16 -  Heather Godinez, RN Registered Nurse Nurse    WS 06/16/16 -  Keisha Messer, RN Registered Nurse Nurse    EB 06/16/16 -  Piedad Holder, RN Registered Nurse Nurse    AM 06/16/16 -  Madeline Santiago, RN Registered Nurse Nurse    BC 03/14/16 -  Ekaterina Cifuentes, PT Physical Therapist PT    AG 04/03/18 -  Parisa Orellana, PT Physical Therapist PT    CT 04/03/18 -  Kyung Lr, PT Physical Therapist PT    AG1 06/16/16 -  Ayana Corbin, RN Registered Nurse Nurse    CL 07/23/18 -  Rj Godfrey, RN Registered Nurse Nurse    KB1 12/20/17 -  Ekaterina Parry PTA Physical Therapy Assistant PT    LAZARO 05/22/18 -  Renata Mcbride, RN Registered Nurse Nurse     09/06/18 -  Collett, Morgan, RN  Registered Nurse Nurse    SM 09/12/18 -  Sean Kohler, RN Registered Nurse Nurse    GAVINO 10/18/18 -  Marko Cifuentes, RN Registered Nurse Nurse    EV 10/26/18 -  Corry Bagley, RN Registered Nurse Nurse    JS 11/26/18 -  Sg Smith, RN Registered Nurse Nurse                PT Recommendation and Plan  Anticipated Discharge Disposition (PT): skilled nursing facility  Planned Therapy Interventions (PT Eval): balance training, gait training, bed mobility training, home exercise program, manual therapy techniques, motor coordination training, neuromuscular re-education, patient/family education, postural re-education, ROM (range of motion), stair training, strengthening, stretching, transfer training  Therapy Frequency (PT Clinical Impression): 3 times/wk(3-5 times/ week per priority policy)  Outcome Summary/Treatment Plan (PT)  Daily Summary of Progress (PT): progress toward functional goals is good  Anticipated Equipment Needs at Discharge (PT): front wheeled walker  Anticipated Discharge Disposition (PT): skilled nursing facility  Plan of Care Reviewed With: patient  Progress: improving  Outcome Summary: Pt met all goals at time of progress note. New goals established.   Outcome Measures     Row Name 04/15/19 1400             How much help from another person do you currently need...    Turning from your back to your side while in flat bed without using bedrails?  4  -CT      Moving from lying on back to sitting on the side of a flat bed without bedrails?  4  -CT      Moving to and from a bed to a chair (including a wheelchair)?  3  -CT      Standing up from a chair using your arms (e.g., wheelchair, bedside chair)?  3  -CT      Climbing 3-5 steps with a railing?  3  -CT      To walk in hospital room?  3  -CT      AM-PAC 6 Clicks Score  20  -CT         Functional Assessment    Outcome Measure Options  AM-PAC 6 Clicks Basic Mobility (PT)  -CT        User Key  (r) = Recorded By, (t) = Taken By, (c) = Cosigned By     Initials Name Provider Type    CT Kyung Lr PT Physical Therapist         Time Calculation:   PT Charges     Row Name 04/17/19 1111             Time Calculation    PT Received On  04/17/19  -CT      PT - Next Appointment  04/18/19  -CT      PT Goal Re-Cert Due Date  04/23/19  -CT         Time Calculation- PT    Total Timed Code Minutes- PT  28 minute(s)  -CT         Timed Charges    24585 - Gait Training Minutes   20  -CT      57416 - PT Therapeutic Activity Minutes  8  -CT        User Key  (r) = Recorded By, (t) = Taken By, (c) = Cosigned By    Initials Name Provider Type    CT Kyung Lr PT Physical Therapist        Therapy Charges for Today     Code Description Service Date Service Provider Modifiers Qty    87199654356 HC GAIT TRAINING EA 15 MIN 4/17/2019 Kyung Lr, PT GP 1    68823825312 HC PT THERAPEUTIC ACT EA 15 MIN 4/17/2019 Kyung Lr, PT GP 1    56556511038 HC PT THER SUPP EA 15 MIN 4/17/2019 Kyung Lr, PT GP 2          PT G-Codes  Outcome Measure Options: AM-PAC 6 Clicks Basic Mobility (PT)  AM-PAC 6 Clicks Score: 20  Score: 12    Kyung Lr PT  4/17/2019         Electronically signed by Kyung Lr PT at 4/17/2019 11:13 AM       Occupational Therapy Notes (last 24 hours) (Notes from 4/17/2019 10:15 AM through 4/18/2019 10:15 AM)     No notes of this type exist for this encounter.        Speech Language Pathology Notes (last 24 hours) (Notes from 4/17/2019 10:15 AM through 4/18/2019 10:15 AM)     No notes of this type exist for this encounter.        Respiratory Therapy Notes (last 24 hours) (Notes from 4/17/2019 10:15 AM through 4/18/2019 10:15 AM)     No notes of this type exist for this encounter.          Discharge Summary     No notes of this type exist for this encounter.        Discharge Order (From admission, onward)    Start     Ordered    04/18/19 1004  Discharge patient  Once     Expected Discharge Date:  04/18/19    Discharge Disposition:  Skilled  "Nursing Facility (DC - External)    Physician of Record for Attribution - Please select from Treatment Team:  FRANCES GANN [[id number]]    Review needed by CMO to determine Physician of Record:  No       Question Answer Comment   Physician of Record for Attribution - Please select from Treatment Team FRANCES GANN    Review needed by CMO to determine Physician of Record No        04/18/19 1013          Altagracia King MRN: [id number]      2/22/2019 - 4/18/2019     [address]    Your Next Steps     Ask     ·   Ask how to get these medications   ? amiodarone   ? apixaban   ? aspirin   ? cyanocobalamin   ? digoxin   ? folic acid   ? furosemide   ? levothyroxine   ? Metoprolol Tartrate   ? mexiletine   ? OLANZapine   ? pantoprazole   ? spironolactone   ? thiamine   Read     ·   Read these attachments   ? Acute Pain  Adult (English)   ? Cataract (English)   ? Urinary Tract Infection  Adult  Easy-to-Read (English)   [employer]ulse Sign-Up     Send messages to your doctor, view your test results, renew your prescriptions, schedule appointments, and more.   Go to [email or web address], click \"Sign Up Now\", and enter your personal activation code: [email or web address]. Activation code expires 5/18/2019.   After Visit Summary   Instructions     ·   Your medications have changed     START taking:   ? amiodarone (PACERONE)   Start taking on: 4/19/2019   ? apixaban (ELIQUIS)   ? cyanocobalamin   Start taking on: 5/1/2019   ? digoxin (LANOXIN)   Start taking on: 4/19/2019   ? folic acid (FOLVITE)   Start taking on: 4/19/2019   ? furosemide (LASIX)   Start taking on: 4/19/2019   ? levothyroxine (SYNTHROID, LEVOTHROID)   ? Metoprolol Tartrate   ? mexiletine (MEXITIL)   ? OLANZapine (zyPREXA)   ? pantoprazole (PROTONIX)   ? spironolactone (ALDACTONE)   ? thiamine (VITAMIN B1)   Start taking on: 4/19/2019   CHANGE how you take:   ? aspirin   Review your updated medication list below.   Your " Next Steps   Ask   Read   If you have any questions about your recovery, please call the [facility] Nurse Call Center at [phone]. A registered nurse is available 24 hours a day 7 days a week to assist you.   ·   Activity instructions     Activity as Tolerated   ·   Diet instructions     Diet: Cardiac   Discharge Diet:   Cardiac   What's next     What's next          Follow up with No Known Provider  [facility]   [place] [address]   [phone]    Your Allergies   Date Reviewed: 3/7/2019   Your Allergies   No active allergies   Patient Belongings Returned     Document Return of Belongings Flowsheet     Were the patient bedside belongings sent home? --   Medications Retrieved from Pharmacy & Sent Home --   Belongings Sent to Safe --   Belongings sent with: --   Belongings Retrieved from Security & Sent Home --       Best Before Mediahart Signup     [facility] SPIRIT Navigation allows you to send messages to your doctor, view your test results, renew your prescriptions, schedule appointments, and more. To sign up, go to [employer] and click on the Sign Up Now link in the New User? box. Enter your SPIRIT Navigation Activation Code exactly as it appears below along with the last four digits of your Social Security Number and your Date of Birth () to complete the sign-up process. If you do not sign up before the expiration date, you must request a new code.     SPIRIT Navigation Activation Code: [email or web address]  Expires: 2019 10:15 AM     If you have questions, you can email [email or web address] or call [phone] to talk to our SPIRIT Navigation staff. Remember, SPIRIT Navigation is NOT to be used for urgent needs. For medical emergencies, dial 911.     Medication List   Medication List     Morning Afternoon Evening Bedtime As Needed    amiodarone 200 MG tablet   Commonly known as: PACERONE   Start taking on: 2019   Take 1 tablet by mouth Daily.          apixaban 5 MG tablet tablet   Commonly known as: ELIQUIS   Take  1 tablet by mouth Every 12 (Twelve) Hours.   For: Atrial Fibrillation          aspirin 81 MG tablet   Take 1 tablet by mouth Daily.   What changed:   · medication strength   · how much to take          calcium carbonate 500 MG chewable tablet   Commonly known as: TUMS   Chew 2 tablets As Needed for Indigestion or Heartburn.          cyanocobalamin 1000 MCG/ML injection   Start taking on: 5/1/2019   Inject 1 mL into the appropriate muscle as directed by prescriber Every 30 (Thirty) Days.          digoxin 125 MCG tablet   Commonly known as: LANOXIN   Start taking on: 4/19/2019   Take 1 tablet by mouth Every Other Day.          folic acid 1 MG tablet   Commonly known as: FOLVITE   Start taking on: 4/19/2019   Take 1 tablet by mouth Daily.          furosemide 10 MG half tablet   Commonly known as: LASIX   Start taking on: 4/19/2019   Take 1 half tablet by mouth Daily.          levothyroxine 75 MCG tablet   Commonly known as: SYNTHROID, LEVOTHROID   Take 1 tablet by mouth Daily.          Metoprolol Tartrate 75 MG tablet   Take 75 mg by mouth Every 12 (Twelve) Hours for 30 days.          mexiletine 150 MG capsule   Commonly known as: MEXITIL   Take 1 capsule by mouth Every 8 (Eight) Hours.          OLANZapine 5 MG tablet   Commonly known as: zyPREXA   Take 1 tablet by mouth Every Night.          pantoprazole 40 MG EC tablet   Commonly known as: PROTONIX   Take 1 tablet by mouth Daily.          spironolactone 25 MG tablet   Commonly known as: ALDACTONE   Take 1 tablet by mouth Daily With Lunch.          thiamine 100 MG tablet   Commonly known as: VITAMIN B1   Start taking on: 4/19/2019   Take 1 tablet by mouth Daily.         Where to  your medications     ·   Ask your doctor where to  these medications     ? • amiodarone 200 MG tablet   ? • apixaban 5 MG tablet tablet   ? • aspirin 81 MG tablet   ? • cyanocobalamin 1000 MCG/ML injection   ? • digoxin 125 MCG tablet   ? • folic acid 1 MG tablet   ? •  furosemide 10 MG half tablet   ? • levothyroxine 75 MCG tablet   ? • Metoprolol Tartrate 75 MG tablet   ? • mexiletine 150 MG capsule   ? • OLANZapine 5 MG tablet   ? • pantoprazole 40 MG EC tablet   ? • spironolactone 25 MG tablet   ? • thiamine 100 MG tablet   Your Medication List      Always carry an updated list of your medications with you. If there is an emergency, a responder can quickly see what medications you are taking. Take this paperwork with you the next time you see your health care provider.        Yoni Sign-Up   Attached Information   Acute Pain  Adult (English)   Acute Pain, Adult  Acute pain is a type of pain that may last for just a few days or as long as six months. It is often related to an illness, injury, or medical procedure. Acute pain may be mild, moderate, or severe. It usually goes away once your injury has healed or you are no longer ill.  Pain can make it hard for you to do daily activities. It can cause anxiety and lead to other problems if left untreated. Treatment depends on the cause and severity of your acute pain.  Follow these instructions at home:  ·   · Check your pain level as told by your health care provider.  · Take over-the-counter and prescription medicines only as told by your health care provider.  · If you are taking prescription pain medicine:  ? Ask your health care provider about taking a stool softener or laxative to prevent constipation.  ? Do not stop taking the medicine suddenly. Talk to your health care provider about how and when to discontinue prescription pain medicine.  ? If your pain is severe, do not take more pills than instructed by your health care provider.  ? Do not take other over-the-counter pain medicines in addition to this medicine unless told by your health care provider.  ? Do not drive or operate heavy machinery while taking prescription pain medicine.  · Apply ice or heat as told by your health care provider. These may reduce swelling  and pain.  · Ask your health care provider if other strategies such as distraction, relaxation, or physical therapies can help your pain.  · Keep all follow-up visits as told by your health care provider. This is important.  Contact a health care provider if:  · You have pain that is not controlled by medicine.  · Your pain does not improve or gets worse.  · You have side effects from pain medicines, such as vomiting or confusion.  Get help right away if:  · You have severe pain.  · You have trouble breathing.  · You lose consciousness.  · You have chest pain or pressure that lasts for more than a few minutes. Along with the chest pain you may:  ? Have pain or discomfort in one or both arms, your back, neck, jaw, or stomach.  ? Have shortness of breath.  ? Break out in a cold sweat.  ? Feel nauseous.  ? Become light-headed.  These symptoms may represent a serious problem that is an emergency. Do not wait to see if the symptoms will go away. Get medical help right away. Call your local emergency services (911 in the [place] Do not drive yourself to the hospital.  This information is not intended to replace advice given to you by your health care provider. Make sure you discuss any questions you have with your health care provider.  Document Released: 01/01/2017 Document Revised: 05/26/2017 Document Reviewed: 01/01/2017  [employer] Interactive Patient Education © 2018 [employer]     Attached Information   Cataract (English)   Cataract    A cataract is cloudiness on the lens of your eye. The lens is the clear part of your eye that is behind your iris and pupil. The lens focuses light on the retina, which lets you see clearly.  When a lens becomes cloudy, vision may become blurry. The clouding can range from a tiny dot to complete cloudiness. As some cataracts develop, they make a person more nearsighted. Other cataracts increase glare. Cataracts can worsen over time, and sometimes the pupil can look white. Cataracts  get bigger and they cloud more of the lens, making it difficult to see. Cataracts can affect one eye or both eyes.  What are the causes?  Most cataracts are associated with age-related eye changes. The eye lens is mostly made up of water and protein. Normally, this protein is arranged in a way that keeps the lens clear. Cataracts develop when protein begins to clump together over time. This clouds the lens and lets less light pass through to the retina, which causes blurry vision.  What increases the risk?  This condition is more likely to develop in people who:  · Are 60 years of age or older.  · Have diabetes.  · Have high blood pressure.  · Take certain medicines, such as steroids or hormone replacement therapy.  · Have had an eye injury.  · Have or have had eye inflammation.  · Have a family history of cataracts.  · Smoke.  · Drink alcohol heavily.  · Are frequently exposed to sun or very strong light without eye protection.  · Are obese.  · Have been exposed to large amounts of radiation, lead, or other toxic substances.  · Have had eye surgery.     What are the signs or symptoms?  The main symptom of a cataract is blurry vision. Your vision may change or get worse over time. Other symptoms include:  · Increased glare.  · Seeing a bright ring or halo around light.  · Poor night vision.  · Double vision in one eye.  · Having trouble seeing, even while wearing contact lenses or glasses.  · Seeing colors that appear faded.  · Trouble telling the difference between blue and purple.  · Needing frequent changes to your prescription glasses or contacts.     How is this diagnosed?  This condition is diagnosed with a medical history and eye exam. You may need to see an eye specialist (optometrist or ophthalmologist). Your health care provider may enlarge (dilate) your pupils with eye drops to see the back of your eye more clearly and look for signs of cataracts or other damage.  You may also have tests, including:  · A  visual acuity test. This uses a chart to determine the smallest letters that you can see from a specific distance.  · A slit-lamp exam. This uses a microscope to examine small sections of your eye for abnormalities.  · Tonometry. This test measures the pressure of the fluid inside your eye.     How is this treated?  Treatment depends on the stage of your cataract. For an early cataract, vision may improve by using different eyeglasses or stronger lighting. If that does not help your vision, surgery may be recommended to remove the cataract.  If your health care provider thinks your cataract may be linked to any medicines that you are taking, he or she may change your medicines.  Follow these instructions at home:  Lifestyle  · Use stronger or brighter lighting.  · Consider using a magnifying glass for reading or other activities.  · Become familiar with your surroundings. Having poor vision can put you at a greater risk for tripping, falling, or bumping into things.  · Wear sunglasses and a hat if you are sensitive to bright light or are having problems with glare.  · Quit smoking if you smoke. If you need help quitting, talk with your health care provider.  General instructions  · If you are prescribed new eyeglasses, wear them as told by your health care provider.  · Take over-the-counter and prescription medicines only as told by your health care provider. Do not change your medicines unless told by your health care provider.  · Do not drive or operate heavy machinery if your vision is blurry, particularly at night.  · Keep your blood sugar under control, if you have diabetes.  · Keep all follow-up visits as told by your health care provider. This is important.  Contact a health care provider if:  · Your symptoms get worse.  · Your vision affects your ability to perform daily activities.  · You have new symptoms.  · You have a fever.  Get help right away if:  · You have sudden vision loss.  · You have redness,  swelling, or increasing pain in your eye.  · You develop a headache and sensitivity to light.  This information is not intended to replace advice given to you by your health care provider. Make sure you discuss any questions you have with your health care provider.  Document Released: 12/18/2006 Document Revised: 04/27/2017 Document Reviewed: 06/22/2016  [employer] Interactive Patient Education © 2018 Elsevier Inc.     Attached Information   Urinary Tract Infection  Adult  Easy-to-Read (English)   Urinary Tract Infection, Adult    A urinary tract infection (UTI) is an infection of any part of the urinary tract. The urinary tract includes the:  · Kidneys.  · Ureters.  · Bladder.  · Urethra.     These organs make, store, and get rid of pee (urine) in the body.  Follow these instructions at home:  · Take over-the-counter and prescription medicines only as told by your doctor.  · If you were prescribed an antibiotic medicine, take it as told by your doctor. Do not stop taking the antibiotic even if you start to feel better.  · Avoid the following drinks:  ? Alcohol.  ? Caffeine.  ? Tea.  ? Carbonated drinks.  · Drink enough fluid to keep your pee clear or pale yellow.  · Keep all follow-up visits as told by your doctor. This is important.  · Make sure to:  ? Empty your bladder often and completely. Do not to hold pee for long periods of time.  ? Empty your bladder before and after sex.  ? Wipe from front to back after a bowel movement if you are female. Use each tissue one time when you wipe.  Contact a doctor if:  · You have back pain.  · You have a fever.  · You feel sick to your stomach (nauseous).  · You throw up (vomit).  · Your symptoms do not get better after 3 days.  · Your symptoms go away and then come back.  Get help right away if:  · You have very bad back pain.  · You have very bad lower belly (abdominal) pain.  · You are throwing up and cannot keep down any medicines or water.  This information is not  intended to replace advice given to you by your health care provider. Make sure you discuss any questions you have with your health care provider.  Document Released: 06/05/2009 Document Revised: 05/25/2017 Document Reviewed: 11/07/2016  [employer] Interactive Patient Education © 2018 [employer]           Opioid Resource     If you or someone you know needs information on substance abuse, please visit   https://www.findhelpnWorkshareky.org/ for listings of facilities and resources across [place].           SYMPTOMS OF A STROKE     Call 911 or have someone take you to the Emergency Department if you have any of the following:     · Sudden numbness or weakness of your face, arm or leg especially on one side of the body  · Sudden confusion, difficulty speaking or trouble understanding   · Changes in your vision or loss of sight in one eye  · Sudden severe headache with no known cause  · Sudden dizziness, trouble walking, loss of balance or coordination     It is important to seek emergency care right away if you have further stroke symptoms. If you get emergency help quickly, the powerful clot-dissolving medicines can reduce the disabilities caused by a stroke.      For more information:     American Stroke Association  0-005-1-STROKE  www.strokeassociation.org      IF YOU SMOKE OR USE TOBACCO PLEASE READ THE FOLLOWING:     Why is smoking bad for me?  Smoking increases the risk of heart disease, lung disease, vascular disease, stroke, and cancer.      If you smoke, STOP!     If you would like more information on quitting smoking, please visit the [employer] website: www.IfOnly/Kaola100/healthier-together/smoke   This link will provide additional resources including the QUIT line and the Beat the Pack support groups.      For more information:     Quit Now [place]  1-800-QUIT-NOW  https://[facility].quitlogix.org/en-[place]/         SUICIDAL FEELINGS     If you feel like life is too tough and are thinking  of suicide or injuring yourself, get help right away!  · Call 911  · Call a suicide hotline to speak to a counselor. 5-042-631-TALK or 3-784-VJTRKHT             YOU ARE THE MOST IMPORTANT FACTOR IN YOUR RECOVERY.      Follow all instructions carefully.      I have reviewed my discharge instructions with my nurse, including the following information, if applicable:                 Information about my illness and diagnosis              Follow up appointments (including lab draws)              Wound Care              Equipment Needs              Medications (new and continuing) along with side effects              Preventative information such as vaccines and smoking cessations              Diet              Pain              I know when to contact my Doctor's office or seek emergency care        I want my nurse to describe the side effects of my medications: YES NO   If the answer is no, I understand the side effects of my medications: YES NO   My nurse described the side effects of my medications in a way that I could understand: YES NO   I have taken my personal belongings and my own medications with me at discharge: YES NO       I have received this information and my questions have been answered. I have discussed any concerns I see with this plan with the nurse or physician. I understand these instructions.     Signature of Patient or Responsible Person: _____________________________________     Date: _________________  Time: __________________     Signature of Healthcare Provider: _______________________________________  Date: _________________  Time: __________________

## 2019-04-18 NOTE — DISCHARGE SUMMARY
[facility] DISCHARGE SUMMARY    Patient Identification:  Name:  Altagracia King  Age:  53 y.o.  Sex:  female  :  1965  MRN:  [id number]  Visit Number:  [id number]    Date of Admission: 2019  Date of Discharge:  2019     PCP: Provider, No Known    DISCHARGE DIAGNOSIS  -Functional decline with physical deconditioning, unsafe discharge, patient is blind and homeless, going from nursing facility  -Paroxysmal atrial fibrillation, relatively stable  -History of nonsustained V. Tach  -Diastolic dysfunction currently compensated  -Acquired hypothyroidism  -Right eye cataract with complete left eye blindness      HOSPITAL COURSE  Patient is a 53 y.o. female presented to [facility] complaining of chest pain.  Please see the admitting history and physical for further details.  The patient has been in the hospital for 50+ days, please refer to chart.  In summary she was complaining of chest pain, MI was ruled out, and the patient was noted to have the same complaints while she was in [facility], and right after discharge she came to our facility. She is homeless, big social problem for placement. She has complete left eye blindness from trauma in the past and also has right eye cataract. She needs ophthalmology appointment for cataract and GI appointment for chest pain/odynophagia with abnormal CT distal esophagus. She did develop A. fib with rapid rate and had a low risk score she had a stress test on Thursday which was unremarkable.  PE protocol and Dopplers were negative.  She did develop however a new lateral pneumonia.  She required pressors but these are being weaned off and patient continued the full course of antibiotics, the patient is stable and is been on the medical floor for few weeks without any acute medical problem.  She is ready to be discharged to a nursing facility    VITAL SIGNS:  Temp:  [97.8 °F (36.6 °C)-98.3 °F (36.8 °C)] 98 °F (36.7  °C)  Heart Rate:  [78-90] 78  Resp:  [18] 18  BP: (101-122)/(52-67) 118/62  SpO2:  [95 %-100 %] 100 %  on  Flow (L/min):  [4] 4;   Device (Oxygen Therapy): room air    Body mass index is 18.53 kg/m².  Wt Readings from Last 3 Encounters:   04/18/19 50.5 kg (111 lb 6 oz)   02/14/19 56.7 kg (125 lb)   02/14/19 56.7 kg (125 lb)       PHYSICAL EXAM:  General: Comfortable,awake, alert, not in distress.    Skin:  Skin is warm and dry. No rash noted.  She is pale.  No skin break  HENT:  Head:  Normocephalic and atraumatic.  Mouth:  Moist mucous membranes.    Eyes:  Conjunctivae and EOM are normal.   No scleral icterus.  Left eye completely opacified, right eye has cataract  Neck:  Neck supple.  No JVD present.    Pulmonary/Chest:  No respiratory distress, no wheezes, no crackles, with normal breath sounds and good air movement.  Cardiovascular:  Normal rate, regular rhythm and normal heart sounds with no murmur.  Abdominal:  Soft.  Bowel sounds are normal.  No distension and no tenderness.   Extremities:  No edema, no tenderness, and no deformity.  No red or swollen joints anywhere.  Strong pulses in all 4 extremities with no clubbing, no cyanosis, no edema.  Neurological: No lateralization          DISCHARGE DISPOSITION   Stable    DISCHARGE MEDICATIONS:     Discharge Medications      New Medications      Instructions Start Date   amiodarone 200 MG tablet  Commonly known as:  PACERONE   200 mg, Oral, Daily   Start Date:  4/19/2019     apixaban 5 MG tablet tablet  Commonly known as:  ELIQUIS   5 mg, Oral, Every 12 Hours Scheduled      cyanocobalamin 1000 MCG/ML injection   1,000 mcg, Intramuscular, Every 30 Days   Start Date:  5/1/2019     digoxin 125 MCG tablet  Commonly known as:  LANOXIN   125 mcg, Oral, Every 48 Hours   Start Date:  4/19/2019     folic acid 1 MG tablet  Commonly known as:  FOLVITE   1 mg, Oral, Daily   Start Date:  4/19/2019     furosemide 10 MG half tablet  Commonly known as:  LASIX   10 mg, Oral,  Daily   Start Date:  4/19/2019     levothyroxine 75 MCG tablet  Commonly known as:  SYNTHROID, LEVOTHROID   75 mcg, Oral, Daily      Metoprolol Tartrate 75 MG tablet   75 mg, Oral, Every 12 Hours Scheduled      mexiletine 150 MG capsule  Commonly known as:  MEXITIL   150 mg, Oral, Every 8 Hours Scheduled      OLANZapine 5 MG tablet  Commonly known as:  zyPREXA   5 mg, Oral, Nightly      pantoprazole 40 MG EC tablet  Commonly known as:  PROTONIX   40 mg, Oral, Daily      spironolactone 25 MG tablet  Commonly known as:  ALDACTONE   25 mg, Oral, Daily With Lunch      thiamine 100 MG tablet  Commonly known as:  VITAMIN B1   100 mg, Oral, Daily   Start Date:  4/19/2019        Changes to Medications      Instructions Start Date   aspirin 81 MG tablet  What changed:    · medication strength  · how much to take   81 mg, Oral, Daily         Continue These Medications      Instructions Start Date   calcium carbonate 500 MG chewable tablet  Commonly known as:  TUMS   2 tablets, Oral, As Needed             Diet Instructions     Diet: Cardiac      Discharge Diet:  Cardiac        Activity Instructions     Activity as Tolerated          No future appointments.    Follow-up Information     Provider, No Known .    Contact information:  [facility] [address] [phone]                     CODE STATUS  Code Status and Medical Interventions:   Ordered at: 02/22/19 2236     Code Status:    CPR     Medical Interventions (Level of Support Prior to Arrest):    Full       Mhd Lis Celaya MD  04/18/19  10:15 AM    Please note that this discharge summary required more than 30 minutes to complete.    Please send a copy of this dictation to the following providers:  Provider, No Known

## 2019-04-18 NOTE — PLAN OF CARE
Problem: Skin Injury Risk (Adult)  Goal: Skin Health and Integrity  Outcome: Ongoing (interventions implemented as appropriate)   04/18/19 1123   Skin Injury Risk (Adult)   Skin Health and Integrity making progress toward outcome       Problem: Patient Care Overview  Goal: Plan of Care Review  Outcome: Ongoing (interventions implemented as appropriate)   04/17/19 9917 04/18/19 0815   Plan of Care Review   Progress improving --    Coping/Psychosocial   Plan of Care Reviewed With --  patient       Problem: Pain, Chronic (Adult)  Goal: Acceptable Pain/Comfort Level and Functional Ability  Outcome: Ongoing (interventions implemented as appropriate)   04/18/19 1123   Pain, Chronic (Adult)   Acceptable Pain/Comfort Level and Functional Ability making progress toward outcome

## 2019-04-18 NOTE — DISCHARGE PLACEMENT REQUEST
"Shagufta Loza (53 y.o. Female)     Date of Birth Social Security Number Address Home Phone MRN    1965  [address] [place] [address] [phone] [id number]    Jain Marital Status          Unknown        Admission Date Admission Type Admitting Provider Attending Provider Department, Room/Bed    19 Emergency [place], MD Belia Spencer Mhd Motaz, MD [address], 3306/1S    Discharge Date Discharge Disposition Discharge Destination         Skilled Nursing Facility (DC - External)              Attending Provider:  Beronica Celaya MD    Allergies:  No Known Allergies    Isolation:  None   Infection:  None   Code Status:  CPR    Ht:  165.1 cm (65\")   Wt:  50.5 kg (111 lb 6 oz)    Admission Cmt:  None   Principal Problem:  None                Active Insurance as of 2019     Primary Coverage     Payor Plan Insurance Group Employer/Plan Group    HUMANA MEDICAID HU[employer] [id number]     Payor Plan Address Payor Plan Phone Number Payor Plan Fax Number Effective Dates    PO  [phone]  2019 - None Entered    [facility] [id number]       Subscriber Name Subscriber Birth Date Member ID       SHAGUFTA LOZA 1965 [id number]                 Emergency Contacts      (Rel.) Home Phone Work Phone Mobile Phone    Madeline Loza (Daughter) -- -- [phone]    Faustino Mclain (Friend) [phone] -- --    Mumtaz Bundy (Friend) [phone] -- --    Sylwia Montenegro (Sister) [phone] -- --            Beronica Celaya MD   Physician   Medicine   Discharge Summary   Addendum   Date of Service:  2019 10:15 AM   Creation Time:  2019 10:15 AM                      Show:Clear all  [x]Manual[x]Template[x]Copied    Added by:  [x]Beronica Celaya MD      []Philip for details           [facility] DISCHARGE SUMMARY     Patient Identification:  Name:  Shagufta Loza  Age:  53 y.o.  Sex:  female  :  " 1965  MRN:  [id number]  Visit Number:  [id number]     Date of Admission: 2/22/2019  Date of Discharge:  4/18/2019      PCP: Provider, No Known     DISCHARGE DIAGNOSIS  -Functional decline with physical deconditioning, unsafe discharge, patient is blind and homeless, going from nursing facility  -Paroxysmal atrial fibrillation, relatively stable  -History of nonsustained V. Tach  -Diastolic dysfunction currently compensated  -Acquired hypothyroidism  -Right eye cataract with complete left eye blindness        HOSPITAL COURSE  Patient is a 53 y.o. female presented to [facility] complaining of chest pain.  Please see the admitting history and physical for further details.  The patient has been in the hospital for 50+ days, please refer to chart.  In summary she was complaining of chest pain, MI was ruled out, and the patient was noted to have the same complaints while she was in [facility], and right after discharge she came to our facility. She is homeless, big social problem for placement. She has complete left eye blindness from trauma in the past and also has right eye cataract. She needs ophthalmology appointment for cataract and GI appointment for chest pain/odynophagia with abnormal CT distal esophagus. She did develop A. fib with rapid rate and had a low risk score she had a stress test on Thursday which was unremarkable.  PE protocol and Dopplers were negative.  She did develop however a new lateral pneumonia.  She required pressors but these are being weaned off and patient continued the full course of antibiotics, the patient is stable and is been on the medical floor for few weeks without any acute medical problem.  She is ready to be discharged to a nursing facility     VITAL SIGNS:  Temp:  [97.8 °F (36.6 °C)-98.3 °F (36.8 °C)] 98 °F (36.7 °C)  Heart Rate:  [78-90] 78  Resp:  [18] 18  BP: (101-122)/(52-67) 118/62  SpO2:  [95 %-100 %] 100 %  on  Flow (L/min):  [4] 4;   Device (Oxygen  Therapy): room air     Body mass index is 18.53 kg/m².      Wt Readings from Last 3 Encounters:   04/18/19 50.5 kg (111 lb 6 oz)   02/14/19 56.7 kg (125 lb)   02/14/19 56.7 kg (125 lb)         PHYSICAL EXAM:  General: Comfortable,awake, alert, not in distress.    Skin:  Skin is warm and dry. No rash noted.  She is pale.  No skin break  HENT:  Head:  Normocephalic and atraumatic.  Mouth:  Moist mucous membranes.    Eyes:  Conjunctivae and EOM are normal.   No scleral icterus.  Left eye completely opacified, right eye has cataract  Neck:  Neck supple.  No JVD present.    Pulmonary/Chest:  No respiratory distress, no wheezes, no crackles, with normal breath sounds and good air movement.  Cardiovascular:  Normal rate, regular rhythm and normal heart sounds with no murmur.  Abdominal:  Soft.  Bowel sounds are normal.  No distension and no tenderness.   Extremities:  No edema, no tenderness, and no deformity.  No red or swollen joints anywhere.  Strong pulses in all 4 extremities with no clubbing, no cyanosis, no edema.  Neurological: No lateralization              DISCHARGE DISPOSITION   Stable     DISCHARGE MEDICATIONS:           Discharge Medications            New Medications      Instructions Start Date   amiodarone 200 MG tablet  Commonly known as:  PACERONE    200 mg, Oral, Daily    Start Date:  4/19/2019      apixaban 5 MG tablet tablet  Commonly known as:  ELIQUIS    5 mg, Oral, Every 12 Hours Scheduled        cyanocobalamin 1000 MCG/ML injection    1,000 mcg, Intramuscular, Every 30 Days    Start Date:  5/1/2019      digoxin 125 MCG tablet  Commonly known as:  LANOXIN    125 mcg, Oral, Every 48 Hours    Start Date:  4/19/2019      folic acid 1 MG tablet  Commonly known as:  FOLVITE    1 mg, Oral, Daily    Start Date:  4/19/2019      furosemide 10 MG half tablet  Commonly known as:  LASIX    10 mg, Oral, Daily    Start Date:  4/19/2019      levothyroxine 75 MCG tablet  Commonly known as:  SYNTHROID, LEVOTHROID     75 mcg, Oral, Daily        Metoprolol Tartrate 75 MG tablet    75 mg, Oral, Every 12 Hours Scheduled        mexiletine 150 MG capsule  Commonly known as:  MEXITIL    150 mg, Oral, Every 8 Hours Scheduled        OLANZapine 5 MG tablet  Commonly known as:  zyPREXA    5 mg, Oral, Nightly        pantoprazole 40 MG EC tablet  Commonly known as:  PROTONIX    40 mg, Oral, Daily        spironolactone 25 MG tablet  Commonly known as:  ALDACTONE    25 mg, Oral, Daily With Lunch        thiamine 100 MG tablet  Commonly known as:  VITAMIN B1    100 mg, Oral, Daily    Start Date:  4/19/2019                    Changes to Medications      Instructions Start Date   aspirin 81 MG tablet  What changed:    · medication strength  · how much to take    81 mg, Oral, Daily                      Continue These Medications      Instructions Start Date   calcium carbonate 500 MG chewable tablet  Commonly known as:  TUMS    2 tablets, Oral, As Needed                      Diet Instructions      Diet: Cardiac       Discharge Diet:  Cardiac              Activity Instructions      Activity as Tolerated            No future appointments.         Follow-up Information      Provider, No Known .    Contact information:  [facility] [address] [phone]                         CODE STATUS      Code Status and Medical Interventions:   Ordered at: 02/22/19 2236     Code Status:     CPR     Medical Interventions (Level of Support Prior to Arrest):     Full         Beronica Celaya MD  04/18/19  10:15 AM     Please note that this discharge summary required more than 30 minutes to complete.     Please send a copy of this dictation to the following providers:  Provider, No Known             Revision History     Date/Time User Provider Type Action   4/18/2019 10:20 AM Beronica Celaya MD Physician Addend   4/18/2019 10:19 AM Beronica Celaya MD Physician Sign   View Details Report          Altagracia King MRN: [id number]       "2/22/2019 - 4/18/2019     [address]    Your Next Steps     Ask     ·   Ask how to get these medications   ? amiodarone   ? apixaban   ? aspirin   ? cyanocobalamin   ? digoxin   ? folic acid   ? furosemide   ? levothyroxine   ? Metoprolol Tartrate   ? mexiletine   ? OLANZapine   ? pantoprazole   ? spironolactone   ? thiamine   Read     ·   Read these attachments   ? Acute Pain  Adult (English)   ? Cataract (English)   ? Urinary Tract Infection  Adult  Easy-to-Read (English)   F[employer] Sign-Up     Send messages to your doctor, view your test results, renew your prescriptions, schedule appointments, and more.   Go to [email or web address], click \"Sign Up Now\", and enter your personal activation code: [email or web address]. Activation code expires 5/18/2019.   After Visit Summary   Instructions     ·   Your medications have changed     START taking:   ? amiodarone (PACERONE)   Start taking on: 4/19/2019   ? apixaban (ELIQUIS)   ? cyanocobalamin   Start taking on: 5/1/2019   ? digoxin (LANOXIN)   Start taking on: 4/19/2019   ? folic acid (FOLVITE)   Start taking on: 4/19/2019   ? furosemide (LASIX)   Start taking on: 4/19/2019   ? levothyroxine (SYNTHROID, LEVOTHROID)   ? Metoprolol Tartrate   ? mexiletine (MEXITIL)   ? OLANZapine (zyPREXA)   ? pantoprazole (PROTONIX)   ? spironolactone (ALDACTONE)   ? thiamine (VITAMIN B1)   Start taking on: 4/19/2019   CHANGE how you take:   ? aspirin   Review your updated medication list below.   Your Next Steps   Ask   Read   If you have any questions about your recovery, please call the [facility] Nurse Call Center at [phone]. A registered nurse is available 24 hours a day 7 days a week to assist you.   ·   Activity instructions     Activity as Tolerated   ·   Diet instructions     Diet: Cardiac   Discharge Diet:   Cardiac   What's next     What's next          Follow up with No Known Provider  [facility]" [id number]   [phone]    Your Allergies   Date Reviewed: 3/7/2019   Your Allergies   No active allergies   Patient Belongings Returned     Document Return of Belongings Flowsheet     Were the patient bedside belongings sent home? --   Medications Retrieved from Pharmacy & Sent Home --   Belongings Sent to Safe --   Belongings sent with: --   Belongings Retrieved from Security & Sent Home --       Fancy[employer] Signup     [facility] [employer] allows you to send messages to your doctor, view your test results, renew your prescriptions, schedule appointments, and more. To sign up, go to [employer] and click on the Sign Up Now link in the New User? box. Enter your [employer] Activation Code exactly as it appears below along with the last four digits of your Social Security Number and your Date of Birth () to complete the sign-up process. If you do not sign up before the expiration date, you must request a new code.     [employer] Activation Code: [email or web address]  Expires: 2019 10:15 AM     If you have questions, you can email [email or web address] or call [phone] to talk to our [employer] staff. Remember, [employer] is NOT to be used for urgent needs. For medical emergencies, dial 911.     Medication List   Medication List     Morning Afternoon Evening Bedtime As Needed    amiodarone 200 MG tablet   Commonly known as: PACERONE   Start taking on: 2019   Take 1 tablet by mouth Daily.          apixaban 5 MG tablet tablet   Commonly known as: ELIQUIS   Take 1 tablet by mouth Every 12 (Twelve) Hours.   For: Atrial Fibrillation          aspirin 81 MG tablet   Take 1 tablet by mouth Daily.   What changed:   · medication strength   · how much to take          calcium carbonate 500 MG chewable tablet   Commonly known as: TUMS   Chew 2 tablets As Needed for Indigestion or Heartburn.          cyanocobalamin 1000 MCG/ML injection   Start taking on: 2019   Inject 1 mL into the appropriate muscle as  directed by prescriber Every 30 (Thirty) Days.          digoxin 125 MCG tablet   Commonly known as: LANOXIN   Start taking on: 4/19/2019   Take 1 tablet by mouth Every Other Day.          folic acid 1 MG tablet   Commonly known as: FOLVITE   Start taking on: 4/19/2019   Take 1 tablet by mouth Daily.          furosemide 10 MG half tablet   Commonly known as: LASIX   Start taking on: 4/19/2019   Take 1 half tablet by mouth Daily.          levothyroxine 75 MCG tablet   Commonly known as: SYNTHROID, LEVOTHROID   Take 1 tablet by mouth Daily.          Metoprolol Tartrate 75 MG tablet   Take 75 mg by mouth Every 12 (Twelve) Hours for 30 days.          mexiletine 150 MG capsule   Commonly known as: MEXITIL   Take 1 capsule by mouth Every 8 (Eight) Hours.          OLANZapine 5 MG tablet   Commonly known as: zyPREXA   Take 1 tablet by mouth Every Night.          pantoprazole 40 MG EC tablet   Commonly known as: PROTONIX   Take 1 tablet by mouth Daily.          spironolactone 25 MG tablet   Commonly known as: ALDACTONE   Take 1 tablet by mouth Daily With Lunch.          thiamine 100 MG tablet   Commonly known as: VITAMIN B1   Start taking on: 4/19/2019   Take 1 tablet by mouth Daily.         Where to  your medications     ·   Ask your doctor where to  these medications     ? • amiodarone 200 MG tablet   ? • apixaban 5 MG tablet tablet   ? • aspirin 81 MG tablet   ? • cyanocobalamin 1000 MCG/ML injection   ? • digoxin 125 MCG tablet   ? • folic acid 1 MG tablet   ? • furosemide 10 MG half tablet   ? • levothyroxine 75 MCG tablet   ? • Metoprolol Tartrate 75 MG tablet   ? • mexiletine 150 MG capsule   ? • OLANZapine 5 MG tablet   ? • pantoprazole 40 MG EC tablet   ? • spironolactone 25 MG tablet   ? • thiamine 100 MG tablet   Your Medication List      Always carry an updated list of your medications with you. If there is an emergency, a responder can quickly see what medications you are taking. Take this paperwork  with you the next time you see your health care provider.        Yoni Sign-Up   Attached Information   Acute Pain  Adult (English)   Acute Pain, Adult  Acute pain is a type of pain that may last for just a few days or as long as six months. It is often related to an illness, injury, or medical procedure. Acute pain may be mild, moderate, or severe. It usually goes away once your injury has healed or you are no longer ill.  Pain can make it hard for you to do daily activities. It can cause anxiety and lead to other problems if left untreated. Treatment depends on the cause and severity of your acute pain.  Follow these instructions at home:  ·   · Check your pain level as told by your health care provider.  · Take over-the-counter and prescription medicines only as told by your health care provider.  · If you are taking prescription pain medicine:  ? Ask your health care provider about taking a stool softener or laxative to prevent constipation.  ? Do not stop taking the medicine suddenly. Talk to your health care provider about how and when to discontinue prescription pain medicine.  ? If your pain is severe, do not take more pills than instructed by your health care provider.  ? Do not take other over-the-counter pain medicines in addition to this medicine unless told by your health care provider.  ? Do not drive or operate heavy machinery while taking prescription pain medicine.  · Apply ice or heat as told by your health care provider. These may reduce swelling and pain.  · Ask your health care provider if other strategies such as distraction, relaxation, or physical therapies can help your pain.  · Keep all follow-up visits as told by your health care provider. This is important.  Contact a health care provider if:  · You have pain that is not controlled by medicine.  · Your pain does not improve or gets worse.  · You have side effects from pain medicines, such as vomiting or confusion.  Get help right away  if:  · You have severe pain.  · You have trouble breathing.  · You lose consciousness.  · You have chest pain or pressure that lasts for more than a few minutes. Along with the chest pain you may:  ? Have pain or discomfort in one or both arms, your back, neck, jaw, or stomach.  ? Have shortness of breath.  ? Break out in a cold sweat.  ? Feel nauseous.  ? Become light-headed.  These symptoms may represent a serious problem that is an emergency. Do not wait to see if the symptoms will go away. Get medical help right away. Call your local emergency services (911 in the [place] Do not drive yourself to the hospital.  This information is not intended to replace advice given to you by your health care provider. Make sure you discuss any questions you have with your health care provider.  Document Released: 01/01/2017 Document Revised: 05/26/2017 Document Reviewed: 01/01/2017  [employer] Interactive Patient Education © 2018 [employer]     Attached Information   Cataract (English)   Cataract    A cataract is cloudiness on the lens of your eye. The lens is the clear part of your eye that is behind your iris and pupil. The lens focuses light on the retina, which lets you see clearly.  When a lens becomes cloudy, vision may become blurry. The clouding can range from a tiny dot to complete cloudiness. As some cataracts develop, they make a person more nearsighted. Other cataracts increase glare. Cataracts can worsen over time, and sometimes the pupil can look white. Cataracts get bigger and they cloud more of the lens, making it difficult to see. Cataracts can affect one eye or both eyes.  What are the causes?  Most cataracts are associated with age-related eye changes. The eye lens is mostly made up of water and protein. Normally, this protein is arranged in a way that keeps the lens clear. Cataracts develop when protein begins to clump together over time. This clouds the lens and lets less light pass through to the retina,  which causes blurry vision.  What increases the risk?  This condition is more likely to develop in people who:  · Are 60 years of age or older.  · Have diabetes.  · Have high blood pressure.  · Take certain medicines, such as steroids or hormone replacement therapy.  · Have had an eye injury.  · Have or have had eye inflammation.  · Have a family history of cataracts.  · Smoke.  · Drink alcohol heavily.  · Are frequently exposed to sun or very strong light without eye protection.  · Are obese.  · Have been exposed to large amounts of radiation, lead, or other toxic substances.  · Have had eye surgery.     What are the signs or symptoms?  The main symptom of a cataract is blurry vision. Your vision may change or get worse over time. Other symptoms include:  · Increased glare.  · Seeing a bright ring or halo around light.  · Poor night vision.  · Double vision in one eye.  · Having trouble seeing, even while wearing contact lenses or glasses.  · Seeing colors that appear faded.  · Trouble telling the difference between blue and purple.  · Needing frequent changes to your prescription glasses or contacts.     How is this diagnosed?  This condition is diagnosed with a medical history and eye exam. You may need to see an eye specialist (optometrist or ophthalmologist). Your health care provider may enlarge (dilate) your pupils with eye drops to see the back of your eye more clearly and look for signs of cataracts or other damage.  You may also have tests, including:  · A visual acuity test. This uses a chart to determine the smallest letters that you can see from a specific distance.  · A slit-lamp exam. This uses a microscope to examine small sections of your eye for abnormalities.  · Tonometry. This test measures the pressure of the fluid inside your eye.     How is this treated?  Treatment depends on the stage of your cataract. For an early cataract, vision may improve by using different eyeglasses or stronger  lighting. If that does not help your vision, surgery may be recommended to remove the cataract.  If your health care provider thinks your cataract may be linked to any medicines that you are taking, he or she may change your medicines.  Follow these instructions at home:  Lifestyle  · Use stronger or brighter lighting.  · Consider using a magnifying glass for reading or other activities.  · Become familiar with your surroundings. Having poor vision can put you at a greater risk for tripping, falling, or bumping into things.  · Wear sunglasses and a hat if you are sensitive to bright light or are having problems with glare.  · Quit smoking if you smoke. If you need help quitting, talk with your health care provider.  General instructions  · If you are prescribed new eyeglasses, wear them as told by your health care provider.  · Take over-the-counter and prescription medicines only as told by your health care provider. Do not change your medicines unless told by your health care provider.  · Do not drive or operate heavy machinery if your vision is blurry, particularly at night.  · Keep your blood sugar under control, if you have diabetes.  · Keep all follow-up visits as told by your health care provider. This is important.  Contact a health care provider if:  · Your symptoms get worse.  · Your vision affects your ability to perform daily activities.  · You have new symptoms.  · You have a fever.  Get help right away if:  · You have sudden vision loss.  · You have redness, swelling, or increasing pain in your eye.  · You develop a headache and sensitivity to light.  This information is not intended to replace advice given to you by your health care provider. Make sure you discuss any questions you have with your health care provider.  Document Released: 12/18/2006 Document Revised: 04/27/2017 Document Reviewed: 06/22/2016  [employer] Interactive Patient Education © 2018 Elsevier Inc.     Attached Information   Urinary  Tract Infection  Adult  Easy-to-Read (English)   Urinary Tract Infection, Adult    A urinary tract infection (UTI) is an infection of any part of the urinary tract. The urinary tract includes the:  · Kidneys.  · Ureters.  · Bladder.  · Urethra.     These organs make, store, and get rid of pee (urine) in the body.  Follow these instructions at home:  · Take over-the-counter and prescription medicines only as told by your doctor.  · If you were prescribed an antibiotic medicine, take it as told by your doctor. Do not stop taking the antibiotic even if you start to feel better.  · Avoid the following drinks:  ? Alcohol.  ? Caffeine.  ? Tea.  ? Carbonated drinks.  · Drink enough fluid to keep your pee clear or pale yellow.  · Keep all follow-up visits as told by your doctor. This is important.  · Make sure to:  ? Empty your bladder often and completely. Do not to hold pee for long periods of time.  ? Empty your bladder before and after sex.  ? Wipe from front to back after a bowel movement if you are female. Use each tissue one time when you wipe.  Contact a doctor if:  · You have back pain.  · You have a fever.  · You feel sick to your stomach (nauseous).  · You throw up (vomit).  · Your symptoms do not get better after 3 days.  · Your symptoms go away and then come back.  Get help right away if:  · You have very bad back pain.  · You have very bad lower belly (abdominal) pain.  · You are throwing up and cannot keep down any medicines or water.  This information is not intended to replace advice given to you by your health care provider. Make sure you discuss any questions you have with your health care provider.  Document Released: 06/05/2009 Document Revised: 05/25/2017 Document Reviewed: 11/07/2016  [employer] Interactive Patient Education © 2018 [employer]           Opioid Resource     If you or someone you know needs information on substance abuse, please visit   [email or web address]://www.findhelpnowky.org/ for listings of  facilities and resources across [place].           SYMPTOMS OF A STROKE     Call 911 or have someone take you to the Emergency Department if you have any of the following:     · Sudden numbness or weakness of your face, arm or leg especially on one side of the body  · Sudden confusion, difficulty speaking or trouble understanding   · Changes in your vision or loss of sight in one eye  · Sudden severe headache with no known cause  · Sudden dizziness, trouble walking, loss of balance or coordination     It is important to seek emergency care right away if you have further stroke symptoms. If you get emergency help quickly, the powerful clot-dissolving medicines can reduce the disabilities caused by a stroke.      For more information:     American Stroke Association  6-866-3-STROKE  www.strokeassociation.org      IF YOU SMOKE OR USE TOBACCO PLEASE READ THE FOLLOWING:     Why is smoking bad for me?  Smoking increases the risk of heart disease, lung disease, vascular disease, stroke, and cancer.      If you smoke, STOP!     If you would like more information on quitting smoking, please visit the [employer] website: [email or web address]   This link will provide additional resources including the QUIT line and the Beat the Pack support groups.      For more information:     Quit Now [place]  1-800-QUIT-NOW  https://[place].quitlogix.org/en-[place]/         SUICIDAL FEELINGS     If you feel like life is too tough and are thinking of suicide or injuring yourself, get help right away!  · Call 911  · Call a suicide hotline to speak to a counselor. 2-302-221-TALK or 4-096-KEQOSYX             YOU ARE THE MOST IMPORTANT FACTOR IN YOUR RECOVERY.      Follow all instructions carefully.      I have reviewed my discharge instructions with my nurse, including the following information, if applicable:                 Information about my illness and diagnosis              Follow up  appointments (including lab draws)              Wound Care              Equipment Needs              Medications (new and continuing) along with side effects              Preventative information such as vaccines and smoking cessations              Diet              Pain              I know when to contact my Doctor's office or seek emergency care        I want my nurse to describe the side effects of my medications: YES NO   If the answer is no, I understand the side effects of my medications: YES NO   My nurse described the side effects of my medications in a way that I could understand: YES NO   I have taken my personal belongings and my own medications with me at discharge: YES NO       I have received this information and my questions have been answered. I have discussed any concerns I see with this plan with the nurse or physician. I understand these instructions.     Signature of Patient or Responsible Person: _____________________________________     Date: _________________  Time: __________________     Signature of Healthcare Provider: _______________________________________  Date: _________________  Time: __________________

## 2019-04-18 NOTE — PLAN OF CARE
Problem: Skin Injury Risk (Adult)  Goal: Skin Health and Integrity  Outcome: Ongoing (interventions implemented as appropriate)      Problem: Patient Care Overview  Goal: Plan of Care Review  Outcome: Ongoing (interventions implemented as appropriate)    Goal: Discharge Needs Assessment  Outcome: Ongoing (interventions implemented as appropriate)    Goal: Interprofessional Rounds/Family Conf  Outcome: Ongoing (interventions implemented as appropriate)    Goal: Discharge Needs Assessment  Outcome: Ongoing (interventions implemented as appropriate)    Goal: Interprofessional Rounds/Family Conf  Outcome: Ongoing (interventions implemented as appropriate)      Problem: Pain, Chronic (Adult)  Goal: Acceptable Pain/Comfort Level and Functional Ability  Outcome: Ongoing (interventions implemented as appropriate)      Problem: Mobility, Physical Impaired (Adult)  Goal: Enhanced Mobility Skills  Outcome: Ongoing (interventions implemented as appropriate)    Goal: Enhanced Functional Ability  Outcome: Ongoing (interventions implemented as appropriate)    Goal: Identify Related Risk Factors and Signs and Symptoms  Outcome: Ongoing (interventions implemented as appropriate)    Goal: Enhanced Mobility Skills  Outcome: Ongoing (interventions implemented as appropriate)    Goal: Enhanced Functional Ability  Outcome: Ongoing (interventions implemented as appropriate)    Goal: Identify Related Risk Factors and Signs and Symptoms  Outcome: Ongoing (interventions implemented as appropriate)    Goal: Enhanced Mobility Skills  Outcome: Ongoing (interventions implemented as appropriate)    Goal: Enhanced Functional Ability  Outcome: Ongoing (interventions implemented as appropriate)      Problem: Arrhythmia/Dysrhythmia (Symptomatic) (Adult)  Goal: Signs and Symptoms of Listed Potential Problems Will be Absent, Minimized or Managed (Arrhythmia/Dysrhythmia)  Outcome: Ongoing (interventions implemented as appropriate)

## 2019-04-18 NOTE — PROGRESS NOTES
Discharge Planning Assessment  [facility]     Patient Name: Altagracia King  MRN: [id number]  Today's Date: 4/18/2019    Admit Date: 2/22/2019      Discharge Plan     Row Name 04/18/19 1017       Plan    Final Discharge Disposition Code  03 - skilled nursing facility (SNF)    Final Note  Pt is being discharged to [facility] on this date. SS spoke with [facility], who states they are sending transportation to [place] on this date to meet [employer]. SS arranged [employer] on this date for a . SS faxed pt's discharge information on this date. SS contacted pt's family to inform of the discharge. No further needs at this time.        Destination      Service Provider Request Status Selected Services Address Phone Number Fax Number    [facility] - Request Sent N/A [address] [place] [phone]    [facility] Pending - Request Sent N/A [address] [phone]    [facility] [place] [facility] - Request Sent N/A [address] [place] [phone]    [facility] - Request Sent N/A [address] [facility] [phone]    [facility] Declined N/A [address] [facility] [id number] [phone]    [facility] Declined N/A [address] [address] [phone]        Expected Discharge Date and Time     Expected Discharge Date Expected Discharge Time    Apr 18, 2019           Jazmyn Mendoza

## 2024-01-10 NOTE — PLAN OF CARE
Problem: Fall Risk (Adult)  Goal: Absence of Fall  Outcome: Ongoing (interventions implemented as appropriate)      Problem: Skin Injury Risk (Adult)  Goal: Skin Health and Integrity  Outcome: Ongoing (interventions implemented as appropriate)      Problem: Patient Care Overview  Goal: Plan of Care Review  Outcome: Ongoing (interventions implemented as appropriate)    Goal: Individualization and Mutuality  Outcome: Ongoing (interventions implemented as appropriate)    Goal: Discharge Needs Assessment  Outcome: Ongoing (interventions implemented as appropriate)    Goal: Interprofessional Rounds/Family Conf  Outcome: Ongoing (interventions implemented as appropriate)    Goal: Plan of Care Review  Outcome: Ongoing (interventions implemented as appropriate)    Goal: Individualization and Mutuality  Outcome: Ongoing (interventions implemented as appropriate)    Goal: Discharge Needs Assessment  Outcome: Ongoing (interventions implemented as appropriate)    Goal: Interprofessional Rounds/Family Conf  Outcome: Ongoing (interventions implemented as appropriate)      Problem: Pain, Chronic (Adult)  Goal: Acceptable Pain/Comfort Level and Functional Ability  Outcome: Ongoing (interventions implemented as appropriate)      Problem: Mobility, Physical Impaired (Adult)  Goal: Enhanced Mobility Skills  Outcome: Ongoing (interventions implemented as appropriate)    Goal: Enhanced Functional Ability  Outcome: Ongoing (interventions implemented as appropriate)    Goal: Identify Related Risk Factors and Signs and Symptoms  Outcome: Ongoing (interventions implemented as appropriate)    Goal: Enhanced Mobility Skills  Outcome: Ongoing (interventions implemented as appropriate)    Goal: Enhanced Functional Ability  Outcome: Ongoing (interventions implemented as appropriate)      Problem: Arrhythmia/Dysrhythmia (Symptomatic) (Adult)  Goal: Signs and Symptoms of Listed Potential Problems Will be Absent, Minimized or Managed  (Arrhythmia/Dysrhythmia)  Outcome: Ongoing (interventions implemented as appropriate)         [id number] 17:38